# Patient Record
Sex: MALE | Race: ASIAN | NOT HISPANIC OR LATINO | URBAN - METROPOLITAN AREA
[De-identification: names, ages, dates, MRNs, and addresses within clinical notes are randomized per-mention and may not be internally consistent; named-entity substitution may affect disease eponyms.]

---

## 2022-07-06 ENCOUNTER — INPATIENT (INPATIENT)
Facility: HOSPITAL | Age: 82
LOS: 15 days | Discharge: PSYCHIATRIC OR SUBSTANCE USE FACILITY | End: 2022-07-22
Attending: INTERNAL MEDICINE | Admitting: INTERNAL MEDICINE

## 2022-07-06 VITALS
HEART RATE: 71 BPM | RESPIRATION RATE: 18 BRPM | DIASTOLIC BLOOD PRESSURE: 59 MMHG | OXYGEN SATURATION: 100 % | TEMPERATURE: 98 F | SYSTOLIC BLOOD PRESSURE: 113 MMHG

## 2022-07-06 DIAGNOSIS — E86.0 DEHYDRATION: ICD-10-CM

## 2022-07-06 LAB
ALBUMIN SERPL ELPH-MCNC: 4.4 G/DL — SIGNIFICANT CHANGE UP (ref 3.3–5)
ALP SERPL-CCNC: 42 U/L — SIGNIFICANT CHANGE UP (ref 40–120)
ALT FLD-CCNC: 13 U/L — SIGNIFICANT CHANGE UP (ref 4–41)
AMPHET UR-MCNC: NEGATIVE — SIGNIFICANT CHANGE UP
ANION GAP SERPL CALC-SCNC: 18 MMOL/L — HIGH (ref 7–14)
ANION GAP SERPL CALC-SCNC: 22 MMOL/L — HIGH (ref 7–14)
APAP SERPL-MCNC: <10 UG/ML — LOW (ref 15–25)
APPEARANCE UR: CLEAR — SIGNIFICANT CHANGE UP
AST SERPL-CCNC: 21 U/L — SIGNIFICANT CHANGE UP (ref 4–40)
BACTERIA # UR AUTO: NEGATIVE — SIGNIFICANT CHANGE UP
BARBITURATES UR SCN-MCNC: NEGATIVE — SIGNIFICANT CHANGE UP
BASOPHILS # BLD AUTO: 0.06 K/UL — SIGNIFICANT CHANGE UP (ref 0–0.2)
BASOPHILS NFR BLD AUTO: 0.9 % — SIGNIFICANT CHANGE UP (ref 0–2)
BENZODIAZ UR-MCNC: NEGATIVE — SIGNIFICANT CHANGE UP
BILIRUB SERPL-MCNC: 0.9 MG/DL — SIGNIFICANT CHANGE UP (ref 0.2–1.2)
BILIRUB UR-MCNC: ABNORMAL
BUN SERPL-MCNC: 26 MG/DL — HIGH (ref 7–23)
BUN SERPL-MCNC: 27 MG/DL — HIGH (ref 7–23)
CALCIUM SERPL-MCNC: 9.4 MG/DL — SIGNIFICANT CHANGE UP (ref 8.4–10.5)
CALCIUM SERPL-MCNC: 9.6 MG/DL — SIGNIFICANT CHANGE UP (ref 8.4–10.5)
CHLORIDE SERPL-SCNC: 100 MMOL/L — SIGNIFICANT CHANGE UP (ref 98–107)
CHLORIDE SERPL-SCNC: 97 MMOL/L — LOW (ref 98–107)
CO2 SERPL-SCNC: 18 MMOL/L — LOW (ref 22–31)
CO2 SERPL-SCNC: 20 MMOL/L — LOW (ref 22–31)
COCAINE METAB.OTHER UR-MCNC: NEGATIVE — SIGNIFICANT CHANGE UP
COLOR SPEC: YELLOW — SIGNIFICANT CHANGE UP
CREAT SERPL-MCNC: 1.04 MG/DL — SIGNIFICANT CHANGE UP (ref 0.5–1.3)
CREAT SERPL-MCNC: 1.12 MG/DL — SIGNIFICANT CHANGE UP (ref 0.5–1.3)
CREATININE URINE RESULT, DAU: 250 MG/DL — SIGNIFICANT CHANGE UP
DIFF PNL FLD: NEGATIVE — SIGNIFICANT CHANGE UP
EGFR: 66 ML/MIN/1.73M2 — SIGNIFICANT CHANGE UP
EGFR: 72 ML/MIN/1.73M2 — SIGNIFICANT CHANGE UP
EOSINOPHIL # BLD AUTO: 0.07 K/UL — SIGNIFICANT CHANGE UP (ref 0–0.5)
EOSINOPHIL NFR BLD AUTO: 1 % — SIGNIFICANT CHANGE UP (ref 0–6)
EPI CELLS # UR: 1 /HPF — SIGNIFICANT CHANGE UP (ref 0–5)
ETHANOL SERPL-MCNC: <10 MG/DL — SIGNIFICANT CHANGE UP
FLUAV AG NPH QL: SIGNIFICANT CHANGE UP
FLUBV AG NPH QL: SIGNIFICANT CHANGE UP
GLUCOSE BLDC GLUCOMTR-MCNC: 114 MG/DL — HIGH (ref 70–99)
GLUCOSE BLDC GLUCOMTR-MCNC: 119 MG/DL — HIGH (ref 70–99)
GLUCOSE BLDC GLUCOMTR-MCNC: 143 MG/DL — HIGH (ref 70–99)
GLUCOSE BLDC GLUCOMTR-MCNC: 146 MG/DL — HIGH (ref 70–99)
GLUCOSE BLDC GLUCOMTR-MCNC: 160 MG/DL — HIGH (ref 70–99)
GLUCOSE BLDC GLUCOMTR-MCNC: 73 MG/DL — SIGNIFICANT CHANGE UP (ref 70–99)
GLUCOSE BLDC GLUCOMTR-MCNC: 94 MG/DL — SIGNIFICANT CHANGE UP (ref 70–99)
GLUCOSE SERPL-MCNC: 82 MG/DL — SIGNIFICANT CHANGE UP (ref 70–99)
GLUCOSE SERPL-MCNC: 97 MG/DL — SIGNIFICANT CHANGE UP (ref 70–99)
GLUCOSE UR QL: NEGATIVE — SIGNIFICANT CHANGE UP
HCT VFR BLD CALC: 44.8 % — SIGNIFICANT CHANGE UP (ref 39–50)
HGB BLD-MCNC: 14.7 G/DL — SIGNIFICANT CHANGE UP (ref 13–17)
HYALINE CASTS # UR AUTO: 0 /LPF — SIGNIFICANT CHANGE UP (ref 0–7)
IANC: 5.36 K/UL — SIGNIFICANT CHANGE UP (ref 1.8–7.4)
IMM GRANULOCYTES NFR BLD AUTO: 0.3 % — SIGNIFICANT CHANGE UP (ref 0–1.5)
KETONES UR-MCNC: ABNORMAL
LEUKOCYTE ESTERASE UR-ACNC: NEGATIVE — SIGNIFICANT CHANGE UP
LYMPHOCYTES # BLD AUTO: 0.69 K/UL — LOW (ref 1–3.3)
LYMPHOCYTES # BLD AUTO: 9.9 % — LOW (ref 13–44)
MCHC RBC-ENTMCNC: 32.6 PG — SIGNIFICANT CHANGE UP (ref 27–34)
MCHC RBC-ENTMCNC: 32.8 GM/DL — SIGNIFICANT CHANGE UP (ref 32–36)
MCV RBC AUTO: 99.3 FL — SIGNIFICANT CHANGE UP (ref 80–100)
METHADONE UR-MCNC: NEGATIVE — SIGNIFICANT CHANGE UP
MONOCYTES # BLD AUTO: 0.75 K/UL — SIGNIFICANT CHANGE UP (ref 0–0.9)
MONOCYTES NFR BLD AUTO: 10.8 % — SIGNIFICANT CHANGE UP (ref 2–14)
NEUTROPHILS # BLD AUTO: 5.36 K/UL — SIGNIFICANT CHANGE UP (ref 1.8–7.4)
NEUTROPHILS NFR BLD AUTO: 77.1 % — HIGH (ref 43–77)
NITRITE UR-MCNC: NEGATIVE — SIGNIFICANT CHANGE UP
NRBC # BLD: 0 /100 WBCS — SIGNIFICANT CHANGE UP
NRBC # FLD: 0 K/UL — SIGNIFICANT CHANGE UP
OPIATES UR-MCNC: NEGATIVE — SIGNIFICANT CHANGE UP
OXYCODONE UR-MCNC: NEGATIVE — SIGNIFICANT CHANGE UP
PCP SPEC-MCNC: SIGNIFICANT CHANGE UP
PCP UR-MCNC: NEGATIVE — SIGNIFICANT CHANGE UP
PH UR: 5.5 — SIGNIFICANT CHANGE UP (ref 5–8)
PLATELET # BLD AUTO: 230 K/UL — SIGNIFICANT CHANGE UP (ref 150–400)
POTASSIUM SERPL-MCNC: 4.1 MMOL/L — SIGNIFICANT CHANGE UP (ref 3.5–5.3)
POTASSIUM SERPL-MCNC: 4.8 MMOL/L — SIGNIFICANT CHANGE UP (ref 3.5–5.3)
POTASSIUM SERPL-SCNC: 4.1 MMOL/L — SIGNIFICANT CHANGE UP (ref 3.5–5.3)
POTASSIUM SERPL-SCNC: 4.8 MMOL/L — SIGNIFICANT CHANGE UP (ref 3.5–5.3)
PROT SERPL-MCNC: 7.5 G/DL — SIGNIFICANT CHANGE UP (ref 6–8.3)
PROT UR-MCNC: ABNORMAL
RBC # BLD: 4.51 M/UL — SIGNIFICANT CHANGE UP (ref 4.2–5.8)
RBC # FLD: 12.9 % — SIGNIFICANT CHANGE UP (ref 10.3–14.5)
RBC CASTS # UR COMP ASSIST: 0 /HPF — SIGNIFICANT CHANGE UP (ref 0–4)
RSV RNA NPH QL NAA+NON-PROBE: SIGNIFICANT CHANGE UP
SALICYLATES SERPL-MCNC: <0.3 MG/DL — LOW (ref 15–30)
SARS-COV-2 RNA SPEC QL NAA+PROBE: SIGNIFICANT CHANGE UP
SODIUM SERPL-SCNC: 137 MMOL/L — SIGNIFICANT CHANGE UP (ref 135–145)
SODIUM SERPL-SCNC: 138 MMOL/L — SIGNIFICANT CHANGE UP (ref 135–145)
SP GR SPEC: 1.03 — SIGNIFICANT CHANGE UP (ref 1–1.05)
THC UR QL: NEGATIVE — SIGNIFICANT CHANGE UP
TOXICOLOGY SCREEN, DRUGS OF ABUSE, SERUM RESULT: SIGNIFICANT CHANGE UP
TSH SERPL-MCNC: 1.3 UIU/ML — SIGNIFICANT CHANGE UP (ref 0.27–4.2)
UROBILINOGEN FLD QL: ABNORMAL
WBC # BLD: 6.95 K/UL — SIGNIFICANT CHANGE UP (ref 3.8–10.5)
WBC # FLD AUTO: 6.95 K/UL — SIGNIFICANT CHANGE UP (ref 3.8–10.5)
WBC UR QL: 0 /HPF — SIGNIFICANT CHANGE UP (ref 0–5)

## 2022-07-06 PROCEDURE — 99285 EMERGENCY DEPT VISIT HI MDM: CPT | Mod: 25

## 2022-07-06 PROCEDURE — 70450 CT HEAD/BRAIN W/O DYE: CPT | Mod: 26,MA

## 2022-07-06 PROCEDURE — 93010 ELECTROCARDIOGRAM REPORT: CPT

## 2022-07-06 RX ORDER — AMLODIPINE BESYLATE 2.5 MG/1
10 TABLET ORAL DAILY
Refills: 0 | Status: DISCONTINUED | OUTPATIENT
Start: 2022-07-06 | End: 2022-07-16

## 2022-07-06 RX ORDER — BUDESONIDE AND FORMOTEROL FUMARATE DIHYDRATE 160; 4.5 UG/1; UG/1
2 AEROSOL RESPIRATORY (INHALATION)
Refills: 0 | Status: DISCONTINUED | OUTPATIENT
Start: 2022-07-06 | End: 2022-07-22

## 2022-07-06 RX ORDER — DEXTROSE 50 % IN WATER 50 %
25 SYRINGE (ML) INTRAVENOUS ONCE
Refills: 0 | Status: DISCONTINUED | OUTPATIENT
Start: 2022-07-06 | End: 2022-07-22

## 2022-07-06 RX ORDER — DEXTROSE 50 % IN WATER 50 %
15 SYRINGE (ML) INTRAVENOUS ONCE
Refills: 0 | Status: DISCONTINUED | OUTPATIENT
Start: 2022-07-06 | End: 2022-07-22

## 2022-07-06 RX ORDER — SODIUM CHLORIDE 9 MG/ML
1000 INJECTION, SOLUTION INTRAVENOUS ONCE
Refills: 0 | Status: COMPLETED | OUTPATIENT
Start: 2022-07-06 | End: 2022-07-06

## 2022-07-06 RX ORDER — SODIUM CHLORIDE 9 MG/ML
1000 INJECTION, SOLUTION INTRAVENOUS
Refills: 0 | Status: DISCONTINUED | OUTPATIENT
Start: 2022-07-06 | End: 2022-07-22

## 2022-07-06 RX ORDER — DEXTROSE 50 % IN WATER 50 %
12.5 SYRINGE (ML) INTRAVENOUS ONCE
Refills: 0 | Status: DISCONTINUED | OUTPATIENT
Start: 2022-07-06 | End: 2022-07-22

## 2022-07-06 RX ORDER — SODIUM CHLORIDE 9 MG/ML
1000 INJECTION, SOLUTION INTRAVENOUS
Refills: 0 | Status: DISCONTINUED | OUTPATIENT
Start: 2022-07-06 | End: 2022-07-20

## 2022-07-06 RX ORDER — DEXTROSE 50 % IN WATER 50 %
25 SYRINGE (ML) INTRAVENOUS ONCE
Refills: 0 | Status: DISCONTINUED | OUTPATIENT
Start: 2022-07-06 | End: 2022-07-06

## 2022-07-06 RX ORDER — ENOXAPARIN SODIUM 100 MG/ML
40 INJECTION SUBCUTANEOUS EVERY 24 HOURS
Refills: 0 | Status: DISCONTINUED | OUTPATIENT
Start: 2022-07-06 | End: 2022-07-22

## 2022-07-06 RX ORDER — GLUCAGON INJECTION, SOLUTION 0.5 MG/.1ML
1 INJECTION, SOLUTION SUBCUTANEOUS ONCE
Refills: 0 | Status: DISCONTINUED | OUTPATIENT
Start: 2022-07-06 | End: 2022-07-22

## 2022-07-06 RX ORDER — INSULIN LISPRO 100/ML
VIAL (ML) SUBCUTANEOUS
Refills: 0 | Status: DISCONTINUED | OUTPATIENT
Start: 2022-07-06 | End: 2022-07-22

## 2022-07-06 RX ADMIN — SODIUM CHLORIDE 1000 MILLILITER(S): 9 INJECTION, SOLUTION INTRAVENOUS at 05:40

## 2022-07-06 RX ADMIN — SODIUM CHLORIDE 1000 MILLILITER(S): 9 INJECTION, SOLUTION INTRAVENOUS at 02:07

## 2022-07-06 RX ADMIN — SODIUM CHLORIDE 75 MILLILITER(S): 9 INJECTION, SOLUTION INTRAVENOUS at 06:43

## 2022-07-06 RX ADMIN — Medication 0.5 MILLIGRAM(S): at 22:05

## 2022-07-06 RX ADMIN — BUDESONIDE AND FORMOTEROL FUMARATE DIHYDRATE 2 PUFF(S): 160; 4.5 AEROSOL RESPIRATORY (INHALATION) at 22:00

## 2022-07-06 RX ADMIN — SODIUM CHLORIDE 75 MILLILITER(S): 9 INJECTION, SOLUTION INTRAVENOUS at 22:30

## 2022-07-06 RX ADMIN — SODIUM CHLORIDE 1000 MILLILITER(S): 9 INJECTION, SOLUTION INTRAVENOUS at 06:40

## 2022-07-06 NOTE — ED PROVIDER NOTE - OBJECTIVE STATEMENT
82M PMH CHF, COPD, DM, dementia, schizophrenia, HTN sent in from [facility] for dehydration and SI. Per notes in chart that were sent from facility pt has not been eating and states he wants to kill himself. Pt currently states he has felt like this for the past 10 days. Not able to provide any other hx

## 2022-07-06 NOTE — BH CONSULTATION LIAISON ASSESSMENT NOTE - CURRENT MEDICATION
MEDICATIONS  (STANDING):  amLODIPine   Tablet 10 milliGRAM(s) Oral daily  budesonide  80 MICROgram(s)/formoterol 4.5 MICROgram(s) Inhaler 2 Puff(s) Inhalation two times a day  dextrose 5% + lactated ringers. 1000 milliLiter(s) (75 mL/Hr) IV Continuous <Continuous>  dextrose 5%. 1000 milliLiter(s) (100 mL/Hr) IV Continuous <Continuous>  dextrose 5%. 1000 milliLiter(s) (50 mL/Hr) IV Continuous <Continuous>  dextrose 50% Injectable 25 Gram(s) IV Push once  dextrose 50% Injectable 12.5 Gram(s) IV Push once  dextrose 50% Injectable 25 Gram(s) IV Push once  enoxaparin Injectable 40 milliGRAM(s) SubCutaneous every 24 hours  glucagon  Injectable 1 milliGRAM(s) IntraMuscular once  insulin lispro (ADMELOG) corrective regimen sliding scale   SubCutaneous three times a day before meals  LORazepam     Tablet 0.5 milliGRAM(s) Oral at bedtime  PARoxetine 20 milliGRAM(s) Oral daily    MEDICATIONS  (PRN):  dextrose Oral Gel 15 Gram(s) Oral once PRN Blood Glucose LESS THAN 70 milliGRAM(s)/deciliter

## 2022-07-06 NOTE — BH CONSULTATION LIAISON ASSESSMENT NOTE - RISK ASSESSMENT
risk: recent SI, recent SA?, hx schizophrenia  Protective: in treatment, at baseline compliant with medication, on GARCIA risk: recent SI, recent SA?, hx schizophrenia  Protective: in treatment, at baseline compliant with medication, on GARCIA, has a psychiatrist

## 2022-07-06 NOTE — BH CONSULTATION LIAISON ASSESSMENT NOTE - ATTENDING COMMENTS
Chart reviewed, pt. seen/evaluated on 7/7, I agree with above assessment/plan, pt. AAOX3, knows the current president, smiling/laughing at times, endorsing SI with plans to jump in the ocean, when asked about the stressors/mood, pt. started to stutter and unable to elaborate further, denies HI, denies AVH. Denies feeling unsafe in the hospital. Patient is aware that he takes Paxil and Ativan, unable to tell his psychiatrist name, reports he is  and has one son, echopraxia noted on exam, no other catatonic sxs. Plan as above, encourage PO intake, calorie count,  will follow

## 2022-07-06 NOTE — ED ADULT NURSE NOTE - OBJECTIVE STATEMENT
Pt. is an 82 y.o male who presents form [facility] for SI. Pt. A&Ox4 and non-amb. Pt. not providing information, only stating "I want to die, I want to commit suicide". Does not state if he has a plan. PCA at bedside within arms reach. Scene maintained for safety. FS low, juice provided per MD order.

## 2022-07-06 NOTE — BH CONSULTATION LIAISON ASSESSMENT NOTE - NSBHCHARTREVIEWLAB_PSY_A_CORE FT
14.7   6.95  )-----------( 230      ( 06 Jul 2022 02:29 )             44.8   07-06    138  |  100  |  26<H>  ----------------------------<  97  4.8   |  20<L>  |  1.04    Ca    9.4      06 Jul 2022 04:00    TPro  7.5  /  Alb  4.4  /  TBili  0.9  /  DBili  x   /  AST  21  /  ALT  13  /  AlkPhos  42  07-06

## 2022-07-06 NOTE — H&P ADULT - ASSESSMENT
82M PMH CHF, COPD, DM, dementia, schizophrenia, HTN sent in from [facility] for dehydration and SI. Per notes in chart that were sent from facility pt has not been eating and states he wants to kill himself. Pt currently states he has felt like this for the past 10 days. Not able to provide any other hx      82M PMH CHF, COPD, DM, dementia, schizophrenia, HTN sent in from [facility] for dehydration and SI. Per notes in chart that were sent from facility pt has not been eating and states he wants to kill himself. Pt currently states he has felt like this for the past 10 days. Not able to provide any other hx    1 Suicidal ideation  - psych consult pending  - will defer 1 to 1 decision on psych  - will monitor    2 Schizophrenia  - cw home meds    3 COPD  - cw nebs    4 DM  - monitor FS  - ISS    Lovenox for dvt prophylaxis

## 2022-07-06 NOTE — BH CONSULTATION LIAISON ASSESSMENT NOTE - SUMMARY
Patient is an 82M PMH CHF, COPD, DM, dementia, schizophrenia, HTN sent in from [facility] for dehydration and SI. Patient is a poor historian. Patient does report a PPHx of schizophrenia. Spoke with psychiatrist from [facility] Dr. Moeller [phone] who reports patient has a hx of schizophrenia - has requested to be on invega sustenna as he has done well on this medication in the past. Was given this medication about a year ago and has been doing well. Patient does have a "close catatonic episode" about a year ago, and this past monday, he was called by staff saying the patient was note eating, not speaking, not moving. Unsure if the ativan 0.5mg qhs is new as MD is not at a computer at this time.   Psychiatry was called for SI as patient reported not eating or drinking in attempt to kill self.    PLAN  - continue patient home medications - patient reporting being off medications for 2-3 days  -- Paxil 20mg daily, Ativan 0.5mg qhs  -- patient on Invega Sustenna 156mg - given on the 3rd of the month? as per paperwork  - PRN for agitation: Ativan 0.5mg q6hrs as patient with recent hx r/o catatonia   - can continue CO due to recent SI/ impulsivity   - CL will follow   Patient is an 82M PMH CHF, COPD, DM, dementia, schizophrenia, HTN sent in from [facility] for dehydration and SI. Patient is a poor historian. Patient does report a PPHx of schizophrenia. Spoke with psychiatrist from [facility] Dr. Moeller [phone] who reports patient has a hx of schizophrenia - has requested to be on invega sustenna as he has done well on this medication in the past. Was given this medication about a year ago and has been doing well. Patient does have a "close catatonic episode" about a year ago, and this past monday, he was called by staff saying the patient was note eating, not speaking, not moving. Unsure if the ativan 0.5mg qhs is new as MD is not at a computer at this time.   Psychiatry was called for SI as patient reported not eating or drinking in attempt to kill self.    PLAN  - continue patient home medications - patient reporting being off medications for 2-3 days  -- Paxil 20mg daily  ---Please discontinue Ativan 0.5mg qhs  ---start Ativan 0.5mg PO BID (7AM and 11AM) for possible ?catatonia   -- patient on Invega Sustenna 156mg - given on the 3rd of the month? as per paperwork (needs to clarify this as it unclear when was the last dose given)  - PRN for agitation: Ativan 0.5mg q6hrs as patient with recent hx r/o catatonia   - can continue CO due to recent SI/ impulsivity   - CL will follow   Patient is an 82M PMH CHF, COPD, DM, dementia, schizophrenia, HTN sent in from [facility] for dehydration and SI. Patient is a poor historian. Patient does report a PPHx of schizophrenia. Spoke with psychiatrist from [facility] Dr. Moeller [phone] who reports patient has a hx of schizophrenia - has requested to be on invega sustenna as he has done well on this medication in the past. Was given this medication about a year ago and has been doing well. Patient does have a "close catatonic episode" about a year ago, and this past monday, he was called by staff saying the patient was note eating, not speaking, not moving. Unsure if the ativan 0.5mg qhs is new as MD is not at a computer at this time.   Psychiatry was called for SI as patient reported not eating or drinking in attempt to kill self.    PLAN  - continue patient home medications - patient reporting being off medications for 2-3 days  -- Paxil 20mg daily  ---Give Ativan 0.5mg qhs x1 on 7/7  ---start Ativan 0.5mg PO BID from 7/8 (7AM and 11AM) for possible ?catatonia   -- patient on Invega Sustenna 156mg - given on the 3rd of the month? as per paperwork (needs to clarify this as it unclear when was the last dose given)  - PRN for agitation: Ativan 0.5mg q6hrs as patient with recent hx r/o catatonia   - can continue CO due to recent SI/ impulsivity   - CL will follow

## 2022-07-06 NOTE — H&P ADULT - HISTORY OF PRESENT ILLNESS
82M PMH CHF, COPD, DM, dementia, schizophrenia, HTN sent in from [facility] for dehydration and SI. Per notes in chart that were sent from facility pt has not been eating and states he wants to kill himself. Pt currently states he has felt like this for the past 10 days. Not able to provide any other hx

## 2022-07-06 NOTE — PATIENT PROFILE ADULT - FALL HARM RISK - HARM RISK INTERVENTIONS
Assistance with ambulation/Assistance OOB with selected safe patient handling equipment/Communicate Risk of Fall with Harm to all staff/Discuss with provider need for PT consult/Monitor gait and stability/Reinforce activity limits and safety measures with patient and family/Tailored Fall Risk Interventions/Visual Cue: Yellow wristband and red socks/Bed in lowest position, wheels locked, appropriate side rails in place/Call bell, personal items and telephone in reach/Instruct patient to call for assistance before getting out of bed or chair/Non-slip footwear when patient is out of bed/[place] to call system/Physically safe environment - no spills, clutter or unnecessary equipment/Purposeful Proactive Rounding/Room/bathroom lighting operational, light cord in reach

## 2022-07-06 NOTE — ED PROVIDER NOTE - NSICDXPASTMEDICALHX_GEN_ALL_CORE_FT
PAST MEDICAL HISTORY:  CHF (congestive heart failure)     COPD, severity to be determined     DM (diabetes mellitus)     HTN (hypertension)     Schizophrenia

## 2022-07-06 NOTE — ED ADULT NURSE NOTE - CHIEF COMPLAINT QUOTE
Arrives with EMS from [facility] for "failure to thrive, and he would like to kill himself." Patient appears cachectic in triage and is repeating, "I don't want to live." FS noted to be 67. PMHx schizophrenia, DM2. Copy of MOLST arrives with patient.

## 2022-07-06 NOTE — ED PROVIDER NOTE - PROGRESS NOTE DETAILS
Omar ENCINAS (PGY-3)  called phone no for nursing supervisor at facility. no answer. left call back # for collateral

## 2022-07-06 NOTE — ED PROVIDER NOTE - CLINICAL SUMMARY MEDICAL DECISION MAKING FREE TEXT BOX
82M PMH CHF, COPD, DM, dementia, schizophrenia, HTN sent in from [facility] for SI. VSS. Dry MM, lungs ctab, abd NT  Will order psych labs, tsh, ivf, ct head, reassess symptoms

## 2022-07-06 NOTE — BH CONSULTATION LIAISON ASSESSMENT NOTE - HPI (INCLUDE ILLNESS QUALITY, SEVERITY, DURATION, TIMING, CONTEXT, MODIFYING FACTORS, ASSOCIATED SIGNS AND SYMPTOMS)
Patient is an 82M PMH CHF, COPD, DM, dementia, schizophrenia, HTN sent in from [facility] for dehydration and SI. Patient is a poor historian. Patient does report a PPHx of schizophrenia. Spoke with psychiatrist from [facility] Dr. Moeller [phone] who reports patient has a hx of schizophrenia - has requested to be on invega sustenna as he has done well on this medication in the past. Was given this medication about a year ago and has been doing well. Patient does have a "close catatonic episode" about a year ago, and this past monday, he was called by staff saying the patient was note eating, not speaking, not moving. Unsure if the ativan 0.5mg qhs is new as MD is not at a computer at this time.   Psychiatry was called for SI as patient reported not eating or drinking in attempt to kill self.    Patient was seen and assessed at bedside. Patient with good eye contact, smiling, laughing with provider. Patient is alert, aware he is in the hospital, states it is July 7th. Patient reports being here as he is experiencing "a lot of pressure from the nursing home."  Patient begins to stutter with inability to further elaborate on the stressors at his NH. Patient denies feeling as if he is in danger. Denies paranoia. no AH or VH reported. Patient does report having SI. He states 3 days ago he filled his sink and tried to drown himself. He also reports he stopped eating to kill himself, but on today's exam he requests food and as per RN patient did eat today. Patient unable to say what has changed.      Patient not scoring on bush rosmery at this time. WIll monitor.

## 2022-07-06 NOTE — ED ADULT NURSE NOTE - NSIMPLEMENTINTERV_GEN_ALL_ED
Implemented All Fall with Harm Risk Interventions:  [place] to call system. Call bell, personal items and telephone within reach. Instruct patient to call for assistance. Room bathroom lighting operational. Non-slip footwear when patient is off stretcher. Physically safe environment: no spills, clutter or unnecessary equipment. Stretcher in lowest position, wheels locked, appropriate side rails in place. Provide visual cue, wrist band, yellow gown, etc. Monitor gait and stability. Monitor for mental status changes and reorient to person, place, and time. Review medications for side effects contributing to fall risk. Reinforce activity limits and safety measures with patient and family. Provide visual clues: red socks.

## 2022-07-06 NOTE — ED ADULT TRIAGE NOTE - CHIEF COMPLAINT QUOTE
Arrives with EMS from [facility] for "failure to thrive, and he would like to kill himself." Patient appears cachectic in triage and is repeating, "I don't want to live." FS noted to be 67. PMHx schizophrenia, DM2. Copy of MOLST arrives with patient.

## 2022-07-06 NOTE — ED PROVIDER NOTE - ATTENDING CONTRIBUTION TO CARE
81 y/o M with h/o CHF, COPD, DM, dementia, schizophrenia, HTN from NH for FTT.  Per NH records, he has not been eating for 3-4 days (refusing food and water) and making suicidal statements.  Pt states that he wants to die.  He does not have a specific plan, but states he has felt this was for 10 days.  Denies HI/AVH.  No fever, pain, vomiting, diarrhea.  Thin, elderly male, poorly nourished, lying in stretcher, awake and alert, nontoxic.  AF/VSS.  Dry mucosa.  Lungs cta bl.  Cards nl S1/S2, RRR, no MRG.  Abd soft ntnd.  No focal neuro deficits.  (+)Pt endorsing suicidal ideations, no plan; no obvious psychomotor retardation; does appear internally preoccupied, answering questions appropriately and follows commands.  Plan for ekg, labs incl tox, ua, ct head - FTT 2/2 dementia vs underlying psychiatric illness, pt does not display any signs of catatonia.  Unclear if decreased PO intake is 2/2 suicidal intent or if there is other underlying etiology (infection, cns lesion, metabolic disturbance).  Will start IV hydrtion for clinical dehydration, correct electrolytes prn, admit med for stabilization and clearance prior to psych (keep on CO given expressed suicidality).

## 2022-07-06 NOTE — H&P ADULT - NSHPPHYSICALEXAM_GEN_ALL_CORE
General:  NAD  PERRLA  Neurology: nonfocal  Respiratory: CTA B/L  CV: RRR, S1S2, no murmurs, rubs or gallops  Abdominal: Soft, NT, ND +BS, Last BM  Extremities: No edema, + peripheral pulses  Skin Normal

## 2022-07-06 NOTE — BH CONSULTATION LIAISON ASSESSMENT NOTE - NSBHCHARTREVIEWVS_PSY_A_CORE FT
Vital Signs Last 24 Hrs  T(C): 36.9 (06 Jul 2022 08:52), Max: 37.2 (06 Jul 2022 05:21)  T(F): 98.4 (06 Jul 2022 08:52), Max: 99 (06 Jul 2022 05:21)  HR: 55 (06 Jul 2022 08:52) (51 - 71)  BP: 142/71 (06 Jul 2022 08:52) (113/59 - 148/69)  BP(mean): --  RR: 18 (06 Jul 2022 08:52) (18 - 20)  SpO2: 100% (06 Jul 2022 08:52) (98% - 100%)

## 2022-07-06 NOTE — ED PROVIDER NOTE - PHYSICAL EXAMINATION
Physical Exam:  Gen: cachectic, awake alert   HEENT:  normal conjunctiva, oral mucosa dry  Lung: CTAB, no respiratory distress, no wheezes/rhonchi/rales B/L, speaking in full sentences  CV: RRR  Abd: soft, NT, ND, no guarding, no rigidity, no rebound tenderness  MSK: no visible deformities  Skin: Warm, well perfused, no rash, no leg swelling  ~King Nelson MD (PGY-3)

## 2022-07-06 NOTE — CONSULT NOTE ADULT - ASSESSMENT
82M with CHF, COPD, DM, dementia, schizophrenia, HTN sent in from [facility] for dehydration and SI.  CTH unremarkable   Utox neg     Impression: AMS possible 2/2 toxic metabolic encephalopathy vs related to underlying psychiatric condition   - psych f/u for SI. 1:1?  - b12, RPR, TSH   - on paroxetine 20 daily and lorazepam 0.5 qhs   - no need for further brain imaging at this time    - check FS, glucose control <180  - GI/DVT ppx  - Counseling on diet, exercise, and medication adherence was done  - Counseling on smoking cessation and alcohol consumption offered when appropriate.  - Pain assessed and judicious use of narcotics when appropriate was discussed.    - Stroke education given when appropriate.  - Importance of fall prevention discussed.   - Differential diagnosis and plan of care discussed with patient and/or family and primary team  - Thank you for allowing me to participate in the care of this patient. Call with questions.   David Francisco MD  Vascular Neurology  Office: [phone]

## 2022-07-06 NOTE — H&P ADULT - NSHPLABSRESULTS_GEN_ALL_CORE
Lab Results:  CBC  CBC Full  -  ( 2022 02:29 )  WBC Count : 6.95 K/uL  RBC Count : 4.51 M/uL  Hemoglobin : 14.7 g/dL  Hematocrit : 44.8 %  Platelet Count - Automated : 230 K/uL  Mean Cell Volume : 99.3 fL  Mean Cell Hemoglobin : 32.6 pg  Mean Cell Hemoglobin Concentration : 32.8 gm/dL  Auto Neutrophil # : 5.36 K/uL  Auto Lymphocyte # : 0.69 K/uL  Auto Monocyte # : 0.75 K/uL  Auto Eosinophil # : 0.07 K/uL  Auto Basophil # : 0.06 K/uL  Auto Neutrophil % : 77.1 %  Auto Lymphocyte % : 9.9 %  Auto Monocyte % : 10.8 %  Auto Eosinophil % : 1.0 %  Auto Basophil % : 0.9 %    .		Differential:	[] Automated		[] Manual  Chemistry                        14.7   6.95  )-----------( 230      ( 2022 02:29 )             44.8     07-06    138  |  100  |  26<H>  ----------------------------<  97  4.8   |  20<L>  |  1.04    Ca    9.4      2022 04:00    TPro  7.5  /  Alb  4.4  /  TBili  0.9  /  DBili  x   /  AST  21  /  ALT  13  /  AlkPhos  42  07-06    LIVER FUNCTIONS - ( 2022 02:29 )  Alb: 4.4 g/dL / Pro: 7.5 g/dL / ALK PHOS: 42 U/L / ALT: 13 U/L / AST: 21 U/L / GGT: x             Urinalysis Basic - ( 2022 04:57 )    Color: Yellow / Appearance: Clear / S.029 / pH: x  Gluc: x / Ketone: Large  / Bili: Small / Urobili: 3 mg/dL   Blood: x / Protein: 30 mg/dL / Nitrite: Negative   Leuk Esterase: Negative / RBC: 0 /HPF / WBC 0 /HPF   Sq Epi: x / Non Sq Epi: 1 /HPF / Bacteria: Negative            MICROBIOLOGY/CULTURES:      RADIOLOGY RESULTS: reviewed

## 2022-07-07 LAB
A1C WITH ESTIMATED AVERAGE GLUCOSE RESULT: 5.8 % — HIGH (ref 4–5.6)
ESTIMATED AVERAGE GLUCOSE: 120 — SIGNIFICANT CHANGE UP
GLUCOSE BLDC GLUCOMTR-MCNC: 135 MG/DL — HIGH (ref 70–99)
GLUCOSE BLDC GLUCOMTR-MCNC: 135 MG/DL — HIGH (ref 70–99)
GLUCOSE BLDC GLUCOMTR-MCNC: 144 MG/DL — HIGH (ref 70–99)

## 2022-07-07 PROCEDURE — 99223 1ST HOSP IP/OBS HIGH 75: CPT

## 2022-07-07 RX ADMIN — Medication 0.5 MILLIGRAM(S): at 22:49

## 2022-07-07 RX ADMIN — BUDESONIDE AND FORMOTEROL FUMARATE DIHYDRATE 2 PUFF(S): 160; 4.5 AEROSOL RESPIRATORY (INHALATION) at 21:50

## 2022-07-07 RX ADMIN — ENOXAPARIN SODIUM 40 MILLIGRAM(S): 100 INJECTION SUBCUTANEOUS at 05:22

## 2022-07-07 RX ADMIN — AMLODIPINE BESYLATE 10 MILLIGRAM(S): 2.5 TABLET ORAL at 05:23

## 2022-07-07 RX ADMIN — BUDESONIDE AND FORMOTEROL FUMARATE DIHYDRATE 2 PUFF(S): 160; 4.5 AEROSOL RESPIRATORY (INHALATION) at 08:29

## 2022-07-07 RX ADMIN — Medication 20 MILLIGRAM(S): at 11:30

## 2022-07-07 NOTE — CONSULT NOTE ADULT - SUBJECTIVE AND OBJECTIVE BOX
Neurology Consult    Reason for Consult: Patient is a 82y old  Male who presents with a chief complaint of SI (2022 09:10) AMS       HPI:   82M with CHF, COPD, DM, dementia, schizophrenia, HTN sent in from [facility] for dehydration and SI. Per notes in chart that were sent from facility pt has not been eating and states he wants to kill himself. Pt currently states he has felt like this for the past 10 days. Not able to provide any other hx (2022 09:10)       PAST MEDICAL & SURGICAL HISTORY:  HTN (hypertension)      COPD, severity to be determined      DM (diabetes mellitus)      Schizophrenia      CHF (congestive heart failure)      No significant past surgical history          Allergies: Allergies    Allergy Status Unknown    Intolerances        Social History: Denies toxic habits including tobacco, ETOH or illicit drugs.    Family History: FAMILY HISTORY:  No pertinent family history in first degree relatives    . No family history of strokes    Medications: MEDICATIONS  (STANDING):  amLODIPine   Tablet 10 milliGRAM(s) Oral daily  budesonide  80 MICROgram(s)/formoterol 4.5 MICROgram(s) Inhaler 2 Puff(s) Inhalation two times a day  dextrose 5% + lactated ringers. 1000 milliLiter(s) (75 mL/Hr) IV Continuous <Continuous>  dextrose 5%. 1000 milliLiter(s) (100 mL/Hr) IV Continuous <Continuous>  dextrose 5%. 1000 milliLiter(s) (50 mL/Hr) IV Continuous <Continuous>  dextrose 50% Injectable 25 Gram(s) IV Push once  dextrose 50% Injectable 12.5 Gram(s) IV Push once  dextrose 50% Injectable 25 Gram(s) IV Push once  enoxaparin Injectable 40 milliGRAM(s) SubCutaneous every 24 hours  glucagon  Injectable 1 milliGRAM(s) IntraMuscular once  insulin lispro (ADMELOG) corrective regimen sliding scale   SubCutaneous three times a day before meals  LORazepam     Tablet 0.5 milliGRAM(s) Oral at bedtime  PARoxetine 20 milliGRAM(s) Oral daily    MEDICATIONS  (PRN):  dextrose Oral Gel 15 Gram(s) Oral once PRN Blood Glucose LESS THAN 70 milliGRAM(s)/deciliter      Review of Systems:  CONSTITUTIONAL:  No weight loss, fever, chills, weakness or fatigue.  HEENT:  Eyes:  No visual loss, blurred vision, double vision or yellow sclera. Ears, Nose, Throat:  No hearing loss, sneezing, congestion, runny nose or sore throat.  SKIN:  No rash or itching.  CARDIOVASCULAR:  No chest pain, chest pressure or chest discomfort. No palpitations or edema.  RESPIRATORY:  No shortness of breath, cough or sputum.  GASTROINTESTINAL:  No anorexia, nausea, vomiting or diarrhea. No abdominal pain or blood.  GENITOURINARY:  No burning on urination or incontinence   NEUROLOGICAL:  No headache, dizziness, syncope, paralysis, ataxia, numbness or tingling in the extremities. No change in bowel or bladder control. no limb weakness. no vision changes.   MUSCULOSKELETAL:  No muscle, back pain, joint pain or stiffness.  HEMATOLOGIC:  No anemia, bleeding or bruising.  LYMPHATICS:  No enlarged nodes. No history of splenectomy.  PSYCHIATRIC:  No history of depression or anxiety.  ENDOCRINOLOGIC:  No reports of sweating, cold or heat intolerance. No polyuria or polydipsia.      Vitals:  Vital Signs Last 24 Hrs  T(C): 36.7 (2022 18:30), Max: 37.2 (2022 05:21)  T(F): 98 (2022 18:30), Max: 99 (2022 05:21)  HR: 60 (2022 18:30) (51 - 71)  BP: 137/92 (2022 18:30) (113/59 - 148/69)  BP(mean): --  RR: 18 (2022 18:30) (18 - 20)  SpO2: 100% (2022 18:30) (98% - 100%)    General Exam:   General Appearance: Appropriately dressed and in no acute distress       Head: Normocephalic, atraumatic and no dysmorphic features  Ear, Nose, and Throat: Moist mucous membranes  CVS: S1S2+  Resp: No SOB, no wheeze or rhonchi  GI: soft NT/ND  Extremities: No edema or cyanosis  Skin: No bruises or rashes     Neurological Exam:  Mental Status: Awake, alert and oriented x 2-3.  Able to follow simple and complex verbal commands. Able to name and repeat. fluent speech. No obvious aphasia or dysarthria noted.   Cranial Nerves: PERRL, EOMI, VFFC, sensation V1-V3 intact,  no obvious facial asymmetry, equal elevation of palate, scm/trap 5/5, tongue is midline on protrusion. no obvious papilledema on fundoscopic exam. hearing is grossly intact.   Motor: generlzied weakness but at least 4+/5   Sensation: Intact to light touch and pinprick throughout. no right/left confusion. no extinction to tactile on DSS.   Reflexes: 1+ throughout at biceps, brachioradialis, triceps, patellars and ankles bilaterally and equal. No clonus. R toe and L toe were both downgoing.  Coordination: No dysmetria on FNF    Gait: deferred in DED     Data/Labs/Imaging which I personally reviewed.     Labs:     CBC Full  -  ( 2022 02:29 )  WBC Count : 6.95 K/uL  RBC Count : 4.51 M/uL  Hemoglobin : 14.7 g/dL  Hematocrit : 44.8 %  Platelet Count - Automated : 230 K/uL  Mean Cell Volume : 99.3 fL  Mean Cell Hemoglobin : 32.6 pg  Mean Cell Hemoglobin Concentration : 32.8 gm/dL  Auto Neutrophil # : 5.36 K/uL  Auto Lymphocyte # : 0.69 K/uL  Auto Monocyte # : 0.75 K/uL  Auto Eosinophil # : 0.07 K/uL  Auto Basophil # : 0.06 K/uL  Auto Neutrophil % : 77.1 %  Auto Lymphocyte % : 9.9 %  Auto Monocyte % : 10.8 %  Auto Eosinophil % : 1.0 %  Auto Basophil % : 0.9 %    07-    138  |  100  |  26<H>  ----------------------------<  97  4.8   |  20<L>  |  1.04    Ca    9.4      2022 04:00    TPro  7.5  /  Alb  4.4  /  TBili  0.9  /  DBili  x   /  AST  21  /  ALT  13  /  AlkPhos  42  07-06    LIVER FUNCTIONS - ( 2022 02:29 )  Alb: 4.4 g/dL / Pro: 7.5 g/dL / ALK PHOS: 42 U/L / ALT: 13 U/L / AST: 21 U/L / GGT: x             Urinalysis Basic - ( 2022 04:57 )    Color: Yellow / Appearance: Clear / S.029 / pH: x  Gluc: x / Ketone: Large  / Bili: Small / Urobili: 3 mg/dL   Blood: x / Protein: 30 mg/dL / Nitrite: Negative   Leuk Esterase: Negative / RBC: 0 /HPF / WBC 0 /HPF   Sq Epi: x / Non Sq Epi: 1 /HPF / Bacteria: Negative        < from: CT Head No Cont (22 @ 03:46) >    ACC: [id number] EXAM:  CT BRAIN                          PROCEDURE DATE:  2022          INTERPRETATION:  CLINICAL INDICATION:  Altered mental status    TECHNIQUE : Axial CT scanning of the brain was obtained from the skull   base to the vertex without the administration of intravenous contrast.   Coronal and sagittal reformatted images were subsequently obtained.    COMPARISON: No prior imaging available for comparison.    FINDINGS:  No acute intracranial hemorrhage or suspicious extra-axial fluid   collection. No focal edema or acute mass effect. No hydrocephalus.   Basilar cisterns remain clear. Senescent changes are compatible with the   patient's age.    The scalp and imaged midfacial soft tissues are unremarkable. Calvarium   is intact. Minimal paranasal sinus mucosal thickening. Mastoid air cells   and middle ear cavities are clear.    IMPRESSION:  Unremarkable, unenhanced CT head for age.    --- End of Report ---          PETRA ABEBE MD; Resident Interventional Radiology  Thisdocument has been electronically signed.  SAMANTHA MITCHELL MD; Attending Radiologist  This document has been electronically signed. 2022  4:40AM    < end of copied text >    
Date of service: 07/07/22    Requesting Physician : Dr. Browne     Reason for Consultation: Abnormal ECG     HISTORY OF PRESENT ILLNESS: HPI:   82M PMH CHF, COPD, DM, dementia, schizophrenia, HTN sent in from [facility] for dehydration and SI. Per notes in chart that were sent from facility, the pt has not been eating and stated he wants to kill himself. He was admitted and underwent ECG demonstrating non-specific T wave changes.  Cardiology consulted to further evaluate.  The patient denies chest pain or anginal symptoms.  Pt. is a poor historian but offers no complaints upon evaluation.        PAST MEDICAL & SURGICAL HISTORY:  HTN (hypertension)      COPD, severity to be determined      DM (diabetes mellitus)      Schizophrenia      CHF (congestive heart failure)      No significant past surgical history              MEDICATIONS:  MEDICATIONS  (STANDING):  amLODIPine   Tablet 10 milliGRAM(s) Oral daily  budesonide  80 MICROgram(s)/formoterol 4.5 MICROgram(s) Inhaler 2 Puff(s) Inhalation two times a day  dextrose 5% + lactated ringers. 1000 milliLiter(s) (75 mL/Hr) IV Continuous <Continuous>  dextrose 5%. 1000 milliLiter(s) (100 mL/Hr) IV Continuous <Continuous>  dextrose 5%. 1000 milliLiter(s) (50 mL/Hr) IV Continuous <Continuous>  dextrose 50% Injectable 25 Gram(s) IV Push once  dextrose 50% Injectable 12.5 Gram(s) IV Push once  dextrose 50% Injectable 25 Gram(s) IV Push once  enoxaparin Injectable 40 milliGRAM(s) SubCutaneous every 24 hours  glucagon  Injectable 1 milliGRAM(s) IntraMuscular once  insulin lispro (ADMELOG) corrective regimen sliding scale   SubCutaneous three times a day before meals  LORazepam     Tablet 0.5 milliGRAM(s) Oral at bedtime  PARoxetine 20 milliGRAM(s) Oral daily      Allergies    Allergy Status Unknown    Intolerances        FAMILY HISTORY:  No pertinent family history in first degree relatives      Non-contributary for premature coronary disease or sudden cardiac death    SOCIAL HISTORY:    [x ] Non-smoker  [ ] Smoker  [ ] Alcohol      REVIEW OF SYSTEMS:  [ ]chest pain  [  ]shortness of breath  [  ]palpitations  [  ]syncope  [ ]near syncope [ ]upper extremity weakness   [ ] lower extremity weakness  [  ]diplopia  [  ]altered mental status   [  ]fevers  [ ]chills [ ]nausea  [ ]vomitting  [  ]dysphagia    [ ]abdominal pain  [ ]melena  [ ]BRBPR    [  ]epistaxis  [  ]rash    [ ]lower extremity edema        [x ] All others negative	  [ ] Unable to obtain    PHYSICAL EXAM:  T(C): 36.6 (07-07-22 @ 12:53), Max: 37 (07-07-22 @ 05:00)  HR: 64 (07-07-22 @ 12:53) (60 - 76)  BP: 135/73 (07-07-22 @ 12:53) (135/73 - 154/78)  RR: 18 (07-07-22 @ 12:53) (16 - 18)  SpO2: 100% (07-07-22 @ 12:53) (97% - 100%)  Wt(kg): --  I&O's Summary    06 Jul 2022 07:01  -  07 Jul 2022 07:00  --------------------------------------------------------  IN: 0 mL / OUT: 675 mL / NET: -675 mL          HEENT:   Normal oral mucosa, PERRL, EOMI	  Lymphatic: No lymphadenopathy , no edema  Cardiovascular: Normal S1 S2, No JVD, No murmurs , Peripheral pulses palpable 2+ bilaterally  Respiratory: Lungs clear to auscultation, normal effort 	  Gastrointestinal:  Soft, Non-tender, + BS	  Skin: No rashes, No ecchymoses, No cyanosis, warm to touch        TELEMETRY: 	    ECG: SR, non-specific T wave changes, QTc 437 	  RADIOLOGY:  OTHER:     DIAGNOSTIC TESTING:  [ ] Echocardiogram:   [ ]  Catheterization:  [ ] Stress Test:    	  	  LABS:	 	    CARDIAC MARKERS:                              14.7   6.95  )-----------( 230      ( 06 Jul 2022 02:29 )             44.8     07-06    138  |  100  |  26<H>  ----------------------------<  97  4.8   |  20<L>  |  1.04    Ca    9.4      06 Jul 2022 04:00    TPro  7.5  /  Alb  4.4  /  TBili  0.9  /  DBili  x   /  AST  21  /  ALT  13  /  AlkPhos  42  07-06    proBNP:   Lipid Profile:   HgA1c:   TSH:     ASSESSMENT/PLAN: 82M PMH CHF, COPD, DM, dementia, schizophrenia, HTN sent in from [facility] for dehydration and SI.     -ecg with non-specific t wave changes  -given no anginal symptoms, no further workup indicated at this time   -pt. with no clinical heart failure   -QTc normal - if QTc prolonging medications are to be used in the future, would monitor QTc with serial ECG  -follow up psych and neurology     Deirdre Jalloh MD

## 2022-07-08 LAB
ANION GAP SERPL CALC-SCNC: 10 MMOL/L — SIGNIFICANT CHANGE UP (ref 7–14)
BASOPHILS # BLD AUTO: 0.05 K/UL — SIGNIFICANT CHANGE UP (ref 0–0.2)
BASOPHILS NFR BLD AUTO: 1.1 % — SIGNIFICANT CHANGE UP (ref 0–2)
BUN SERPL-MCNC: 6 MG/DL — LOW (ref 7–23)
CALCIUM SERPL-MCNC: 9.1 MG/DL — SIGNIFICANT CHANGE UP (ref 8.4–10.5)
CHLORIDE SERPL-SCNC: 99 MMOL/L — SIGNIFICANT CHANGE UP (ref 98–107)
CO2 SERPL-SCNC: 28 MMOL/L — SIGNIFICANT CHANGE UP (ref 22–31)
CREAT SERPL-MCNC: 0.79 MG/DL — SIGNIFICANT CHANGE UP (ref 0.5–1.3)
EGFR: 89 ML/MIN/1.73M2 — SIGNIFICANT CHANGE UP
EOSINOPHIL # BLD AUTO: 0.17 K/UL — SIGNIFICANT CHANGE UP (ref 0–0.5)
EOSINOPHIL NFR BLD AUTO: 3.6 % — SIGNIFICANT CHANGE UP (ref 0–6)
GLUCOSE BLDC GLUCOMTR-MCNC: 110 MG/DL — HIGH (ref 70–99)
GLUCOSE BLDC GLUCOMTR-MCNC: 121 MG/DL — HIGH (ref 70–99)
GLUCOSE BLDC GLUCOMTR-MCNC: 131 MG/DL — HIGH (ref 70–99)
GLUCOSE BLDC GLUCOMTR-MCNC: 139 MG/DL — HIGH (ref 70–99)
GLUCOSE SERPL-MCNC: 134 MG/DL — HIGH (ref 70–99)
HCT VFR BLD CALC: 41.7 % — SIGNIFICANT CHANGE UP (ref 39–50)
HGB BLD-MCNC: 14.6 G/DL — SIGNIFICANT CHANGE UP (ref 13–17)
IANC: 3.11 K/UL — SIGNIFICANT CHANGE UP (ref 1.8–7.4)
IMM GRANULOCYTES NFR BLD AUTO: 0 % — SIGNIFICANT CHANGE UP (ref 0–1.5)
LYMPHOCYTES # BLD AUTO: 0.78 K/UL — LOW (ref 1–3.3)
LYMPHOCYTES # BLD AUTO: 16.4 % — SIGNIFICANT CHANGE UP (ref 13–44)
MAGNESIUM SERPL-MCNC: 1.9 MG/DL — SIGNIFICANT CHANGE UP (ref 1.6–2.6)
MCHC RBC-ENTMCNC: 33.1 PG — SIGNIFICANT CHANGE UP (ref 27–34)
MCHC RBC-ENTMCNC: 35 GM/DL — SIGNIFICANT CHANGE UP (ref 32–36)
MCV RBC AUTO: 94.6 FL — SIGNIFICANT CHANGE UP (ref 80–100)
MONOCYTES # BLD AUTO: 0.65 K/UL — SIGNIFICANT CHANGE UP (ref 0–0.9)
MONOCYTES NFR BLD AUTO: 13.7 % — SIGNIFICANT CHANGE UP (ref 2–14)
NEUTROPHILS # BLD AUTO: 3.11 K/UL — SIGNIFICANT CHANGE UP (ref 1.8–7.4)
NEUTROPHILS NFR BLD AUTO: 65.2 % — SIGNIFICANT CHANGE UP (ref 43–77)
NRBC # BLD: 0 /100 WBCS — SIGNIFICANT CHANGE UP
NRBC # FLD: 0 K/UL — SIGNIFICANT CHANGE UP
PHOSPHATE SERPL-MCNC: 3.3 MG/DL — SIGNIFICANT CHANGE UP (ref 2.5–4.5)
PLATELET # BLD AUTO: 205 K/UL — SIGNIFICANT CHANGE UP (ref 150–400)
POTASSIUM SERPL-MCNC: 3.6 MMOL/L — SIGNIFICANT CHANGE UP (ref 3.5–5.3)
POTASSIUM SERPL-SCNC: 3.6 MMOL/L — SIGNIFICANT CHANGE UP (ref 3.5–5.3)
RBC # BLD: 4.41 M/UL — SIGNIFICANT CHANGE UP (ref 4.2–5.8)
RBC # FLD: 12.9 % — SIGNIFICANT CHANGE UP (ref 10.3–14.5)
SODIUM SERPL-SCNC: 137 MMOL/L — SIGNIFICANT CHANGE UP (ref 135–145)
T PALLIDUM AB TITR SER: NEGATIVE — SIGNIFICANT CHANGE UP
TSH SERPL-MCNC: 2.44 UIU/ML — SIGNIFICANT CHANGE UP (ref 0.27–4.2)
VIT B12 SERPL-MCNC: 744 PG/ML — SIGNIFICANT CHANGE UP (ref 200–900)
WBC # BLD: 4.76 K/UL — SIGNIFICANT CHANGE UP (ref 3.8–10.5)
WBC # FLD AUTO: 4.76 K/UL — SIGNIFICANT CHANGE UP (ref 3.8–10.5)

## 2022-07-08 PROCEDURE — 99233 SBSQ HOSP IP/OBS HIGH 50: CPT

## 2022-07-08 RX ADMIN — Medication 0.5 MILLIGRAM(S): at 06:04

## 2022-07-08 RX ADMIN — Medication 20 MILLIGRAM(S): at 11:50

## 2022-07-08 RX ADMIN — ENOXAPARIN SODIUM 40 MILLIGRAM(S): 100 INJECTION SUBCUTANEOUS at 06:02

## 2022-07-08 RX ADMIN — BUDESONIDE AND FORMOTEROL FUMARATE DIHYDRATE 2 PUFF(S): 160; 4.5 AEROSOL RESPIRATORY (INHALATION) at 21:01

## 2022-07-08 RX ADMIN — AMLODIPINE BESYLATE 10 MILLIGRAM(S): 2.5 TABLET ORAL at 06:03

## 2022-07-08 NOTE — BH CONSULTATION LIAISON PROGRESS NOTE - NSBHASSESSMENTFT_PSY_ALL_CORE
Patient is an 82M PMH CHF, COPD, DM, dementia, schizophrenia, HTN sent in from [facility] for dehydration and SI. Patient is a poor historian. Patient does report a PPHx of schizophrenia. Spoke with [facility] from [facility] Dr. Moeller [phone] who reports patient has a hx of schizophrenia - has requested to be on invega sustenna as he has done well on this medication in the past. Was given this medication about a year ago and has been doing well. Patient does have a "close catatonic episode" about a year ago, and this past monday, he was called by staff saying the patient was note eating, not speaking, not moving. Unsure if the ativan 0.5mg qhs is new as MD is not at a computer at this time. Psychiatry was called for SI as patient reported not eating or drinking in attempt to kill self.    7/8: AAOX3, cooperative, continues to have SI with plans, unable to report any specific stressors, +psychomotor retardation, likely thought blocked, however  no significant catatonic sxs noted on exam.       PLAN  - continue patient home medications:   -- Paxil 20mg daily  ---Continue Ativan 0.5mg PO BID (7AM and 11AM) for possible ?catatonia (started today), will monitor/titrate accordingly   - patient on Invega Sustenna IM 156mg - given on the 3rd of the month? as per paperwork (needs to clarify this as it unclear when was the last dose given)  - PRN for agitation: Ativan 0.5mg q6hrs as patient with recent hx r/o catatonia   -  continue CO due to recent SI with plans/ impulsivity   - Needs to confirm when last GARCIA given : psychiatrist from [facility] Dr. Moeller [phone]  -Encourage PO intake, calorie count  -Dispo: Pt. will need an inpatient psychiatric admission once he consistently starts eating and medically optimized. 2PC in chart.   - CL will  Patient is an 82M PMH CHF, COPD, DM, dementia, schizophrenia, HTN sent in from [facility] for dehydration and SI. Patient is a poor historian. Patient does report a PPHx of schizophrenia. Spoke with psychiatrist from [facility] Dr. Moeller [phone] who reports patient has a hx of schizophrenia - has requested to be on invega sustenna as he has done well on this medication in the past. Was given this medication about a year ago and has been doing well. Patient does have a "close catatonic episode" about a year ago, and this past monday, he was called by staff saying the patient was note eating, not speaking, not moving. Unsure if the ativan 0.5mg qhs is new as MD is not at a computer at this time. Psychiatry was called for SI as patient reported not eating or drinking in attempt to kill self.    7/8: AAOX3, cooperative, continues to have SI with plans, unable to report any specific stressors, +psychomotor retardation, likely thought blocked, however  no significant catatonic sxs noted on exam.       PLAN  - continue patient home medications:   -- Paxil 20mg daily  ---Continue Ativan 0.5mg PO BID (7AM and 11AM) for possible ?catatonia (started today), will monitor/titrate accordingly   - patient on Invega Sustenna IM 156mg - given on the 3rd of the month? as per paperwork (needs to clarify this as it unclear when was the last dose given)  - PRN for agitation: Ativan 0.5mg q6hrs as patient with recent hx r/o catatonia   -  continue CO due to recent SI with plans/ impulsivity   - Needs to confirm when last GARCIA given : psychiatrist from [facility] Dr. Moeller [phone]  -Encourage PO intake, calorie count  -Dispo: Pt. will need an inpatient psychiatric admission once he consistently starts eating and medically optimized. 2PC in progress.   - CL will

## 2022-07-08 NOTE — BH CONSULTATION LIAISON PROGRESS NOTE - NSBHFUPINTERVALHXFT_PSY_A_CORE
Chart reviewed, did not get PRNs, compliant with psychiatric medications, pt. seen/evaluated, AAOX3, pt. with good eye contact, reports he is feeling depressed due to multiple issues, when asked to elaborate, pt. was unable to explain. Patient continues to intermittently stutter however seems improved from yesterday. Patient reports ongoing SI with plans " I want to jump in an ocean, because I don't to live anymore". Patient denies paranoia, no AH or VH reported. Denies HI. Patient did not eat his breakfast but stated " I will eat my lunch today". Patient reports that his adult son lives in [place] but he did not speak with him for sometime.      Chart reviewed, did not get PRNs, compliant with psychiatric medications, pt. seen/evaluated, AAOX3, pt. with good eye contact, reports he is feeling depressed due to multiple issues, when asked to elaborate, pt. was unable to explain. Patient continues to intermittently stutter however seems improved from yesterday. Patient reports ongoing SI with plans " I want to jump in an ocean, because I don't want  to live anymore". Patient denies paranoia, no AH or VH reported. Denies HI. Patient did not eat his breakfast but stated " I will eat my lunch today". Patient reports that his adult son lives in [place] but he did not speak with him for sometime.

## 2022-07-09 LAB
ANION GAP SERPL CALC-SCNC: 9 MMOL/L — SIGNIFICANT CHANGE UP (ref 7–14)
BASOPHILS # BLD AUTO: 0.04 K/UL — SIGNIFICANT CHANGE UP (ref 0–0.2)
BASOPHILS NFR BLD AUTO: 0.9 % — SIGNIFICANT CHANGE UP (ref 0–2)
BUN SERPL-MCNC: 7 MG/DL — SIGNIFICANT CHANGE UP (ref 7–23)
CALCIUM SERPL-MCNC: 8.9 MG/DL — SIGNIFICANT CHANGE UP (ref 8.4–10.5)
CHLORIDE SERPL-SCNC: 102 MMOL/L — SIGNIFICANT CHANGE UP (ref 98–107)
CO2 SERPL-SCNC: 27 MMOL/L — SIGNIFICANT CHANGE UP (ref 22–31)
CREAT SERPL-MCNC: 0.86 MG/DL — SIGNIFICANT CHANGE UP (ref 0.5–1.3)
EGFR: 87 ML/MIN/1.73M2 — SIGNIFICANT CHANGE UP
EOSINOPHIL # BLD AUTO: 0.16 K/UL — SIGNIFICANT CHANGE UP (ref 0–0.5)
EOSINOPHIL NFR BLD AUTO: 3.7 % — SIGNIFICANT CHANGE UP (ref 0–6)
GLUCOSE BLDC GLUCOMTR-MCNC: 122 MG/DL — HIGH (ref 70–99)
GLUCOSE BLDC GLUCOMTR-MCNC: 124 MG/DL — HIGH (ref 70–99)
GLUCOSE BLDC GLUCOMTR-MCNC: 130 MG/DL — HIGH (ref 70–99)
GLUCOSE BLDC GLUCOMTR-MCNC: 141 MG/DL — HIGH (ref 70–99)
GLUCOSE SERPL-MCNC: 117 MG/DL — HIGH (ref 70–99)
HCT VFR BLD CALC: 40 % — SIGNIFICANT CHANGE UP (ref 39–50)
HGB BLD-MCNC: 13.8 G/DL — SIGNIFICANT CHANGE UP (ref 13–17)
IANC: 2.81 K/UL — SIGNIFICANT CHANGE UP (ref 1.8–7.4)
IMM GRANULOCYTES NFR BLD AUTO: 0.2 % — SIGNIFICANT CHANGE UP (ref 0–1.5)
LYMPHOCYTES # BLD AUTO: 0.7 K/UL — LOW (ref 1–3.3)
LYMPHOCYTES # BLD AUTO: 16.3 % — SIGNIFICANT CHANGE UP (ref 13–44)
MAGNESIUM SERPL-MCNC: 1.9 MG/DL — SIGNIFICANT CHANGE UP (ref 1.6–2.6)
MCHC RBC-ENTMCNC: 32.7 PG — SIGNIFICANT CHANGE UP (ref 27–34)
MCHC RBC-ENTMCNC: 34.5 GM/DL — SIGNIFICANT CHANGE UP (ref 32–36)
MCV RBC AUTO: 94.8 FL — SIGNIFICANT CHANGE UP (ref 80–100)
MONOCYTES # BLD AUTO: 0.58 K/UL — SIGNIFICANT CHANGE UP (ref 0–0.9)
MONOCYTES NFR BLD AUTO: 13.5 % — SIGNIFICANT CHANGE UP (ref 2–14)
NEUTROPHILS # BLD AUTO: 2.81 K/UL — SIGNIFICANT CHANGE UP (ref 1.8–7.4)
NEUTROPHILS NFR BLD AUTO: 65.4 % — SIGNIFICANT CHANGE UP (ref 43–77)
NRBC # BLD: 0 /100 WBCS — SIGNIFICANT CHANGE UP
NRBC # FLD: 0 K/UL — SIGNIFICANT CHANGE UP
PHOSPHATE SERPL-MCNC: 3.4 MG/DL — SIGNIFICANT CHANGE UP (ref 2.5–4.5)
PLATELET # BLD AUTO: 205 K/UL — SIGNIFICANT CHANGE UP (ref 150–400)
POTASSIUM SERPL-MCNC: 3.5 MMOL/L — SIGNIFICANT CHANGE UP (ref 3.5–5.3)
POTASSIUM SERPL-SCNC: 3.5 MMOL/L — SIGNIFICANT CHANGE UP (ref 3.5–5.3)
RBC # BLD: 4.22 M/UL — SIGNIFICANT CHANGE UP (ref 4.2–5.8)
RBC # FLD: 13.1 % — SIGNIFICANT CHANGE UP (ref 10.3–14.5)
SODIUM SERPL-SCNC: 138 MMOL/L — SIGNIFICANT CHANGE UP (ref 135–145)
WBC # BLD: 4.3 K/UL — SIGNIFICANT CHANGE UP (ref 3.8–10.5)
WBC # FLD AUTO: 4.3 K/UL — SIGNIFICANT CHANGE UP (ref 3.8–10.5)

## 2022-07-09 RX ADMIN — SODIUM CHLORIDE 75 MILLILITER(S): 9 INJECTION, SOLUTION INTRAVENOUS at 11:51

## 2022-07-09 RX ADMIN — Medication 20 MILLIGRAM(S): at 11:49

## 2022-07-09 RX ADMIN — AMLODIPINE BESYLATE 10 MILLIGRAM(S): 2.5 TABLET ORAL at 05:32

## 2022-07-09 RX ADMIN — ENOXAPARIN SODIUM 40 MILLIGRAM(S): 100 INJECTION SUBCUTANEOUS at 05:32

## 2022-07-09 RX ADMIN — BUDESONIDE AND FORMOTEROL FUMARATE DIHYDRATE 2 PUFF(S): 160; 4.5 AEROSOL RESPIRATORY (INHALATION) at 20:56

## 2022-07-09 RX ADMIN — Medication 0.5 MILLIGRAM(S): at 11:48

## 2022-07-09 RX ADMIN — Medication 0.5 MILLIGRAM(S): at 07:20

## 2022-07-09 NOTE — BH CONSULTATION LIAISON PROGRESS NOTE - NSBHFUPINTERVALHXFT_PSY_A_CORE
Chart reviewed, did not get PRNs, compliant with psychiatric medications, pt. seen/evaluated, AAOX3, pt. with good eye contact. Reports feeling depressed, sleeping poorly. Patient reports ongoing SI with plans to drown or stab himself, have a heart attack. Patient denies paranoia, no AH or VH reported. Denies HI. Patient did not eat his breakfast but stated " I will eat my lunch today" - Per nursing staff, pt does not eat breakfast, and eats half of his lunch and dinner trays.

## 2022-07-09 NOTE — BH CONSULTATION LIAISON PROGRESS NOTE - NSBHASSESSMENTFT_PSY_ALL_CORE
Patient is an 82M PMH CHF, COPD, DM, dementia, schizophrenia, HTN sent in from [facility] for dehydration and SI. Patient is a poor historian. Patient does report a PPHx of schizophrenia. Spoke with psychiatrist from [facility] Dr. Moeller [phone] who reports patient has a hx of schizophrenia - has requested to be on invega sustenna as he has done well on this medication in the past. Was given this medication about a year ago and has been doing well. Patient does have a "close catatonic episode" about a year ago, and this past monday, he was called by staff saying the patient was note eating, not speaking, not moving. Unsure if the ativan 0.5mg qhs is new as MD is not at a computer at this time. Psychiatry was called for SI as patient reported not eating or drinking in attempt to kill self.    7/8: AAOX3, cooperative, continues to have SI with plans, unable to report any specific stressors, +psychomotor retardation, likely thought blocked, however  no significant catatonic sxs noted on exam.   7/9: A&Ox3, cooperative, ongoing SI with plans. Not suspicious for catatonia.    PLAN  - continue patient home medications:   -- Paxil 20mg daily  ---Continue Ativan 0.5mg PO BID (7AM and 11AM) for possible ?catatonia (started today), will monitor/titrate accordingly   - patient on Invega Sustenna IM 156mg - given on the 3rd of the month? as per paperwork (needs to clarify this as it unclear when was the last dose given)  - PRN for agitation: Ativan 0.5mg q6hrs as patient with recent hx r/o catatonia   -  continue CO due to recent SI with plans/ impulsivity   - Needs to confirm when last GARCIA given : psychiatrist from [facility] Dr. Moeller [phone]  -Encourage PO intake, calorie count  -Dispo: Pt. will need an inpatient psychiatric admission once he consistently starts eating and medically optimized. 2PC in progress.   - CL will

## 2022-07-10 LAB
ANION GAP SERPL CALC-SCNC: 11 MMOL/L — SIGNIFICANT CHANGE UP (ref 7–14)
BASOPHILS # BLD AUTO: 0.04 K/UL — SIGNIFICANT CHANGE UP (ref 0–0.2)
BASOPHILS NFR BLD AUTO: 1.2 % — SIGNIFICANT CHANGE UP (ref 0–2)
BUN SERPL-MCNC: 9 MG/DL — SIGNIFICANT CHANGE UP (ref 7–23)
CALCIUM SERPL-MCNC: 9.1 MG/DL — SIGNIFICANT CHANGE UP (ref 8.4–10.5)
CHLORIDE SERPL-SCNC: 102 MMOL/L — SIGNIFICANT CHANGE UP (ref 98–107)
CO2 SERPL-SCNC: 26 MMOL/L — SIGNIFICANT CHANGE UP (ref 22–31)
CREAT SERPL-MCNC: 0.76 MG/DL — SIGNIFICANT CHANGE UP (ref 0.5–1.3)
EGFR: 90 ML/MIN/1.73M2 — SIGNIFICANT CHANGE UP
EOSINOPHIL # BLD AUTO: 0.2 K/UL — SIGNIFICANT CHANGE UP (ref 0–0.5)
EOSINOPHIL NFR BLD AUTO: 6 % — SIGNIFICANT CHANGE UP (ref 0–6)
GLUCOSE BLDC GLUCOMTR-MCNC: 107 MG/DL — HIGH (ref 70–99)
GLUCOSE BLDC GLUCOMTR-MCNC: 122 MG/DL — HIGH (ref 70–99)
GLUCOSE BLDC GLUCOMTR-MCNC: 124 MG/DL — HIGH (ref 70–99)
GLUCOSE BLDC GLUCOMTR-MCNC: 131 MG/DL — HIGH (ref 70–99)
GLUCOSE SERPL-MCNC: 117 MG/DL — HIGH (ref 70–99)
HCT VFR BLD CALC: 41.5 % — SIGNIFICANT CHANGE UP (ref 39–50)
HGB BLD-MCNC: 13.4 G/DL — SIGNIFICANT CHANGE UP (ref 13–17)
IANC: 1.92 K/UL — SIGNIFICANT CHANGE UP (ref 1.8–7.4)
IMM GRANULOCYTES NFR BLD AUTO: 0 % — SIGNIFICANT CHANGE UP (ref 0–1.5)
LYMPHOCYTES # BLD AUTO: 0.72 K/UL — LOW (ref 1–3.3)
LYMPHOCYTES # BLD AUTO: 21.6 % — SIGNIFICANT CHANGE UP (ref 13–44)
MAGNESIUM SERPL-MCNC: 1.9 MG/DL — SIGNIFICANT CHANGE UP (ref 1.6–2.6)
MCHC RBC-ENTMCNC: 31.6 PG — SIGNIFICANT CHANGE UP (ref 27–34)
MCHC RBC-ENTMCNC: 32.3 GM/DL — SIGNIFICANT CHANGE UP (ref 32–36)
MCV RBC AUTO: 97.9 FL — SIGNIFICANT CHANGE UP (ref 80–100)
MONOCYTES # BLD AUTO: 0.45 K/UL — SIGNIFICANT CHANGE UP (ref 0–0.9)
MONOCYTES NFR BLD AUTO: 13.5 % — SIGNIFICANT CHANGE UP (ref 2–14)
NEUTROPHILS # BLD AUTO: 1.92 K/UL — SIGNIFICANT CHANGE UP (ref 1.8–7.4)
NEUTROPHILS NFR BLD AUTO: 57.7 % — SIGNIFICANT CHANGE UP (ref 43–77)
NRBC # BLD: 0 /100 WBCS — SIGNIFICANT CHANGE UP
NRBC # FLD: 0 K/UL — SIGNIFICANT CHANGE UP
PHOSPHATE SERPL-MCNC: 3.3 MG/DL — SIGNIFICANT CHANGE UP (ref 2.5–4.5)
PLATELET # BLD AUTO: 210 K/UL — SIGNIFICANT CHANGE UP (ref 150–400)
POTASSIUM SERPL-MCNC: 3.6 MMOL/L — SIGNIFICANT CHANGE UP (ref 3.5–5.3)
POTASSIUM SERPL-SCNC: 3.6 MMOL/L — SIGNIFICANT CHANGE UP (ref 3.5–5.3)
RBC # BLD: 4.24 M/UL — SIGNIFICANT CHANGE UP (ref 4.2–5.8)
RBC # FLD: 12.9 % — SIGNIFICANT CHANGE UP (ref 10.3–14.5)
SODIUM SERPL-SCNC: 139 MMOL/L — SIGNIFICANT CHANGE UP (ref 135–145)
WBC # BLD: 3.33 K/UL — LOW (ref 3.8–10.5)
WBC # FLD AUTO: 3.33 K/UL — LOW (ref 3.8–10.5)

## 2022-07-10 RX ADMIN — ENOXAPARIN SODIUM 40 MILLIGRAM(S): 100 INJECTION SUBCUTANEOUS at 05:38

## 2022-07-10 RX ADMIN — Medication 0.5 MILLIGRAM(S): at 06:02

## 2022-07-10 RX ADMIN — Medication 20 MILLIGRAM(S): at 11:00

## 2022-07-10 RX ADMIN — AMLODIPINE BESYLATE 10 MILLIGRAM(S): 2.5 TABLET ORAL at 05:38

## 2022-07-10 RX ADMIN — Medication 0.5 MILLIGRAM(S): at 10:56

## 2022-07-10 NOTE — BH CONSULTATION LIAISON PROGRESS NOTE - NSBHFUPINTERVALHXFT_PSY_A_CORE
Chart reviewed, did not get PRNs, compliant with psychiatric medications.     Patient sleeping on approach for interview. Per staff at bedside, patient has been alert and talkative this morning. He ate 25% of his breakfast, has been eating complete lunch and dinner meals.

## 2022-07-10 NOTE — BH CONSULTATION LIAISON PROGRESS NOTE - NSBHASSESSMENTFT_PSY_ALL_CORE
Patient is an 82M PMH CHF, COPD, DM, dementia, schizophrenia, HTN sent in from [facility] for dehydration and SI. Patient is a poor historian. Patient does report a PPHx of schizophrenia. Spoke with psychiatrist from [facility] Dr. Moeller [phone] who reports patient has a hx of schizophrenia - has requested to be on invega sustenna as he has done well on this medication in the past. Was given this medication about a year ago and has been doing well. Patient does have a "close catatonic episode" about a year ago, and this past monday, he was called by staff saying the patient was note eating, not speaking, not moving. Unsure if the ativan 0.5mg qhs is new as MD is not at a computer at this time. Psychiatry was called for SI as patient reported not eating or drinking in attempt to kill self.    7/8: AAOX3, cooperative, continues to have SI with plans, unable to report any specific stressors, +psychomotor retardation, likely thought blocked, however  no significant catatonic sxs noted on exam.   7/9: A&Ox3, cooperative, ongoing SI with plans. Not suspicious for catatonia.    PLAN  - continue patient home medications:   -- Paxil 20mg daily  ---Continue Ativan 0.5mg PO BID (7AM and 11AM) for possible ?catatonia (started today), will monitor/titrate accordingly   - patient on Invega Sustenna IM 156mg - given on the 3rd of the month? as per paperwork (needs to clarify this as it unclear when was the last dose given)  - PRN for agitation: Ativan 0.5mg q6hrs as patient with recent hx r/o catatonia   -  continue CO due to recent SI with plans/ impulsivity   - Needs to confirm when last GARCIA given : psychiatrist from [facility] Dr. Moeller [phone]  -Encourage PO intake, calorie count  -Dispo: Pt. will need an inpatient psychiatric admission once he consistently starts eating and medically optimized. 2PC in progress.   - CL will  Patient is an 82M PMH CHF, COPD, DM, dementia, schizophrenia, HTN sent in from [facility] for dehydration and SI. Patient is a poor historian. Patient does report a PPHx of schizophrenia. Spoke with psychiatrist from [facility] Dr. Moeller [phone] who reports patient has a hx of schizophrenia - has requested to be on invega sustenna as he has done well on this medication in the past. Was given this medication about a year ago and has been doing well. Patient does have a "close catatonic episode" about a year ago, and this past monday, he was called by staff saying the patient was note eating, not speaking, not moving. Unsure if the ativan 0.5mg qhs is new as MD is not at a computer at this time. Psychiatry was called for SI as patient reported not eating or drinking in attempt to kill self.    7/8: AAOX3, cooperative, continues to have SI with plans, unable to report any specific stressors, +psychomotor retardation, likely thought blocked, however  no significant catatonic sxs noted on exam.   7/9: A&Ox3, cooperative, ongoing SI with plans. Not suspicious for catatonia.  7/10: Per staff, patient has been alert, cooperative, talkative.     PLAN  - continue patient home medications:   -- Paxil 20mg daily  ---Continue Ativan 0.5mg PO BID (7AM and 11AM) for possible ?catatonia, will monitor/titrate accordingly   - patient on Invega Sustenna IM 156mg - given on the 3rd of the month? as per paperwork (needs to clarify this as it unclear when was the last dose given)  - PRN for agitation: Ativan 0.5mg q6hrs as patient with recent hx r/o catatonia   -  continue CO 1:1 due to recent SI with plans/ impulsivity   - Needs to confirm when last GARCIA given : psychiatrist from [facility] Dr. Moeller [phone]  -Encourage PO intake, calorie count  -Dispo: Pt. will need an inpatient psychiatric admission once he consistently starts eating and medically optimized. 2PC in progress.   - CL will follow

## 2022-07-11 LAB
ANION GAP SERPL CALC-SCNC: 12 MMOL/L — SIGNIFICANT CHANGE UP (ref 7–14)
BASOPHILS # BLD AUTO: 0.05 K/UL — SIGNIFICANT CHANGE UP (ref 0–0.2)
BASOPHILS NFR BLD AUTO: 1.3 % — SIGNIFICANT CHANGE UP (ref 0–2)
BUN SERPL-MCNC: 6 MG/DL — LOW (ref 7–23)
CALCIUM SERPL-MCNC: 9.4 MG/DL — SIGNIFICANT CHANGE UP (ref 8.4–10.5)
CHLORIDE SERPL-SCNC: 100 MMOL/L — SIGNIFICANT CHANGE UP (ref 98–107)
CO2 SERPL-SCNC: 27 MMOL/L — SIGNIFICANT CHANGE UP (ref 22–31)
CREAT SERPL-MCNC: 0.75 MG/DL — SIGNIFICANT CHANGE UP (ref 0.5–1.3)
EGFR: 91 ML/MIN/1.73M2 — SIGNIFICANT CHANGE UP
EOSINOPHIL # BLD AUTO: 0.2 K/UL — SIGNIFICANT CHANGE UP (ref 0–0.5)
EOSINOPHIL NFR BLD AUTO: 5.2 % — SIGNIFICANT CHANGE UP (ref 0–6)
GLUCOSE BLDC GLUCOMTR-MCNC: 112 MG/DL — HIGH (ref 70–99)
GLUCOSE BLDC GLUCOMTR-MCNC: 120 MG/DL — HIGH (ref 70–99)
GLUCOSE BLDC GLUCOMTR-MCNC: 95 MG/DL — SIGNIFICANT CHANGE UP (ref 70–99)
GLUCOSE BLDC GLUCOMTR-MCNC: 97 MG/DL — SIGNIFICANT CHANGE UP (ref 70–99)
GLUCOSE SERPL-MCNC: 109 MG/DL — HIGH (ref 70–99)
HCT VFR BLD CALC: 41.5 % — SIGNIFICANT CHANGE UP (ref 39–50)
HGB BLD-MCNC: 13.9 G/DL — SIGNIFICANT CHANGE UP (ref 13–17)
IANC: 2.23 K/UL — SIGNIFICANT CHANGE UP (ref 1.8–7.4)
IMM GRANULOCYTES NFR BLD AUTO: 0.3 % — SIGNIFICANT CHANGE UP (ref 0–1.5)
LYMPHOCYTES # BLD AUTO: 0.88 K/UL — LOW (ref 1–3.3)
LYMPHOCYTES # BLD AUTO: 22.9 % — SIGNIFICANT CHANGE UP (ref 13–44)
MAGNESIUM SERPL-MCNC: 2 MG/DL — SIGNIFICANT CHANGE UP (ref 1.6–2.6)
MCHC RBC-ENTMCNC: 31.7 PG — SIGNIFICANT CHANGE UP (ref 27–34)
MCHC RBC-ENTMCNC: 33.5 GM/DL — SIGNIFICANT CHANGE UP (ref 32–36)
MCV RBC AUTO: 94.7 FL — SIGNIFICANT CHANGE UP (ref 80–100)
MONOCYTES # BLD AUTO: 0.47 K/UL — SIGNIFICANT CHANGE UP (ref 0–0.9)
MONOCYTES NFR BLD AUTO: 12.2 % — SIGNIFICANT CHANGE UP (ref 2–14)
NEUTROPHILS # BLD AUTO: 2.23 K/UL — SIGNIFICANT CHANGE UP (ref 1.8–7.4)
NEUTROPHILS NFR BLD AUTO: 58.1 % — SIGNIFICANT CHANGE UP (ref 43–77)
NRBC # BLD: 0 /100 WBCS — SIGNIFICANT CHANGE UP
NRBC # FLD: 0 K/UL — SIGNIFICANT CHANGE UP
PHOSPHATE SERPL-MCNC: 3.3 MG/DL — SIGNIFICANT CHANGE UP (ref 2.5–4.5)
PLATELET # BLD AUTO: 218 K/UL — SIGNIFICANT CHANGE UP (ref 150–400)
POTASSIUM SERPL-MCNC: 3.6 MMOL/L — SIGNIFICANT CHANGE UP (ref 3.5–5.3)
POTASSIUM SERPL-SCNC: 3.6 MMOL/L — SIGNIFICANT CHANGE UP (ref 3.5–5.3)
RBC # BLD: 4.38 M/UL — SIGNIFICANT CHANGE UP (ref 4.2–5.8)
RBC # FLD: 12.9 % — SIGNIFICANT CHANGE UP (ref 10.3–14.5)
SARS-COV-2 RNA SPEC QL NAA+PROBE: SIGNIFICANT CHANGE UP
SODIUM SERPL-SCNC: 139 MMOL/L — SIGNIFICANT CHANGE UP (ref 135–145)
WBC # BLD: 3.84 K/UL — SIGNIFICANT CHANGE UP (ref 3.8–10.5)
WBC # FLD AUTO: 3.84 K/UL — SIGNIFICANT CHANGE UP (ref 3.8–10.5)

## 2022-07-11 PROCEDURE — 99233 SBSQ HOSP IP/OBS HIGH 50: CPT

## 2022-07-11 RX ADMIN — Medication 0.5 MILLIGRAM(S): at 07:03

## 2022-07-11 RX ADMIN — Medication 0.5 MILLIGRAM(S): at 15:11

## 2022-07-11 RX ADMIN — SODIUM CHLORIDE 75 MILLILITER(S): 9 INJECTION, SOLUTION INTRAVENOUS at 15:08

## 2022-07-11 RX ADMIN — ENOXAPARIN SODIUM 40 MILLIGRAM(S): 100 INJECTION SUBCUTANEOUS at 05:42

## 2022-07-11 RX ADMIN — AMLODIPINE BESYLATE 10 MILLIGRAM(S): 2.5 TABLET ORAL at 05:43

## 2022-07-11 RX ADMIN — Medication 0.5 MILLIGRAM(S): at 11:22

## 2022-07-11 RX ADMIN — SODIUM CHLORIDE 75 MILLILITER(S): 9 INJECTION, SOLUTION INTRAVENOUS at 03:13

## 2022-07-11 RX ADMIN — Medication 10 MILLIGRAM(S): at 11:25

## 2022-07-11 NOTE — BH CONSULTATION LIAISON PROGRESS NOTE - NSBHFUPINTERVALHXFT_PSY_A_CORE
Chart reviewed, did not get PRNs, compliant with psychiatric medications. Patient seen/evaluated, reports he ate eggs and pancakes in breakfast, as per staff  pt. ate 50% of his breakfast. Patient reported he sometimes feels depressed, superficially denies SI today.

## 2022-07-11 NOTE — BH CONSULTATION LIAISON PROGRESS NOTE - NSBHASSESSMENTFT_PSY_ALL_CORE
Patient is an 82M PMH CHF, COPD, DM, dementia, schizophrenia, HTN sent in from [facility] for dehydration and SI. Patient is a poor historian. Patient does report a PPHx of schizophrenia. Spoke with [facility] from [facility] Dr. Moeller [phone] who reports patient has a hx of schizophrenia - has requested to be on invega sustenna as he has done well on this medication in the past. Was given this medication about a year ago and has been doing well. Patient does have a "close catatonic episode" about a year ago, and this past monday, he was called by staff saying the patient was note eating, not speaking, not moving. Unsure if the ativan 0.5mg qhs is new as MD is not at a computer at this time. Psychiatry was called for SI as patient reported not eating or drinking in attempt to kill self.    7/8: AAOX3, cooperative, continues to have SI with plans, unable to report any specific stressors, +psychomotor retardation, likely thought blocked, however  no significant catatonic sxs noted on exam.   7/9: A&Ox3, cooperative, ongoing SI with plans. Not suspicious for catatonia.  7/10: Per staff, patient has been alert, cooperative, talkative.   7/11: oriented, remains depressed/withdrawn, less engaging today, possibly thought blocked, no significant catatonic sxs noted on exam, today he denies SI, PO intake somewhat improved.     PLAN  - continue patient home medications:   -- INCREASE Paxil 30mg daily to target depression  --INCREASE Ativan 0.5mg PO TID (7AM, 11AM and 4PM) for possible ?catatonia, will monitor/titrate accordingly (HOLD if sedated, bradycardic, hypotensive or in respiratory distress)  - patient on Invega Sustenna IM 156mg - given on the 3rd of the month? as per paperwork (needs to clarify this as it unclear when was the last dose given)  - PRN for agitation: Ativan 0.5mg q6hrs as patient with recent hx r/o catatonia   -  continue CO 1:1 due to SI with plans/ impulsivity   - Needs to confirm when last GARCIA given : psychiatrist from [facility] Dr. Meoller [phone]  -Encourage PO intake, calorie count  -PT/OT and OOB as tolerated.  -Dispo: Pt. will need an inpatient psychiatric admission once he consistently starts eating and medically optimized. 2PC placed in the chart.   - CL will follow

## 2022-07-11 NOTE — PROGRESS NOTE ADULT - NS ATTEND AMEND GEN_ALL_CORE FT
Patient seen and examined.  Agree with above.   No further inpatient cardiac workup needed at this time.     Deirdre Jalloh MD

## 2022-07-12 LAB
GLUCOSE BLDC GLUCOMTR-MCNC: 101 MG/DL — HIGH (ref 70–99)
GLUCOSE BLDC GLUCOMTR-MCNC: 105 MG/DL — HIGH (ref 70–99)
GLUCOSE BLDC GLUCOMTR-MCNC: 105 MG/DL — HIGH (ref 70–99)
GLUCOSE BLDC GLUCOMTR-MCNC: 94 MG/DL — SIGNIFICANT CHANGE UP (ref 70–99)

## 2022-07-12 PROCEDURE — 99232 SBSQ HOSP IP/OBS MODERATE 35: CPT

## 2022-07-12 RX ADMIN — BUDESONIDE AND FORMOTEROL FUMARATE DIHYDRATE 2 PUFF(S): 160; 4.5 AEROSOL RESPIRATORY (INHALATION) at 21:13

## 2022-07-12 RX ADMIN — BUDESONIDE AND FORMOTEROL FUMARATE DIHYDRATE 2 PUFF(S): 160; 4.5 AEROSOL RESPIRATORY (INHALATION) at 08:51

## 2022-07-12 RX ADMIN — AMLODIPINE BESYLATE 10 MILLIGRAM(S): 2.5 TABLET ORAL at 05:13

## 2022-07-12 RX ADMIN — Medication 30 MILLIGRAM(S): at 11:21

## 2022-07-12 RX ADMIN — Medication 0.5 MILLIGRAM(S): at 15:42

## 2022-07-12 RX ADMIN — Medication 0.5 MILLIGRAM(S): at 07:30

## 2022-07-12 RX ADMIN — ENOXAPARIN SODIUM 40 MILLIGRAM(S): 100 INJECTION SUBCUTANEOUS at 05:07

## 2022-07-12 RX ADMIN — Medication 0.5 MILLIGRAM(S): at 11:21

## 2022-07-12 RX ADMIN — SODIUM CHLORIDE 75 MILLILITER(S): 9 INJECTION, SOLUTION INTRAVENOUS at 06:45

## 2022-07-12 NOTE — SWALLOW BEDSIDE ASSESSMENT ADULT - SWALLOW EVAL: DIAGNOSIS
Was A Bandage Applied: Yes 1) Functional oral stage of swallow soft/bite sized, puree, mildly thick fluids, and thin fluids marked by adequate containment, bolus manipulation/mastication, and coordination. Anterior to posterior oral transit/transfer noted. 2) Functional pharyngeal stage of swallow for soft and bite sized, puree, and mildly thick fluids marked by initiation of pharyngeal swallow trigger and hyolaryngeal excursion noted upon digital palpation. No overt signs/symptoms of penetration/aspiration noted. 3) Moderate to Severe pharyngeal dysphagia for thin liquids marked by a suspected delayed pharyngeal swallow trigger. Hyolaryngeal elevation noted upon digital palpation. Patient with immediate cough following oral intake suggestive of airway penetration/aspiration.

## 2022-07-12 NOTE — SWALLOW BEDSIDE ASSESSMENT ADULT - ADDITIONAL RECOMMENDATIONS
1) Monitor for PO tolerance and intake 2) This service to follow up as schedule permits for dysphagia remediation while patient is admitted to [facility]. 3) Reconsult this service as needed. 4) Patient would benefit from continued swallow therapy upon discharge.

## 2022-07-12 NOTE — SWALLOW BEDSIDE ASSESSMENT ADULT - COMMENTS
Hospitalist note 7/11 "82M PMH CHF, COPD, DM, dementia, schizophrenia, HTN sent in from [facility] for dehydration and SI. Per notes in chart that were sent from facility pt has not been eating and states he wants to kill himself. Pt currently states he has felt like this for the past 10 days. Not able to provide any other hx".       WBC is WFL. CT Head 7/6 "Unremarkable, unenhanced CT head for age."    Patient seen at bedside, awake/alert and oriented, during clinical swallow evaluation this PM. Patient able to follow directions and answer questions. Patient denied feeding/swallowing difficulties. Patient denied pain. Patient was cooperative and accepted all PO trials.

## 2022-07-12 NOTE — BH CONSULTATION LIAISON PROGRESS NOTE - NSBHASSESSMENTFT_PSY_ALL_CORE
Patient is an 82M PMH CHF, COPD, DM, dementia, schizophrenia, HTN sent in from [facility] for dehydration and SI. Patient is a poor historian. Patient does report a PPHx of schizophrenia. Spoke with [facility] from [facility] Dr. Moeller [phone] who reports patient has a hx of schizophrenia - has requested to be on invega sustenna as he has done well on this medication in the past. Was given this medication about a year ago and has been doing well. Patient does have a "close catatonic episode" about a year ago, and this past monday, he was called by staff saying the patient was note eating, not speaking, not moving. Unsure if the ativan 0.5mg qhs is new as MD is not at a computer at this time. Psychiatry was called for SI as patient reported not eating or drinking in attempt to kill self.    7/8: AAOX3, cooperative, continues to have SI with plans, unable to report any specific stressors, +psychomotor retardation, likely thought blocked, however  no significant catatonic sxs noted on exam.   7/9: A&Ox3, cooperative, ongoing SI with plans. Not suspicious for catatonia.  7/10: Per staff, patient has been alert, cooperative, talkative.   7/11: oriented, remains depressed/withdrawn, less engaging today, possibly thought blocked, no significant catatonic sxs noted on exam, today he denies SI, PO intake somewhat improved.   7/12: Presentation largely unchanged, however more verbal/talkative today, reports SI with plan. No significant catatonic sxs noted on exam, paxil and ativan increased on 7/11.     PLAN  --Cont. Paxil 30mg daily to target depression  --Continue Ativan 0.5mg PO TID (7AM, 11AM and 4PM) for possible ?catatonia, will monitor/titrate accordingly (HOLD if sedated, bradycardic, hypotensive or in respiratory distress)  - patient on Invega Sustenna IM 156mg - given on the 3rd of the month? as per paperwork (needs to clarify this as it unclear when was the last dose given)  - PRN for agitation: Ativan 0.5mg q6hrs as patient with recent hx r/o catatonia   -  continue CO 1:1 due to SI with plans/ impulsivity   - Needs to confirm when last GARCIA given : psychiatrist from [facility] Dr. Moeller [phone]  -Encourage PO intake, calorie count  -PT/OT and OOB as tolerated.  -Dispo: Pt. will need an inpatient psychiatric admission once he consistently starts eating and medically optimized. 2PC placed in the chart.   - CL will follow

## 2022-07-12 NOTE — BH CONSULTATION LIAISON PROGRESS NOTE - OTHER
Staring somewhat but makes eye contact intermittently  variable concrete, possibly thought blocked seems sad/withdrawn

## 2022-07-12 NOTE — SWALLOW BEDSIDE ASSESSMENT ADULT - ASR SWALLOW RECOMMEND DIAG
Objective testing NOT warranted given patient with overt s/sx of penetration/aspiration for thin liquids only

## 2022-07-12 NOTE — BH CONSULTATION LIAISON PROGRESS NOTE - NSBHFUPINTERVALHXFT_PSY_A_CORE
Chart reviewed, did not get PRNs, compliant with standing psychiatric medications. Patient seen/evaluated, AAOX3, knows the current president, reports he drank apple juice in breakfast, patient reported he is feeling depressed because he is under lots of stress. When asked to elaborate, pt. unable to do so. Patient initially denies SI, later in the interview stated " I have suicidal thoughts, I want to drown myself in ocean". Denies HI, denies AVH.

## 2022-07-13 LAB
GLUCOSE BLDC GLUCOMTR-MCNC: 102 MG/DL — HIGH (ref 70–99)
GLUCOSE BLDC GLUCOMTR-MCNC: 102 MG/DL — HIGH (ref 70–99)
GLUCOSE BLDC GLUCOMTR-MCNC: 114 MG/DL — HIGH (ref 70–99)

## 2022-07-13 PROCEDURE — 99232 SBSQ HOSP IP/OBS MODERATE 35: CPT

## 2022-07-13 RX ADMIN — Medication 30 MILLIGRAM(S): at 11:08

## 2022-07-13 RX ADMIN — Medication 0.5 MILLIGRAM(S): at 06:39

## 2022-07-13 RX ADMIN — Medication 0.5 MILLIGRAM(S): at 16:53

## 2022-07-13 RX ADMIN — BUDESONIDE AND FORMOTEROL FUMARATE DIHYDRATE 2 PUFF(S): 160; 4.5 AEROSOL RESPIRATORY (INHALATION) at 11:08

## 2022-07-13 RX ADMIN — Medication 0.5 MILLIGRAM(S): at 11:08

## 2022-07-13 RX ADMIN — AMLODIPINE BESYLATE 10 MILLIGRAM(S): 2.5 TABLET ORAL at 05:12

## 2022-07-13 RX ADMIN — ENOXAPARIN SODIUM 40 MILLIGRAM(S): 100 INJECTION SUBCUTANEOUS at 05:12

## 2022-07-13 NOTE — DIETITIAN INITIAL EVALUATION ADULT - ADD RECOMMEND
1. Monitor weights, labs, BM's, skin integrity, PO intake/tolerance. 2. Encourage po intake as tolerated, assist with meals and menu selections, provide alternatives PRN. 3. May consider daily Multivitamin and appetite stimulant when medically appropriate.

## 2022-07-13 NOTE — BH CONSULTATION LIAISON PROGRESS NOTE - NSBHASSESSMENTFT_PSY_ALL_CORE
Patient is an 82M PMH CHF, COPD, DM, dementia, schizophrenia, HTN sent in from [facility] for dehydration and SI. Patient is a poor historian. Patient does report a PPHx of schizophrenia. Spoke with psychiatrist from [facility] Dr. Moeller [phone] who reports patient has a hx of schizophrenia - has requested to be on invega sustenna as he has done well on this medication in the past. Was given this medication about a year ago and has been doing well. Patient does have a "close catatonic episode" about a year ago, and this past monday, he was called by staff saying the patient was note eating, not speaking, not moving. Unsure if the ativan 0.5mg qhs is new as MD is not at a computer at this time. Psychiatry was called for SI as patient reported not eating or drinking in attempt to kill self.    7/8: AAOX3, cooperative, continues to have SI with plans, unable to report any specific stressors, +psychomotor retardation, likely thought blocked, however  no significant catatonic sxs noted on exam.   7/9: A&Ox3, cooperative, ongoing SI with plans. Not suspicious for catatonia.  7/10: Per staff, patient has been alert, cooperative, talkative.   7/11: oriented, remains depressed/withdrawn, less engaging today, possibly thought blocked, no significant catatonic sxs noted on exam, today he denies SI, PO intake somewhat improved.   7/12: Presentation largely unchanged, however more verbal/talkative today, reports SI with plan. No significant catatonic sxs noted on exam, paxil and ativan increased on 7/11.   7/13: AAOX3, remains depressed/withdrawn, denies SI today, po intake improving, continues to need an inpatient psychiatric admission for further stabilization/safety    PLAN  --Cont. Paxil 30mg daily to target depression  --Continue Ativan 0.5mg PO TID (7AM, 11AM and 4PM) for possible ?catatonia, will monitor/titrate accordingly (HOLD if sedated, bradycardic, hypotensive or in respiratory distress)  - patient on Invega Sustenna IM 156mg - given on the 3rd of the month? as per paperwork (needs to clarify this as it unclear when was the last dose given)  - PRN for agitation: Ativan 0.5mg q6hrs as patient with recent hx r/o catatonia   -  continue CO 1:1 due to SI with plans/ impulsivity   - Needs to confirm when last GARCIA given : psychiatrist from [facility] Dr. Moeller [phone]  -Encourage PO intake, calorie count  -PT/OT and OOB as tolerated.  -Dispo: Pt. will need an inpatient psychiatric admission once he consistently starts eating and medically optimized. 2PC placed in the chart.   -Case d/w [facility] in person  - CL will follow

## 2022-07-13 NOTE — BH CONSULTATION LIAISON PROGRESS NOTE - NSBHFUPINTERVALHXFT_PSY_A_CORE
Chart reviewed, did not get PRNs, remains compliant with standing psychiatric medications, seen/evaluated, oriented, seems withdrawn with poor eye contact,   remains depressed, denies SI today, stating "I had suicidal thoughts yesterday".  Denies HI, denies AVH. Reports that he had his breakfast.    As per sitter: Patient ate his breakfast.

## 2022-07-13 NOTE — DIETITIAN INITIAL EVALUATION ADULT - ORAL NUTRITION SUPPLEMENTS
Add Glucerna Therapeutic Nutrition Shake 240mls 2x daily (440kcals, 20g protein) thickened to appropriate consistency

## 2022-07-13 NOTE — DIETITIAN INITIAL EVALUATION ADULT - CALCULATED FROM (G/KG)
9/14/21: Care Coordinator will close EW and DTR as member has been in TCU > 30days.   MMIS entered  [id number] sent.   DTR sent to [facility].     Plan is for member to move to [place] - will reopen EW at that time.     Merari Reyna RN, N  [facility]       73.8

## 2022-07-13 NOTE — DIETITIAN INITIAL EVALUATION ADULT - PERTINENT MEDS FT
MEDICATIONS  (STANDING):  amLODIPine   Tablet 10 milliGRAM(s) Oral daily  budesonide  80 MICROgram(s)/formoterol 4.5 MICROgram(s) Inhaler 2 Puff(s) Inhalation two times a day  dextrose 5% + lactated ringers. 1000 milliLiter(s) (75 mL/Hr) IV Continuous <Continuous>  dextrose 5%. 1000 milliLiter(s) (100 mL/Hr) IV Continuous <Continuous>  dextrose 5%. 1000 milliLiter(s) (50 mL/Hr) IV Continuous <Continuous>  dextrose 50% Injectable 25 Gram(s) IV Push once  dextrose 50% Injectable 12.5 Gram(s) IV Push once  dextrose 50% Injectable 25 Gram(s) IV Push once  enoxaparin Injectable 40 milliGRAM(s) SubCutaneous every 24 hours  glucagon  Injectable 1 milliGRAM(s) IntraMuscular once  insulin lispro (ADMELOG) corrective regimen sliding scale   SubCutaneous three times a day before meals  LORazepam     Tablet 0.5 milliGRAM(s) Oral <User Schedule>  LORazepam     Tablet 0.5 milliGRAM(s) Oral <User Schedule>  PARoxetine 30 milliGRAM(s) Oral daily    MEDICATIONS  (PRN):  dextrose Oral Gel 15 Gram(s) Oral once PRN Blood Glucose LESS THAN 70 milliGRAM(s)/deciliter

## 2022-07-13 NOTE — DIETITIAN INITIAL EVALUATION ADULT - NS FNS DIET ORDER
Diet, Consistent Carbohydrate Renal/No Snacks:   Soft and Bite Sized (SOFTBTSZ)  Mildly Thick Liquids (MILDTHICKLIQS) (07-12-22 @ 15:35)

## 2022-07-13 NOTE — BH CONSULTATION LIAISON PROGRESS NOTE - OTHER
seems sad/withdrawn Staring somewhat but makes eye contact intermittently  variable concrete, possibly thought blocked

## 2022-07-13 NOTE — DIETITIAN INITIAL EVALUATION ADULT - PERTINENT LABORATORY DATA
07-11 Na139 mmol/L Glu 109 mg/dL<H> K+ 3.6 mmol/L Cr  0.75 mg/dL BUN 6 mg/dL<L> 07-11 Phos 3.3 mg/dL    POCT Blood Glucose.: 102 mg/dL (07-13-22 @ 11:38)  A1C with Estimated Average Glucose Result: 5.8 % (07-07-22 @ 05:30)    CAPILLARY BLOOD GLUCOSE  POCT Blood Glucose.: 102 mg/dL (13 Jul 2022 11:38)  POCT Blood Glucose.: 102 mg/dL (13 Jul 2022 07:24)  POCT Blood Glucose.: 101 mg/dL (12 Jul 2022 22:24)  POCT Blood Glucose.: 105 mg/dL (12 Jul 2022 16:56)

## 2022-07-13 NOTE — DIETITIAN INITIAL EVALUATION ADULT - OTHER INFO
Per chart, 82M PMH CHF, COPD, DM, dementia, schizophrenia, HTN sent in from [facility] for dehydration and SI.     Limited diet hx/information obtained from patient due to garbled speech, dementia, and patient did not express interest in communicating with this RDN during encounter today. Collateral information obtained from medical chart and PCA/RN at bedside. Noted s/p SLP swallow eval on 7/12 which recommended Soft and bite sized with mildly thick liquids. On Safety tray due to SI. Per PCA/RN, patient with fair PO ~50% at meals, tolerated current diet order, no chewing/swallowing difficulties at meals observed. No reports of GI distress (nausea/vomiting/diarrhea/constipation) at this time. NKFA per patient. Patient endorsed decreased appetite, RDN encouraged PO intake as tolerated, patient is amenable to have Glucerna shake to optimize po intake.

## 2022-07-14 ENCOUNTER — TRANSCRIPTION ENCOUNTER (OUTPATIENT)
Age: 82
End: 2022-07-14

## 2022-07-14 LAB
GLUCOSE BLDC GLUCOMTR-MCNC: 103 MG/DL — HIGH (ref 70–99)
GLUCOSE BLDC GLUCOMTR-MCNC: 107 MG/DL — HIGH (ref 70–99)
GLUCOSE BLDC GLUCOMTR-MCNC: 109 MG/DL — HIGH (ref 70–99)
GLUCOSE BLDC GLUCOMTR-MCNC: 88 MG/DL — SIGNIFICANT CHANGE UP (ref 70–99)
SARS-COV-2 RNA SPEC QL NAA+PROBE: SIGNIFICANT CHANGE UP

## 2022-07-14 PROCEDURE — 99232 SBSQ HOSP IP/OBS MODERATE 35: CPT

## 2022-07-14 RX ORDER — PALIPERIDONE 1.5 MG/1
1 TABLET, EXTENDED RELEASE ORAL
Qty: 0 | Refills: 0 | DISCHARGE

## 2022-07-14 RX ADMIN — Medication 0.5 MILLIGRAM(S): at 18:00

## 2022-07-14 RX ADMIN — Medication 30 MILLIGRAM(S): at 18:00

## 2022-07-14 RX ADMIN — BUDESONIDE AND FORMOTEROL FUMARATE DIHYDRATE 2 PUFF(S): 160; 4.5 AEROSOL RESPIRATORY (INHALATION) at 11:02

## 2022-07-14 RX ADMIN — Medication 0.5 MILLIGRAM(S): at 11:41

## 2022-07-14 RX ADMIN — SODIUM CHLORIDE 75 MILLILITER(S): 9 INJECTION, SOLUTION INTRAVENOUS at 23:09

## 2022-07-14 RX ADMIN — ENOXAPARIN SODIUM 40 MILLIGRAM(S): 100 INJECTION SUBCUTANEOUS at 05:24

## 2022-07-14 RX ADMIN — AMLODIPINE BESYLATE 10 MILLIGRAM(S): 2.5 TABLET ORAL at 05:24

## 2022-07-14 NOTE — PHYSICAL THERAPY INITIAL EVALUATION ADULT - ADDITIONAL COMMENTS
Pt minimally verbal, unable to gather social history. As per care coordination 7/8/22, pt admitted from skilled nursing facility where he was walking.

## 2022-07-14 NOTE — BH CONSULTATION LIAISON PROGRESS NOTE - OTHER
Staring somewhat but makes eye contact intermittently  concrete, goal directed, difficult to engage in interview, possibly ?thought blocked variable seems sad/withdrawn

## 2022-07-14 NOTE — PHYSICAL THERAPY INITIAL EVALUATION ADULT - PHYSICAL ASSIST/NONPHYSICAL ASSIST: GAIT, REHAB EVAL
1.  Rapid strep negative, throat culture pending. 2.  Tylenol or ibuprofen as directed for pain/fever. 3.  Recommend oral antihistamine such as childrens Benadryl, Claritin, Allegra, Zyrtec, Xyzal (generic is okay).   Benadryl is dosed multiple times da otherwise, call your child's healthcare provider if:  · Your child has a fever (see Fever and children, below)  · Ear pain that gets worse  · Discharge, blood, or foul odor from ear  · Unusual decreased activity, fussiness, drowsiness, or confusion  · Head [id number] [employer] [address]. [address] [facility], [address] [place] [address]. All rights reserved. This information is not intended as a substitute for professional medical care. Always follow your healthcare professional's instructions. verbal cues/nonverbal cues (demo/gestures)/1 person assist

## 2022-07-14 NOTE — DISCHARGE NOTE PROVIDER - NSDCMRMEDTOKEN_GEN_ALL_CORE_FT
amLODIPine 10 mg oral tablet: 1 tab(s) orally once a day  LORazepam 0.5 mg oral tablet: 1 tab(s) orally 2 times a day 7am, 11 am  LORazepam 0.5 mg oral tablet: 1 tab(s) orally once a day @ 4pm  metFORMIN 500 mg oral tablet: 1 tab(s) orally 2 times a day  PARoxetine 20 mg oral tablet: 1 tab(s) orally once a day  Symbicort 80 mcg-4.5 mcg/inh inhalation aerosol: 2 puff(s) inhaled 2 times a day   amLODIPine 10 mg oral tablet: 1 tab(s) orally once a day  LORazepam 0.5 mg oral tablet: 1 tab(s) orally once a day [id number]  metFORMIN 500 mg oral tablet: 1 tab(s) orally 2 times a day  paliperidone 156 mg/mL intramuscular suspension, extended release:  intramuscular once a month next 8/3  PARoxetine 20 mg oral tablet: 1 tab(s) orally once a day  Symbicort 80 mcg-4.5 mcg/inh inhalation aerosol: 2 puff(s) inhaled 2 times a day

## 2022-07-14 NOTE — PHYSICAL THERAPY INITIAL EVALUATION ADULT - PATIENT PROFILE REVIEW, REHAB EVAL
Activity - out of bed with assistance. Spoke with VILMA Villeda prior to session, pt cleared for PT evaluation./yes

## 2022-07-14 NOTE — DISCHARGE NOTE PROVIDER - NSDCCPCAREPLAN_GEN_ALL_CORE_FT
PRINCIPAL DISCHARGE DIAGNOSIS  Diagnosis: Suicidal ideation  Assessment and Plan of Treatment: You have Suicidal ideation  You are AAOX3, remains depressed/withdrawn, denies SI today, po intake improving.  You need an inpatient psychiatric admission for further stabilization/safety.  Continue medications Paxil, Ativan, Invega.  Continue 1:1         PRINCIPAL DISCHARGE DIAGNOSIS  Diagnosis: Suicidal ideation  Assessment and Plan of Treatment: You have Suicidal ideation  You are AAOX3, remains depressed/withdrawn, denies SI today, po intake improving.  You need an inpatient psychiatric admission for further stabilization/safety.  Continue medications Paxil, Ativan, Invega.  Continue 1:1.  Please continue your medications as prescribed and allow help with daily acitivities of living. Maintain a safe environment and make movements in a careful manner to prevent falls. Ensure that you are eating and drinking adequately and maintaining a healthy sleep cycle. If you are in need of assistance with medication adjustment you can follow-up with your outpatient provider or refer to the [facility] Geriatric Psychiatry clinic by calling [phone].         PRINCIPAL DISCHARGE DIAGNOSIS  Diagnosis: Suicidal ideation  Assessment and Plan of Treatment: You have Suicidal ideation  You are AAOX3, remains depressed/withdrawn, denies SI today, po intake improving.  You need an inpatient psychiatric admission for further stabilization/safety.  Continue medications Paxil, Ativan, Invega.   Please continue your medications as prescribed and allow help with daily acitivities of living. Maintain a safe environment and make movements in a careful manner to prevent falls. Ensure that you are eating and drinking adequately and maintaining a healthy sleep cycle. If you are in need of assistance with medication adjustment you can follow-up with your outpatient provider or refer to the [facility] Geriatric Psychiatry clinic by calling [phone].         PRINCIPAL DISCHARGE DIAGNOSIS  Diagnosis: Suicidal ideation  Assessment and Plan of Treatment: You have Suicidal ideation  You are AAOX3, remains depressed/withdrawn, denies SI today, po intake improving.  You need an inpatient psychiatric admission for further stabilization/safety.  Continue medications Paxil, Ativan, Invega.   Please continue your medications as prescribed and allow help with daily acitivities of living. Maintain a safe environment and make movements in a careful manner to prevent falls. Ensure that you are eating and drinking adequately and maintaining a healthy sleep cycle. If you are in need of assistance with medication adjustment you can follow-up with your outpatient provider or refer to the [facility] Geriatric Psychiatry clinic by calling [phone].  Invega Sustenna IM 156mg given on the 3rd of the month last on 7/3 - NEXT DUE 8/3/22

## 2022-07-14 NOTE — DISCHARGE NOTE PROVIDER - HOSPITAL COURSE
82M PMH CHF, COPD, DM, dementia, schizophrenia, HTN sent in from [facility] for dehydration and SI. Per notes in chart that were sent from facility pt has not been eating and states he wants to kill himself. Pt currently states he has felt like this for the past 10 days. Not able to provide any other hx    1 Suicidal ideation  - psych consult appreciated   - will monitor    2 Schizophrenia  - cw home meds          82M PMH CHF, COPD, DM, dementia, schizophrenia, HTN sent in from [facility] for dehydration and SI. Per notes in chart that were sent from facility pt has not been eating and states he wants to kill himself. Pt currently states he has felt like this for the past 10 days. Not able to provide any other hx    1 Suicidal ideation  - psych consult appreciated   - will monitor    2 Schizophrenia  - cw home meds    On ___ this case was reviewed with  ____, the patient is medically stable and optimized for discharge. All medications were reviewed and prescriptions were sent to mutually agreed upon pharmacy.          82M PMH CHF, COPD, DM, dementia, schizophrenia, HTN sent in from [facility] for dehydration and SI. Per notes in chart that were sent from facility pt has not been eating and states he wants to kill himself. Pt admitted for SI. Psy consulted and pt placed on constant obs. Pt started on IVF. Psy regimen tailored. Pt with oral intake and weaned off IVF.    7/22 QTc 409    Case discussed with Dr Browne on 7/22 pt medically optimized for discharge to [facility].      82M PMH CHF, COPD, DM, dementia, schizophrenia, HTN sent in from [facility] for dehydration and SI. Per notes in chart that were sent from facility pt has not been eating and states he wants to kill himself. Pt admitted for SI. Psy consulted and pt placed on constant obs. Pt started on IVF. Psy regimen tailored. Pt with oral intake and weaned off IVF.    PT recommende rehab    7/22 QTc 409    Case discussed with Dr Browne on 7/22 pt medically optimized for discharge to [facility].      82M PMH CHF, COPD, DM, dementia, schizophrenia, HTN sent in from [facility] for dehydration and SI. Per notes in chart that were sent from facility pt has not been eating and states he wants to kill himself. Pt admitted for SI. Psy consulted and pt placed on constant obs. Pt started on IVF. Psy regimen tailored. Pt with oral intake and weaned off IVF.    PT recommended rehab    7/22 QTc 409    Case discussed with Dr Browne on 7/22 pt medically optimized for discharge to [facility].

## 2022-07-14 NOTE — PHYSICAL THERAPY INITIAL EVALUATION ADULT - PERTINENT HX OF CURRENT PROBLEM, REHAB EVAL
82 year old male sent in from [facility] for dehydration and SI. Per notes in chart that were sent from facility pt has not been eating and states he wants to kill himself. Pt states he has felt like this for the past 10 days.

## 2022-07-14 NOTE — BH CONSULTATION LIAISON PROGRESS NOTE - NSBHASSESSMENTFT_PSY_ALL_CORE
Patient is an 82M PMH CHF, COPD, DM, dementia, schizophrenia, HTN sent in from [facility] for dehydration and SI. Patient is a poor historian. Patient does report a PPHx of schizophrenia. Sp[place] with [facility] from [place] Dr. Moeller [phone] who reports patient has a hx of schizophrenia - has requested to be on invega sustenna as he has done well on this medication in the past. Was given this medication about a year ago and has been doing well. Patient does have a "close catatonic episode" about a year ago, and this past monday, he was called by staff saying the patient was note eating, not speaking, not moving. Unsure if the ativan 0.5mg qhs is new as MD is not at a computer at this time. Psychiatry was called for SI as patient reported not eating or drinking in attempt to kill self.    7/8: AAOX3, cooperative, continues to have SI with plans, unable to report any specific stressors, +psychomotor retardation, likely thought blocked, however  no significant catatonic sxs noted on exam.   7/9: A&Ox3, cooperative, ongoing SI with plans. Not suspicious for catatonia.  7/10: Per staff, patient has been alert, cooperative, talkative.   7/11: oriented, remains depressed/withdrawn, less engaging today, possibly thought blocked, no significant catatonic sxs noted on exam, today he denies SI, PO intake somewhat improved.   7/12: Presentation largely unchanged, however more verbal/talkative today, reports SI with plan. No significant catatonic sxs noted on exam, paxil and ativan increased on 7/11.   7/13: AAOX3, remains depressed/withdrawn, denies SI today, po intake improving, continues to need an inpatient psychiatric admission for further stabilization/safety  7/14: oriented, remains depressed/withdrawn, continues to report intermittent SI with plans. Po intake has been improving. Patient will need an inpatient psychiatric admission for safety/stability.       PLAN  --Cont. Paxil 30mg daily to target depression  --Continue Ativan 0.5mg PO TID (7AM, 11AM and 4PM) for possible ?catatonia, will monitor/titrate accordingly (HOLD if sedated, bradycardic, hypotensive or in respiratory distress)  - patient on Invega Sustenna IM 156mg - given on the 3rd of the month? as per paperwork (needs to clarify this as it unclear when was the last dose given)  - PRN for agitation: Ativan 0.5mg q6hrs as patient with recent hx r/o catatonia   -  continue CO 1:1 due to SI with plans/ impulsivity   - Needs to confirm/clarify when last GARCIA given : psychiatrist from [facility] Dr. Moeller [phone]  -Encourage PO intake, calorie count  -PT/OT and OOB as tolerated.  -Dispo: Pt. will need an inpatient psychiatric admission once he consistently starts eating and medically optimized. 2PC placed in the chart.   -Case d/w [facility] in person  - CL will follow Patient is an 82M PMH CHF, COPD, DM, dementia, schizophrenia, HTN sent in from [facility] for dehydration and SI. Patient is a poor historian. Patient does report a PPHx of schizophrenia. Spoke with psychiatrist from [facility] Dr. Moeller [phone] who reports patient has a hx of schizophrenia - has requested to be on invega sustenna as he has done well on this medication in the past. Was given this medication about a year ago and has been doing well. Patient does have a "close catatonic episode" about a year ago, and this past monday, he was called by staff saying the patient was note eating, not speaking, not moving. Unsure if the ativan 0.5mg qhs is new as MD is not at a computer at this time. Psychiatry was called for SI as patient reported not eating or drinking in attempt to kill self.    7/8: AAOX3, cooperative, continues to have SI with plans, unable to report any specific stressors, +psychomotor retardation, likely thought blocked, however  no significant catatonic sxs noted on exam.   7/9: A&Ox3, cooperative, ongoing SI with plans. Not suspicious for catatonia.  7/10: Per staff, patient has been alert, cooperative, talkative.   7/11: oriented, remains depressed/withdrawn, less engaging today, possibly thought blocked, no significant catatonic sxs noted on exam, today he denies SI, PO intake somewhat improved.   7/12: Presentation largely unchanged, however more verbal/talkative today, reports SI with plan. No significant catatonic sxs noted on exam, paxil and ativan increased on 7/11.   7/13: AAOX3, remains depressed/withdrawn, denies SI today, po intake improving, continues to need an inpatient psychiatric admission for further stabilization/safety  7/14: oriented, remains depressed/withdrawn, continues to report intermittent SI with plans. Po intake has been improving. Patient will need an inpatient psychiatric admission for safety/stability.       PLAN  --Cont. Paxil 30mg daily to target depression  --Continue Ativan 0.5mg PO TID (7AM, 11AM and 4PM) for possible ?catatonia, will monitor/titrate accordingly (HOLD if sedated, bradycardic, hypotensive or in respiratory distress)  - patient on Invega Sustenna IM 156mg - given on the 3rd of the month? as per paperwork (needs to clarify this as it unclear when was the last dose given)  - PRN for agitation: Ativan 0.5mg q6hrs as patient with recent hx r/o catatonia   -  continue CO 1:1 due to SI with plans/ impulsivity   - Needs to confirm/clarify when last GARCIA given : psychiatrist from [place] Dr. Moeller [phone] (attempted to call today 7/14, unable to reach, mailbox was full so unable to leave a message)   -Encourage PO intake, calorie count  -PT/OT and OOB as tolerated.  -Dispo: Pt. will need an inpatient psychiatric admission once he consistently starts eating and medically optimized. 2PC placed in the chart.   -Case d/w [facility] in person  - CL will follow

## 2022-07-14 NOTE — BH CONSULTATION LIAISON PROGRESS NOTE - NSBHFUPINTERVALHXFT_PSY_A_CORE
Chart reviewed, did not get PRNs, morning dose of Ativan held due to lethargy,  pt. seen, AAOX3, overall continues to feel depressed, but reports mood is better today and was able to eat his breakfast. When asked about SI, pt. guarded, he initially denies, later endorsing intermittent SI with plans. Denies HI, denies AVH.     As per sitter and patient's nurse: Patient ate his breakfast.

## 2022-07-15 LAB
GLUCOSE BLDC GLUCOMTR-MCNC: 103 MG/DL — HIGH (ref 70–99)
GLUCOSE BLDC GLUCOMTR-MCNC: 126 MG/DL — HIGH (ref 70–99)
GLUCOSE BLDC GLUCOMTR-MCNC: 98 MG/DL — SIGNIFICANT CHANGE UP (ref 70–99)
GLUCOSE BLDC GLUCOMTR-MCNC: 98 MG/DL — SIGNIFICANT CHANGE UP (ref 70–99)

## 2022-07-15 PROCEDURE — 99231 SBSQ HOSP IP/OBS SF/LOW 25: CPT | Mod: FS

## 2022-07-15 RX ADMIN — SODIUM CHLORIDE 75 MILLILITER(S): 9 INJECTION, SOLUTION INTRAVENOUS at 11:19

## 2022-07-15 RX ADMIN — SODIUM CHLORIDE 75 MILLILITER(S): 9 INJECTION, SOLUTION INTRAVENOUS at 22:31

## 2022-07-15 RX ADMIN — Medication 0.5 MILLIGRAM(S): at 07:03

## 2022-07-15 RX ADMIN — Medication 0.5 MILLIGRAM(S): at 11:17

## 2022-07-15 RX ADMIN — Medication 0.5 MILLIGRAM(S): at 16:59

## 2022-07-15 RX ADMIN — BUDESONIDE AND FORMOTEROL FUMARATE DIHYDRATE 2 PUFF(S): 160; 4.5 AEROSOL RESPIRATORY (INHALATION) at 22:31

## 2022-07-15 RX ADMIN — ENOXAPARIN SODIUM 40 MILLIGRAM(S): 100 INJECTION SUBCUTANEOUS at 05:49

## 2022-07-15 RX ADMIN — Medication 30 MILLIGRAM(S): at 11:17

## 2022-07-15 RX ADMIN — AMLODIPINE BESYLATE 10 MILLIGRAM(S): 2.5 TABLET ORAL at 05:49

## 2022-07-15 NOTE — BH CONSULTATION LIAISON PROGRESS NOTE - NSBHASSESSMENTFT_PSY_ALL_CORE
Patient is an 82M PMH CHF, COPD, DM, dementia, schizophrenia, HTN sent in from [facility] for dehydration and SI. Patient is a poor historian. Patient does report a PPHx of schizophrenia. Spoke with psychiatrist from [place] Dr. Moeller [phone] who reports patient has a hx of schizophrenia - has requested to be on invega sustenna as he has done well on this medication in the past. Was given this medication about a year ago and has been doing well. Patient does have a "close catatonic episode" about a year ago, and this past monday, he was called by staff saying the patient was note eating, not speaking, not moving. Unsure if the ativan 0.5mg qhs is new as MD is not at a computer at this time. Psychiatry was called for SI as patient reported not eating or drinking in attempt to kill self.    7/8: AAOX3, cooperative, continues to have SI with plans, unable to report any specific stressors, +psychomotor retardation, likely thought blocked, however  no significant catatonic sxs noted on exam.   7/9: A&Ox3, cooperative, ongoing SI with plans. Not suspicious for catatonia.  7/10: Per staff, patient has been alert, cooperative, talkative.   7/11: oriented, remains depressed/withdrawn, less engaging today, possibly thought blocked, no significant catatonic sxs noted on exam, today he denies SI, PO intake somewhat improved.   7/12: Presentation largely unchanged, however more verbal/talkative today, reports SI with plan. No significant catatonic sxs noted on exam, paxil and ativan increased on 7/11.   7/13: AAOX3, remains depressed/withdrawn, denies SI today, po intake improving, continues to need an inpatient psychiatric admission for further stabilization/safety  7/14: oriented, remains depressed/withdrawn, continues to report intermittent SI with plans. Po intake has been improving. Patient will need an inpatient psychiatric admission for safety/stability.   7/15: denies SI on this exam however remains withdrawn and possibly thought blocked. WIll follow       PLAN  --Cont. Paxil 30mg daily to target depression  --Continue Ativan 0.5mg PO TID (7AM, 11AM and 4PM) for possible ?catatonia, will monitor/titrate accordingly (HOLD if sedated, bradycardic, hypotensive or in respiratory distress)  - patient on Invega Sustenna IM 156mg - given on the 3rd of the month? as per paperwork (needs to clarify this as it unclear when was the last dose given)  - PRN for agitation: Ativan 0.5mg q6hrs as patient with recent hx r/o catatonia   -  continue CO 1:1 due to SI with plans/ impulsivity   - Needs to confirm/clarify when last GARCIA given : psychiatrist from [place] Dr. Moeller [phone] mailbox was full so unable to leave a message - will attempt to call residence.   -Encourage PO intake, calorie count  -PT/OT and OOB as tolerated.  -Dispo: Pt. will need an inpatient psychiatric admission once he consistently starts eating and medically optimized. 2PC placed in the chart.   - CL will follow Patient is an 82M PMH CHF, COPD, DM, dementia, schizophrenia, HTN sent in from [facility] for dehydration and SI. Patient is a poor historian. Patient does report a PPHx of schizophrenia. Spoke with psychiatrist from [place] Dr. Moeller [phone] who reports patient has a hx of schizophrenia - has requested to be on invega sustenna as he has done well on this medication in the past. Was given this medication about a year ago and has been doing well. Patient does have a "close catatonic episode" about a year ago, and this past monday, he was called by staff saying the patient was note eating, not speaking, not moving. Unsure if the ativan 0.5mg qhs is new as MD is not at a computer at this time. Psychiatry was called for SI as patient reported not eating or drinking in attempt to kill self.    7/8: AAOX3, cooperative, continues to have SI with plans, unable to report any specific stressors, +psychomotor retardation, likely thought blocked, however  no significant catatonic sxs noted on exam.   7/9: A&Ox3, cooperative, ongoing SI with plans. Not suspicious for catatonia.  7/10: Per staff, patient has been alert, cooperative, talkative.   7/11: oriented, remains depressed/withdrawn, less engaging today, possibly thought blocked, no significant catatonic sxs noted on exam, today he denies SI, PO intake somewhat improved.   7/12: Presentation largely unchanged, however more verbal/talkative today, reports SI with plan. No significant catatonic sxs noted on exam, paxil and ativan increased on 7/11.   7/13: AAOX3, remains depressed/withdrawn, denies SI today, po intake improving, continues to need an inpatient psychiatric admission for further stabilization/safety  7/14: oriented, remains depressed/withdrawn, continues to report intermittent SI with plans. Po intake has been improving. Patient will need an inpatient psychiatric admission for safety/stability.   7/15: denies SI on this exam however remains withdrawn and possibly thought blocked. WIll follow       PLAN  --Cont. Paxil 30mg daily to target depression  --Continue Ativan 0.5mg PO TID (7AM, 11AM and 4PM) for possible ?catatonia, will monitor/titrate accordingly (HOLD if sedated, bradycardic, hypotensive or in respiratory distress)  - patient on Invega Sustenna IM 156mg - given on the 3rd of the month? as per paperwork (needs to clarify this as it unclear when was the last dose given)  - PRN for agitation: Ativan 0.5mg q6hrs as patient with recent hx r/o catatonia   -  continue CO 1:1 due to SI with plans/ impulsivity   - Needs to confirm/clarify when last GARCIA given : psychiatrist from [place] Dr. Moeller [phone] mailbox was full so unable to leave a message - attempted to call [facility] residence - Staff on unit does not answer - left  for return call .   -Encourage PO intake, calorie count  -PT/OT and OOB as tolerated.  -Dispo: Pt. will need an inpatient psychiatric admission once he consistently starts eating and medically optimized. 2PC placed in the chart.   - CL will follow

## 2022-07-15 NOTE — BH CONSULTATION LIAISON PROGRESS NOTE - NSBHFUPINTERVALHXFT_PSY_A_CORE
Chart reviewed, did not get PRNs. Accepting all medications. Patient ate breakfast ( patient reported as well as confirmed by PCA). Patient reporting good mood, stating no SI at this time. Reports enjoying reading. Patient continues to be thought blocked, speech stuttered. NO obvious s/s of catatonia noted on this exam.

## 2022-07-15 NOTE — BH CONSULTATION LIAISON PROGRESS NOTE - OTHER
withdrawn variable concrete, goal directed, difficult to engage in interview, possibly ?thought blocked

## 2022-07-16 LAB
GLUCOSE BLDC GLUCOMTR-MCNC: 104 MG/DL — HIGH (ref 70–99)
GLUCOSE BLDC GLUCOMTR-MCNC: 105 MG/DL — HIGH (ref 70–99)
GLUCOSE BLDC GLUCOMTR-MCNC: 116 MG/DL — HIGH (ref 70–99)
GLUCOSE BLDC GLUCOMTR-MCNC: 134 MG/DL — HIGH (ref 70–99)

## 2022-07-16 RX ORDER — AMLODIPINE BESYLATE 2.5 MG/1
10 TABLET ORAL DAILY
Refills: 0 | Status: DISCONTINUED | OUTPATIENT
Start: 2022-07-16 | End: 2022-07-22

## 2022-07-16 RX ADMIN — AMLODIPINE BESYLATE 10 MILLIGRAM(S): 2.5 TABLET ORAL at 05:59

## 2022-07-16 RX ADMIN — Medication 30 MILLIGRAM(S): at 18:00

## 2022-07-16 RX ADMIN — ENOXAPARIN SODIUM 40 MILLIGRAM(S): 100 INJECTION SUBCUTANEOUS at 05:59

## 2022-07-16 RX ADMIN — Medication 0.5 MILLIGRAM(S): at 17:58

## 2022-07-16 RX ADMIN — BUDESONIDE AND FORMOTEROL FUMARATE DIHYDRATE 2 PUFF(S): 160; 4.5 AEROSOL RESPIRATORY (INHALATION) at 10:12

## 2022-07-16 RX ADMIN — BUDESONIDE AND FORMOTEROL FUMARATE DIHYDRATE 2 PUFF(S): 160; 4.5 AEROSOL RESPIRATORY (INHALATION) at 21:03

## 2022-07-16 NOTE — BH CONSULTATION LIAISON PROGRESS NOTE - NSBHASSESSMENTFT_PSY_ALL_CORE
Patient is an 82M PMH CHF, COPD, DM, dementia, schizophrenia, HTN sent in from [facility] for dehydration and SI. Patient is a poor historian. Patient does report a PPHx of schizophrenia. Spoke with [facility] from [place] Dr. Moeller [phone] who reports patient has a hx of schizophrenia - has requested to be on invega sustenna as he has done well on this medication in the past. Was given this medication about a year ago and has been doing well. Patient does have a "close catatonic episode" about a year ago, and this past monday, he was called by staff saying the patient was note eating, not speaking, not moving. Unsure if the ativan 0.5mg qhs is new as MD is not at a computer at this time. Psychiatry was called for SI as patient reported not eating or drinking in attempt to kill self.    7/8: AAOX3, cooperative, continues to have SI with plans, unable to report any specific stressors, +psychomotor retardation, likely thought blocked, however  no significant catatonic sxs noted on exam.   7/9: A&Ox3, cooperative, ongoing SI with plans. Not suspicious for catatonia.  7/10: Per staff, patient has been alert, cooperative, talkative.   7/11: oriented, remains depressed/withdrawn, less engaging today, possibly thought blocked, no significant catatonic sxs noted on exam, today he denies SI, PO intake somewhat improved.   7/12: Presentation largely unchanged, however more verbal/talkative today, reports SI with plan. No significant catatonic sxs noted on exam, paxil and ativan increased on 7/11.   7/13: AAOX3, remains depressed/withdrawn, denies SI today, po intake improving, continues to need an inpatient psychiatric admission for further stabilization/safety  7/14: oriented, remains depressed/withdrawn, continues to report intermittent SI with plans. Po intake has been improving. Patient will need an inpatient psychiatric admission for safety/stability.   7/15: denies SI on this exam however remains withdrawn and possibly thought blocked. WIll follow   7/16: continues to present as withdrawn; endorses passive SI without plan/intent    PLAN  --Cont. Paxil 30mg daily to target depression  --Continue Ativan 0.5mg PO TID (7AM, 11AM and 4PM) for possible ?catatonia, will monitor/titrate accordingly (HOLD if sedated, bradycardic, hypotensive or in respiratory distress)  - patient on Invega Sustenna IM 156mg - given on the 3rd of the month? as per paperwork (needs to clarify this as it unclear when was the last dose given)  - PRN for agitation: Ativan 0.5mg q6hrs as patient with recent hx r/o catatonia   -  continue CO 1:1 due to SI with plans/ impulsivity   - Needs to confirm/clarify when last GARCIA given : psychiatrist from [facility] Dr. Moeller [phone] mailbox was full so unable to leave a message - attempted to call [place] [facility] residence - Staff on unit does not answer - left VM for return call .   -Encourage PO intake, calorie count  -PT/OT and OOB as tolerated.  -Dispo: Pt. will need an inpatient psychiatric admission once he consistently starts eating and medically optimized. 2PC placed in the chart.   - CL will follow

## 2022-07-16 NOTE — PROVIDER CONTACT NOTE (OTHER) - ASSESSMENT
Electrolytes and BUN within normal limit. PO intake encouraged.
bp 179/92. other VSS. asymptomatic
no PM insulin sliding scale and no BP parameters for AM amlodipine

## 2022-07-16 NOTE — BH CONSULTATION LIAISON PROGRESS NOTE - NSBHFUPINTERVALHXFT_PSY_A_CORE
Chart reviewed, did not get PRNs. Accepting all medications. Pt reports feeling "so-so" today. Reports sometimes thinking about not wanting to be alive, though denies plan/intent. Says appetite is fair and he has been eating and drinking. +mild cogwheeling on exam

## 2022-07-16 NOTE — PROVIDER CONTACT NOTE (OTHER) - SITUATION
Pt noted to be coughing with dinner. Patient lungs assessed and sound clear.
bp 179/92. other VSS. asymptomatic
no PM insulin sliding scale and no BP parameters for AM amlodipine

## 2022-07-17 LAB
GLUCOSE BLDC GLUCOMTR-MCNC: 101 MG/DL — HIGH (ref 70–99)
GLUCOSE BLDC GLUCOMTR-MCNC: 104 MG/DL — HIGH (ref 70–99)
GLUCOSE BLDC GLUCOMTR-MCNC: 105 MG/DL — HIGH (ref 70–99)
GLUCOSE BLDC GLUCOMTR-MCNC: 113 MG/DL — HIGH (ref 70–99)
SARS-COV-2 RNA SPEC QL NAA+PROBE: SIGNIFICANT CHANGE UP

## 2022-07-17 RX ADMIN — BUDESONIDE AND FORMOTEROL FUMARATE DIHYDRATE 2 PUFF(S): 160; 4.5 AEROSOL RESPIRATORY (INHALATION) at 09:09

## 2022-07-17 RX ADMIN — Medication 0.5 MILLIGRAM(S): at 16:55

## 2022-07-17 RX ADMIN — AMLODIPINE BESYLATE 10 MILLIGRAM(S): 2.5 TABLET ORAL at 05:36

## 2022-07-17 RX ADMIN — BUDESONIDE AND FORMOTEROL FUMARATE DIHYDRATE 2 PUFF(S): 160; 4.5 AEROSOL RESPIRATORY (INHALATION) at 21:03

## 2022-07-17 RX ADMIN — ENOXAPARIN SODIUM 40 MILLIGRAM(S): 100 INJECTION SUBCUTANEOUS at 05:36

## 2022-07-17 RX ADMIN — Medication 30 MILLIGRAM(S): at 16:55

## 2022-07-17 NOTE — BH CONSULTATION LIAISON PROGRESS NOTE - ADDITIONAL DETAILS / COMMENTS
No echolalia or echopraxia. No automatic obedience. No grimacing. No catalepsy. No rigidity. No gegenhalten, no posturing
No echolalia or echopraxia. No automatic obedience. No grimacing. No catalepsy. No rigidity. No gegenhalten, no posturing, able to follow simple commands 
No echolalia or echopraxia. No automatic obedience. No grimacing. No catalepsy. No rigidity.
Continues with No echolalia or echopraxia. No automatic obedience. No grimacing. No catalepsy. No rigidity. No gegenhalten, no posturing, able to follow simple commands 
No echolalia or echopraxia. No automatic obedience. No grimacing. No catalepsy. No rigidity. No gegenhalten, no posturing

## 2022-07-17 NOTE — BH CONSULTATION LIAISON PROGRESS NOTE - ATTENDING COMMENTS
Chart reviewed. Discussed case with resident. Agree with above assessment/recs. Will continue to follow. 
Chart reviewed. Case discussed with resident. Agree with above recs/assessment. Will continue to follow.

## 2022-07-17 NOTE — BH CONSULTATION LIAISON PROGRESS NOTE - NSBHMSELANG_PSY_A_CORE
No abnormalities noted
No abnormalities noted
Unable to assess

## 2022-07-17 NOTE — BH CONSULTATION LIAISON PROGRESS NOTE - NSBHFUPINTERVALHXFT_PSY_A_CORE
Chart reviewed, no acute events, no PRN required overnight. Accepting all medications. Pt reports feeling "okay" today, denies current complaints, denies SI/HI. Per staff just finished eating breakfast, has been calm and cooperative.

## 2022-07-17 NOTE — BH CONSULTATION LIAISON PROGRESS NOTE - NSBHASSESSMENTFT_PSY_ALL_CORE
Patient is an 82M PMH CHF, COPD, DM, dementia, schizophrenia, HTN sent in from [facility] for dehydration and SI. Patient is a poor historian. Patient does report a PPHx of schizophrenia. Spoke with [facility] from [facility] Dr. Moeller [phone] who reports patient has a hx of schizophrenia - has requested to be on invega sustenna as he has done well on this medication in the past. Was given this medication about a year ago and has been doing well. Patient does have a "close catatonic episode" about a year ago, and this past monday, he was called by staff saying the patient was note eating, not speaking, not moving. Unsure if the ativan 0.5mg qhs is new as MD is not at a computer at this time. Psychiatry was called for SI as patient reported not eating or drinking in attempt to kill self.    7/8: AAOX3, cooperative, continues to have SI with plans, unable to report any specific stressors, +psychomotor retardation, likely thought blocked, however  no significant catatonic sxs noted on exam.   7/9: A&Ox3, cooperative, ongoing SI with plans. Not suspicious for catatonia.  7/10: Per staff, patient has been alert, cooperative, talkative.   7/11: oriented, remains depressed/withdrawn, less engaging today, possibly thought blocked, no significant catatonic sxs noted on exam, today he denies SI, PO intake somewhat improved.   7/12: Presentation largely unchanged, however more verbal/talkative today, reports SI with plan. No significant catatonic sxs noted on exam, paxil and ativan increased on 7/11.   7/13: AAOX3, remains depressed/withdrawn, denies SI today, po intake improving, continues to need an inpatient psychiatric admission for further stabilization/safety  7/14: oriented, remains depressed/withdrawn, continues to report intermittent SI with plans. Po intake has been improving. Patient will need an inpatient psychiatric admission for safety/stability.   7/15: denies SI on this exam however remains withdrawn and possibly thought blocked. WIll follow   7/16: continues to present as withdrawn; endorses passive SI without plan/intent  7/17: denies SI, per staff has been calm and cooperative with adequate PO intake.     PLAN  --Cont. Paxil 30mg daily to target depression  --Continue Ativan 0.5mg PO TID (7AM, 11AM and 4PM) for possible ?catatonia, will monitor/titrate accordingly (HOLD if sedated, bradycardic, hypotensive or in respiratory distress)  - patient on Invega Sustenna IM 156mg - given on the 3rd of the month? as per paperwork (needs to clarify this as it unclear when was the last dose given)  - PRN for agitation: Ativan 0.5mg q6hrs as patient with recent hx r/o catatonia   -  continue CO 1:1 due to SI with plans/ impulsivity   - Needs to confirm/clarify when last GARCIA given : psychiatrist from [facility] Dr. Moeller [phone] mailbox was full so unable to leave a message - attempted to call [place] residence - Staff on unit does not answer - left  for return call .   -Encourage PO intake, calorie count  -PT/OT and OOB as tolerated.  -Dispo: Pt. will need an inpatient psychiatric admission once he consistently starts eating and medically optimized. 2PC placed in the chart.   - CL will follow

## 2022-07-18 LAB
GLUCOSE BLDC GLUCOMTR-MCNC: 105 MG/DL — HIGH (ref 70–99)
GLUCOSE BLDC GLUCOMTR-MCNC: 106 MG/DL — HIGH (ref 70–99)
GLUCOSE BLDC GLUCOMTR-MCNC: 113 MG/DL — HIGH (ref 70–99)
GLUCOSE BLDC GLUCOMTR-MCNC: 116 MG/DL — HIGH (ref 70–99)

## 2022-07-18 PROCEDURE — 99231 SBSQ HOSP IP/OBS SF/LOW 25: CPT

## 2022-07-18 RX ADMIN — ENOXAPARIN SODIUM 40 MILLIGRAM(S): 100 INJECTION SUBCUTANEOUS at 06:13

## 2022-07-18 RX ADMIN — Medication 0.5 MILLIGRAM(S): at 17:14

## 2022-07-18 RX ADMIN — AMLODIPINE BESYLATE 10 MILLIGRAM(S): 2.5 TABLET ORAL at 06:13

## 2022-07-18 RX ADMIN — BUDESONIDE AND FORMOTEROL FUMARATE DIHYDRATE 2 PUFF(S): 160; 4.5 AEROSOL RESPIRATORY (INHALATION) at 08:56

## 2022-07-18 RX ADMIN — Medication 30 MILLIGRAM(S): at 17:14

## 2022-07-18 NOTE — BH CONSULTATION LIAISON PROGRESS NOTE - NSBHASSESSMENTFT_PSY_ALL_CORE
Patient is an 82M PMH CHF, COPD, DM, dementia, schizophrenia, HTN sent in from [facility] for dehydration and SI. Patient is a poor historian. Patient does report a PPHx of schizophrenia. Sp[place] [facility] from [place] Dr. Moeller [phone] who reports patient has a hx of schizophrenia - has requested to be on invega sustenna as he has done well on this medication in the past. Was given this medication about a year ago and has been doing well. Patient does have a "close catatonic episode" about a year ago, and this past monday, he was called by staff saying the patient was note eating, not speaking, not moving. Unsure if the ativan 0.5mg qhs is new as MD is not at a computer at this time. Psychiatry was called for SI as patient reported not eating or drinking in attempt to kill self.    7/8: AAOX3, cooperative, continues to have SI with plans, unable to report any specific stressors, +psychomotor retardation, likely thought blocked, however  no significant catatonic sxs noted on exam.   7/9: A&Ox3, cooperative, ongoing SI with plans. Not suspicious for catatonia.  7/10: Per staff, patient has been alert, cooperative, talkative.   7/11: oriented, remains depressed/withdrawn, less engaging today, possibly thought blocked, no significant catatonic sxs noted on exam, today he denies SI, PO intake somewhat improved.   7/12: Presentation largely unchanged, however more verbal/talkative today, reports SI with plan. No significant catatonic sxs noted on exam, paxil and ativan increased on 7/11.   7/13: AAOX3, remains depressed/withdrawn, denies SI today, po intake improving, continues to need an inpatient psychiatric admission for further stabilization/safety  7/14: oriented, remains depressed/withdrawn, continues to report intermittent SI with plans. Po intake has been improving. Patient will need an inpatient psychiatric admission for safety/stability.   7/15: denies SI on this exam however remains withdrawn and possibly thought blocked. WIll follow   7/16: continues to present as withdrawn; endorses passive SI without plan/intent  7/17: denies SI, per staff has been calm and cooperative with adequate PO intake.   7/18: No changes, no PRNs, mood content, calm/cooperative, flat/constricted; Attempted to call [phone] going to voicemail upon transfer to nursing station.    PLAN  --Cont. Paxil 30mg daily to target depression  --Continue Ativan 0.5mg PO TID (7AM, 11AM and 4PM) for possible ?catatonia, will monitor/titrate accordingly (HOLD if sedated, bradycardic, hypotensive or in respiratory distress)  - patient on Invega Sustenna IM 156mg - given on the 3rd of the month? as per paperwork (needs to clarify this as it unclear when was the last dose given)  - PRN for agitation: Ativan 0.5mg q6hrs as patient with recent hx r/o catatonia   -  continue CO 1:1 due to SI with plans/ impulsivity   - Needs to confirm/clarify when last GARCIA given attempted to call [facility] residence - Staff on unit does not answer - left  for return call .   - Encourage PO intake, calorie count, PT/OT and OOB as tolerated.  - Dispo: Pt. will need an inpatient psychiatric admission once he consistently starts eating and medically optimized. 2PC placed in the chart.   - CL will follow

## 2022-07-19 LAB
ANION GAP SERPL CALC-SCNC: 11 MMOL/L — SIGNIFICANT CHANGE UP (ref 7–14)
BUN SERPL-MCNC: 9 MG/DL — SIGNIFICANT CHANGE UP (ref 7–23)
CALCIUM SERPL-MCNC: 9.2 MG/DL — SIGNIFICANT CHANGE UP (ref 8.4–10.5)
CHLORIDE SERPL-SCNC: 98 MMOL/L — SIGNIFICANT CHANGE UP (ref 98–107)
CO2 SERPL-SCNC: 26 MMOL/L — SIGNIFICANT CHANGE UP (ref 22–31)
CREAT SERPL-MCNC: 0.62 MG/DL — SIGNIFICANT CHANGE UP (ref 0.5–1.3)
EGFR: 96 ML/MIN/1.73M2 — SIGNIFICANT CHANGE UP
GLUCOSE BLDC GLUCOMTR-MCNC: 107 MG/DL — HIGH (ref 70–99)
GLUCOSE BLDC GLUCOMTR-MCNC: 109 MG/DL — HIGH (ref 70–99)
GLUCOSE BLDC GLUCOMTR-MCNC: 110 MG/DL — HIGH (ref 70–99)
GLUCOSE BLDC GLUCOMTR-MCNC: 127 MG/DL — HIGH (ref 70–99)
GLUCOSE SERPL-MCNC: 122 MG/DL — HIGH (ref 70–99)
HCT VFR BLD CALC: 42.7 % — SIGNIFICANT CHANGE UP (ref 39–50)
HGB BLD-MCNC: 14.5 G/DL — SIGNIFICANT CHANGE UP (ref 13–17)
MAGNESIUM SERPL-MCNC: 2.1 MG/DL — SIGNIFICANT CHANGE UP (ref 1.6–2.6)
MCHC RBC-ENTMCNC: 32.2 PG — SIGNIFICANT CHANGE UP (ref 27–34)
MCHC RBC-ENTMCNC: 34 GM/DL — SIGNIFICANT CHANGE UP (ref 32–36)
MCV RBC AUTO: 94.9 FL — SIGNIFICANT CHANGE UP (ref 80–100)
NRBC # BLD: 0 /100 WBCS — SIGNIFICANT CHANGE UP
NRBC # FLD: 0 K/UL — SIGNIFICANT CHANGE UP
PHOSPHATE SERPL-MCNC: 2.9 MG/DL — SIGNIFICANT CHANGE UP (ref 2.5–4.5)
PLATELET # BLD AUTO: 264 K/UL — SIGNIFICANT CHANGE UP (ref 150–400)
POTASSIUM SERPL-MCNC: 3.8 MMOL/L — SIGNIFICANT CHANGE UP (ref 3.5–5.3)
POTASSIUM SERPL-SCNC: 3.8 MMOL/L — SIGNIFICANT CHANGE UP (ref 3.5–5.3)
RBC # BLD: 4.5 M/UL — SIGNIFICANT CHANGE UP (ref 4.2–5.8)
RBC # FLD: 12.5 % — SIGNIFICANT CHANGE UP (ref 10.3–14.5)
SARS-COV-2 RNA SPEC QL NAA+PROBE: SIGNIFICANT CHANGE UP
SODIUM SERPL-SCNC: 135 MMOL/L — SIGNIFICANT CHANGE UP (ref 135–145)
WBC # BLD: 5.55 K/UL — SIGNIFICANT CHANGE UP (ref 3.8–10.5)
WBC # FLD AUTO: 5.55 K/UL — SIGNIFICANT CHANGE UP (ref 3.8–10.5)

## 2022-07-19 PROCEDURE — 99232 SBSQ HOSP IP/OBS MODERATE 35: CPT | Mod: FS

## 2022-07-19 RX ADMIN — AMLODIPINE BESYLATE 10 MILLIGRAM(S): 2.5 TABLET ORAL at 05:42

## 2022-07-19 RX ADMIN — SODIUM CHLORIDE 75 MILLILITER(S): 9 INJECTION, SOLUTION INTRAVENOUS at 23:05

## 2022-07-19 RX ADMIN — Medication 30 MILLIGRAM(S): at 12:27

## 2022-07-19 RX ADMIN — Medication 0.5 MILLIGRAM(S): at 17:35

## 2022-07-19 RX ADMIN — ENOXAPARIN SODIUM 40 MILLIGRAM(S): 100 INJECTION SUBCUTANEOUS at 05:42

## 2022-07-19 NOTE — BH CONSULTATION LIAISON PROGRESS NOTE - NSBHATTESTCOMMENTATTENDFT_PSY_A_CORE
patient depressed, no SI today but patient unreliable, invega dose confirmed. Will need to renew 2PC

## 2022-07-19 NOTE — BH CONSULTATION LIAISON PROGRESS NOTE - NSBHFUPINTERVALHXFT_PSY_A_CORE
Chart reviewed, no acute events, no PRN required. Patient is seen, alert, calm, cooperative. He describes his mood as "sad" but shrugs shoulders as to why he is sad. Patient then denies SI and again shrugs shoulders as to why he is not suicidal at this time. Unable to tell provider about previous suicidal thoughts, what changed. Limited interview today. patient does state he is eating. Sleep is inconsistent. No overt s/s of catatonia on exam.

## 2022-07-19 NOTE — BH CONSULTATION LIAISON PROGRESS NOTE - NSBHATTESTAPPAMEND_PSY_A_CORE
I have personally seen and examined this patient. I fully participated in the care of this patient. I have made amendments to the documentation where appropriate and otherwise agree with the history, physical exam, and plan as documented by the HARRY

## 2022-07-19 NOTE — BH CONSULTATION LIAISON PROGRESS NOTE - NSBHASSESSMENTFT_PSY_ALL_CORE
Patient is an 82M PMH CHF, COPD, DM, dementia, schizophrenia, HTN sent in from [facility] for dehydration and SI. Patient is a poor historian. Patient does report a PPHx of schizophrenia. Spoke with psychiatrist from [place] Dr. Moeller [phone] who reports patient has a hx of schizophrenia - has requested to be on invega sustenna as he has done well on this medication in the past. Was given this medication about a year ago and has been doing well. Patient does have a "close catatonic episode" about a year ago, and this past monday, he was called by staff saying the patient was note eating, not speaking, not moving. Unsure if the ativan 0.5mg qhs is new as MD is not at a computer at this time. Psychiatry was called for SI as patient reported not eating or drinking in attempt to kill self.    7/8: AAOX3, cooperative, continues to have SI with plans, unable to report any specific stressors, +psychomotor retardation, likely thought blocked, however  no significant catatonic sxs noted on exam.   7/9: A&Ox3, cooperative, ongoing SI with plans. Not suspicious for catatonia.  7/10: Per staff, patient has been alert, cooperative, talkative.   7/11: oriented, remains depressed/withdrawn, less engaging today, possibly thought blocked, no significant catatonic sxs noted on exam, today he denies SI, PO intake somewhat improved.   7/12: Presentation largely unchanged, however more verbal/talkative today, reports SI with plan. No significant catatonic sxs noted on exam, paxil and ativan increased on 7/11.   7/13: AAOX3, remains depressed/withdrawn, denies SI today, po intake improving, continues to need an inpatient psychiatric admission for further stabilization/safety  7/14: oriented, remains depressed/withdrawn, continues to report intermittent SI with plans. Po intake has been improving. Patient will need an inpatient psychiatric admission for safety/stability.   7/15: denies SI on this exam however remains withdrawn and possibly thought blocked. WIll follow   7/16: continues to present as withdrawn; endorses passive SI without plan/intent  7/17: denies SI, per staff has been calm and cooperative with adequate PO intake.   7/18: No changes, no PRNs, mood content, calm/cooperative, flat/constricted; Attempted to call [phone] going to voicemail upon transfer to nursing station.  7/19: no SI today, however states mood is sad. Poor insight. Flat, withdrawan.     PLAN  --Cont. Paxil 30mg daily to target depression  --Continue Ativan 0.5mg PO TID (7AM, 11AM and 4PM) for possible ?catatonia, will monitor/titrate accordingly (HOLD if sedated, bradycardic, hypotensive or in respiratory distress)  - patient on Invega Sustenna IM 156mg - given on the 3rd of the month? as per paperwork (needs to clarify this as it unclear when was the last dose given)  - PRN for agitation: Ativan 0.5mg q6hrs as patient with recent hx r/o catatonia   -  continue CO 1:1 due to SI with plans/ impulsivity   - Needs to confirm/clarify when last GARCIA given attempted to call [facility] residence - Staff on unit does not answer - left  for return call .   - Encourage PO intake, calorie count, PT/OT and OOB as tolerated.  - Dispo: Pt. will need an inpatient psychiatric admission once he consistently starts eating and medically optimized. will update legals.   - CL will follow Patient is an 82M PMH CHF, COPD, DM, dementia, schizophrenia, HTN sent in from [facility] for dehydration and SI. Patient is a poor historian. Patient does report a PPHx of schizophrenia. Spoke with [facility] from [facility] Dr. Moeller [phone] who reports patient has a hx of schizophrenia - has requested to be on invega sustenna as he has done well on this medication in the past. Was given this medication about a year ago and has been doing well. Patient does have a "close catatonic episode" about a year ago, and this past monday, he was called by staff saying the patient was note eating, not speaking, not moving. Unsure if the ativan 0.5mg qhs is new as MD is not at a computer at this time. Psychiatry was called for SI as patient reported not eating or drinking in attempt to kill self.    7/8: AAOX3, cooperative, continues to have SI with plans, unable to report any specific stressors, +psychomotor retardation, likely thought blocked, however  no significant catatonic sxs noted on exam.   7/9: A&Ox3, cooperative, ongoing SI with plans. Not suspicious for catatonia.  7/10: Per staff, patient has been alert, cooperative, talkative.   7/11: oriented, remains depressed/withdrawn, less engaging today, possibly thought blocked, no significant catatonic sxs noted on exam, today he denies SI, PO intake somewhat improved.   7/12: Presentation largely unchanged, however more verbal/talkative today, reports SI with plan. No significant catatonic sxs noted on exam, paxil and ativan increased on 7/11.   7/13: AAOX3, remains depressed/withdrawn, denies SI today, po intake improving, continues to need an inpatient psychiatric admission for further stabilization/safety  7/14: oriented, remains depressed/withdrawn, continues to report intermittent SI with plans. Po intake has been improving. Patient will need an inpatient psychiatric admission for safety/stability.   7/15: denies SI on this exam however remains withdrawn and possibly thought blocked. WIll follow   7/16: continues to present as withdrawn; endorses passive SI without plan/intent  7/17: denies SI, per staff has been calm and cooperative with adequate PO intake.   7/18: No changes, no PRNs, mood content, calm/cooperative, flat/constricted; Attempted to call [phone] going to voicemail upon transfer to nursing station.  7/19: no SI today, however states mood is sad. Poor insight. Flat, withdrawn. INVEGA INJECTION GIVEN 7/3 confirmed with RN on 3rd floor, [facility].     PLAN  --Cont. Paxil 30mg daily to target depression  --Continue Ativan 0.5mg PO TID (7AM, 11AM and 4PM) for possible ?catatonia, will monitor/titrate accordingly (HOLD if sedated, bradycardic, hypotensive or in respiratory distress)  - patient on Invega Sustenna IM 156mg - given on the 3rd of the month (7/3- confirmed )   - PRN for agitation: Ativan 0.5mg q6hrs as patient with recent hx r/o catatonia   -  continue CO 1:1 due to SI with plans/ impulsivity   - Encourage PO intake, calorie count, PT/OT and OOB as tolerated.  - Dispo: Pt. will need an inpatient psychiatric admission once he consistently starts eating and medically optimized. will update legals.   - CL will follow

## 2022-07-20 PROBLEM — J44.9 CHRONIC OBSTRUCTIVE PULMONARY DISEASE, UNSPECIFIED: Chronic | Status: ACTIVE | Noted: 2022-07-06

## 2022-07-20 PROBLEM — I50.9 HEART FAILURE, UNSPECIFIED: Chronic | Status: ACTIVE | Noted: 2022-07-06

## 2022-07-20 PROBLEM — I10 ESSENTIAL (PRIMARY) HYPERTENSION: Chronic | Status: ACTIVE | Noted: 2022-07-06

## 2022-07-20 LAB
ANION GAP SERPL CALC-SCNC: 11 MMOL/L — SIGNIFICANT CHANGE UP (ref 7–14)
BUN SERPL-MCNC: 10 MG/DL — SIGNIFICANT CHANGE UP (ref 7–23)
CALCIUM SERPL-MCNC: 9 MG/DL — SIGNIFICANT CHANGE UP (ref 8.4–10.5)
CHLORIDE SERPL-SCNC: 98 MMOL/L — SIGNIFICANT CHANGE UP (ref 98–107)
CO2 SERPL-SCNC: 27 MMOL/L — SIGNIFICANT CHANGE UP (ref 22–31)
CREAT SERPL-MCNC: 0.73 MG/DL — SIGNIFICANT CHANGE UP (ref 0.5–1.3)
EGFR: 91 ML/MIN/1.73M2 — SIGNIFICANT CHANGE UP
GLUCOSE BLDC GLUCOMTR-MCNC: 114 MG/DL — HIGH (ref 70–99)
GLUCOSE BLDC GLUCOMTR-MCNC: 115 MG/DL — HIGH (ref 70–99)
GLUCOSE BLDC GLUCOMTR-MCNC: 118 MG/DL — HIGH (ref 70–99)
GLUCOSE SERPL-MCNC: 107 MG/DL — HIGH (ref 70–99)
HCT VFR BLD CALC: 38.7 % — LOW (ref 39–50)
HGB BLD-MCNC: 13.4 G/DL — SIGNIFICANT CHANGE UP (ref 13–17)
MAGNESIUM SERPL-MCNC: 1.9 MG/DL — SIGNIFICANT CHANGE UP (ref 1.6–2.6)
MCHC RBC-ENTMCNC: 32.6 PG — SIGNIFICANT CHANGE UP (ref 27–34)
MCHC RBC-ENTMCNC: 34.6 GM/DL — SIGNIFICANT CHANGE UP (ref 32–36)
MCV RBC AUTO: 94.2 FL — SIGNIFICANT CHANGE UP (ref 80–100)
NRBC # BLD: 0 /100 WBCS — SIGNIFICANT CHANGE UP
NRBC # FLD: 0 K/UL — SIGNIFICANT CHANGE UP
PHOSPHATE SERPL-MCNC: 3.1 MG/DL — SIGNIFICANT CHANGE UP (ref 2.5–4.5)
PLATELET # BLD AUTO: 240 K/UL — SIGNIFICANT CHANGE UP (ref 150–400)
POTASSIUM SERPL-MCNC: 3.7 MMOL/L — SIGNIFICANT CHANGE UP (ref 3.5–5.3)
POTASSIUM SERPL-SCNC: 3.7 MMOL/L — SIGNIFICANT CHANGE UP (ref 3.5–5.3)
RBC # BLD: 4.11 M/UL — LOW (ref 4.2–5.8)
RBC # FLD: 12.7 % — SIGNIFICANT CHANGE UP (ref 10.3–14.5)
SODIUM SERPL-SCNC: 136 MMOL/L — SIGNIFICANT CHANGE UP (ref 135–145)
WBC # BLD: 5.24 K/UL — SIGNIFICANT CHANGE UP (ref 3.8–10.5)
WBC # FLD AUTO: 5.24 K/UL — SIGNIFICANT CHANGE UP (ref 3.8–10.5)

## 2022-07-20 PROCEDURE — 99231 SBSQ HOSP IP/OBS SF/LOW 25: CPT | Mod: FS

## 2022-07-20 RX ADMIN — AMLODIPINE BESYLATE 10 MILLIGRAM(S): 2.5 TABLET ORAL at 05:23

## 2022-07-20 RX ADMIN — ENOXAPARIN SODIUM 40 MILLIGRAM(S): 100 INJECTION SUBCUTANEOUS at 05:23

## 2022-07-20 RX ADMIN — Medication 0.5 MILLIGRAM(S): at 17:09

## 2022-07-20 RX ADMIN — Medication 30 MILLIGRAM(S): at 12:27

## 2022-07-20 NOTE — BH CONSULTATION LIAISON PROGRESS NOTE - NSBHFUPINTERVALHXFT_PSY_A_CORE
Chart reviewed, no acute events, no PRN required. Patient is seen, alert, calm, cooperative. patient reports todays mood as "good now" but again remains uncertain as to why his mood his good today vs sad yesterday. patient denies SI and HI today. Seen eating lunch. PCA reporting no behavioral issues. Limited interview.

## 2022-07-20 NOTE — BH CONSULTATION LIAISON PROGRESS NOTE - NSBHASSESSMENTFT_PSY_ALL_CORE
Patient is an 82M PMH CHF, COPD, DM, dementia, schizophrenia, HTN sent in from [facility] for dehydration and SI. Patient is a poor historian. Patient does report a PPHx of schizophrenia. Spoke [place] [facility] from [place] Dr. Moeller [phone] who reports patient has a hx of schizophrenia - has requested to be on invega sustenna as he has done well on this medication in the past. Was given this medication about a year ago and has been doing well. Patient does have a "close catatonic episode" about a year ago, and this past monday, he was called by staff saying the patient was note eating, not speaking, not moving. Unsure if the ativan 0.5mg qhs is new as MD is not at a computer at this time. Psychiatry was called for SI as patient reported not eating or drinking in attempt to kill self.    7/8: AAOX3, cooperative, continues to have SI with plans, unable to report any specific stressors, +psychomotor retardation, likely thought blocked, however  no significant catatonic sxs noted on exam.   7/9: A&Ox3, cooperative, ongoing SI with plans. Not suspicious for catatonia.  7/10: Per staff, patient has been alert, cooperative, talkative.   7/11: oriented, remains depressed/withdrawn, less engaging today, possibly thought blocked, no significant catatonic sxs noted on exam, today he denies SI, PO intake somewhat improved.   7/12: Presentation largely unchanged, however more verbal/talkative today, reports SI with plan. No significant catatonic sxs noted on exam, paxil and ativan increased on 7/11.   7/13: AAOX3, remains depressed/withdrawn, denies SI today, po intake improving, continues to need an inpatient psychiatric admission for further stabilization/safety  7/14: oriented, remains depressed/withdrawn, continues to report intermittent SI with plans. Po intake has been improving. Patient will need an inpatient psychiatric admission for safety/stability.   7/15: denies SI on this exam however remains withdrawn and possibly thought blocked. WIll follow   7/16: continues to present as withdrawn; endorses passive SI without plan/intent  7/17: denies SI, per staff has been calm and cooperative with adequate PO intake.   7/18: No changes, no PRNs, mood content, calm/cooperative, flat/constricted; Attempted to call [phone] going to voicemail upon transfer to nursing station.  7/19: no SI today, however states mood is sad. Poor insight. Flat, withdrawn. INVEGA INJECTION GIVEN 7/3 confirmed with RN on 3rd floor, [facility].   7/20: remains constricted with limited insight. no SI.     PLAN  --Cont. Paxil 30mg daily to target depression  --Continue Ativan 0.5mg PO TID (7AM, 11AM and 4PM) for possible ?catatonia, will monitor/titrate accordingly (HOLD if sedated, bradycardic, hypotensive or in respiratory distress)  - patient on Invega Sustenna IM 156mg - given on the 3rd of the month (7/3- confirmed )   - PRN for agitation: Ativan 0.5mg q6hrs as patient with recent hx r/o catatonia   -  continue CO 1:1 due to impulsivity and recent SI with plan - has been denying SI, L will follow  - Encourage PO intake, calorie count, PT/OT and OOB as tolerated.  - Dispo: Pt. will need an inpatient psychiatric admission once he consistently starts eating and medically optimized. will update legals.   - CL will follow

## 2022-07-21 LAB
ANION GAP SERPL CALC-SCNC: 9 MMOL/L — SIGNIFICANT CHANGE UP (ref 7–14)
BUN SERPL-MCNC: 10 MG/DL — SIGNIFICANT CHANGE UP (ref 7–23)
CALCIUM SERPL-MCNC: 9.2 MG/DL — SIGNIFICANT CHANGE UP (ref 8.4–10.5)
CHLORIDE SERPL-SCNC: 98 MMOL/L — SIGNIFICANT CHANGE UP (ref 98–107)
CO2 SERPL-SCNC: 27 MMOL/L — SIGNIFICANT CHANGE UP (ref 22–31)
CREAT SERPL-MCNC: 0.73 MG/DL — SIGNIFICANT CHANGE UP (ref 0.5–1.3)
EGFR: 91 ML/MIN/1.73M2 — SIGNIFICANT CHANGE UP
GLUCOSE BLDC GLUCOMTR-MCNC: 100 MG/DL — HIGH (ref 70–99)
GLUCOSE BLDC GLUCOMTR-MCNC: 104 MG/DL — HIGH (ref 70–99)
GLUCOSE BLDC GLUCOMTR-MCNC: 105 MG/DL — HIGH (ref 70–99)
GLUCOSE BLDC GLUCOMTR-MCNC: 145 MG/DL — HIGH (ref 70–99)
GLUCOSE BLDC GLUCOMTR-MCNC: 98 MG/DL — SIGNIFICANT CHANGE UP (ref 70–99)
GLUCOSE SERPL-MCNC: 103 MG/DL — HIGH (ref 70–99)
MAGNESIUM SERPL-MCNC: 2 MG/DL — SIGNIFICANT CHANGE UP (ref 1.6–2.6)
PHOSPHATE SERPL-MCNC: 3.4 MG/DL — SIGNIFICANT CHANGE UP (ref 2.5–4.5)
POTASSIUM SERPL-MCNC: 3.7 MMOL/L — SIGNIFICANT CHANGE UP (ref 3.5–5.3)
POTASSIUM SERPL-SCNC: 3.7 MMOL/L — SIGNIFICANT CHANGE UP (ref 3.5–5.3)
SODIUM SERPL-SCNC: 134 MMOL/L — LOW (ref 135–145)

## 2022-07-21 PROCEDURE — 99231 SBSQ HOSP IP/OBS SF/LOW 25: CPT

## 2022-07-21 RX ADMIN — Medication 30 MILLIGRAM(S): at 12:22

## 2022-07-21 RX ADMIN — AMLODIPINE BESYLATE 10 MILLIGRAM(S): 2.5 TABLET ORAL at 05:18

## 2022-07-21 RX ADMIN — ENOXAPARIN SODIUM 40 MILLIGRAM(S): 100 INJECTION SUBCUTANEOUS at 05:18

## 2022-07-21 RX ADMIN — Medication 0.5 MILLIGRAM(S): at 16:45

## 2022-07-21 NOTE — BH CONSULTATION LIAISON PROGRESS NOTE - NSBHFUPINTERVALHXFT_PSY_A_CORE
Chart reviewed, no acute events, no PRN required. Patient is seen, alert, calm, cooperative. Patient denies SI and HI today. Seen eating lunch. PCA reporting no behavioral issues. Limited interview.

## 2022-07-21 NOTE — BH CONSULTATION LIAISON PROGRESS NOTE - NSBHASSESSMENTFT_PSY_ALL_CORE
Patient is an 82M PMH CHF, COPD, DM, dementia, schizophrenia, HTN sent in from [facility] for dehydration and SI. Patient is a poor historian. Patient does report a PPHx of schizophrenia. Sp[place] [facility] from [place] Dr. Moeller [phone] who reports patient has a hx of schizophrenia - has requested to be on invega sustenna as he has done well on this medication in the past. Was given this medication about a year ago and has been doing well. Patient does have a "close catatonic episode" about a year ago, and this past monday, he was called by staff saying the patient was note eating, not speaking, not moving. Unsure if the ativan 0.5mg qhs is new as MD is not at a computer at this time. Psychiatry was called for SI as patient reported not eating or drinking in attempt to kill self.    7/8: AAOX3, cooperative, continues to have SI with plans, unable to report any specific stressors, +psychomotor retardation, likely thought blocked, however  no significant catatonic sxs noted on exam.   7/9: A&Ox3, cooperative, ongoing SI with plans. Not suspicious for catatonia.  7/10: Per staff, patient has been alert, cooperative, talkative.   7/11: oriented, remains depressed/withdrawn, less engaging today, possibly thought blocked, no significant catatonic sxs noted on exam, today he denies SI, PO intake somewhat improved.   7/12: Presentation largely unchanged, however more verbal/talkative today, reports SI with plan. No significant catatonic sxs noted on exam, paxil and ativan increased on 7/11.   7/13: AAOX3, remains depressed/withdrawn, denies SI today, po intake improving, continues to need an inpatient psychiatric admission for further stabilization/safety  7/14: oriented, remains depressed/withdrawn, continues to report intermittent SI with plans. Po intake has been improving. Patient will need an inpatient psychiatric admission for safety/stability.   7/15: denies SI on this exam however remains withdrawn and possibly thought blocked. WIll follow   7/16: continues to present as withdrawn; endorses passive SI without plan/intent  7/17: denies SI, per staff has been calm and cooperative with adequate PO intake.   7/18: No changes, no PRNs, mood content, calm/cooperative, flat/constricted; Attempted to call [phone] going to voicemail upon transfer to nursing station.  7/19: no SI today, however states mood is sad. Poor insight. Flat, withdrawn. INVEGA INJECTION GIVEN 7/3 confirmed with RN on 3rd floor, [facility].   7/20: remains constricted with limited insight. no SI.   7/21: Remains constricted with limited insight no suicidality or psychosis noted; Eating meals.    PLAN  - Cont. Paxil 30mg daily to target depression  - Continue Ativan 0.5mg PO TID (7AM, 11AM and 4PM) for possible catatonia, will monitor/titrate accordingly (HOLD if sedated, bradycardic, hypotensive or in respiratory distress)  - Patient on Invega Sustenna IM 156mg given on the 3rd of the month last on 7/3 (Confirmed).   - PRN for agitation: Ativan 0.5mg q6hrs as patient with recent hx r/o catatonia   - Continue CO 1:1 due to impulsivity and recent SI with plan - has been denying SI, L will follow  - Encourage PO intake, calorie count, PT/OT and OOB as tolerated.  - Dispo: Pt. will need an inpatient psychiatric admission once he consistently starts eating and medically optimized. will update legals.   - CL will follow

## 2022-07-22 ENCOUNTER — TRANSCRIPTION ENCOUNTER (OUTPATIENT)
Age: 82
End: 2022-07-22

## 2022-07-22 ENCOUNTER — INPATIENT (INPATIENT)
Facility: HOSPITAL | Age: 82
LOS: 27 days | Discharge: HOME | End: 2022-08-19
Attending: PSYCHIATRY & NEUROLOGY | Admitting: PSYCHIATRY & NEUROLOGY

## 2022-07-22 VITALS
HEART RATE: 66 BPM | DIASTOLIC BLOOD PRESSURE: 73 MMHG | TEMPERATURE: 98 F | RESPIRATION RATE: 17 BRPM | SYSTOLIC BLOOD PRESSURE: 129 MMHG

## 2022-07-22 VITALS
SYSTOLIC BLOOD PRESSURE: 135 MMHG | TEMPERATURE: 98 F | HEART RATE: 64 BPM | RESPIRATION RATE: 18 BRPM | DIASTOLIC BLOOD PRESSURE: 83 MMHG | OXYGEN SATURATION: 97 %

## 2022-07-22 DIAGNOSIS — E11.9 TYPE 2 DIABETES MELLITUS WITHOUT COMPLICATIONS: ICD-10-CM

## 2022-07-22 DIAGNOSIS — F33.9 MAJOR DEPRESSIVE DISORDER, RECURRENT, UNSPECIFIED: ICD-10-CM

## 2022-07-22 LAB
ANION GAP SERPL CALC-SCNC: 10 MMOL/L — SIGNIFICANT CHANGE UP (ref 7–14)
BUN SERPL-MCNC: 17 MG/DL — SIGNIFICANT CHANGE UP (ref 7–23)
CALCIUM SERPL-MCNC: 9.1 MG/DL — SIGNIFICANT CHANGE UP (ref 8.4–10.5)
CHLORIDE SERPL-SCNC: 96 MMOL/L — LOW (ref 98–107)
CO2 SERPL-SCNC: 26 MMOL/L — SIGNIFICANT CHANGE UP (ref 22–31)
CREAT SERPL-MCNC: 0.76 MG/DL — SIGNIFICANT CHANGE UP (ref 0.5–1.3)
EGFR: 90 ML/MIN/1.73M2 — SIGNIFICANT CHANGE UP
GLUCOSE BLDC GLUCOMTR-MCNC: 105 MG/DL — HIGH (ref 70–99)
GLUCOSE BLDC GLUCOMTR-MCNC: 109 MG/DL — HIGH (ref 70–99)
GLUCOSE BLDC GLUCOMTR-MCNC: 98 MG/DL — SIGNIFICANT CHANGE UP (ref 70–99)
GLUCOSE SERPL-MCNC: 83 MG/DL — SIGNIFICANT CHANGE UP (ref 70–99)
HCT VFR BLD CALC: 40.3 % — SIGNIFICANT CHANGE UP (ref 39–50)
HGB BLD-MCNC: 13.6 G/DL — SIGNIFICANT CHANGE UP (ref 13–17)
MCHC RBC-ENTMCNC: 31.8 PG — SIGNIFICANT CHANGE UP (ref 27–34)
MCHC RBC-ENTMCNC: 33.7 GM/DL — SIGNIFICANT CHANGE UP (ref 32–36)
MCV RBC AUTO: 94.2 FL — SIGNIFICANT CHANGE UP (ref 80–100)
NRBC # BLD: 0 /100 WBCS — SIGNIFICANT CHANGE UP
NRBC # FLD: 0 K/UL — SIGNIFICANT CHANGE UP
PLATELET # BLD AUTO: 269 K/UL — SIGNIFICANT CHANGE UP (ref 150–400)
POTASSIUM SERPL-MCNC: 3.8 MMOL/L — SIGNIFICANT CHANGE UP (ref 3.5–5.3)
POTASSIUM SERPL-SCNC: 3.8 MMOL/L — SIGNIFICANT CHANGE UP (ref 3.5–5.3)
RBC # BLD: 4.28 M/UL — SIGNIFICANT CHANGE UP (ref 4.2–5.8)
RBC # FLD: 12.7 % — SIGNIFICANT CHANGE UP (ref 10.3–14.5)
SARS-COV-2 RNA SPEC QL NAA+PROBE: SIGNIFICANT CHANGE UP
SODIUM SERPL-SCNC: 132 MMOL/L — LOW (ref 135–145)
WBC # BLD: 5.27 K/UL — SIGNIFICANT CHANGE UP (ref 3.8–10.5)
WBC # FLD AUTO: 5.27 K/UL — SIGNIFICANT CHANGE UP (ref 3.8–10.5)

## 2022-07-22 PROCEDURE — 99231 SBSQ HOSP IP/OBS SF/LOW 25: CPT

## 2022-07-22 PROCEDURE — 99221 1ST HOSP IP/OBS SF/LOW 40: CPT

## 2022-07-22 PROCEDURE — 93010 ELECTROCARDIOGRAM REPORT: CPT

## 2022-07-22 RX ORDER — PALIPERIDONE 1.5 MG/1
0 TABLET, EXTENDED RELEASE ORAL
Qty: 0 | Refills: 0 | DISCHARGE
Start: 2022-07-22

## 2022-07-22 RX ORDER — AMLODIPINE BESYLATE 2.5 MG/1
10 TABLET ORAL DAILY
Refills: 0 | Status: DISCONTINUED | OUTPATIENT
Start: 2022-07-22 | End: 2022-08-05

## 2022-07-22 RX ORDER — BUDESONIDE AND FORMOTEROL FUMARATE DIHYDRATE 160; 4.5 UG/1; UG/1
2 AEROSOL RESPIRATORY (INHALATION)
Refills: 0 | Status: DISCONTINUED | OUTPATIENT
Start: 2022-07-22 | End: 2022-08-19

## 2022-07-22 RX ORDER — SODIUM CHLORIDE 9 MG/ML
1000 INJECTION, SOLUTION INTRAVENOUS
Refills: 0 | Status: DISCONTINUED | OUTPATIENT
Start: 2022-07-22 | End: 2022-08-19

## 2022-07-22 RX ORDER — DEXTROSE 50 % IN WATER 50 %
25 SYRINGE (ML) INTRAVENOUS ONCE
Refills: 0 | Status: DISCONTINUED | OUTPATIENT
Start: 2022-07-22 | End: 2022-08-19

## 2022-07-22 RX ORDER — DEXTROSE 50 % IN WATER 50 %
12.5 SYRINGE (ML) INTRAVENOUS ONCE
Refills: 0 | Status: DISCONTINUED | OUTPATIENT
Start: 2022-07-22 | End: 2022-08-19

## 2022-07-22 RX ORDER — DEXTROSE 50 % IN WATER 50 %
15 SYRINGE (ML) INTRAVENOUS ONCE
Refills: 0 | Status: DISCONTINUED | OUTPATIENT
Start: 2022-07-22 | End: 2022-08-19

## 2022-07-22 RX ORDER — GLUCAGON INJECTION, SOLUTION 0.5 MG/.1ML
1 INJECTION, SOLUTION SUBCUTANEOUS ONCE
Refills: 0 | Status: DISCONTINUED | OUTPATIENT
Start: 2022-07-22 | End: 2022-08-19

## 2022-07-22 RX ORDER — METFORMIN HYDROCHLORIDE 850 MG/1
500 TABLET ORAL
Refills: 0 | Status: DISCONTINUED | OUTPATIENT
Start: 2022-07-22 | End: 2022-08-19

## 2022-07-22 RX ORDER — OLANZAPINE 15 MG/1
2.5 TABLET, FILM COATED ORAL ONCE
Refills: 0 | Status: DISCONTINUED | OUTPATIENT
Start: 2022-07-22 | End: 2022-08-19

## 2022-07-22 RX ORDER — INSULIN LISPRO 100/ML
VIAL (ML) SUBCUTANEOUS
Refills: 0 | Status: DISCONTINUED | OUTPATIENT
Start: 2022-07-22 | End: 2022-07-27

## 2022-07-22 RX ADMIN — Medication 0.5 MILLIGRAM(S): at 16:56

## 2022-07-22 RX ADMIN — Medication 30 MILLIGRAM(S): at 12:47

## 2022-07-22 RX ADMIN — ENOXAPARIN SODIUM 40 MILLIGRAM(S): 100 INJECTION SUBCUTANEOUS at 05:21

## 2022-07-22 RX ADMIN — AMLODIPINE BESYLATE 10 MILLIGRAM(S): 2.5 TABLET ORAL at 09:36

## 2022-07-22 NOTE — BH CONSULTATION LIAISON PROGRESS NOTE - CURRENT MEDICATION
MEDICATIONS  (STANDING):  amLODIPine   Tablet 10 milliGRAM(s) Oral daily  budesonide  80 MICROgram(s)/formoterol 4.5 MICROgram(s) Inhaler 2 Puff(s) Inhalation two times a day  dextrose 5% + lactated ringers. 1000 milliLiter(s) (75 mL/Hr) IV Continuous <Continuous>  dextrose 5%. 1000 milliLiter(s) (100 mL/Hr) IV Continuous <Continuous>  dextrose 5%. 1000 milliLiter(s) (50 mL/Hr) IV Continuous <Continuous>  dextrose 50% Injectable 25 Gram(s) IV Push once  dextrose 50% Injectable 12.5 Gram(s) IV Push once  dextrose 50% Injectable 25 Gram(s) IV Push once  enoxaparin Injectable 40 milliGRAM(s) SubCutaneous every 24 hours  glucagon  Injectable 1 milliGRAM(s) IntraMuscular once  insulin lispro (ADMELOG) corrective regimen sliding scale   SubCutaneous three times a day before meals  LORazepam     Tablet 0.5 milliGRAM(s) Oral <User Schedule>  LORazepam     Tablet 0.5 milliGRAM(s) Oral <User Schedule>  PARoxetine 30 milliGRAM(s) Oral daily    MEDICATIONS  (PRN):  dextrose Oral Gel 15 Gram(s) Oral once PRN Blood Glucose LESS THAN 70 milliGRAM(s)/deciliter  
MEDICATIONS  (STANDING):  amLODIPine   Tablet 10 milliGRAM(s) Oral daily  budesonide  80 MICROgram(s)/formoterol 4.5 MICROgram(s) Inhaler 2 Puff(s) Inhalation two times a day  dextrose 5% + lactated ringers. 1000 milliLiter(s) (75 mL/Hr) IV Continuous <Continuous>  dextrose 5%. 1000 milliLiter(s) (100 mL/Hr) IV Continuous <Continuous>  dextrose 5%. 1000 milliLiter(s) (50 mL/Hr) IV Continuous <Continuous>  dextrose 50% Injectable 25 Gram(s) IV Push once  dextrose 50% Injectable 12.5 Gram(s) IV Push once  dextrose 50% Injectable 25 Gram(s) IV Push once  enoxaparin Injectable 40 milliGRAM(s) SubCutaneous every 24 hours  glucagon  Injectable 1 milliGRAM(s) IntraMuscular once  insulin lispro (ADMELOG) corrective regimen sliding scale   SubCutaneous three times a day before meals  LORazepam     Tablet 0.5 milliGRAM(s) Oral <User Schedule>  PARoxetine 20 milliGRAM(s) Oral daily    MEDICATIONS  (PRN):  dextrose Oral Gel 15 Gram(s) Oral once PRN Blood Glucose LESS THAN 70 milliGRAM(s)/deciliter  
MEDICATIONS  (STANDING):  amLODIPine   Tablet 10 milliGRAM(s) Oral daily  budesonide  80 MICROgram(s)/formoterol 4.5 MICROgram(s) Inhaler 2 Puff(s) Inhalation two times a day  dextrose 5% + lactated ringers. 1000 milliLiter(s) (75 mL/Hr) IV Continuous <Continuous>  dextrose 5%. 1000 milliLiter(s) (100 mL/Hr) IV Continuous <Continuous>  dextrose 5%. 1000 milliLiter(s) (50 mL/Hr) IV Continuous <Continuous>  dextrose 50% Injectable 25 Gram(s) IV Push once  dextrose 50% Injectable 12.5 Gram(s) IV Push once  dextrose 50% Injectable 25 Gram(s) IV Push once  enoxaparin Injectable 40 milliGRAM(s) SubCutaneous every 24 hours  glucagon  Injectable 1 milliGRAM(s) IntraMuscular once  insulin lispro (ADMELOG) corrective regimen sliding scale   SubCutaneous three times a day before meals  LORazepam     Tablet 0.5 milliGRAM(s) Oral <User Schedule>  LORazepam     Tablet 0.5 milliGRAM(s) Oral <User Schedule>  PARoxetine 30 milliGRAM(s) Oral daily    MEDICATIONS  (PRN):  dextrose Oral Gel 15 Gram(s) Oral once PRN Blood Glucose LESS THAN 70 milliGRAM(s)/deciliter  
MEDICATIONS  (STANDING):  amLODIPine   Tablet 10 milliGRAM(s) Oral daily  budesonide  80 MICROgram(s)/formoterol 4.5 MICROgram(s) Inhaler 2 Puff(s) Inhalation two times a day  dextrose 5% + lactated ringers. 1000 milliLiter(s) (75 mL/Hr) IV Continuous <Continuous>  dextrose 5%. 1000 milliLiter(s) (100 mL/Hr) IV Continuous <Continuous>  dextrose 5%. 1000 milliLiter(s) (50 mL/Hr) IV Continuous <Continuous>  dextrose 50% Injectable 25 Gram(s) IV Push once  dextrose 50% Injectable 12.5 Gram(s) IV Push once  dextrose 50% Injectable 25 Gram(s) IV Push once  enoxaparin Injectable 40 milliGRAM(s) SubCutaneous every 24 hours  glucagon  Injectable 1 milliGRAM(s) IntraMuscular once  insulin lispro (ADMELOG) corrective regimen sliding scale   SubCutaneous three times a day before meals  LORazepam     Tablet 0.5 milliGRAM(s) Oral <User Schedule>  PARoxetine 30 milliGRAM(s) Oral daily    MEDICATIONS  (PRN):  dextrose Oral Gel 15 Gram(s) Oral once PRN Blood Glucose LESS THAN 70 milliGRAM(s)/deciliter  
MEDICATIONS  (STANDING):  amLODIPine   Tablet 10 milliGRAM(s) Oral daily  budesonide  80 MICROgram(s)/formoterol 4.5 MICROgram(s) Inhaler 2 Puff(s) Inhalation two times a day  dextrose 5% + lactated ringers. 1000 milliLiter(s) (75 mL/Hr) IV Continuous <Continuous>  dextrose 5%. 1000 milliLiter(s) (50 mL/Hr) IV Continuous <Continuous>  dextrose 5%. 1000 milliLiter(s) (100 mL/Hr) IV Continuous <Continuous>  dextrose 50% Injectable 25 Gram(s) IV Push once  dextrose 50% Injectable 12.5 Gram(s) IV Push once  dextrose 50% Injectable 25 Gram(s) IV Push once  enoxaparin Injectable 40 milliGRAM(s) SubCutaneous every 24 hours  glucagon  Injectable 1 milliGRAM(s) IntraMuscular once  insulin lispro (ADMELOG) corrective regimen sliding scale   SubCutaneous three times a day before meals  LORazepam     Tablet 0.5 milliGRAM(s) Oral <User Schedule>  PARoxetine 30 milliGRAM(s) Oral daily    MEDICATIONS  (PRN):  dextrose Oral Gel 15 Gram(s) Oral once PRN Blood Glucose LESS THAN 70 milliGRAM(s)/deciliter  
MEDICATIONS  (STANDING):  amLODIPine   Tablet 10 milliGRAM(s) Oral daily  budesonide  80 MICROgram(s)/formoterol 4.5 MICROgram(s) Inhaler 2 Puff(s) Inhalation two times a day  dextrose 5% + lactated ringers. 1000 milliLiter(s) (75 mL/Hr) IV Continuous <Continuous>  dextrose 5%. 1000 milliLiter(s) (100 mL/Hr) IV Continuous <Continuous>  dextrose 5%. 1000 milliLiter(s) (50 mL/Hr) IV Continuous <Continuous>  dextrose 50% Injectable 25 Gram(s) IV Push once  dextrose 50% Injectable 12.5 Gram(s) IV Push once  dextrose 50% Injectable 25 Gram(s) IV Push once  enoxaparin Injectable 40 milliGRAM(s) SubCutaneous every 24 hours  glucagon  Injectable 1 milliGRAM(s) IntraMuscular once  insulin lispro (ADMELOG) corrective regimen sliding scale   SubCutaneous three times a day before meals  LORazepam     Tablet 0.5 milliGRAM(s) Oral <User Schedule>  PARoxetine 30 milliGRAM(s) Oral daily    MEDICATIONS  (PRN):  dextrose Oral Gel 15 Gram(s) Oral once PRN Blood Glucose LESS THAN 70 milliGRAM(s)/deciliter  
MEDICATIONS  (STANDING):  amLODIPine   Tablet 10 milliGRAM(s) Oral daily  budesonide  80 MICROgram(s)/formoterol 4.5 MICROgram(s) Inhaler 2 Puff(s) Inhalation two times a day  dextrose 5%. 1000 milliLiter(s) (100 mL/Hr) IV Continuous <Continuous>  dextrose 5%. 1000 milliLiter(s) (50 mL/Hr) IV Continuous <Continuous>  dextrose 50% Injectable 25 Gram(s) IV Push once  dextrose 50% Injectable 12.5 Gram(s) IV Push once  dextrose 50% Injectable 25 Gram(s) IV Push once  enoxaparin Injectable 40 milliGRAM(s) SubCutaneous every 24 hours  glucagon  Injectable 1 milliGRAM(s) IntraMuscular once  insulin lispro (ADMELOG) corrective regimen sliding scale   SubCutaneous three times a day before meals  LORazepam     Tablet 0.5 milliGRAM(s) Oral <User Schedule>  PARoxetine 30 milliGRAM(s) Oral daily    MEDICATIONS  (PRN):  dextrose Oral Gel 15 Gram(s) Oral once PRN Blood Glucose LESS THAN 70 milliGRAM(s)/deciliter  
MEDICATIONS  (STANDING):  amLODIPine   Tablet 10 milliGRAM(s) Oral daily  budesonide  80 MICROgram(s)/formoterol 4.5 MICROgram(s) Inhaler 2 Puff(s) Inhalation two times a day  dextrose 5% + lactated ringers. 1000 milliLiter(s) (75 mL/Hr) IV Continuous <Continuous>  dextrose 5%. 1000 milliLiter(s) (100 mL/Hr) IV Continuous <Continuous>  dextrose 5%. 1000 milliLiter(s) (50 mL/Hr) IV Continuous <Continuous>  dextrose 50% Injectable 25 Gram(s) IV Push once  dextrose 50% Injectable 12.5 Gram(s) IV Push once  dextrose 50% Injectable 25 Gram(s) IV Push once  enoxaparin Injectable 40 milliGRAM(s) SubCutaneous every 24 hours  glucagon  Injectable 1 milliGRAM(s) IntraMuscular once  insulin lispro (ADMELOG) corrective regimen sliding scale   SubCutaneous three times a day before meals  LORazepam     Tablet 0.5 milliGRAM(s) Oral <User Schedule>  PARoxetine 20 milliGRAM(s) Oral daily    MEDICATIONS  (PRN):  dextrose Oral Gel 15 Gram(s) Oral once PRN Blood Glucose LESS THAN 70 milliGRAM(s)/deciliter  
MEDICATIONS  (STANDING):  amLODIPine   Tablet 10 milliGRAM(s) Oral daily  budesonide  80 MICROgram(s)/formoterol 4.5 MICROgram(s) Inhaler 2 Puff(s) Inhalation two times a day  dextrose 5% + lactated ringers. 1000 milliLiter(s) (75 mL/Hr) IV Continuous <Continuous>  dextrose 5%. 1000 milliLiter(s) (100 mL/Hr) IV Continuous <Continuous>  dextrose 5%. 1000 milliLiter(s) (50 mL/Hr) IV Continuous <Continuous>  dextrose 50% Injectable 25 Gram(s) IV Push once  dextrose 50% Injectable 12.5 Gram(s) IV Push once  dextrose 50% Injectable 25 Gram(s) IV Push once  enoxaparin Injectable 40 milliGRAM(s) SubCutaneous every 24 hours  glucagon  Injectable 1 milliGRAM(s) IntraMuscular once  insulin lispro (ADMELOG) corrective regimen sliding scale   SubCutaneous three times a day before meals  LORazepam     Tablet 0.5 milliGRAM(s) Oral <User Schedule>  PARoxetine 30 milliGRAM(s) Oral daily    MEDICATIONS  (PRN):  dextrose Oral Gel 15 Gram(s) Oral once PRN Blood Glucose LESS THAN 70 milliGRAM(s)/deciliter  
MEDICATIONS  (STANDING):  amLODIPine   Tablet 10 milliGRAM(s) Oral daily  budesonide  80 MICROgram(s)/formoterol 4.5 MICROgram(s) Inhaler 2 Puff(s) Inhalation two times a day  dextrose 5% + lactated ringers. 1000 milliLiter(s) (75 mL/Hr) IV Continuous <Continuous>  dextrose 5%. 1000 milliLiter(s) (100 mL/Hr) IV Continuous <Continuous>  dextrose 5%. 1000 milliLiter(s) (50 mL/Hr) IV Continuous <Continuous>  dextrose 50% Injectable 25 Gram(s) IV Push once  dextrose 50% Injectable 12.5 Gram(s) IV Push once  dextrose 50% Injectable 25 Gram(s) IV Push once  enoxaparin Injectable 40 milliGRAM(s) SubCutaneous every 24 hours  glucagon  Injectable 1 milliGRAM(s) IntraMuscular once  insulin lispro (ADMELOG) corrective regimen sliding scale   SubCutaneous three times a day before meals  LORazepam     Tablet 0.5 milliGRAM(s) Oral <User Schedule>  LORazepam     Tablet 0.5 milliGRAM(s) Oral <User Schedule>  PARoxetine 30 milliGRAM(s) Oral daily    MEDICATIONS  (PRN):  dextrose Oral Gel 15 Gram(s) Oral once PRN Blood Glucose LESS THAN 70 milliGRAM(s)/deciliter  
MEDICATIONS  (STANDING):  amLODIPine   Tablet 10 milliGRAM(s) Oral daily  budesonide  80 MICROgram(s)/formoterol 4.5 MICROgram(s) Inhaler 2 Puff(s) Inhalation two times a day  dextrose 5% + lactated ringers. 1000 milliLiter(s) (75 mL/Hr) IV Continuous <Continuous>  dextrose 5%. 1000 milliLiter(s) (100 mL/Hr) IV Continuous <Continuous>  dextrose 5%. 1000 milliLiter(s) (50 mL/Hr) IV Continuous <Continuous>  dextrose 50% Injectable 25 Gram(s) IV Push once  dextrose 50% Injectable 12.5 Gram(s) IV Push once  dextrose 50% Injectable 25 Gram(s) IV Push once  enoxaparin Injectable 40 milliGRAM(s) SubCutaneous every 24 hours  glucagon  Injectable 1 milliGRAM(s) IntraMuscular once  insulin lispro (ADMELOG) corrective regimen sliding scale   SubCutaneous three times a day before meals  LORazepam     Tablet 0.5 milliGRAM(s) Oral <User Schedule>  PARoxetine 20 milliGRAM(s) Oral daily    MEDICATIONS  (PRN):  dextrose Oral Gel 15 Gram(s) Oral once PRN Blood Glucose LESS THAN 70 milliGRAM(s)/deciliter  
MEDICATIONS  (STANDING):  amLODIPine   Tablet 10 milliGRAM(s) Oral daily  budesonide  80 MICROgram(s)/formoterol 4.5 MICROgram(s) Inhaler 2 Puff(s) Inhalation two times a day  dextrose 5% + lactated ringers. 1000 milliLiter(s) (75 mL/Hr) IV Continuous <Continuous>  dextrose 5%. 1000 milliLiter(s) (100 mL/Hr) IV Continuous <Continuous>  dextrose 5%. 1000 milliLiter(s) (50 mL/Hr) IV Continuous <Continuous>  dextrose 50% Injectable 25 Gram(s) IV Push once  dextrose 50% Injectable 12.5 Gram(s) IV Push once  dextrose 50% Injectable 25 Gram(s) IV Push once  enoxaparin Injectable 40 milliGRAM(s) SubCutaneous every 24 hours  glucagon  Injectable 1 milliGRAM(s) IntraMuscular once  insulin lispro (ADMELOG) corrective regimen sliding scale   SubCutaneous three times a day before meals  LORazepam     Tablet 0.5 milliGRAM(s) Oral <User Schedule>  PARoxetine 30 milliGRAM(s) Oral daily    MEDICATIONS  (PRN):  dextrose Oral Gel 15 Gram(s) Oral once PRN Blood Glucose LESS THAN 70 milliGRAM(s)/deciliter  
MEDICATIONS  (STANDING):  amLODIPine   Tablet 10 milliGRAM(s) Oral daily  budesonide  80 MICROgram(s)/formoterol 4.5 MICROgram(s) Inhaler 2 Puff(s) Inhalation two times a day  dextrose 5% + lactated ringers. 1000 milliLiter(s) (75 mL/Hr) IV Continuous <Continuous>  dextrose 5%. 1000 milliLiter(s) (100 mL/Hr) IV Continuous <Continuous>  dextrose 5%. 1000 milliLiter(s) (50 mL/Hr) IV Continuous <Continuous>  dextrose 50% Injectable 25 Gram(s) IV Push once  dextrose 50% Injectable 12.5 Gram(s) IV Push once  dextrose 50% Injectable 25 Gram(s) IV Push once  enoxaparin Injectable 40 milliGRAM(s) SubCutaneous every 24 hours  glucagon  Injectable 1 milliGRAM(s) IntraMuscular once  insulin lispro (ADMELOG) corrective regimen sliding scale   SubCutaneous three times a day before meals  LORazepam     Tablet 0.5 milliGRAM(s) Oral <User Schedule>  PARoxetine 30 milliGRAM(s) Oral daily    MEDICATIONS  (PRN):  dextrose Oral Gel 15 Gram(s) Oral once PRN Blood Glucose LESS THAN 70 milliGRAM(s)/deciliter  
MEDICATIONS  (STANDING):  amLODIPine   Tablet 10 milliGRAM(s) Oral daily  budesonide  80 MICROgram(s)/formoterol 4.5 MICROgram(s) Inhaler 2 Puff(s) Inhalation two times a day  dextrose 5%. 1000 milliLiter(s) (100 mL/Hr) IV Continuous <Continuous>  dextrose 5%. 1000 milliLiter(s) (50 mL/Hr) IV Continuous <Continuous>  dextrose 50% Injectable 25 Gram(s) IV Push once  dextrose 50% Injectable 12.5 Gram(s) IV Push once  dextrose 50% Injectable 25 Gram(s) IV Push once  enoxaparin Injectable 40 milliGRAM(s) SubCutaneous every 24 hours  glucagon  Injectable 1 milliGRAM(s) IntraMuscular once  insulin lispro (ADMELOG) corrective regimen sliding scale   SubCutaneous three times a day before meals  LORazepam     Tablet 0.5 milliGRAM(s) Oral <User Schedule>  PARoxetine 30 milliGRAM(s) Oral daily    MEDICATIONS  (PRN):  dextrose Oral Gel 15 Gram(s) Oral once PRN Blood Glucose LESS THAN 70 milliGRAM(s)/deciliter  
MEDICATIONS  (STANDING):  amLODIPine   Tablet 10 milliGRAM(s) Oral daily  budesonide  80 MICROgram(s)/formoterol 4.5 MICROgram(s) Inhaler 2 Puff(s) Inhalation two times a day  dextrose 5% + lactated ringers. 1000 milliLiter(s) (75 mL/Hr) IV Continuous <Continuous>  dextrose 5%. 1000 milliLiter(s) (100 mL/Hr) IV Continuous <Continuous>  dextrose 5%. 1000 milliLiter(s) (50 mL/Hr) IV Continuous <Continuous>  dextrose 50% Injectable 25 Gram(s) IV Push once  dextrose 50% Injectable 12.5 Gram(s) IV Push once  dextrose 50% Injectable 25 Gram(s) IV Push once  enoxaparin Injectable 40 milliGRAM(s) SubCutaneous every 24 hours  glucagon  Injectable 1 milliGRAM(s) IntraMuscular once  insulin lispro (ADMELOG) corrective regimen sliding scale   SubCutaneous three times a day before meals  LORazepam     Tablet 0.5 milliGRAM(s) Oral <User Schedule>  PARoxetine 20 milliGRAM(s) Oral daily    MEDICATIONS  (PRN):  dextrose Oral Gel 15 Gram(s) Oral once PRN Blood Glucose LESS THAN 70 milliGRAM(s)/deciliter

## 2022-07-22 NOTE — BH INPATIENT PSYCHIATRY ASSESSMENT NOTE - NSBHMETABOLIC_PSY_ALL_CORE_FT
BMI:   HbA1c: A1C with Estimated Average Glucose Result: 5.8 % (07-07-22 @ 05:30)    Glucose: POCT Blood Glucose.: 105 mg/dL (07-22-22 @ 17:00)    BP: --  Lipid Panel:

## 2022-07-22 NOTE — DISCHARGE NOTE NURSING/CASE MANAGEMENT/SOCIAL WORK - NSDCPEFALRISK_GEN_ALL_CORE
For information on Fall & Injury Prevention, visit: [email or web address] OR  [email or web address] OR  https://www.cdc.gov/steadi/patient.html

## 2022-07-22 NOTE — BH INPATIENT PSYCHIATRY ASSESSMENT NOTE - NSBHCHARTREVIEWVS_PSY_A_CORE FT
Vital Signs Last 24 Hrs  T(C): 36.4 (07-22-22 @ 12:07), Max: 36.8 (07-21-22 @ 23:30)  T(F): 97.5 (07-22-22 @ 12:07), Max: 98.2 (07-21-22 @ 23:30)  HR: 64 (07-22-22 @ 12:07) (56 - 64)  BP: 135/83 (07-22-22 @ 12:07) (121/67 - 137/76)  BP(mean): --  RR: 18 (07-22-22 @ 12:07) (17 - 18)  SpO2: 97% (07-22-22 @ 12:07) (95% - 98%)

## 2022-07-22 NOTE — BH CONSULTATION LIAISON PROGRESS NOTE - NSBHFUPREASONCONS_PSY_A_CORE
suicidality/depression/med management
depression/med management
suicidality/depression/med management

## 2022-07-22 NOTE — DISCHARGE NOTE NURSING/CASE MANAGEMENT/SOCIAL WORK - PATIENT PORTAL LINK FT
You can access the FollowMyHealth Patient Portal offered by [facility] by registering at the following website: [email or web address]. By joining [facility]’s FollowMyHealth portal, you will also be able to view your health information using other applications (apps) compatible with our system.

## 2022-07-22 NOTE — BH CONSULTATION LIAISON PROGRESS NOTE - NSBHFUPINTERVALHXFT_PSY_A_CORE
Chart reviewed, no acute events, no PRN required. PCA reporting no behavioral issues. Limited interview, however, PCA reports patient was expressing some of his social life aspects being more conversational in the morning. ACP noticed that patient has been receiving Ativan 0.5mg PO in the evening and has not received daytime doses since July 15th as order has fallen off. Patient has been behaviorally well controlled and no active signs of catatonia at this time. Denies SI/HI/AH/VH.

## 2022-07-22 NOTE — PROGRESS NOTE ADULT - ASSESSMENT
82M PMH CHF, COPD, DM, dementia, schizophrenia, HTN sent in from [facility] for dehydration and SI. Per notes in chart that were sent from facility pt has not been eating and states he wants to kill himself. Pt currently states he has felt like this for the past 10 days. Not able to provide any other hx    1 Suicidal ideation  - psych consult appreciated   - will monitor    2 Schizophrenia  - cw home meds    3 COPD  - cw nebs    4 DM  - monitor FS  - ISS    Lovenox for dvt prophylaxis 
 82M PMH CHF, COPD, DM, dementia, schizophrenia, HTN sent in from [facility] for dehydration and SI. Per notes in chart that were sent from facility pt has not been eating and states he wants to kill himself. Pt currently states he has felt like this for the past 10 days. Not able to provide any other hx    1 Suicidal ideation  - psych consult appreciated   - will monitor    2 Schizophrenia  - cw home meds    3 COPD  - cw nebs    4 DM  - monitor FS  - ISS    Lovenox for dvt prophylaxis     dc planning to [facility]     
 82M PMH CHF, COPD, DM, dementia, schizophrenia, HTN sent in from [facility] for dehydration and SI. Per notes in chart that were sent from facility pt has not been eating and states he wants to kill himself. Pt currently states he has felt like this for the past 10 days. Not able to provide any other hx    1 Suicidal ideation  - psych consult appreciated   - will monitor    2 Schizophrenia  - cw home meds    3 COPD  - cw nebs    4 DM  - monitor FS  - ISS    Lovenox for dvt prophylaxis   
 82M PMH CHF, COPD, DM, dementia, schizophrenia, HTN sent in from [facility] for dehydration and SI. Per notes in chart that were sent from facility pt has not been eating and states he wants to kill himself. Pt currently states he has felt like this for the past 10 days. Not able to provide any other hx    1 Suicidal ideation  - psych consult appreciated   - will monitor    2 Schizophrenia  - cw home meds    3 COPD  - cw nebs    4 DM  - monitor FS  - ISS    Lovenox for dvt prophylaxis     dc planning to [facility]   
 82M PMH CHF, COPD, DM, dementia, schizophrenia, HTN sent in from [facility] for dehydration and SI. Per notes in chart that were sent from facility pt has not been eating and states he wants to kill himself. Pt currently states he has felt like this for the past 10 days. Not able to provide any other hx    1 Suicidal ideation  - psych consult appreciated   - will monitor    2 Schizophrenia  - cw home meds    3 COPD  - cw nebs    4 DM  - monitor FS  - ISS    Lovenox for dvt prophylaxis 
 82M PMH CHF, COPD, DM, dementia, schizophrenia, HTN sent in from [facility] for dehydration and SI. Per notes in chart that were sent from facility pt has not been eating and states he wants to kill himself. Pt currently states he has felt like this for the past 10 days. Not able to provide any other hx    1 Suicidal ideation  - psych consult appreciated   - will monitor    2 Schizophrenia  - cw home meds    3 COPD  - cw nebs    4 DM  - monitor FS  - ISS    Lovenox for dvt prophylaxis 
 82M PMH CHF, COPD, DM, dementia, schizophrenia, HTN sent in from [facility] for dehydration and SI. Per notes in chart that were sent from facility pt has not been eating and states he wants to kill himself. Pt currently states he has felt like this for the past 10 days. Not able to provide any other hx    1 Suicidal ideation  - psych consult appreciated   - will monitor    2 Schizophrenia  - cw home meds    3 COPD  - cw nebs    4 DM  - monitor FS  - ISS    Lovenox for dvt prophylaxis 
 82M with CHF, COPD, DM, dementia, schizophrenia, HTN sent in from [facility] for dehydration and SI.  CTH unremarkable   Utox neg   B12 adn TSH WNL   RPR neg   Impression: AMS possible 2/2 toxic metabolic encephalopathy vs related to underlying psychiatric condition   - psych f/u for SI.    - on paroxetine 20 daily and lorazepam 0.5 qhs   - no need for further brain imaging at this time    - check FS, glucose control <180  - GI/DVT ppx  - Thank you for allowing me to participate in the care of this patient. Call with questions.   - await dispo to [facility]RE[facility] bowser planning   David Francisco MD  Vascular Neurology  Office: [phone] 
 82M with CHF, COPD, DM, dementia, schizophrenia, HTN sent in from [facility] for dehydration and SI.  CTH unremarkable   Utox neg   B12 adn TSH WNL   RPR neg   Impression: AMS possible 2/2 toxic metabolic encephalopathy vs related to underlying psychiatric condition   - psych f/u for SI.    - on paroxetine 20 daily and lorazepam 0.5 qhs   - no need for further brain imaging at this time    - check FS, glucose control <180  - GI/DVT ppx  - Thank you for allowing me to participate in the care of this patient. Call with questions.   - await dispo to [facility] bowser planning   David Francisco MD  Vascular Neurology  Office: [phone] 
 82M with CHF, COPD, DM, dementia, schizophrenia, HTN sent in from [facility] for dehydration and SI.  CTH unremarkable   Utox neg   B12 adn TSH WNL   RPR neg   Impression: AMS possible 2/2 toxic metabolic encephalopathy vs related to underlying psychiatric condition   - psych f/u for SI.    - on paroxetine 20 daily and lorazepam 0.5 qhs   - no need for further brain imaging at this time    - check FS, glucose control <180  - GI/DVT ppx  - Thank you for allowing me to participate in the care of this patient. Call with questions.   - await dispo to [facility]RE[facility] bowser planning   David Francisco MD  Vascular Neurology  Office: [phone] 
 82M PMH CHF, COPD, DM, dementia, schizophrenia, HTN sent in from [facility] for dehydration and SI. Per notes in chart that were sent from facility pt has not been eating and states he wants to kill himself. Pt currently states he has felt like this for the past 10 days. Not able to provide any other hx    1 Suicidal ideation  - psych consult appreciated   - will monitor    2 Schizophrenia  - cw home meds    3 COPD  - cw nebs    4 DM  - monitor FS  - ISS    Lovenox for dvt prophylaxis 
 82M PMH CHF, COPD, DM, dementia, schizophrenia, HTN sent in from [facility] for dehydration and SI. Per notes in chart that were sent from facility pt has not been eating and states he wants to kill himself. Pt currently states he has felt like this for the past 10 days. Not able to provide any other hx    1 Suicidal ideation  - psych consult appreciated   - will monitor    2 Schizophrenia  - cw home meds    3 COPD  - cw nebs    4 DM  - monitor FS  - ISS    Lovenox for dvt prophylaxis 
 82M PMH CHF, COPD, DM, dementia, schizophrenia, HTN sent in from [facility] for dehydration and SI. Per notes in chart that were sent from facility pt has not been eating and states he wants to kill himself. Pt currently states he has felt like this for the past 10 days. Not able to provide any other hx    1 Suicidal ideation  - psych consult appreciated   - will monitor    2 Schizophrenia  - cw home meds    3 COPD  - cw nebs    4 DM  - monitor FS  - ISS    Lovenox for dvt prophylaxis 
 82M PMH CHF, COPD, DM, dementia, schizophrenia, HTN sent in from [facility] for dehydration and SI. Per notes in chart that were sent from facility pt has not been eating and states he wants to kill himself. Pt currently states he has felt like this for the past 10 days. Not able to provide any other hx    1 Suicidal ideation  - psych consult appreciated   - will monitor    2 Schizophrenia  - cw home meds    3 COPD  - cw nebs    4 DM  - monitor FS  - ISS    Lovenox for dvt prophylaxis   
 82M with CHF, COPD, DM, dementia, schizophrenia, HTN sent in from [facility] for dehydration and SI.  CTH unremarkable   Utox neg   B12 adn TSH WNL   RPR neg   Impression: AMS possible 2/2 toxic metabolic encephalopathy vs related to underlying psychiatric condition   - psych f/u for SI. 1:1   - on paroxetine 20 daily and lorazepam 0.5 qhs   - no need for further brain imaging at this time    - check FS, glucose control <180  - GI/DVT ppx  - Thank you for allowing me to participate in the care of this patient. Call with questions.   David Francisco MD  Vascular Neurology  Office: [phone] 
 82M with CHF, COPD, DM, dementia, schizophrenia, HTN sent in from [facility] for dehydration and SI.  CTH unremarkable   Utox neg   B12 adn TSH WNL   RPR neg   Impression: AMS possible 2/2 toxic metabolic encephalopathy vs related to underlying psychiatric condition   - psych f/u for SI. 1:1   - on paroxetine 20 daily and lorazepam 0.5 qhs   - no need for further brain imaging at this time    - check FS, glucose control <180  - GI/DVT ppx  - Thank you for allowing me to participate in the care of this patient. Call with questions.   - await dispo to ANDREA Francisco MD  Vascular Neurology  Office: [phone] 
 82M with CHF, COPD, DM, dementia, schizophrenia, HTN sent in from [facility] for dehydration and SI.  CTH unremarkable   Utox neg   B12 adn TSH WNL   RPR neg   Impression: AMS possible 2/2 toxic metabolic encephalopathy vs related to underlying psychiatric condition   - psych f/u for SI.    - on paroxetine 20 daily and lorazepam 0.5 qhs   - no need for further brain imaging at this time    - check FS, glucose control <180  - GI/DVT ppx  - Thank you for allowing me to participate in the care of this patient. Call with questions.   - await dispo to [facility]RE[facility] bowser planning   David Francisco MD  Vascular Neurology  Office: [phone] 
 82M PMH CHF, COPD, DM, dementia, schizophrenia, HTN sent in from [facility] for dehydration and SI. Per notes in chart that were sent from facility pt has not been eating and states he wants to kill himself. Pt currently states he has felt like this for the past 10 days. Not able to provide any other hx    1 Suicidal ideation  - psych consult appreciated   - will monitor    2 Schizophrenia  - cw home meds    3 COPD  - cw nebs    4 DM  - monitor FS  - ISS    Lovenox for dvt prophylaxis   
 82M PMH CHF, COPD, DM, dementia, schizophrenia, HTN sent in from [facility] for dehydration and SI. Per notes in chart that were sent from facility pt has not been eating and states he wants to kill himself. Pt currently states he has felt like this for the past 10 days. Not able to provide any other hx    1 Suicidal ideation  - psych consult appreciated   - will monitor    2 Schizophrenia  - cw home meds    3 COPD  - cw nebs    4 DM  - monitor FS  - ISS    Lovenox for dvt prophylaxis   
 82M with CHF, COPD, DM, dementia, schizophrenia, HTN sent in from [facility] for dehydration and SI.  CTH unremarkable   Utox neg   B12 adn TSH WNL   RPR neg   Impression: AMS possible 2/2 toxic metabolic encephalopathy vs related to underlying psychiatric condition   - psych f/u for SI.    - on paroxetine 20 daily and lorazepam 0.5 qhs   - no need for further brain imaging at this time    - check FS, glucose control <180  - GI/DVT ppx  - Thank you for allowing me to participate in the care of this patient. Call with questions.   - await dispo to [facility]RE[facility] bowser planning   David Francisco MD  Vascular Neurology  Office: [phone] 
 82M with CHF, COPD, DM, dementia, schizophrenia, HTN sent in from [facility] for dehydration and SI.  CTH unremarkable   Utox neg   B12 adn TSH WNL   RPR neg   Impression: AMS possible 2/2 toxic metabolic encephalopathy vs related to underlying psychiatric condition   - psych f/u for SI.  1:1   - on paroxetine 20 daily and lorazepam 0.5 qhs   - no need for further brain imaging at this time    - check FS, glucose control <180  - GI/DVT ppx  - Thank you for allowing me to participate in the care of this patient. Call with questions.   - await dispo to [facility] niels planning   David Francisco MD  Vascular Neurology  Office: [phone] 
 82M with CHF, COPD, DM, dementia, schizophrenia, HTN sent in from [facility] for dehydration and SI.  CTH unremarkable   Utox neg   B12 adn TSH WNL   RPR neg   Impression: AMS possible 2/2 toxic metabolic encephalopathy vs related to underlying psychiatric condition   - psych f/u for SI. 1:1   - on paroxetine 20 daily and lorazepam 0.5 qhs   - no need for further brain imaging at this time    - check FS, glucose control <180  - GI/DVT ppx  - Thank you for allowing me to participate in the care of this patient. Call with questions.   David Francisco MD  Vascular Neurology  Office: [phone] 
 82M PMH CHF, COPD, DM, dementia, schizophrenia, HTN sent in from [facility] for dehydration and SI. Per notes in chart that were sent from facility pt has not been eating and states he wants to kill himself. Pt currently states he has felt like this for the past 10 days. Not able to provide any other hx    1 Suicidal ideation  - psych consult appreciated   - will monitor    2 Schizophrenia  - cw home meds    3 COPD  - cw nebs    4 DM  - monitor FS  - ISS    Lovenox for dvt prophylaxis   
Attending addendum:   Agree with above  QTc normal - if QTc prolonging medications are to be used in the future, would monitor QTc with serial ECG  Otherwise, no further inpatient cardiac workup anticipated at this time     Deirdre Jalloh MD 
 82M PMH CHF, COPD, DM, dementia, schizophrenia, HTN sent in from [facility] for dehydration and SI. Per notes in chart that were sent from facility pt has not been eating and states he wants to kill himself. Pt currently states he has felt like this for the past 10 days. Not able to provide any other hx    1 Suicidal ideation  - psych consult appreciated   - will monitor    2 Schizophrenia  - cw home meds    3 COPD  - cw nebs    4 DM  - monitor FS  - ISS    Lovenox for dvt prophylaxis   
 82M with CHF, COPD, DM, dementia, schizophrenia, HTN sent in from [facility] for dehydration and SI.  CTH unremarkable   Utox neg   B12 adn TSH WNL   RPR neg   Impression: AMS possible 2/2 toxic metabolic encephalopathy vs related to underlying psychiatric condition   - psych f/u for SI.    - on paroxetine 20 daily and lorazepam 0.5 qhs   - no need for further brain imaging at this time    - check FS, glucose control <180  - GI/DVT ppx  - Thank you for allowing me to participate in the care of this patient. Call with questions.   - await dispo to [facility]RE[facility] bowser planning   David Francisco MD  Vascular Neurology  Office: [phone] 
 82M PMH CHF, COPD, DM, dementia, schizophrenia, HTN sent in from [facility] for dehydration and SI. Per notes in chart that were sent from facility pt has not been eating and states he wants to kill himself. Pt currently states he has felt like this for the past 10 days. Not able to provide any other hx    1 Suicidal ideation  - psych consult appreciated   - will monitor    2 Schizophrenia  - cw home meds    3 COPD  - cw nebs    4 DM  - monitor FS  - ISS    Lovenox for dvt prophylaxis

## 2022-07-22 NOTE — BH CONSULTATION LIAISON PROGRESS NOTE - NSBHCONSULTMEDPRNREASON_PSY_A_CORE
agitation...

## 2022-07-22 NOTE — BH CONSULTATION LIAISON PROGRESS NOTE - NSBHMSESPABN_PSY_A_CORE
Soft volume/Slowed rate/Decreased productivity/Increased latency/Other
Soft volume/Slowed rate/Decreased productivity/Increased latency
Impaired articulation
Soft volume/Slowed rate/Decreased productivity/Increased latency
Impaired articulation
Impaired articulation
Soft volume/Slowed rate/Decreased productivity/Increased latency
Impaired articulation
Soft volume/Slowed rate/Decreased productivity/Increased latency
Soft volume/Slowed rate/Decreased productivity/Increased latency
Impaired articulation
Soft volume/Slowed rate/Decreased productivity/Increased latency
Soft volume/Slowed rate/Decreased productivity/Increased latency

## 2022-07-22 NOTE — BH CONSULTATION LIAISON PROGRESS NOTE - NSBHATTESTTYPEVISIT_PSY_A_CORE
HARRY without on-site Attending supervision
Attending Only
HARRY without on-site Attending supervision
Attending Only
HARRY without on-site Attending supervision
Resident/Fellow with telephonic supervision
Resident/Fellow with telephonic supervision
Attending evaluating patient with HARRY ([id number] code)

## 2022-07-22 NOTE — BH CONSULTATION LIAISON PROGRESS NOTE - NSBHMSEMOOD_PSY_A_CORE
Normal
Normal
Depressed
Depressed
Normal
Depressed
Depressed
Normal
Depressed
Unable to assess
Normal

## 2022-07-22 NOTE — BH CONSULTATION LIAISON PROGRESS NOTE - NSBHMSEGAIT_PSY_A_CORE
Unable to assess

## 2022-07-22 NOTE — PROGRESS NOTE ADULT - REASON FOR ADMISSION
SI

## 2022-07-22 NOTE — BH CONSULTATION LIAISON PROGRESS NOTE - NSBHPSYCHOLCOGORIENT_PSY_A_CORE
Oriented to time, place, person, situation
Not fully oriented...
Oriented to time, place, person, situation

## 2022-07-22 NOTE — BH CONSULTATION LIAISON PROGRESS NOTE - NSBHINDICATION_PSY_ALL_CORE
See above
see above  patient with intermittent SI - denies today 7/15 - will monitor 
see above
see above  patient with intermittent SI - denies today 7/17 - will monitor 
see above  patient with intermittent SI - denies today 7/15 - will monitor 
See above

## 2022-07-22 NOTE — CHART NOTE - NSCHARTNOTEFT_GEN_A_CORE
Source: Patient [ ]    Family [ ]     other [x ] PCA, chart review    Patient seen for f/u for severe malnutrition. 82 year old male with a PMH of CHF, DM, dementia, schizophrenia, HTN sent in from NH for dehydration and SI per chart.    Patient with fair PO intake, noted with 51-75% intake per RN flow sheet. No GI distress or intolerances to diet consistency reported. No edema or pressure injuries noted per RN flow sheet.    Diet : Diet, Consistent Carbohydrate Renal/No Snacks:   Soft and Bite Sized (SOFTBTSZ)  Mildly Thick Liquids (MILDTHICKLIQS) (07-12-22 @ 15:35)    Current Weight: no new weight to assess  61.5 kg (7/6)    Pertinent Medications: MEDICATIONS  (STANDING):  amLODIPine   Tablet 10 milliGRAM(s) Oral daily  budesonide  80 MICROgram(s)/formoterol 4.5 MICROgram(s) Inhaler 2 Puff(s) Inhalation two times a day  dextrose 5%. 1000 milliLiter(s) (100 mL/Hr) IV Continuous <Continuous>  dextrose 5%. 1000 milliLiter(s) (50 mL/Hr) IV Continuous <Continuous>  dextrose 50% Injectable 25 Gram(s) IV Push once  dextrose 50% Injectable 12.5 Gram(s) IV Push once  dextrose 50% Injectable 25 Gram(s) IV Push once  enoxaparin Injectable 40 milliGRAM(s) SubCutaneous every 24 hours  glucagon  Injectable 1 milliGRAM(s) IntraMuscular once  insulin lispro (ADMELOG) corrective regimen sliding scale   SubCutaneous three times a day before meals  LORazepam     Tablet 0.5 milliGRAM(s) Oral <User Schedule>  PARoxetine 30 milliGRAM(s) Oral daily    MEDICATIONS  (PRN):  dextrose Oral Gel 15 Gram(s) Oral once PRN Blood Glucose LESS THAN 70 milliGRAM(s)/deciliter    Pertinent Labs:  07-21 Na134 mmol/L<L> Glu 103 mg/dL<H> K+ 3.7 mmol/L Cr  0.73 mg/dL BUN 10 mg/dL 07-21 Phos 3.4 mg/dL  CAPILLARY BLOOD GLUCOSE  POCT Blood Glucose.: 104 mg/dL (21 Jul 2022 11:44)  POCT Blood Glucose.: 100 mg/dL (21 Jul 2022 07:42)  POCT Blood Glucose.: 98 mg/dL (21 Jul 2022 02:44)  POCT Blood Glucose.: 118 mg/dL (20 Jul 2022 16:48)    Estimated Needs:   [x ] no change since previous assessment    Previous Nutrition Diagnosis: Severe malnutrition    Nutrition Diagnosis is [x ] ongoing  [ ] resolved [ ] not applicable     Recommend  - adjust diet to consistent carbohydrate (no snacks) and d/c renal  - consider addition of Glucerna TID (660 kcal, 30 g pro)  - obtain weekly weight and document PO intake to monitor trend    Monitoring and Evaluation:   [x ] PO intake [x ] Tolerance to diet prescription [x ] weights [x ] follow up per protocol
pt is medically cleared for dc
82M PMH CHF, COPD, DM, dementia, schizophrenia, HTN sent in from [facility] for dehydration and SI. Per notes in chart that were sent from facility pt has not been eating and states he wants to kill himself. Pt currently states he has felt like this for the past 10 days. Not able to provide any other hx    Hospital course:    Suicidal ideation  -ativan added on 7/8 r/o catatonia  -CT head neg  - per neuro- no further brain imaging warranted      Schizophrenia   - Paxil 30mg daily  ---Continue Ativan 0.5mg PO TID (7AM, 11AM and 4 PM) for possible ?catatonia, will monitor/titrate accordingly   - patient on Invega Sustenna IM 156mg - given on the 3rd of the month?   - PRN for agitation: Ativan 0.5mg q6hrs as patient with recent hx r/o catatonia   -  continue CO 1:1 due to recent SI with plans/ impulsivity       COPD  - cw nebs     DM  - monitor FS  - ISS

## 2022-07-22 NOTE — PROGRESS NOTE ADULT - PROVIDER SPECIALTY LIST ADULT
Cardiology
Cardiology
Family Medicine
Family Medicine
Hospitalist
Neurology
Cardiology
Neurology
Neurology
Cardiology
Hospitalist
Neurology
Hospitalist
Neurology
Neurology
Hospitalist
Hospitalist
Neurology
Neurology

## 2022-07-22 NOTE — BH CONSULTATION LIAISON PROGRESS NOTE - NSBHADMITIPOBS_PSY_A_CORE
Constant observation

## 2022-07-22 NOTE — BH CONSULTATION LIAISON PROGRESS NOTE - NSBHMSEAFFRANGE_PSY_A_CORE
Constricted/Other
Constricted
Constricted/Other
Constricted/Other
Constricted

## 2022-07-22 NOTE — BH INPATIENT PSYCHIATRY ASSESSMENT NOTE - CURRENT MEDICATION
MEDICATIONS  (STANDING):  amLODIPine   Tablet 10 milliGRAM(s) Oral daily  budesonide  80 MICROgram(s)/formoterol 4.5 MICROgram(s) Inhaler 2 Puff(s) Inhalation two times a day  dextrose 5%. 1000 milliLiter(s) (50 mL/Hr) IV Continuous <Continuous>  dextrose 5%. 1000 milliLiter(s) (100 mL/Hr) IV Continuous <Continuous>  dextrose 50% Injectable 25 Gram(s) IV Push once  dextrose 50% Injectable 12.5 Gram(s) IV Push once  dextrose 50% Injectable 25 Gram(s) IV Push once  glucagon  Injectable 1 milliGRAM(s) IntraMuscular once  insulin lispro (ADMELOG) corrective regimen sliding scale   SubCutaneous Before meals and at bedtime  LORazepam     Tablet 0.5 milliGRAM(s) Oral <User Schedule>  metFORMIN 500 milliGRAM(s) Oral two times a day  PARoxetine 20 milliGRAM(s) Oral daily    MEDICATIONS  (PRN):  dextrose Oral Gel 15 Gram(s) Oral once PRN Blood Glucose LESS THAN 70 milliGRAM(s)/deciliter  LORazepam     Tablet 0.5 milliGRAM(s) Oral every 4 hours PRN agitation due to catatonia  OLANZapine Injectable 2.5 milliGRAM(s) IntraMuscular once PRN agitation

## 2022-07-22 NOTE — BH INPATIENT PSYCHIATRY ASSESSMENT NOTE - HPI (INCLUDE ILLNESS QUALITY, SEVERITY, DURATION, TIMING, CONTEXT, MODIFYING FACTORS, ASSOCIATED SIGNS AND SYMPTOMS)
Patient is an 82M PMH CHF, COPD, DM, dementia, schizophrenia, HTN sent in from [facility] for dehydration and SI. Admitted at [facility] and seen by CL service for catatonia with SI.  Now admitted at [facility].    Initial interview limited.  Patient found sitting in jenni chair in day room.  Interview limited by stammering and laconic speech.  Patient states he has intermittent SI.  Will not specify any thoughts of how he would want to die.  Denies current SI at time of interview.  Feels safe in the hospital.  Hears voices but cannot say what they tell him.  Patient denies current depressed mood.  Was oriented to name, location, situation, prior residence before the hospital, month, year.  Does not know date.  patient has been receiving Ativan 0.5mg PO in the evening and has not received daytime doses since July 15th as order has fallen off. Patient has been behaviorally well controlled and no active signs of catatonia at this time.     [facility] hospital course per CL:   7/8: AAOX3, cooperative, continues to have SI with plans, unable to report any specific stressors, +psychomotor retardation, likely thought blocked, however  no significant catatonic sxs noted on exam.   7/9: A&Ox3, cooperative, ongoing SI with plans. Not suspicious for catatonia.  7/10: Per staff, patient has been alert, cooperative, talkative.   7/11: oriented, remains depressed/withdrawn, less engaging today, possibly thought blocked, no significant catatonic sxs noted on exam, today he denies SI, PO intake somewhat improved.   7/12: Presentation largely unchanged, however more verbal/talkative today, reports SI with plan. No significant catatonic sxs noted on exam, paxil and ativan increased on 7/11.   7/13: AAOX3, remains depressed/withdrawn, denies SI today, po intake improving, continues to need an inpatient psychiatric admission for further stabilization/safety  7/14: oriented, remains depressed/withdrawn, continues to report intermittent SI with plans. Po intake has been improving. Patient will need an inpatient psychiatric admission for safety/stability.   7/15: denies SI on this exam however remains withdrawn and possibly thought blocked. WIll follow   7/16: continues to present as withdrawn; endorses passive SI without plan/intent  7/17: denies SI, per staff has been calm and cooperative with adequate PO intake.   7/18: No changes, no PRNs, mood content, calm/cooperative, flat/constricted; Attempted to call [phone] going to voicemail upon transfer to nursing station.  7/19: no SI today, however states mood is sad. Poor insight. Flat, withdrawn. INVEGA INJECTION GIVEN 7/3 confirmed with RN on 3rd floor, [facility].   7/20: remains constricted with limited insight. no SI.   7/21: Remains constricted with limited insight no suicidality or psychosis noted; Eating meals.  7/22: No events overnight. Patient content and constricted. Primary team reported that patient has been receiving Ativan 0.5mg PO in the evening and has not received daytime doses since July 15th as order has fallen off. Patient has been behaviorally well controlled and no active signs of catatonia at this time. Denies SI/HI/AH/VH.      Per initial CL note: "Patient is a poor historian. Patient does report a PPHx of schizophrenia. Spoke with psychiatrist from [facility] Dr. Moeller [phone] who reports patient has a hx of schizophrenia - has requested to be on invega sustenna as he has done well on this medication in the past. Was given this medication about a year ago and has been doing well. Patient does have a "close catatonic episode" about a year ago, and this past monday, he was called by staff saying the patient was note eating, not speaking, not moving. Unsure if the ativan 0.5mg qhs is new as MD is not at a computer at this time.   Psychiatry was called for SI as patient reported not eating or drinking in attempt to kill self.  Patient was seen and assessed at bedside. Patient with good eye contact, smiling, laughing with provider. Patient is alert, aware he is in the hospital, states it is July 7th. Patient reports being here as he is experiencing "a lot of pressure from the nursing home."  Patient begins to stutter with inability to further elaborate on the stressors at his NH. Patient denies feeling as if he is in danger. Denies paranoia. no AH or VH reported. Patient does report having SI. He states 3 days ago he filled his sink and tried to drown himself. He also reports he stopped eating to kill himself, but on today's exam he requests food and as per RN patient did eat today. Patient unable to say what has changed.    Patient not scoring on bush rosmery at this time. Will monitor. "

## 2022-07-22 NOTE — BH CONSULTATION LIAISON PROGRESS NOTE - [id number]_PSY_A_CORE
09-Jul-2022 11:02
10-Jul-2022 10:48
13-Jul-2022 11:02
21-Jul-2022 13:46
12-Jul-2022 10:25
22-Jul-2022 12:01
15-Jul-2022 12:38
11-Jul-2022 10:52
17-Jul-2022 12:07
16-Jul-2022 11:05
18-Jul-2022 14:02
08-Jul-2022 10:20
19-Jul-2022 14:19
14-Jul-2022 10:12
20-Jul-2022 12:29

## 2022-07-22 NOTE — PROGRESS NOTE ADULT - SUBJECTIVE AND OBJECTIVE BOX
Date of service: 07/08/22    chief complaint: suicidal ideation    extended hpi:  82M PMH CHF, COPD, DM, dementia, schizophrenia, HTN sent in from [facility] for dehydration and SI. Per notes in chart that were sent from facility, the pt has not been eating and stated he wants to kill himself.    S: no chest pain or sob; ros otherwise negative.     Review of Systems:   Constitutional: [ ] fevers, [ ] chills.   Skin: [ ] dry skin. [ ] rashes.  Psychiatric: [ ] depression, [ ] anxiety.   Gastrointestinal: [ ] BRBPR, [ ] melena.   Neurological: [ ] confusion. [ ] seizures. [ ] shuffling gait.   Ears,Nose,Mouth and Throat: [ ] ear pain [ ] sore throat.   Eyes: [ ] diplopia.   Respiratory: [ ] hemoptysis. [ ] shortness of breath  Cardiovascular: See HPI above  Hematologic/Lymphatic: [ ] anemia. [ ] painful nodes. [ ] prolonged bleeding.   Genitourinary: [ ] hematuria. [ ] flank pain.   Endocrine: [ ] significant change in weight. [ ] intolerance to heat and cold.     Review of systems [x ] otherwise negative, [ ] otherwise unable to obtain    FH: no family history of sudden cardiac death in first degree relatives    SH: [ ] tobacco, [ ] alcohol, [ ] drugs    amLODIPine   Tablet 10 milliGRAM(s) Oral daily  budesonide  80 MICROgram(s)/formoterol 4.5 MICROgram(s) Inhaler 2 Puff(s) Inhalation two times a day  dextrose 5% + lactated ringers. 1000 milliLiter(s) IV Continuous <Continuous>  dextrose 5%. 1000 milliLiter(s) IV Continuous <Continuous>  dextrose 5%. 1000 milliLiter(s) IV Continuous <Continuous>  dextrose 50% Injectable 25 Gram(s) IV Push once  dextrose 50% Injectable 12.5 Gram(s) IV Push once  dextrose 50% Injectable 25 Gram(s) IV Push once  dextrose Oral Gel 15 Gram(s) Oral once PRN  enoxaparin Injectable 40 milliGRAM(s) SubCutaneous every 24 hours  glucagon  Injectable 1 milliGRAM(s) IntraMuscular once  insulin lispro (ADMELOG) corrective regimen sliding scale   SubCutaneous three times a day before meals  LORazepam     Tablet 0.5 milliGRAM(s) Oral <User Schedule>  PARoxetine 20 milliGRAM(s) Oral daily                            14.6   4.76  )-----------( 205      ( 08 Jul 2022 06:00 )             41.7       07-08    137  |  99  |  6<L>  ----------------------------<  134<H>  3.6   |  28  |  0.79    Ca    9.1      08 Jul 2022 06:00  Phos  3.3     07-08  Mg     1.90     07-08              T(C): 36.4 (07-08-22 @ 10:14), Max: 36.4 (07-08-22 @ 10:14)  HR: 61 (07-08-22 @ 10:14) (56 - 63)  BP: 125/76 (07-08-22 @ 10:14) (125/76 - 157/79)  RR: 18 (07-08-22 @ 10:14) (16 - 18)  SpO2: 98% (07-08-22 @ 10:14) (98% - 100%)  Wt(kg): --    I&O's Summary      General: Well nourished in no acute distress.    Head: Normocephalic and atraumatic.   Neck: No JVD. No bruits. Supple. Does not appear to be enlarged.   Cardiovascular: + S1,S2 ; RRR Soft systolic murmur at the left lower sternal border. No rubs noted.    Lungs: CTA b/l. No rhonchi, rales or wheezes.   Abdomen: + BS, soft. Non tender. Non distended. No rebound. No guarding.   Extremities: No clubbing/cyanosis/edema.   Skin: Warm and moist. The patient's skin has normal elasticity and good skin turgor.     A/P:  82M PMH CHF, COPD, DM, dementia, schizophrenia, HTN sent in from [facility] for dehydration and SI.     -ecg with non-specific t wave changes  -given no anginal symptoms, no further workup indicated at this time   -pt. with no clinical heart failure   -QTc normal - if QTc prolonging medications are to be used in the future, would monitor QTc with serial ECG  -follow up psych and neurology     Deirdre Jalloh MD 
Date of service: 07/10/22    chief complaint: suicidal ideation    extended hpi:  82M PMH CHF, COPD, DM, dementia, schizophrenia, HTN sent in from [facility] for dehydration and SI. Per notes in chart that were sent from facility, the pt has not been eating and stated he wants to kill himself.    pt seen and examined, no distress     Review of Systems:   Constitutional: [ ] fevers, [ ] chills.   Skin: [ ] dry skin. [ ] rashes.  Psychiatric: [ ] depression, [ ] anxiety.   Gastrointestinal: [ ] BRBPR, [ ] melena.   Neurological: [ ] confusion. [ ] seizures. [ ] shuffling gait.   Ears,Nose,Mouth and Throat: [ ] ear pain [ ] sore throat.   Eyes: [ ] diplopia.   Respiratory: [ ] hemoptysis. [ ] shortness of breath  Cardiovascular: See HPI above  Hematologic/Lymphatic: [ ] anemia. [ ] painful nodes. [ ] prolonged bleeding.   Genitourinary: [ ] hematuria. [ ] flank pain.   Endocrine: [ ] significant change in weight. [ ] intolerance to heat and cold.     Review of systems [x ] otherwise negative, [ ] otherwise unable to obtain    FH: no family history of sudden cardiac death in first degree relatives    SH: [ ] tobacco, [ ] alcohol, [ ] drugs              amLODIPine   Tablet 10 milliGRAM(s) Oral daily  budesonide  80 MICROgram(s)/formoterol 4.5 MICROgram(s) Inhaler 2 Puff(s) Inhalation two times a day  dextrose 5% + lactated ringers. 1000 milliLiter(s) IV Continuous <Continuous>  dextrose 5%. 1000 milliLiter(s) IV Continuous <Continuous>  dextrose 5%. 1000 milliLiter(s) IV Continuous <Continuous>  dextrose 50% Injectable 25 Gram(s) IV Push once  dextrose 50% Injectable 12.5 Gram(s) IV Push once  dextrose 50% Injectable 25 Gram(s) IV Push once  dextrose Oral Gel 15 Gram(s) Oral once PRN  enoxaparin Injectable 40 milliGRAM(s) SubCutaneous every 24 hours  glucagon  Injectable 1 milliGRAM(s) IntraMuscular once  insulin lispro (ADMELOG) corrective regimen sliding scale   SubCutaneous three times a day before meals  LORazepam     Tablet 0.5 milliGRAM(s) Oral <User Schedule>  PARoxetine 20 milliGRAM(s) Oral daily                            13.4   3.33  )-----------( 210      ( 10 Jul 2022 06:00 )             41.5       Hemoglobin: 13.4 g/dL (07-10 @ 06:00)  Hemoglobin: 13.8 g/dL (07-09 @ 05:33)  Hemoglobin: 14.6 g/dL (07-08 @ 06:00)  Hemoglobin: 14.7 g/dL (07-06 @ 02:29)      07-10    139  |  102  |  9   ----------------------------<  117<H>  3.6   |  26  |  0.76    Ca    9.1      10 Jul 2022 06:00  Phos  3.3     07-10  Mg     1.90     07-10      Creatinine Trend: 0.76<--, 0.86<--, 0.79<--, 1.04<--, 1.12<--    COAGS:           T(C): 36.6 (07-10-22 @ 09:31), Max: 36.6 (07-10-22 @ 09:31)  HR: 63 (07-10-22 @ 09:31) (61 - 99)  BP: 153/69 (07-10-22 @ 09:31) (136/71 - 160/76)  RR: 18 (07-10-22 @ 09:31) (18 - 18)  SpO2: 98% (07-10-22 @ 09:31) (64% - 99%)  Wt(kg): --    I&O's Summary  General: Well nourished in no acute distress.    Head: Normocephalic and atraumatic.   Neck: No JVD. No bruits. Supple. Does not appear to be enlarged.   Cardiovascular: + S1,S2 ; RRR Soft systolic murmur at the left lower sternal border. No rubs noted.    Lungs: CTA b/l. No rhonchi, rales or wheezes.   Abdomen: + BS, soft. Non tender. Non distended. No rebound. No guarding.   Extremities: No clubbing/cyanosis/edema.   Skin: Warm and moist. The patient's skin has normal elasticity and good skin turgor.     A/P:  82M PMH CHF, COPD, DM, dementia, schizophrenia, HTN sent in from [facility] for dehydration and SI.     -EKG with non-specific t wave changes  - BP stable on norvasc.   -given no anginal symptoms, no further workup indicated at this time   -pt. with no clinical heart failure   -QTc normal - if QTc prolonging medications are to be used in the future, would monitor QTc with serial ECG     
Date of service: 07/11/22    chief complaint: suicidal ideation    extended hpi:  82M PMH CHF, COPD, DM, dementia, schizophrenia, HTN sent in from [facility] for dehydration and SI. Per notes in chart that were sent from facility, the pt has not been eating and stated he wants to kill himself.    S: no chest pain or sob; ros otherwise negative.     Review of Systems:   Constitutional: [ ] fevers, [ ] chills.   Skin: [ ] dry skin. [ ] rashes.  Psychiatric: [ ] depression, [ ] anxiety.   Gastrointestinal: [ ] BRBPR, [ ] melena.   Neurological: [ ] confusion. [ ] seizures. [ ] shuffling gait.   Ears,Nose,Mouth and Throat: [ ] ear pain [ ] sore throat.   Eyes: [ ] diplopia.   Respiratory: [ ] hemoptysis. [ ] shortness of breath  Cardiovascular: See HPI above  Hematologic/Lymphatic: [ ] anemia. [ ] painful nodes. [ ] prolonged bleeding.   Genitourinary: [ ] hematuria. [ ] flank pain.   Endocrine: [ ] significant change in weight. [ ] intolerance to heat and cold.     Review of systems [x ] otherwise negative, [ ] otherwise unable to obtain    FH: no family history of sudden cardiac death in first degree relatives    SH: [ ] tobacco, [ ] alcohol, [ ] drugs    amLODIPine   Tablet 10 milliGRAM(s) Oral daily  budesonide  80 MICROgram(s)/formoterol 4.5 MICROgram(s) Inhaler 2 Puff(s) Inhalation two times a day  dextrose 5% + lactated ringers. 1000 milliLiter(s) IV Continuous <Continuous>  dextrose 5%. 1000 milliLiter(s) IV Continuous <Continuous>  dextrose 5%. 1000 milliLiter(s) IV Continuous <Continuous>  dextrose 50% Injectable 25 Gram(s) IV Push once  dextrose 50% Injectable 12.5 Gram(s) IV Push once  dextrose 50% Injectable 25 Gram(s) IV Push once  dextrose Oral Gel 15 Gram(s) Oral once PRN  enoxaparin Injectable 40 milliGRAM(s) SubCutaneous every 24 hours  glucagon  Injectable 1 milliGRAM(s) IntraMuscular once  insulin lispro (ADMELOG) corrective regimen sliding scale   SubCutaneous three times a day before meals  LORazepam     Tablet 0.5 milliGRAM(s) Oral <User Schedule>  LORazepam     Tablet 0.5 milliGRAM(s) Oral <User Schedule>                            13.9   3.84  )-----------( 218      ( 11 Jul 2022 05:56 )             41.5       139  |  100  |  6<L>  ----------------------------<  109<H>  3.6   |  27  |  0.75    Ca    9.4      11 Jul 2022 05:56  Phos  3.3     07-11  Mg     2.00     07-11      T(C): 36.4 (07-11-22 @ 11:41), Max: 36.4 (07-10-22 @ 21:09)  HR: 58 (07-11-22 @ 11:41) (55 - 63)  BP: 131/74 (07-11-22 @ 11:41) (131/74 - 158/79)  RR: 16 (07-11-22 @ 11:41) (16 - 16)  SpO2: 98% (07-11-22 @ 11:41) (95% - 99%)  Wt(kg): --    General: Well nourished in no acute distress.    Head: Normocephalic and atraumatic.   Neck: No JVD. No bruits. Supple. Does not appear to be enlarged.   Cardiovascular: + S1,S2 ; RRR Soft systolic murmur at the left lower sternal border. No rubs noted.    Lungs: CTA b/l. No rhonchi, rales or wheezes.   Abdomen: + BS, soft. Non tender. Non distended. No rebound. No guarding.   Extremities: No clubbing/cyanosis/edema.   Skin: Warm and moist. The patient's skin has normal elasticity and good skin turgor.     A/P:  82M PMH CHF, COPD, DM, dementia, schizophrenia, HTN sent in from [facility] for dehydration and SI.     -ecg with non-specific t wave changes  -given no anginal symptoms, no further workup indicated at this time   -pt. with no clinical heart failure   -QTc normal - if QTc prolonging medications are to be used in the future, would monitor QTc with serial ECG  -follow up psych and neurology     
Date of service: 07/12/22    chief complaint: suicidal ideation    extended hpi:  82M PMH CHF, COPD, DM, dementia, schizophrenia, HTN sent in from [facility] for dehydration and SI. Per notes in chart that were sent from facility, the pt has not been eating and stated he wants to kill himself.    S: no chest pain or sob; ros otherwise negative.     Review of Systems:   Constitutional: [ ] fevers, [ ] chills.   Skin: [ ] dry skin. [ ] rashes.  Psychiatric: [ ] depression, [ ] anxiety.   Gastrointestinal: [ ] BRBPR, [ ] melena.   Neurological: [ ] confusion. [ ] seizures. [ ] shuffling gait.   Ears,Nose,Mouth and Throat: [ ] ear pain [ ] sore throat.   Eyes: [ ] diplopia.   Respiratory: [ ] hemoptysis. [ ] shortness of breath  Cardiovascular: See HPI above  Hematologic/Lymphatic: [ ] anemia. [ ] painful nodes. [ ] prolonged bleeding.   Genitourinary: [ ] hematuria. [ ] flank pain.   Endocrine: [ ] significant change in weight. [ ] intolerance to heat and cold.     Review of systems [x ] otherwise negative, [ ] otherwise unable to obtain    FH: no family history of sudden cardiac death in first degree relatives    SH: [ ] tobacco, [ ] alcohol, [ ] drugs    amLODIPine   Tablet 10 milliGRAM(s) Oral daily  budesonide  80 MICROgram(s)/formoterol 4.5 MICROgram(s) Inhaler 2 Puff(s) Inhalation two times a day  dextrose 5% + lactated ringers. 1000 milliLiter(s) IV Continuous <Continuous>  dextrose 5%. 1000 milliLiter(s) IV Continuous <Continuous>  dextrose 5%. 1000 milliLiter(s) IV Continuous <Continuous>  dextrose 50% Injectable 25 Gram(s) IV Push once  dextrose 50% Injectable 12.5 Gram(s) IV Push once  dextrose 50% Injectable 25 Gram(s) IV Push once  dextrose Oral Gel 15 Gram(s) Oral once PRN  enoxaparin Injectable 40 milliGRAM(s) SubCutaneous every 24 hours  glucagon  Injectable 1 milliGRAM(s) IntraMuscular once  insulin lispro (ADMELOG) corrective regimen sliding scale   SubCutaneous three times a day before meals  LORazepam     Tablet 0.5 milliGRAM(s) Oral <User Schedule>  LORazepam     Tablet 0.5 milliGRAM(s) Oral <User Schedule>  PARoxetine 30 milliGRAM(s) Oral daily                            13.9   3.84  )-----------( 218      ( 11 Jul 2022 05:56 )             41.5       07-11    139  |  100  |  6<L>  ----------------------------<  109<H>  3.6   |  27  |  0.75    Ca    9.4      11 Jul 2022 05:56  Phos  3.3     07-11  Mg     2.00     07-11              T(C): 36.5 (07-12-22 @ 05:04), Max: 36.9 (07-11-22 @ 18:23)  HR: 70 (07-12-22 @ 07:20) (57 - 70)  BP: 138/74 (07-12-22 @ 07:20) (102/72 - 140/76)  RR: 16 (07-12-22 @ 05:04) (15 - 17)  SpO2: 100% (07-12-22 @ 05:04) (98% - 100%)  Wt(kg): --    I&O's Summary    11 Jul 2022 07:01  -  12 Jul 2022 07:00  --------------------------------------------------------  IN: 0 mL / OUT: 801 mL / NET: -801 mL        General: Well nourished in no acute distress.    Head: Normocephalic and atraumatic.   Neck: No JVD. No bruits. Supple. Does not appear to be enlarged.   Cardiovascular: + S1,S2 ; RRR Soft systolic murmur at the left lower sternal border. No rubs noted.    Lungs: CTA b/l. No rhonchi, rales or wheezes.   Abdomen: + BS, soft. Non tender. Non distended. No rebound. No guarding.   Extremities: No clubbing/cyanosis/edema.   Skin: Warm and moist. The patient's skin has normal elasticity and good skin turgor.     A/P:  82M PMH CHF, COPD, DM, dementia, schizophrenia, HTN sent in from [facility] for dehydration and SI.     -ecg with non-specific t wave changes  -given no anginal symptoms, no further workup indicated at this time   -pt. with no clinical heart failure   -QTc normal - if QTc prolonging medications are to be used in the future, would monitor QTc with serial ECG  -follow up psych and neurology   -no further inpatient cardiac workup expected at this time     Deirdre Jalloh MD     
Date of service: 07/13/22    chief complaint: suicidal ideation    extended hpi:  82M PMH CHF, COPD, DM, dementia, schizophrenia, HTN sent in from [facility] for dehydration and SI. Per notes in chart that were sent from facility, the pt has not been eating and stated he wants to kill himself.    S: no chest pain or sob, more alert today; ros otherwise negative.     Review of Systems:   Constitutional: [ ] fevers, [ ] chills.   Skin: [ ] dry skin. [ ] rashes.  Psychiatric: [ ] depression, [ ] anxiety.   Gastrointestinal: [ ] BRBPR, [ ] melena.   Neurological: [ ] confusion. [ ] seizures. [ ] shuffling gait.   Ears,Nose,Mouth and Throat: [ ] ear pain [ ] sore throat.   Eyes: [ ] diplopia.   Respiratory: [ ] hemoptysis. [ ] shortness of breath  Cardiovascular: See HPI above  Hematologic/Lymphatic: [ ] anemia. [ ] painful nodes. [ ] prolonged bleeding.   Genitourinary: [ ] hematuria. [ ] flank pain.   Endocrine: [ ] significant change in weight. [ ] intolerance to heat and cold.     Review of systems [x ] otherwise negative, [ ] otherwise unable to obtain    FH: no family history of sudden cardiac death in first degree relatives    SH: [ ] tobacco, [ ] alcohol, [ ] drugs    amLODIPine   Tablet 10 milliGRAM(s) Oral daily  budesonide  80 MICROgram(s)/formoterol 4.5 MICROgram(s) Inhaler 2 Puff(s) Inhalation two times a day  dextrose 5% + lactated ringers. 1000 milliLiter(s) IV Continuous <Continuous>  dextrose 5%. 1000 milliLiter(s) IV Continuous <Continuous>  dextrose 5%. 1000 milliLiter(s) IV Continuous <Continuous>  dextrose 50% Injectable 25 Gram(s) IV Push once  dextrose 50% Injectable 12.5 Gram(s) IV Push once  dextrose 50% Injectable 25 Gram(s) IV Push once  dextrose Oral Gel 15 Gram(s) Oral once PRN  enoxaparin Injectable 40 milliGRAM(s) SubCutaneous every 24 hours  glucagon  Injectable 1 milliGRAM(s) IntraMuscular once  insulin lispro (ADMELOG) corrective regimen sliding scale   SubCutaneous three times a day before meals  LORazepam     Tablet 0.5 milliGRAM(s) Oral <User Schedule>  LORazepam     Tablet 0.5 milliGRAM(s) Oral <User Schedule>  PARoxetine 30 milliGRAM(s) Oral daily      T(C): 37.3 (07-13-22 @ 11:54), Max: 37.6 (07-13-22 @ 07:00)  HR: 70 (07-13-22 @ 11:54) (55 - 70)  BP: 130/76 (07-13-22 @ 11:54) (130/76 - 167/73)  RR: 17 (07-13-22 @ 11:54) (17 - 18)  SpO2: 99% (07-13-22 @ 11:54) (96% - 99%)  Wt(kg): --    I&O's Summary    12 Jul 2022 07:01  -  13 Jul 2022 07:00  --------------------------------------------------------  IN: 0 mL / OUT: 2 mL / NET: -2 mL    General: Well nourished in no acute distress.    Head: Normocephalic and atraumatic.   Neck: No JVD. No bruits. Supple. Does not appear to be enlarged.   Cardiovascular: + S1,S2 ; RRR Soft systolic murmur at the left lower sternal border. No rubs noted.    Lungs: CTA b/l. No rhonchi, rales or wheezes.   Abdomen: + BS, soft. Non tender. Non distended. No rebound. No guarding.   Extremities: No clubbing/cyanosis/edema.   Skin: Warm and moist. The patient's skin has normal elasticity and good skin turgor.     A/P:  82M PMH CHF, COPD, DM, dementia, schizophrenia, HTN sent in from [facility] for dehydration and SI.     -ecg with non-specific t wave changes  -given no anginal symptoms, no further workup indicated at this time   -pt. with no clinical heart failure   -QTc normal - if QTc prolonging medications are to be used in the future, would monitor QTc with serial ECG  -follow up psych and neurology   -no further inpatient cardiac workup expected at this time         
Date of service: 07/16/22    chief complaint: suicidal ideation    extended hpi:  82M PMH CHF, COPD, DM, dementia, schizophrenia, HTN sent in from [facility] for dehydration and SI. Per notes in chart that were sent from facility, the pt has not been eating and stated he wants to kill himself.    S: no chest pain or sob; ros otherwise negative.     Review of Systems:   Constitutional: [ ] fevers, [ ] chills.   Skin: [ ] dry skin. [ ] rashes.  Psychiatric: [ ] depression, [ ] anxiety.   Gastrointestinal: [ ] BRBPR, [ ] melena.   Neurological: [ ] confusion. [ ] seizures. [ ] shuffling gait.   Ears,Nose,Mouth and Throat: [ ] ear pain [ ] sore throat.   Eyes: [ ] diplopia.   Respiratory: [ ] hemoptysis. [ ] shortness of breath  Cardiovascular: See HPI above  Hematologic/Lymphatic: [ ] anemia. [ ] painful nodes. [ ] prolonged bleeding.   Genitourinary: [ ] hematuria. [ ] flank pain.   Endocrine: [ ] significant change in weight. [ ] intolerance to heat and cold.     Review of systems [x ] otherwise negative, [ ] otherwise unable to obtain    FH: no family history of sudden cardiac death in first degree relatives    SH: [ ] tobacco, [ ] alcohol, [ ] drugs    amLODIPine   Tablet 10 milliGRAM(s) Oral daily  budesonide  80 MICROgram(s)/formoterol 4.5 MICROgram(s) Inhaler 2 Puff(s) Inhalation two times a day  dextrose 5% + lactated ringers. 1000 milliLiter(s) IV Continuous <Continuous>  dextrose 5%. 1000 milliLiter(s) IV Continuous <Continuous>  dextrose 5%. 1000 milliLiter(s) IV Continuous <Continuous>  dextrose 50% Injectable 25 Gram(s) IV Push once  dextrose 50% Injectable 12.5 Gram(s) IV Push once  dextrose 50% Injectable 25 Gram(s) IV Push once  dextrose Oral Gel 15 Gram(s) Oral once PRN  enoxaparin Injectable 40 milliGRAM(s) SubCutaneous every 24 hours  glucagon  Injectable 1 milliGRAM(s) IntraMuscular once  insulin lispro (ADMELOG) corrective regimen sliding scale   SubCutaneous three times a day before meals  LORazepam     Tablet 0.5 milliGRAM(s) Oral <User Schedule>  PARoxetine 30 milliGRAM(s) Oral daily      T(C): 36.6 (07-16-22 @ 05:40), Max: 36.8 (07-15-22 @ 10:55)  HR: 59 (07-16-22 @ 06:49) (58 - 63)  BP: 167/70 (07-16-22 @ 06:49) (110/68 - 179/92)  RR: 18 (07-16-22 @ 06:49) (18 - 18)  SpO2: 95% (07-16-22 @ 06:49) (94% - 98%)      General: Well nourished in no acute distress.    Head: Normocephalic and atraumatic.   Neck: No JVD. No bruits. Supple. Does not appear to be enlarged.   Cardiovascular: + S1,S2 ; RRR Soft systolic murmur at the left lower sternal border. No rubs noted.    Lungs: CTA b/l. No rhonchi, rales or wheezes.   Abdomen: + BS, soft. Non tender. Non distended. No rebound. No guarding.   Extremities: No clubbing/cyanosis/edema.   Skin: Warm and moist. The patient's skin has normal elasticity and good skin turgor.     A/P:  82M PMH CHF, COPD, DM, dementia, schizophrenia, HTN sent in from [facility] for dehydration and SI.     -ecg with non-specific t wave changes  -given no anginal symptoms, no further workup indicated at this time   -pt. with no clinical heart failure   -QTc normal - if QTc prolonging medications are to be used in the future, would monitor QTc with serial ECG  -follow up psych and neurology   -no further inpatient cardiac workup expected at this time       
Date of service: 07/17/22    chief complaint: suicidal ideation    extended hpi:  82M PMH CHF, COPD, DM, dementia, schizophrenia, HTN sent in from [facility] for dehydration and SI. Per notes in chart that were sent from facility, the pt has not been eating and stated he wants to kill himself.    S: no chest pain or sob; ros otherwise negative.     Review of Systems:   Constitutional: [ ] fevers, [ ] chills.   Skin: [ ] dry skin. [ ] rashes.  Psychiatric: [ ] depression, [ ] anxiety.   Gastrointestinal: [ ] BRBPR, [ ] melena.   Neurological: [ ] confusion. [ ] seizures. [ ] shuffling gait.   Ears,Nose,Mouth and Throat: [ ] ear pain [ ] sore throat.   Eyes: [ ] diplopia.   Respiratory: [ ] hemoptysis. [ ] shortness of breath  Cardiovascular: See HPI above  Hematologic/Lymphatic: [ ] anemia. [ ] painful nodes. [ ] prolonged bleeding.   Genitourinary: [ ] hematuria. [ ] flank pain.   Endocrine: [ ] significant change in weight. [ ] intolerance to heat and cold.     Review of systems [x ] otherwise negative, [ ] otherwise unable to obtain    FH: no family history of sudden cardiac death in first degree relatives    SH: [ ] tobacco, [ ] alcohol, [ ] drugs    amLODIPine   Tablet 10 milliGRAM(s) Oral daily  budesonide  80 MICROgram(s)/formoterol 4.5 MICROgram(s) Inhaler 2 Puff(s) Inhalation two times a day  dextrose 5% + lactated ringers. 1000 milliLiter(s) IV Continuous <Continuous>  dextrose 5%. 1000 milliLiter(s) IV Continuous <Continuous>  dextrose 5%. 1000 milliLiter(s) IV Continuous <Continuous>  dextrose 50% Injectable 25 Gram(s) IV Push once  dextrose 50% Injectable 12.5 Gram(s) IV Push once  dextrose 50% Injectable 25 Gram(s) IV Push once  dextrose Oral Gel 15 Gram(s) Oral once PRN  enoxaparin Injectable 40 milliGRAM(s) SubCutaneous every 24 hours  glucagon  Injectable 1 milliGRAM(s) IntraMuscular once  insulin lispro (ADMELOG) corrective regimen sliding scale   SubCutaneous three times a day before meals  LORazepam     Tablet 0.5 milliGRAM(s) Oral <User Schedule>  PARoxetine 30 milliGRAM(s) Oral daily      T(C): 36.6 (07-17-22 @ 13:12), Max: 36.7 (07-16-22 @ 17:00)  HR: 59 (07-17-22 @ 13:12) (58 - 62)  BP: 115/60 (07-17-22 @ 13:12) (108/67 - 136/61)  RR: 18 (07-17-22 @ 13:12) (18 - 18)  SpO2: 95% (07-17-22 @ 13:12) (94% - 96%)  Wt(kg): --    I&O's Summary    General: Well nourished in no acute distress.    Head: Normocephalic and atraumatic.   Neck: No JVD. No bruits. Supple. Does not appear to be enlarged.   Cardiovascular: + S1,S2 ; RRR Soft systolic murmur at the left lower sternal border. No rubs noted.    Lungs: CTA b/l. No rhonchi, rales or wheezes.   Abdomen: + BS, soft. Non tender. Non distended. No rebound. No guarding.   Extremities: No clubbing/cyanosis/edema.   Skin: Warm and moist. The patient's skin has normal elasticity and good skin turgor.     A/P:  82M PMH CHF, COPD, DM, dementia, schizophrenia, HTN sent in from [facility] for dehydration and SI.     -ecg with non-specific t wave changes  -given no anginal symptoms, no further workup indicated at this time   -pt. with no clinical heart failure   -QTc normal - if QTc prolonging medications are to be used in the future, would monitor QTc with serial ECG  -follow up psych and neurology   -no further inpatient cardiac workup expected at this time     Jean Stanley M.D.  Cardiac Electrophysiology  [phone]  
Date of service: 07/21/22    chief complaint: suicidal ideation    extended hpi:  82M PMH CHF, COPD, DM, dementia, schizophrenia, HTN sent in from [facility] for dehydration and SI. Per notes in chart that were sent from facility, the pt has not been eating and stated he wants to kill himself.    S: no chest pain or sob; ros otherwise negative.     Review of Systems:   Constitutional: [ ] fevers, [ ] chills.   Skin: [ ] dry skin. [ ] rashes.  Psychiatric: [ ] depression, [ ] anxiety.   Gastrointestinal: [ ] BRBPR, [ ] melena.   Neurological: [ ] confusion. [ ] seizures. [ ] shuffling gait.   Ears,Nose,Mouth and Throat: [ ] ear pain [ ] sore throat.   Eyes: [ ] diplopia.   Respiratory: [ ] hemoptysis. [ ] shortness of breath  Cardiovascular: See HPI above  Hematologic/Lymphatic: [ ] anemia. [ ] painful nodes. [ ] prolonged bleeding.   Genitourinary: [ ] hematuria. [ ] flank pain.   Endocrine: [ ] significant change in weight. [ ] intolerance to heat and cold.     Review of systems [x ] otherwise negative, [ ] otherwise unable to obtain    FH: no family history of sudden cardiac death in first degree relatives    SH: [ ] tobacco, [ ] alcohol, [ ] drugs    amLODIPine   Tablet 10 milliGRAM(s) Oral daily  budesonide  80 MICROgram(s)/formoterol 4.5 MICROgram(s) Inhaler 2 Puff(s) Inhalation two times a day  dextrose 5%. 1000 milliLiter(s) IV Continuous <Continuous>  dextrose 5%. 1000 milliLiter(s) IV Continuous <Continuous>  dextrose 50% Injectable 25 Gram(s) IV Push once  dextrose 50% Injectable 12.5 Gram(s) IV Push once  dextrose 50% Injectable 25 Gram(s) IV Push once  dextrose Oral Gel 15 Gram(s) Oral once PRN  enoxaparin Injectable 40 milliGRAM(s) SubCutaneous every 24 hours  glucagon  Injectable 1 milliGRAM(s) IntraMuscular once  insulin lispro (ADMELOG) corrective regimen sliding scale   SubCutaneous three times a day before meals  LORazepam     Tablet 0.5 milliGRAM(s) Oral <User Schedule>  PARoxetine 30 milliGRAM(s) Oral daily                            13.4   5.24  )-----------( 240      ( 20 Jul 2022 07:00 )             38.7       07-21    134<L>  |  98  |  10  ----------------------------<  103<H>  3.7   |  27  |  0.73    Ca    9.2      21 Jul 2022 06:35  Phos  3.4     07-21  Mg     2.00     07-21              T(C): 36.5 (07-21-22 @ 12:38), Max: 36.5 (07-21-22 @ 05:13)  HR: 61 (07-21-22 @ 12:38) (61 - 84)  BP: 134/70 (07-21-22 @ 12:38) (134/70 - 160/74)  RR: 18 (07-21-22 @ 12:38) (17 - 18)  SpO2: 98% (07-21-22 @ 12:38) (94% - 98%)  Wt(kg): --    I&O's Summary    20 Jul 2022 07:01  -  21 Jul 2022 07:00  --------------------------------------------------------  IN: 0 mL / OUT: 1300 mL / NET: -1300 mL        General: Well nourished in no acute distress.    Head: Normocephalic and atraumatic.   Neck: No JVD. No bruits. Supple. Does not appear to be enlarged.   Cardiovascular: + S1,S2 ; RRR Soft systolic murmur at the left lower sternal border. No rubs noted.    Lungs: CTA b/l. No rhonchi, rales or wheezes.   Abdomen: + BS, soft. Non tender. Non distended. No rebound. No guarding.   Extremities: No clubbing/cyanosis/edema.   Skin: Warm and moist. The patient's skin has normal elasticity and good skin turgor.     A/P:  82M PMH CHF, COPD, DM, dementia, schizophrenia, HTN sent in from [facility] for dehydration and SI.     -ecg with non-specific t wave changes  -given no anginal symptoms, no further workup indicated at this time   -pt. with no clinical heart failure or anginal symptoms   -QTc normal - if QTc prolonging medications are to be used in the future, would monitor QTc with serial ECG  -follow up psych and neurology   -no further inpatient cardiac workup expected at this time   -primary care per medicine    Deirdre Jalloh MD     
Neurology Progress Note    S: Patient seen and examined. No new events overnight. resting in bed     Medication:    MEDICATIONS  (STANDING):  amLODIPine   Tablet 10 milliGRAM(s) Oral daily  budesonide  80 MICROgram(s)/formoterol 4.5 MICROgram(s) Inhaler 2 Puff(s) Inhalation two times a day  dextrose 5% + lactated ringers. 1000 milliLiter(s) (75 mL/Hr) IV Continuous <Continuous>  dextrose 5%. 1000 milliLiter(s) (100 mL/Hr) IV Continuous <Continuous>  dextrose 5%. 1000 milliLiter(s) (50 mL/Hr) IV Continuous <Continuous>  dextrose 50% Injectable 25 Gram(s) IV Push once  dextrose 50% Injectable 12.5 Gram(s) IV Push once  dextrose 50% Injectable 25 Gram(s) IV Push once  enoxaparin Injectable 40 milliGRAM(s) SubCutaneous every 24 hours  glucagon  Injectable 1 milliGRAM(s) IntraMuscular once  insulin lispro (ADMELOG) corrective regimen sliding scale   SubCutaneous three times a day before meals  LORazepam     Tablet 0.5 milliGRAM(s) Oral <User Schedule>  PARoxetine 30 milliGRAM(s) Oral daily    MEDICATIONS  (PRN):  dextrose Oral Gel 15 Gram(s) Oral once PRN Blood Glucose LESS THAN 70 milliGRAM(s)/deciliter        Vitals:        Vital Signs Last 24 Hrs  T(C): 36.8 (07-18-22 @ 11:00), Max: 36.8 (07-18-22 @ 11:00)  T(F): 98.3 (07-18-22 @ 11:00), Max: 98.3 (07-18-22 @ 11:00)  HR: 67 (07-18-22 @ 11:00) (50 - 67)  BP: 134/66 (07-18-22 @ 11:00) (115/60 - 155/85)  BP(mean): --  RR: 18 (07-18-22 @ 11:00) (17 - 19)  SpO2: 97% (07-18-22 @ 11:00) (95% - 97%)            General Exam:   General Appearance: Appropriately dressed and in no acute distress       Head: Normocephalic, atraumatic and no dysmorphic features  Ear, Nose, and Throat: Moist mucous membranes  CVS: S1S2+  Resp: No SOB, no wheeze or rhonchi  GI: soft NT/ND  Extremities: No edema or cyanosis  Skin: No bruises or rashes     Neurological Exam:  Mental Status: Awake, alert and oriented x 2-3.  Able to follow simple and complex verbal commands. Able to name and repeat. fluent speech. No obvious aphasia or dysarthria noted.   Cranial Nerves: PERRL, EOMI, VFFC, sensation V1-V3 intact,  no obvious facial asymmetry, equal elevation of palate, scm/trap 5/5, tongue is midline on protrusion. no obvious papilledema on fundoscopic exam. hearing is grossly intact.   Motor: generlzied weakness but at least 4+/5   Sensation: Intact to light touch and pinprick throughout. no right/left confusion. no extinction to tactile on DSS.   Reflexes: 1+ throughout at biceps, brachioradialis, triceps, patellars and ankles bilaterally and equal. No clonus. R toe and L toe were both downgoing.  Coordination: No dysmetria on FNF    Gait: deferred     I personally reviewed the below data/images/labs:  no new labs       < from: CT Head No Cont (07.06.22 @ 03:46) >    ACC: [id number] EXAM:  CT BRAIN                          PROCEDURE DATE:  07/06/2022          INTERPRETATION:  CLINICAL INDICATION:  Altered mental status    TECHNIQUE : Axial CT scanning of the brain was obtained from the skull   base to the vertex without the administration of intravenous contrast.   Coronal and sagittal reformatted images were subsequently obtained.    COMPARISON: No prior imaging available for comparison.    FINDINGS:  No acute intracranial hemorrhage or suspicious extra-axial fluid   collection. No focal edema or acute mass effect. No hydrocephalus.   Basilar cisterns remain clear. Senescent changes are compatible with the   patient's age.    The scalp and imaged midfacial soft tissues are unremarkable. Calvarium   is intact. Minimal paranasal sinus mucosal thickening. Mastoid air cells   and middle ear cavities are clear.    IMPRESSION:  Unremarkable, unenhanced CT head for age.    --- End of Report ---          PETRA ABEBE MD; Resident Interventional Radiology  Thisdocument has been electronically signed.  SAMANTHA MITCHELL MD; Attending Radiologist  This document has been electronically signed. Jul 6 2022  4:40AM    < end of copied text >         
Neurology Progress Note    S: Patient seen and examined. No new events overnight. resting in bed     Medication:  MEDICATIONS  (STANDING):  amLODIPine   Tablet 10 milliGRAM(s) Oral daily  budesonide  80 MICROgram(s)/formoterol 4.5 MICROgram(s) Inhaler 2 Puff(s) Inhalation two times a day  dextrose 5% + lactated ringers. 1000 milliLiter(s) (75 mL/Hr) IV Continuous <Continuous>  dextrose 5%. 1000 milliLiter(s) (100 mL/Hr) IV Continuous <Continuous>  dextrose 5%. 1000 milliLiter(s) (50 mL/Hr) IV Continuous <Continuous>  dextrose 50% Injectable 25 Gram(s) IV Push once  dextrose 50% Injectable 12.5 Gram(s) IV Push once  dextrose 50% Injectable 25 Gram(s) IV Push once  enoxaparin Injectable 40 milliGRAM(s) SubCutaneous every 24 hours  glucagon  Injectable 1 milliGRAM(s) IntraMuscular once  insulin lispro (ADMELOG) corrective regimen sliding scale   SubCutaneous three times a day before meals  LORazepam     Tablet 0.5 milliGRAM(s) Oral <User Schedule>  PARoxetine 30 milliGRAM(s) Oral daily    MEDICATIONS  (PRN):  dextrose Oral Gel 15 Gram(s) Oral once PRN Blood Glucose LESS THAN 70 milliGRAM(s)/deciliter      Vitals:        Vital Signs Last 24 Hrs  T(C): 36.4 (07-17-22 @ 06:14), Max: 36.7 (07-16-22 @ 13:44)  T(F): 97.5 (07-17-22 @ 06:14), Max: 98.1 (07-16-22 @ 13:44)  HR: 58 (07-17-22 @ 06:14) (56 - 62)  BP: 134/70 (07-17-22 @ 06:14) (108/67 - 136/61)  BP(mean): --  RR: 18 (07-17-22 @ 06:14) (18 - 18)  SpO2: 95% (07-17-22 @ 06:14) (94% - 96%)              General Exam:   General Appearance: Appropriately dressed and in no acute distress       Head: Normocephalic, atraumatic and no dysmorphic features  Ear, Nose, and Throat: Moist mucous membranes  CVS: S1S2+  Resp: No SOB, no wheeze or rhonchi  GI: soft NT/ND  Extremities: No edema or cyanosis  Skin: No bruises or rashes     Neurological Exam:  Mental Status: Awake, alert and oriented x 2-3.  Able to follow simple and complex verbal commands. Able to name and repeat. fluent speech. No obvious aphasia or dysarthria noted.   Cranial Nerves: PERRL, EOMI, VFFC, sensation V1-V3 intact,  no obvious facial asymmetry, equal elevation of palate, scm/trap 5/5, tongue is midline on protrusion. no obvious papilledema on fundoscopic exam. hearing is grossly intact.   Motor: generlzied weakness but at least 4+/5   Sensation: Intact to light touch and pinprick throughout. no right/left confusion. no extinction to tactile on DSS.   Reflexes: 1+ throughout at biceps, brachioradialis, triceps, patellars and ankles bilaterally and equal. No clonus. R toe and L toe were both downgoing.  Coordination: No dysmetria on FNF    Gait: deferred     I personally reviewed the below data/images/labs:  no new labs       < from: CT Head No Cont (07.06.22 @ 03:46) >    ACC: [id number] EXAM:  CT BRAIN                          PROCEDURE DATE:  07/06/2022          INTERPRETATION:  CLINICAL INDICATION:  Altered mental status    TECHNIQUE : Axial CT scanning of the brain was obtained from the skull   base to the vertex without the administration of intravenous contrast.   Coronal and sagittal reformatted images were subsequently obtained.    COMPARISON: No prior imaging available for comparison.    FINDINGS:  No acute intracranial hemorrhage or suspicious extra-axial fluid   collection. No focal edema or acute mass effect. No hydrocephalus.   Basilar cisterns remain clear. Senescent changes are compatible with the   patient's age.    The scalp and imaged midfacial soft tissues are unremarkable. Calvarium   is intact. Minimal paranasal sinus mucosal thickening. Mastoid air cells   and middle ear cavities are clear.    IMPRESSION:  Unremarkable, unenhanced CT head for age.    --- End of Report ---          PETRA ABEBE MD; Resident Interventional Radiology  Thisdocument has been electronically signed.  SAMANTHA MITCHELL MD; Attending Radiologist  This document has been electronically signed. Jul 6 2022  4:40AM    < end of copied text >         
Neurology Progress Note    S: Patient seen and examined. No new events overnight. resting in bed     Medication:  MEDICATIONS  (STANDING):  amLODIPine   Tablet 10 milliGRAM(s) Oral daily  budesonide  80 MICROgram(s)/formoterol 4.5 MICROgram(s) Inhaler 2 Puff(s) Inhalation two times a day  dextrose 5%. 1000 milliLiter(s) (100 mL/Hr) IV Continuous <Continuous>  dextrose 5%. 1000 milliLiter(s) (50 mL/Hr) IV Continuous <Continuous>  dextrose 50% Injectable 25 Gram(s) IV Push once  dextrose 50% Injectable 12.5 Gram(s) IV Push once  dextrose 50% Injectable 25 Gram(s) IV Push once  enoxaparin Injectable 40 milliGRAM(s) SubCutaneous every 24 hours  glucagon  Injectable 1 milliGRAM(s) IntraMuscular once  insulin lispro (ADMELOG) corrective regimen sliding scale   SubCutaneous three times a day before meals  LORazepam     Tablet 0.5 milliGRAM(s) Oral <User Schedule>  PARoxetine 30 milliGRAM(s) Oral daily    MEDICATIONS  (PRN):  dextrose Oral Gel 15 Gram(s) Oral once PRN Blood Glucose LESS THAN 70 milliGRAM(s)/deciliter      Vitals:        Vital Signs Last 24 Hrs  T(C): 36.3 (07-22-22 @ 05:19), Max: 36.8 (07-21-22 @ 23:30)  T(F): 97.4 (07-22-22 @ 05:19), Max: 98.2 (07-21-22 @ 23:30)  HR: 58 (07-22-22 @ 05:19) (56 - 61)  BP: 137/76 (07-22-22 @ 05:19) (121/67 - 137/76)  BP(mean): --  RR: 17 (07-22-22 @ 05:19) (17 - 18)  SpO2: 95% (07-22-22 @ 05:19) (95% - 98%)                  General Exam:   General Appearance: Appropriately dressed and in no acute distress       Head: Normocephalic, atraumatic and no dysmorphic features  Ear, Nose, and Throat: Moist mucous membranes  CVS: S1S2+  Resp: No SOB, no wheeze or rhonchi  GI: soft NT/ND  Extremities: No edema or cyanosis  Skin: No bruises or rashes     Neurological Exam:  Mental Status: Awake, alert and oriented x 2-3.  Able to follow simple and complex verbal commands. Able to name and repeat. fluent speech. No obvious aphasia or dysarthria noted.   Cranial Nerves: PERRL, EOMI, VFFC, sensation V1-V3 intact,  no obvious facial asymmetry, equal elevation of palate, scm/trap 5/5, tongue is midline on protrusion. no obvious papilledema on fundoscopic exam. hearing is grossly intact.   Motor: generlzied weakness but at least 4+/5   Sensation: Intact to light touch and pinprick throughout. no right/left confusion. no extinction to tactile on DSS.   Reflexes: 1+ throughout at biceps, brachioradialis, triceps, patellars and ankles bilaterally and equal. No clonus. R toe and L toe were both downgoing.  Coordination: No dysmetria on FNF    Gait: deferred     I personally reviewed the below data/images/labs:     CBC Full  -  ( 22 Jul 2022 05:30 )  WBC Count : 5.27 K/uL  RBC Count : 4.28 M/uL  Hemoglobin : 13.6 g/dL  Hematocrit : 40.3 %  Platelet Count - Automated : 269 K/uL  Mean Cell Volume : 94.2 fL  Mean Cell Hemoglobin : 31.8 pg  Mean Cell Hemoglobin Concentration : 33.7 gm/dL  Auto Neutrophil # : x  Auto Lymphocyte # : x  Auto Monocyte # : x  Auto Eosinophil # : x  Auto Basophil # : x  Auto Neutrophil % : x  Auto Lymphocyte % : x  Auto Monocyte % : x  Auto Eosinophil % : x  Auto Basophil % : x    07-22    132<L>  |  96<L>  |  17  ----------------------------<  83  3.8   |  26  |  0.76    Ca    9.1      22 Jul 2022 05:30  Phos  3.4     07-21  Mg     2.00     07-21        < from: CT Head No Cont (07.06.22 @ 03:46) >    ACC: [id number] EXAM:  CT BRAIN                          PROCEDURE DATE:  07/06/2022          INTERPRETATION:  CLINICAL INDICATION:  Altered mental status    TECHNIQUE : Axial CT scanning of the brain was obtained from the skull   base to the vertex without the administration of intravenous contrast.   Coronal and sagittal reformatted images were subsequently obtained.    COMPARISON: No prior imaging available for comparison.    FINDINGS:  No acute intracranial hemorrhage or suspicious extra-axial fluid   collection. No focal edema or acute mass effect. No hydrocephalus.   Basilar cisterns remain clear. Senescent changes are compatible with the   patient's age.    The scalp and imaged midfacial soft tissues are unremarkable. Calvarium   is intact. Minimal paranasal sinus mucosal thickening. Mastoid air cells   and middle ear cavities are clear.    IMPRESSION:  Unremarkable, unenhanced CT head for age.    --- End of Report ---          PETRA ABEBE MD; Resident Interventional Radiology  Thisdocument has been electronically signed.  SAMANTHA MITCHELL MD; Attending Radiologist  This document has been electronically signed. Jul 6 2022  4:40AM    < end of copied text >         
Patient is a 82y old  Male who presents with a chief complaint of SI (10 Jul 2022 19:08)    Date of servie : 07-11-22 @ 13:23  INTERVAL HPI/OVERNIGHT EVENTS:  T(C): 36.4 (07-11-22 @ 11:41), Max: 36.6 (07-10-22 @ 14:29)  HR: 58 (07-11-22 @ 11:41) (55 - 63)  BP: 131/74 (07-11-22 @ 11:41) (131/74 - 158/79)  RR: 16 (07-11-22 @ 11:41) (16 - 18)  SpO2: 98% (07-11-22 @ 11:41) (95% - 99%)  Wt(kg): --  I&O's Summary    10 Jul 2022 07:01  -  11 Jul 2022 07:00  --------------------------------------------------------  IN: 0 mL / OUT: 800 mL / NET: -800 mL    11 Jul 2022 07:01  -  11 Jul 2022 13:23  --------------------------------------------------------  IN: 0 mL / OUT: 400 mL / NET: -400 mL        LABS:                        13.9   3.84  )-----------( 218      ( 11 Jul 2022 05:56 )             41.5     07-11    139  |  100  |  6<L>  ----------------------------<  109<H>  3.6   |  27  |  0.75    Ca    9.4      11 Jul 2022 05:56  Phos  3.3     07-11  Mg     2.00     07-11          CAPILLARY BLOOD GLUCOSE      POCT Blood Glucose.: 112 mg/dL (11 Jul 2022 11:05)  POCT Blood Glucose.: 120 mg/dL (11 Jul 2022 07:34)  POCT Blood Glucose.: 124 mg/dL (10 Jul 2022 22:14)  POCT Blood Glucose.: 107 mg/dL (10 Jul 2022 16:38)            MEDICATIONS  (STANDING):  amLODIPine   Tablet 10 milliGRAM(s) Oral daily  budesonide  80 MICROgram(s)/formoterol 4.5 MICROgram(s) Inhaler 2 Puff(s) Inhalation two times a day  dextrose 5% + lactated ringers. 1000 milliLiter(s) (75 mL/Hr) IV Continuous <Continuous>  dextrose 5%. 1000 milliLiter(s) (100 mL/Hr) IV Continuous <Continuous>  dextrose 5%. 1000 milliLiter(s) (50 mL/Hr) IV Continuous <Continuous>  dextrose 50% Injectable 25 Gram(s) IV Push once  dextrose 50% Injectable 12.5 Gram(s) IV Push once  dextrose 50% Injectable 25 Gram(s) IV Push once  enoxaparin Injectable 40 milliGRAM(s) SubCutaneous every 24 hours  glucagon  Injectable 1 milliGRAM(s) IntraMuscular once  insulin lispro (ADMELOG) corrective regimen sliding scale   SubCutaneous three times a day before meals  LORazepam     Tablet 0.5 milliGRAM(s) Oral <User Schedule>  LORazepam     Tablet 0.5 milliGRAM(s) Oral <User Schedule>    MEDICATIONS  (PRN):  dextrose Oral Gel 15 Gram(s) Oral once PRN Blood Glucose LESS THAN 70 milliGRAM(s)/deciliter          PHYSICAL EXAM:  GENERAL: NAD, well-groomed, well-developed  HEAD:  Atraumatic, Normocephalic  CHEST/LUNG: Clear to percussion bilaterally; No rales, rhonchi, wheezing, or rubs  HEART: Regular rate and rhythm; No murmurs, rubs, or gallops  ABDOMEN: Soft, Nontender, Nondistended; Bowel sounds present  EXTREMITIES:  2+ Peripheral Pulses, No clubbing, cyanosis, or edema  LYMPH: No lymphadenopathy noted  SKIN: No rashes or lesions    Care Discussed with Consultants/Other Providers [ ] YES  [ ] NO
Patient is a 82y old  Male who presents with a chief complaint of SI (15 Jul 2022 13:52)    Date of servie : 07-15-22 @ 17:09  INTERVAL HPI/OVERNIGHT EVENTS:  T(C): 36.8 (07-15-22 @ 10:55), Max: 37.1 (07-15-22 @ 05:50)  HR: 58 (07-15-22 @ 10:55) (58 - 65)  BP: 110/68 (07-15-22 @ 10:55) (110/68 - 162/86)  RR: 18 (07-15-22 @ 10:55) (18 - 18)  SpO2: 96% (07-15-22 @ 10:55) (95% - 97%)  Wt(kg): --  I&O's Summary    14 Jul 2022 07:01  -  15 Jul 2022 07:00  --------------------------------------------------------  IN: 0 mL / OUT: 700 mL / NET: -700 mL    15 Jul 2022 07:01  -  15 Jul 2022 17:09  --------------------------------------------------------  IN: 0 mL / OUT: 500 mL / NET: -500 mL        LABS:              CAPILLARY BLOOD GLUCOSE      POCT Blood Glucose.: 98 mg/dL (15 Jul 2022 16:55)  POCT Blood Glucose.: 126 mg/dL (15 Jul 2022 11:34)  POCT Blood Glucose.: 103 mg/dL (15 Jul 2022 07:22)  POCT Blood Glucose.: 88 mg/dL (14 Jul 2022 23:11)            MEDICATIONS  (STANDING):  amLODIPine   Tablet 10 milliGRAM(s) Oral daily  budesonide  80 MICROgram(s)/formoterol 4.5 MICROgram(s) Inhaler 2 Puff(s) Inhalation two times a day  dextrose 5% + lactated ringers. 1000 milliLiter(s) (75 mL/Hr) IV Continuous <Continuous>  dextrose 5%. 1000 milliLiter(s) (50 mL/Hr) IV Continuous <Continuous>  dextrose 5%. 1000 milliLiter(s) (100 mL/Hr) IV Continuous <Continuous>  dextrose 50% Injectable 25 Gram(s) IV Push once  dextrose 50% Injectable 12.5 Gram(s) IV Push once  dextrose 50% Injectable 25 Gram(s) IV Push once  enoxaparin Injectable 40 milliGRAM(s) SubCutaneous every 24 hours  glucagon  Injectable 1 milliGRAM(s) IntraMuscular once  insulin lispro (ADMELOG) corrective regimen sliding scale   SubCutaneous three times a day before meals  LORazepam     Tablet 0.5 milliGRAM(s) Oral <User Schedule>  PARoxetine 30 milliGRAM(s) Oral daily    MEDICATIONS  (PRN):  dextrose Oral Gel 15 Gram(s) Oral once PRN Blood Glucose LESS THAN 70 milliGRAM(s)/deciliter          PHYSICAL EXAM:  GENERAL: NAD, well-groomed, well-developed  HEAD:  Atraumatic, Normocephalic  CHEST/LUNG: Clear to percussion bilaterally; No rales, rhonchi, wheezing, or rubs  HEART: Regular rate and rhythm; No murmurs, rubs, or gallops  ABDOMEN: Soft, Nontender, Nondistended; Bowel sounds present  EXTREMITIES:  2+ Peripheral Pulses, No clubbing, cyanosis, or edema  LYMPH: No lymphadenopathy noted  SKIN: No rashes or lesions    Care Discussed with Consultants/Other Providers [ ] YES  [ ] NO
Patient is a 82y old  Male who presents with a chief complaint of SI (17 Jul 2022 15:23)    Date of servie : 07-17-22 @ 19:14  INTERVAL HPI/OVERNIGHT EVENTS:  T(C): 36.3 (07-17-22 @ 18:11), Max: 36.7 (07-16-22 @ 20:46)  HR: 51 (07-17-22 @ 18:11) (51 - 60)  BP: 141/72 (07-17-22 @ 18:11) (108/67 - 141/72)  RR: 19 (07-17-22 @ 18:11) (18 - 19)  SpO2: 97% (07-17-22 @ 18:11) (95% - 97%)  Wt(kg): --  I&O's Summary      LABS:              CAPILLARY BLOOD GLUCOSE      POCT Blood Glucose.: 105 mg/dL (17 Jul 2022 16:35)  POCT Blood Glucose.: 113 mg/dL (17 Jul 2022 11:47)  POCT Blood Glucose.: 104 mg/dL (17 Jul 2022 07:21)  POCT Blood Glucose.: 104 mg/dL (16 Jul 2022 20:50)            MEDICATIONS  (STANDING):  amLODIPine   Tablet 10 milliGRAM(s) Oral daily  budesonide  80 MICROgram(s)/formoterol 4.5 MICROgram(s) Inhaler 2 Puff(s) Inhalation two times a day  dextrose 5% + lactated ringers. 1000 milliLiter(s) (75 mL/Hr) IV Continuous <Continuous>  dextrose 5%. 1000 milliLiter(s) (100 mL/Hr) IV Continuous <Continuous>  dextrose 5%. 1000 milliLiter(s) (50 mL/Hr) IV Continuous <Continuous>  dextrose 50% Injectable 25 Gram(s) IV Push once  dextrose 50% Injectable 12.5 Gram(s) IV Push once  dextrose 50% Injectable 25 Gram(s) IV Push once  enoxaparin Injectable 40 milliGRAM(s) SubCutaneous every 24 hours  glucagon  Injectable 1 milliGRAM(s) IntraMuscular once  insulin lispro (ADMELOG) corrective regimen sliding scale   SubCutaneous three times a day before meals  LORazepam     Tablet 0.5 milliGRAM(s) Oral <User Schedule>  PARoxetine 30 milliGRAM(s) Oral daily    MEDICATIONS  (PRN):  dextrose Oral Gel 15 Gram(s) Oral once PRN Blood Glucose LESS THAN 70 milliGRAM(s)/deciliter          PHYSICAL EXAM:  GENERAL: NAD, well-groomed, well-developed  HEAD:  Atraumatic, Normocephalic  CHEST/LUNG: Clear to percussion bilaterally; No rales, rhonchi, wheezing, or rubs  HEART: Regular rate and rhythm; No murmurs, rubs, or gallops  ABDOMEN: Soft, Nontender, Nondistended; Bowel sounds present  EXTREMITIES:  2+ Peripheral Pulses, No clubbing, cyanosis, or edema  LYMPH: No lymphadenopathy noted  SKIN: No rashes or lesions    Care Discussed with Consultants/Other Providers [ ] YES  [ ] NO
Patient is a 82y old  Male who presents with a chief complaint of SI (18 Jul 2022 11:23)    Date of servie : 07-18-22 @ 13:44  INTERVAL HPI/OVERNIGHT EVENTS:  T(C): 36.8 (07-18-22 @ 11:00), Max: 36.8 (07-18-22 @ 11:00)  HR: 67 (07-18-22 @ 11:00) (50 - 67)  BP: 134/66 (07-18-22 @ 11:00) (129/82 - 155/85)  RR: 18 (07-18-22 @ 11:00) (17 - 19)  SpO2: 97% (07-18-22 @ 11:00) (95% - 97%)  Wt(kg): --  I&O's Summary    18 Jul 2022 07:01  -  18 Jul 2022 13:44  --------------------------------------------------------  IN: 0 mL / OUT: 400 mL / NET: -400 mL        LABS:              CAPILLARY BLOOD GLUCOSE      POCT Blood Glucose.: 116 mg/dL (18 Jul 2022 11:10)  POCT Blood Glucose.: 105 mg/dL (18 Jul 2022 06:18)  POCT Blood Glucose.: 101 mg/dL (17 Jul 2022 20:40)  POCT Blood Glucose.: 105 mg/dL (17 Jul 2022 16:35)            MEDICATIONS  (STANDING):  amLODIPine   Tablet 10 milliGRAM(s) Oral daily  budesonide  80 MICROgram(s)/formoterol 4.5 MICROgram(s) Inhaler 2 Puff(s) Inhalation two times a day  dextrose 5% + lactated ringers. 1000 milliLiter(s) (75 mL/Hr) IV Continuous <Continuous>  dextrose 5%. 1000 milliLiter(s) (100 mL/Hr) IV Continuous <Continuous>  dextrose 5%. 1000 milliLiter(s) (50 mL/Hr) IV Continuous <Continuous>  dextrose 50% Injectable 25 Gram(s) IV Push once  dextrose 50% Injectable 12.5 Gram(s) IV Push once  dextrose 50% Injectable 25 Gram(s) IV Push once  enoxaparin Injectable 40 milliGRAM(s) SubCutaneous every 24 hours  glucagon  Injectable 1 milliGRAM(s) IntraMuscular once  insulin lispro (ADMELOG) corrective regimen sliding scale   SubCutaneous three times a day before meals  LORazepam     Tablet 0.5 milliGRAM(s) Oral <User Schedule>  PARoxetine 30 milliGRAM(s) Oral daily    MEDICATIONS  (PRN):  dextrose Oral Gel 15 Gram(s) Oral once PRN Blood Glucose LESS THAN 70 milliGRAM(s)/deciliter          PHYSICAL EXAM:  GENERAL: NAD, well-groomed, well-developed  HEAD:  Atraumatic, Normocephalic  CHEST/LUNG: Clear to percussion bilaterally; No rales, rhonchi, wheezing, or rubs  HEART: Regular rate and rhythm; No murmurs, rubs, or gallops  ABDOMEN: Soft, Nontender, Nondistended; Bowel sounds present  EXTREMITIES:  2+ Peripheral Pulses, No clubbing, cyanosis, or edema  LYMPH: No lymphadenopathy noted  SKIN: No rashes or lesions    Care Discussed with Consultants/Other Providers [ ] YES  [ ] NO
Date of service: 07/09/22    chief complaint: suicidal ideation    extended hpi:  82M PMH CHF, COPD, DM, dementia, schizophrenia, HTN sent in from [facility] for dehydration and SI. Per notes in chart that were sent from facility, the pt has not been eating and stated he wants to kill himself.    pt seen and examined, no complaints, ROS - . .     Review of Systems:   Constitutional: [ ] fevers, [ ] chills.   Skin: [ ] dry skin. [ ] rashes.  Psychiatric: [ ] depression, [ ] anxiety.   Gastrointestinal: [ ] BRBPR, [ ] melena.   Neurological: [ ] confusion. [ ] seizures. [ ] shuffling gait.   Ears,Nose,Mouth and Throat: [ ] ear pain [ ] sore throat.   Eyes: [ ] diplopia.   Respiratory: [ ] hemoptysis. [ ] shortness of breath  Cardiovascular: See HPI above  Hematologic/Lymphatic: [ ] anemia. [ ] painful nodes. [ ] prolonged bleeding.   Genitourinary: [ ] hematuria. [ ] flank pain.   Endocrine: [ ] significant change in weight. [ ] intolerance to heat and cold.     Review of systems [x ] otherwise negative, [ ] otherwise unable to obtain    FH: no family history of sudden cardiac death in first degree relatives    SH: [ ] tobacco, [ ] alcohol, [ ] drugs         amLODIPine   Tablet 10 milliGRAM(s) Oral daily  budesonide  80 MICROgram(s)/formoterol 4.5 MICROgram(s) Inhaler 2 Puff(s) Inhalation two times a day  dextrose 5% + lactated ringers. 1000 milliLiter(s) IV Continuous <Continuous>  dextrose 5%. 1000 milliLiter(s) IV Continuous <Continuous>  dextrose 5%. 1000 milliLiter(s) IV Continuous <Continuous>  dextrose 50% Injectable 25 Gram(s) IV Push once  dextrose 50% Injectable 12.5 Gram(s) IV Push once  dextrose 50% Injectable 25 Gram(s) IV Push once  dextrose Oral Gel 15 Gram(s) Oral once PRN  enoxaparin Injectable 40 milliGRAM(s) SubCutaneous every 24 hours  glucagon  Injectable 1 milliGRAM(s) IntraMuscular once  insulin lispro (ADMELOG) corrective regimen sliding scale   SubCutaneous three times a day before meals  LORazepam     Tablet 0.5 milliGRAM(s) Oral <User Schedule>  PARoxetine 20 milliGRAM(s) Oral daily                            13.8   4.30  )-----------( 205      ( 09 Jul 2022 05:33 )             40.0       Hemoglobin: 13.8 g/dL (07-09 @ 05:33)  Hemoglobin: 14.6 g/dL (07-08 @ 06:00)  Hemoglobin: 14.7 g/dL (07-06 @ 02:29)      07-09    138  |  102  |  7   ----------------------------<  117<H>  3.5   |  27  |  0.86    Ca    8.9      09 Jul 2022 05:33  Phos  3.4     07-09  Mg     1.90     07-09      Creatinine Trend: 0.86<--, 0.79<--, 1.04<--, 1.12<--    COAGS:           T(C): 36.8 (07-09-22 @ 05:32), Max: 36.8 (07-09-22 @ 05:32)  HR: 64 (07-09-22 @ 05:32) (61 - 70)  BP: 139/70 (07-09-22 @ 05:32) (125/76 - 139/70)  RR: 17 (07-09-22 @ 05:32) (17 - 18)  SpO2: 96% (07-09-22 @ 05:32) (96% - 98%)  Wt(kg): --    I&O's Summary      General: Well nourished in no acute distress.    Head: Normocephalic and atraumatic.   Neck: No JVD. No bruits. Supple. Does not appear to be enlarged.   Cardiovascular: + S1,S2 ; RRR Soft systolic murmur at the left lower sternal border. No rubs noted.    Lungs: CTA b/l. No rhonchi, rales or wheezes.   Abdomen: + BS, soft. Non tender. Non distended. No rebound. No guarding.   Extremities: No clubbing/cyanosis/edema.   Skin: Warm and moist. The patient's skin has normal elasticity and good skin turgor.     A/P:  82M PMH CHF, COPD, DM, dementia, schizophrenia, HTN sent in from [facility] for dehydration and SI.     -ecg with non-specific t wave changes  - BP stable on norvasc.   -given no anginal symptoms, no further workup indicated at this time   -pt. with no clinical heart failure   -QTc normal - if QTc prolonging medications are to be used in the future, would monitor QTc with serial ECG  - appreciate psych follow up    
Date of service: 07/14/22    chief complaint: suicidal ideation    extended hpi:  82M PMH CHF, COPD, DM, dementia, schizophrenia, HTN sent in from [facility] for dehydration and SI. Per notes in chart that were sent from facility, the pt has not been eating and stated he wants to kill himself.    S: no chest pain or sob, more alert today; ros otherwise negative.   Review of Systems:   Constitutional: [ ] fevers, [ ] chills.   Skin: [ ] dry skin. [ ] rashes.  Psychiatric: [ ] depression, [ ] anxiety.   Gastrointestinal: [ ] BRBPR, [ ] melena.   Neurological: [ ] confusion. [ ] seizures. [ ] shuffling gait.   Ears,Nose,Mouth and Throat: [ ] ear pain [ ] sore throat.   Eyes: [ ] diplopia.   Respiratory: [ ] hemoptysis. [ ] shortness of breath  Cardiovascular: See HPI above  Hematologic/Lymphatic: [ ] anemia. [ ] painful nodes. [ ] prolonged bleeding.   Genitourinary: [ ] hematuria. [ ] flank pain.   Endocrine: [ ] significant change in weight. [ ] intolerance to heat and cold.     Review of systems [x ] otherwise negative, [ ] otherwise unable to obtain    FH: no family history of sudden cardiac death in first degree relatives    SH: [ ] tobacco, [ ] alcohol, [ ] drugs    amLODIPine   Tablet 10 milliGRAM(s) Oral daily  budesonide  80 MICROgram(s)/formoterol 4.5 MICROgram(s) Inhaler 2 Puff(s) Inhalation two times a day  dextrose 5% + lactated ringers. 1000 milliLiter(s) IV Continuous <Continuous>  dextrose 5%. 1000 milliLiter(s) IV Continuous <Continuous>  dextrose 5%. 1000 milliLiter(s) IV Continuous <Continuous>  dextrose 50% Injectable 25 Gram(s) IV Push once  dextrose 50% Injectable 12.5 Gram(s) IV Push once  dextrose 50% Injectable 25 Gram(s) IV Push once  dextrose Oral Gel 15 Gram(s) Oral once PRN  enoxaparin Injectable 40 milliGRAM(s) SubCutaneous every 24 hours  glucagon  Injectable 1 milliGRAM(s) IntraMuscular once  insulin lispro (ADMELOG) corrective regimen sliding scale   SubCutaneous three times a day before meals  LORazepam     Tablet 0.5 milliGRAM(s) Oral <User Schedule>  LORazepam     Tablet 0.5 milliGRAM(s) Oral <User Schedule>  PARoxetine 30 milliGRAM(s) Oral daily      T(C): 36.3 (07-14-22 @ 10:13), Max: 36.6 (07-13-22 @ 18:03)  HR: 60 (07-14-22 @ 10:13) (54 - 66)  BP: 138/74 (07-14-22 @ 10:13) (138/74 - 174/77)  RR: 18 (07-14-22 @ 10:13) (17 - 18)  SpO2: 95% (07-14-22 @ 10:13) (95% - 99%)  Wt(kg): --    I&O's Summary    14 Jul 2022 07:01  -  14 Jul 2022 12:37  --------------------------------------------------------  IN: 0 mL / OUT: 700 mL / NET: -700 mL    General: Well nourished in no acute distress.    Head: Normocephalic and atraumatic.   Neck: No JVD. No bruits. Supple. Does not appear to be enlarged.   Cardiovascular: + S1,S2 ; RRR Soft systolic murmur at the left lower sternal border. No rubs noted.    Lungs: CTA b/l. No rhonchi, rales or wheezes.   Abdomen: + BS, soft. Non tender. Non distended. No rebound. No guarding.   Extremities: No clubbing/cyanosis/edema.   Skin: Warm and moist. The patient's skin has normal elasticity and good skin turgor.     A/P:  82M PMH CHF, COPD, DM, dementia, schizophrenia, HTN sent in from [facility] for dehydration and SI.     -ecg with non-specific t wave changes  -given no anginal symptoms, no further workup indicated at this time   -pt. with no clinical heart failure   -QTc normal - if QTc prolonging medications are to be used in the future, would monitor QTc with serial ECG  -follow up psych and neurology   -no further inpatient cardiac workup expected at this time         
Date of service: 07/20/22    chief complaint: suicidal ideation    extended hpi:  82M PMH CHF, COPD, DM, dementia, schizophrenia, HTN sent in from [facility] for dehydration and SI. Per notes in chart that were sent from facility, the pt has not been eating and stated he wants to kill himself.    S: no chest pain or sob; ros otherwise negative.     Review of Systems:   Constitutional: [ ] fevers, [ ] chills.   Skin: [ ] dry skin. [ ] rashes.  Psychiatric: [ ] depression, [ ] anxiety.   Gastrointestinal: [ ] BRBPR, [ ] melena.   Neurological: [ ] confusion. [ ] seizures. [ ] shuffling gait.   Ears,Nose,Mouth and Throat: [ ] ear pain [ ] sore throat.   Eyes: [ ] diplopia.   Respiratory: [ ] hemoptysis. [ ] shortness of breath  Cardiovascular: See HPI above  Hematologic/Lymphatic: [ ] anemia. [ ] painful nodes. [ ] prolonged bleeding.   Genitourinary: [ ] hematuria. [ ] flank pain.   Endocrine: [ ] significant change in weight. [ ] intolerance to heat and cold.     Review of systems [x ] otherwise negative, [ ] otherwise unable to obtain    FH: no family history of sudden cardiac death in first degree relatives    SH: [ ] tobacco, [ ] alcohol, [ ] drugs    amLODIPine   Tablet 10 milliGRAM(s) Oral daily  budesonide  80 MICROgram(s)/formoterol 4.5 MICROgram(s) Inhaler 2 Puff(s) Inhalation two times a day  dextrose 5% + lactated ringers. 1000 milliLiter(s) IV Continuous <Continuous>  dextrose 5%. 1000 milliLiter(s) IV Continuous <Continuous>  dextrose 5%. 1000 milliLiter(s) IV Continuous <Continuous>  dextrose 50% Injectable 25 Gram(s) IV Push once  dextrose 50% Injectable 12.5 Gram(s) IV Push once  dextrose 50% Injectable 25 Gram(s) IV Push once  dextrose Oral Gel 15 Gram(s) Oral once PRN  enoxaparin Injectable 40 milliGRAM(s) SubCutaneous every 24 hours  glucagon  Injectable 1 milliGRAM(s) IntraMuscular once  insulin lispro (ADMELOG) corrective regimen sliding scale   SubCutaneous three times a day before meals  LORazepam     Tablet 0.5 milliGRAM(s) Oral <User Schedule>  PARoxetine 30 milliGRAM(s) Oral daily                     13.4   5.24  )-----------( 240      ( 20 Jul 2022 07:00 )             38.7     136  |  98  |  10  ----------------------------<  107<H>  3.7   |  27  |  0.73    Ca    9.0      20 Jul 2022 07:00  Phos  3.1     07-20  Mg     1.90     07-20    T(C): 36.6 (07-20-22 @ 13:19), Max: 36.6 (07-20-22 @ 13:19)  HR: 61 (07-20-22 @ 13:19) (55 - 61)  BP: 151/77 (07-20-22 @ 13:19) (122/68 - 151/77)  RR: 18 (07-20-22 @ 13:19) (16 - 18)  SpO2: 99% (07-20-22 @ 13:19) (96% - 100%)    General: Well nourished in no acute distress.    Head: Normocephalic and atraumatic.   Neck: No JVD. No bruits. Supple. Does not appear to be enlarged.   Cardiovascular: + S1,S2 ; RRR Soft systolic murmur at the left lower sternal border. No rubs noted.    Lungs: CTA b/l. No rhonchi, rales or wheezes.   Abdomen: + BS, soft. Non tender. Non distended. No rebound. No guarding.   Extremities: No clubbing/cyanosis/edema.   Skin: Warm and moist. The patient's skin has normal elasticity and good skin turgor.     A/P:  82M PMH CHF, COPD, DM, dementia, schizophrenia, HTN sent in from [facility] for dehydration and SI.     -ecg with non-specific t wave changes  -given no anginal symptoms, no further workup indicated at this time   -pt. with no clinical heart failure or anginal symptoms   -QTc normal - if QTc prolonging medications are to be used in the future, would monitor QTc with serial ECG  -follow up psych and neurology   -no further inpatient cardiac workup expected at this time       
Patient is a 82y old  Male who presents with a chief complaint of SI (08 Jul 2022 12:18)    Date of servie : 07-08-22 @ 14:25  INTERVAL HPI/OVERNIGHT EVENTS:  T(C): 36.4 (07-08-22 @ 10:14), Max: 36.4 (07-08-22 @ 10:14)  HR: 61 (07-08-22 @ 10:14) (56 - 63)  BP: 125/76 (07-08-22 @ 10:14) (125/76 - 157/79)  RR: 18 (07-08-22 @ 10:14) (16 - 18)  SpO2: 98% (07-08-22 @ 10:14) (98% - 100%)  Wt(kg): --  I&O's Summary      LABS:                        14.6   4.76  )-----------( 205      ( 08 Jul 2022 06:00 )             41.7     07-08    137  |  99  |  6<L>  ----------------------------<  134<H>  3.6   |  28  |  0.79    Ca    9.1      08 Jul 2022 06:00  Phos  3.3     07-08  Mg     1.90     07-08          CAPILLARY BLOOD GLUCOSE      POCT Blood Glucose.: 139 mg/dL (08 Jul 2022 11:26)  POCT Blood Glucose.: 131 mg/dL (08 Jul 2022 08:29)  POCT Blood Glucose.: 135 mg/dL (07 Jul 2022 16:51)            MEDICATIONS  (STANDING):  amLODIPine   Tablet 10 milliGRAM(s) Oral daily  budesonide  80 MICROgram(s)/formoterol 4.5 MICROgram(s) Inhaler 2 Puff(s) Inhalation two times a day  dextrose 5% + lactated ringers. 1000 milliLiter(s) (75 mL/Hr) IV Continuous <Continuous>  dextrose 5%. 1000 milliLiter(s) (100 mL/Hr) IV Continuous <Continuous>  dextrose 5%. 1000 milliLiter(s) (50 mL/Hr) IV Continuous <Continuous>  dextrose 50% Injectable 25 Gram(s) IV Push once  dextrose 50% Injectable 12.5 Gram(s) IV Push once  dextrose 50% Injectable 25 Gram(s) IV Push once  enoxaparin Injectable 40 milliGRAM(s) SubCutaneous every 24 hours  glucagon  Injectable 1 milliGRAM(s) IntraMuscular once  insulin lispro (ADMELOG) corrective regimen sliding scale   SubCutaneous three times a day before meals  LORazepam     Tablet 0.5 milliGRAM(s) Oral <User Schedule>  PARoxetine 20 milliGRAM(s) Oral daily    MEDICATIONS  (PRN):  dextrose Oral Gel 15 Gram(s) Oral once PRN Blood Glucose LESS THAN 70 milliGRAM(s)/deciliter          PHYSICAL EXAM:  GENERAL: NAD, well-groomed, well-developed  HEAD:  Atraumatic, Normocephalic  CHEST/LUNG: Clear to percussion bilaterally; No rales, rhonchi, wheezing, or rubs  HEART: Regular rate and rhythm; No murmurs, rubs, or gallops  ABDOMEN: Soft, Nontender, Nondistended; Bowel sounds present  EXTREMITIES:  2+ Peripheral Pulses, No clubbing, cyanosis, or edema  LYMPH: No lymphadenopathy noted  SKIN: No rashes or lesions    Care Discussed with Consultants/Other Providers [ ] YES  [ ] NO
Patient is a 82y old  Male who presents with a chief complaint of SI (16 Jul 2022 10:19)    Date of servie : 07-16-22 @ 17:56  INTERVAL HPI/OVERNIGHT EVENTS:  T(C): 36.7 (07-16-22 @ 17:00), Max: 36.8 (07-15-22 @ 20:20)  HR: 62 (07-16-22 @ 17:00) (56 - 63)  BP: 136/61 (07-16-22 @ 17:00) (118/73 - 179/92)  RR: 18 (07-16-22 @ 17:00) (18 - 18)  SpO2: 94% (07-16-22 @ 17:00) (94% - 98%)  Wt(kg): --  I&O's Summary    15 Jul 2022 07:01  -  16 Jul 2022 07:00  --------------------------------------------------------  IN: 0 mL / OUT: 500 mL / NET: -500 mL        LABS:              CAPILLARY BLOOD GLUCOSE      POCT Blood Glucose.: 116 mg/dL (16 Jul 2022 16:29)  POCT Blood Glucose.: 134 mg/dL (16 Jul 2022 11:14)  POCT Blood Glucose.: 105 mg/dL (16 Jul 2022 07:18)  POCT Blood Glucose.: 98 mg/dL (15 Jul 2022 21:06)            MEDICATIONS  (STANDING):  amLODIPine   Tablet 10 milliGRAM(s) Oral daily  budesonide  80 MICROgram(s)/formoterol 4.5 MICROgram(s) Inhaler 2 Puff(s) Inhalation two times a day  dextrose 5% + lactated ringers. 1000 milliLiter(s) (75 mL/Hr) IV Continuous <Continuous>  dextrose 5%. 1000 milliLiter(s) (50 mL/Hr) IV Continuous <Continuous>  dextrose 5%. 1000 milliLiter(s) (100 mL/Hr) IV Continuous <Continuous>  dextrose 50% Injectable 25 Gram(s) IV Push once  dextrose 50% Injectable 12.5 Gram(s) IV Push once  dextrose 50% Injectable 25 Gram(s) IV Push once  enoxaparin Injectable 40 milliGRAM(s) SubCutaneous every 24 hours  glucagon  Injectable 1 milliGRAM(s) IntraMuscular once  insulin lispro (ADMELOG) corrective regimen sliding scale   SubCutaneous three times a day before meals  LORazepam     Tablet 0.5 milliGRAM(s) Oral <User Schedule>  PARoxetine 30 milliGRAM(s) Oral daily    MEDICATIONS  (PRN):  dextrose Oral Gel 15 Gram(s) Oral once PRN Blood Glucose LESS THAN 70 milliGRAM(s)/deciliter          PHYSICAL EXAM:  GENERAL: NAD, well-groomed, well-developed  HEAD:  Atraumatic, Normocephalic  CHEST/LUNG: Clear to percussion bilaterally; No rales, rhonchi, wheezing, or rubs  HEART: Regular rate and rhythm; No murmurs, rubs, or gallops  ABDOMEN: Soft, Nontender, Nondistended; Bowel sounds present  EXTREMITIES:  2+ Peripheral Pulses, No clubbing, cyanosis, or edema  LYMPH: No lymphadenopathy noted  SKIN: No rashes or lesions    Care Discussed with Consultants/Other Providers [ ] YES  [ ] NO
Date of service: 07/15/22    chief complaint: suicidal ideation    extended hpi:  82M PMH CHF, COPD, DM, dementia, schizophrenia, HTN sent in from [facility] for dehydration and SI. Per notes in chart that were sent from facility, the pt has not been eating and stated he wants to kill himself.    S: no chest pain or sob; ros otherwise negative.       Review of Systems:   Constitutional: [ ] fevers, [ ] chills.   Skin: [ ] dry skin. [ ] rashes.  Psychiatric: [ ] depression, [ ] anxiety.   Gastrointestinal: [ ] BRBPR, [ ] melena.   Neurological: [ ] confusion. [ ] seizures. [ ] shuffling gait.   Ears,Nose,Mouth and Throat: [ ] ear pain [ ] sore throat.   Eyes: [ ] diplopia.   Respiratory: [ ] hemoptysis. [ ] shortness of breath  Cardiovascular: See HPI above  Hematologic/Lymphatic: [ ] anemia. [ ] painful nodes. [ ] prolonged bleeding.   Genitourinary: [ ] hematuria. [ ] flank pain.   Endocrine: [ ] significant change in weight. [ ] intolerance to heat and cold.     Review of systems [x ] otherwise negative, [ ] otherwise unable to obtain    FH: no family history of sudden cardiac death in first degree relatives    SH: [ ] tobacco, [ ] alcohol, [ ] drugs    amLODIPine   Tablet 10 milliGRAM(s) Oral daily  budesonide  80 MICROgram(s)/formoterol 4.5 MICROgram(s) Inhaler 2 Puff(s) Inhalation two times a day  dextrose 5% + lactated ringers. 1000 milliLiter(s) IV Continuous <Continuous>  dextrose 5%. 1000 milliLiter(s) IV Continuous <Continuous>  dextrose 5%. 1000 milliLiter(s) IV Continuous <Continuous>  dextrose 50% Injectable 25 Gram(s) IV Push once  dextrose 50% Injectable 12.5 Gram(s) IV Push once  dextrose 50% Injectable 25 Gram(s) IV Push once  dextrose Oral Gel 15 Gram(s) Oral once PRN  enoxaparin Injectable 40 milliGRAM(s) SubCutaneous every 24 hours  glucagon  Injectable 1 milliGRAM(s) IntraMuscular once  insulin lispro (ADMELOG) corrective regimen sliding scale   SubCutaneous three times a day before meals  LORazepam     Tablet 0.5 milliGRAM(s) Oral <User Schedule>  PARoxetine 30 milliGRAM(s) Oral daily                        T(C): 36.8 (07-15-22 @ 10:55), Max: 37.1 (07-15-22 @ 05:50)  HR: 58 (07-15-22 @ 10:55) (58 - 65)  BP: 110/68 (07-15-22 @ 10:55) (110/68 - 162/86)  RR: 18 (07-15-22 @ 10:55) (18 - 18)  SpO2: 96% (07-15-22 @ 10:55) (95% - 97%)  Wt(kg): --    I&O's Summary    14 Jul 2022 07:01  -  15 Jul 2022 07:00  --------------------------------------------------------  IN: 0 mL / OUT: 700 mL / NET: -700 mL        General: Well nourished in no acute distress.    Head: Normocephalic and atraumatic.   Neck: No JVD. No bruits. Supple. Does not appear to be enlarged.   Cardiovascular: + S1,S2 ; RRR Soft systolic murmur at the left lower sternal border. No rubs noted.    Lungs: CTA b/l. No rhonchi, rales or wheezes.   Abdomen: + BS, soft. Non tender. Non distended. No rebound. No guarding.   Extremities: No clubbing/cyanosis/edema.   Skin: Warm and moist. The patient's skin has normal elasticity and good skin turgor.     A/P:  82M PMH CHF, COPD, DM, dementia, schizophrenia, HTN sent in from [facility] for dehydration and SI.     -ecg with non-specific t wave changes  -given no anginal symptoms, no further workup indicated at this time   -pt. with no clinical heart failure   -QTc normal - if QTc prolonging medications are to be used in the future, would monitor QTc with serial ECG  -follow up psych and neurology   -no further inpatient cardiac workup expected at this time       Deirdre Jalloh MD   
Date of service: 07/18/22    chief complaint: suicidal ideation    extended hpi:  82M PMH CHF, COPD, DM, dementia, schizophrenia, HTN sent in from [facility] for dehydration and SI. Per notes in chart that were sent from facility, the pt has not been eating and stated he wants to kill himself.    S: no chest pain or sob; ros otherwise negative.     Review of Systems:   Constitutional: [ ] fevers, [ ] chills.   Skin: [ ] dry skin. [ ] rashes.  Psychiatric: [ ] depression, [ ] anxiety.   Gastrointestinal: [ ] BRBPR, [ ] melena.   Neurological: [ ] confusion. [ ] seizures. [ ] shuffling gait.   Ears,Nose,Mouth and Throat: [ ] ear pain [ ] sore throat.   Eyes: [ ] diplopia.   Respiratory: [ ] hemoptysis. [ ] shortness of breath  Cardiovascular: See HPI above  Hematologic/Lymphatic: [ ] anemia. [ ] painful nodes. [ ] prolonged bleeding.   Genitourinary: [ ] hematuria. [ ] flank pain.   Endocrine: [ ] significant change in weight. [ ] intolerance to heat and cold.     Review of systems [x ] otherwise negative, [ ] otherwise unable to obtain    FH: no family history of sudden cardiac death in first degree relatives    SH: [ ] tobacco, [ ] alcohol, [ ] drugs    amLODIPine   Tablet 10 milliGRAM(s) Oral daily  budesonide  80 MICROgram(s)/formoterol 4.5 MICROgram(s) Inhaler 2 Puff(s) Inhalation two times a day  dextrose 5% + lactated ringers. 1000 milliLiter(s) IV Continuous <Continuous>  dextrose 5%. 1000 milliLiter(s) IV Continuous <Continuous>  dextrose 5%. 1000 milliLiter(s) IV Continuous <Continuous>  dextrose 50% Injectable 25 Gram(s) IV Push once  dextrose 50% Injectable 12.5 Gram(s) IV Push once  dextrose 50% Injectable 25 Gram(s) IV Push once  dextrose Oral Gel 15 Gram(s) Oral once PRN  enoxaparin Injectable 40 milliGRAM(s) SubCutaneous every 24 hours  glucagon  Injectable 1 milliGRAM(s) IntraMuscular once  insulin lispro (ADMELOG) corrective regimen sliding scale   SubCutaneous three times a day before meals  LORazepam     Tablet 0.5 milliGRAM(s) Oral <User Schedule>  PARoxetine 30 milliGRAM(s) Oral daily                        T(C): 36.8 (07-18-22 @ 11:00), Max: 36.8 (07-18-22 @ 11:00)  HR: 67 (07-18-22 @ 11:00) (50 - 67)  BP: 134/66 (07-18-22 @ 11:00) (129/82 - 155/85)  RR: 18 (07-18-22 @ 11:00) (17 - 19)  SpO2: 97% (07-18-22 @ 11:00) (95% - 97%)  Wt(kg): --    I&O's Summary    18 Jul 2022 07:01  -  18 Jul 2022 15:48  --------------------------------------------------------  IN: 0 mL / OUT: 400 mL / NET: -400 mL        General: Well nourished in no acute distress.    Head: Normocephalic and atraumatic.   Neck: No JVD. No bruits. Supple. Does not appear to be enlarged.   Cardiovascular: + S1,S2 ; RRR Soft systolic murmur at the left lower sternal border. No rubs noted.    Lungs: CTA b/l. No rhonchi, rales or wheezes.   Abdomen: + BS, soft. Non tender. Non distended. No rebound. No guarding.   Extremities: No clubbing/cyanosis/edema.   Skin: Warm and moist. The patient's skin has normal elasticity and good skin turgor.     A/P:  82M PMH CHF, COPD, DM, dementia, schizophrenia, HTN sent in from [facility] for dehydration and SI.     -ecg with non-specific t wave changes  -given no anginal symptoms, no further workup indicated at this time   -pt. with no clinical heart failure or anginal symptoms   -QTc normal - if QTc prolonging medications are to be used in the future, would monitor QTc with serial ECG  -follow up psych and neurology   -no further inpatient cardiac workup expected at this time     Deirdre Jalloh MD   
Date of service: 07/19/22    chief complaint: suicidal ideation    extended hpi:  82M PMH CHF, COPD, DM, dementia, schizophrenia, HTN sent in from [facility] for dehydration and SI. Per notes in chart that were sent from facility, the pt has not been eating and stated he wants to kill himself.    S: no chest pain or sob; ros otherwise negative.     Review of Systems:   Constitutional: [ ] fevers, [ ] chills.   Skin: [ ] dry skin. [ ] rashes.  Psychiatric: [ ] depression, [ ] anxiety.   Gastrointestinal: [ ] BRBPR, [ ] melena.   Neurological: [ ] confusion. [ ] seizures. [ ] shuffling gait.   Ears,Nose,Mouth and Throat: [ ] ear pain [ ] sore throat.   Eyes: [ ] diplopia.   Respiratory: [ ] hemoptysis. [ ] shortness of breath  Cardiovascular: See HPI above  Hematologic/Lymphatic: [ ] anemia. [ ] painful nodes. [ ] prolonged bleeding.   Genitourinary: [ ] hematuria. [ ] flank pain.   Endocrine: [ ] significant change in weight. [ ] intolerance to heat and cold.     Review of systems [x ] otherwise negative, [ ] otherwise unable to obtain    FH: no family history of sudden cardiac death in first degree relatives    SH: [ ] tobacco, [ ] alcohol, [ ] drugs    amLODIPine   Tablet 10 milliGRAM(s) Oral daily  budesonide  80 MICROgram(s)/formoterol 4.5 MICROgram(s) Inhaler 2 Puff(s) Inhalation two times a day  dextrose 5% + lactated ringers. 1000 milliLiter(s) IV Continuous <Continuous>  dextrose 5%. 1000 milliLiter(s) IV Continuous <Continuous>  dextrose 5%. 1000 milliLiter(s) IV Continuous <Continuous>  dextrose 50% Injectable 25 Gram(s) IV Push once  dextrose 50% Injectable 12.5 Gram(s) IV Push once  dextrose 50% Injectable 25 Gram(s) IV Push once  dextrose Oral Gel 15 Gram(s) Oral once PRN  enoxaparin Injectable 40 milliGRAM(s) SubCutaneous every 24 hours  glucagon  Injectable 1 milliGRAM(s) IntraMuscular once  insulin lispro (ADMELOG) corrective regimen sliding scale   SubCutaneous three times a day before meals  LORazepam     Tablet 0.5 milliGRAM(s) Oral <User Schedule>  PARoxetine 30 milliGRAM(s) Oral daily                            14.5   5.55  )-----------( 264      ( 19 Jul 2022 07:56 )             42.7       07-19    135  |  98  |  9   ----------------------------<  122<H>  3.8   |  26  |  0.62    Ca    9.2      19 Jul 2022 07:56  Phos  2.9     07-19  Mg     2.10     07-19      T(C): 36.4 (07-19-22 @ 14:04), Max: 36.7 (07-18-22 @ 21:36)  HR: 60 (07-19-22 @ 14:04) (60 - 70)  BP: 122/68 (07-19-22 @ 14:04) (122/68 - 159/84)  RR: 18 (07-19-22 @ 14:04) (18 - 18)  SpO2: 100% (07-19-22 @ 14:04) (97% - 100%)  Wt(kg): --    General: Well nourished in no acute distress.    Head: Normocephalic and atraumatic.   Neck: No JVD. No bruits. Supple. Does not appear to be enlarged.   Cardiovascular: + S1,S2 ; RRR Soft systolic murmur at the left lower sternal border. No rubs noted.    Lungs: CTA b/l. No rhonchi, rales or wheezes.   Abdomen: + BS, soft. Non tender. Non distended. No rebound. No guarding.   Extremities: No clubbing/cyanosis/edema.   Skin: Warm and moist. The patient's skin has normal elasticity and good skin turgor.     A/P:  82M PMH CHF, COPD, DM, dementia, schizophrenia, HTN sent in from [facility] for dehydration and SI.     -ecg with non-specific t wave changes  -given no anginal symptoms, no further workup indicated at this time   -pt. with no clinical heart failure or anginal symptoms   -QTc normal - if QTc prolonging medications are to be used in the future, would monitor QTc with serial ECG  -follow up psych and neurology   -no further inpatient cardiac workup expected at this time       
Date of service: 07/22/22    chief complaint: suicidal ideation    extended hpi:  82M PMH CHF, COPD, DM, dementia, schizophrenia, HTN sent in from [facility] for dehydration and SI. Per notes in chart that were sent from facility, the pt has not been eating and stated he wants to kill himself.    S: no chest pain or sob; ros otherwise negative.     Review of Systems:   Constitutional: [ ] fevers, [ ] chills.   Skin: [ ] dry skin. [ ] rashes.  Psychiatric: [ ] depression, [ ] anxiety.   Gastrointestinal: [ ] BRBPR, [ ] melena.   Neurological: [ ] confusion. [ ] seizures. [ ] shuffling gait.   Ears,Nose,Mouth and Throat: [ ] ear pain [ ] sore throat.   Eyes: [ ] diplopia.   Respiratory: [ ] hemoptysis. [ ] shortness of breath  Cardiovascular: See HPI above  Hematologic/Lymphatic: [ ] anemia. [ ] painful nodes. [ ] prolonged bleeding.   Genitourinary: [ ] hematuria. [ ] flank pain.   Endocrine: [ ] significant change in weight. [ ] intolerance to heat and cold.     Review of systems [x ] otherwise negative, [ ] otherwise unable to obtain    FH: no family history of sudden cardiac death in first degree relatives    SH: [ ] tobacco, [ ] alcohol, [ ] drugs      amLODIPine   Tablet 10 milliGRAM(s) Oral daily  budesonide  80 MICROgram(s)/formoterol 4.5 MICROgram(s) Inhaler 2 Puff(s) Inhalation two times a day  dextrose 5%. 1000 milliLiter(s) IV Continuous <Continuous>  dextrose 5%. 1000 milliLiter(s) IV Continuous <Continuous>  dextrose 50% Injectable 25 Gram(s) IV Push once  dextrose 50% Injectable 12.5 Gram(s) IV Push once  dextrose 50% Injectable 25 Gram(s) IV Push once  dextrose Oral Gel 15 Gram(s) Oral once PRN  enoxaparin Injectable 40 milliGRAM(s) SubCutaneous every 24 hours  glucagon  Injectable 1 milliGRAM(s) IntraMuscular once  insulin lispro (ADMELOG) corrective regimen sliding scale   SubCutaneous three times a day before meals  LORazepam     Tablet 0.5 milliGRAM(s) Oral <User Schedule>  PARoxetine 30 milliGRAM(s) Oral daily                            13.6   5.27  )-----------( 269      ( 22 Jul 2022 05:30 )             40.3       07-22    132<L>  |  96<L>  |  17  ----------------------------<  83  3.8   |  26  |  0.76    Ca    9.1      22 Jul 2022 05:30  Phos  3.4     07-21  Mg     2.00     07-21              T(C): 36.4 (07-22-22 @ 12:07), Max: 36.8 (07-21-22 @ 23:30)  HR: 64 (07-22-22 @ 12:07) (56 - 64)  BP: 135/83 (07-22-22 @ 12:07) (121/67 - 137/76)  RR: 18 (07-22-22 @ 12:07) (17 - 18)  SpO2: 97% (07-22-22 @ 12:07) (95% - 98%)  Wt(kg): --    I&O's Summary    22 Jul 2022 07:01  -  22 Jul 2022 13:11  --------------------------------------------------------  IN: 0 mL / OUT: 2 mL / NET: -2 mL          General: Well nourished in no acute distress.    Head: Normocephalic and atraumatic.   Neck: No JVD. No bruits. Supple. Does not appear to be enlarged.   Cardiovascular: + S1,S2 ; RRR Soft systolic murmur at the left lower sternal border. No rubs noted.    Lungs: CTA b/l. No rhonchi, rales or wheezes.   Abdomen: + BS, soft. Non tender. Non distended. No rebound. No guarding.   Extremities: No clubbing/cyanosis/edema.   Skin: Warm and moist. The patient's skin has normal elasticity and good skin turgor.     A/P:  82M PMH CHF, COPD, DM, dementia, schizophrenia, HTN sent in from [facility] for dehydration and SI.     -ecg with non-specific t wave changes  -given no anginal symptoms, no further workup indicated at this time   -pt. with no clinical heart failure or anginal symptoms   -QTc normal - if QTc prolonging medications are to be used in the future, would monitor QTc with serial ECG  -follow up psych and neurology   -no further inpatient cardiac workup expected at this time   -primary care per medicine  -dc planning per primary team     Deirdre Jalloh MD     
Patient is a 82y old  Male who presents with a chief complaint of SI (2022 13:13)    Date of servie : 22 @ 14:53  INTERVAL HPI/OVERNIGHT EVENTS:  T(C): 36.6 (22 @ 12:53), Max: 37 (22 @ 05:00)  HR: 64 (22 @ 12:53) (60 - 76)  BP: 135/73 (22 @ 12:53) (135/73 - 154/78)  RR: 18 (22 @ 12:53) (16 - 18)  SpO2: 100% (22 @ 12:53) (97% - 100%)  Wt(kg): --  I&O's Summary    2022 07:01  -  2022 07:00  --------------------------------------------------------  IN: 0 mL / OUT: 675 mL / NET: -675 mL        LABS:                        14.7   6.95  )-----------( 230      ( 2022 02:29 )             44.8     07-06    138  |  100  |  26<H>  ----------------------------<  97  4.8   |  20<L>  |  1.04    Ca    9.4      2022 04:00    TPro  7.5  /  Alb  4.4  /  TBili  0.9  /  DBili  x   /  AST  21  /  ALT  13  /  AlkPhos  42  07-06      Urinalysis Basic - ( 2022 04:57 )    Color: Yellow / Appearance: Clear / S.029 / pH: x  Gluc: x / Ketone: Large  / Bili: Small / Urobili: 3 mg/dL   Blood: x / Protein: 30 mg/dL / Nitrite: Negative   Leuk Esterase: Negative / RBC: 0 /HPF / WBC 0 /HPF   Sq Epi: x / Non Sq Epi: 1 /HPF / Bacteria: Negative      CAPILLARY BLOOD GLUCOSE  119 (2022 17:15)      POCT Blood Glucose.: 144 mg/dL (2022 11:19)  POCT Blood Glucose.: 135 mg/dL (2022 06:05)  POCT Blood Glucose.: 160 mg/dL (2022 22:19)  POCT Blood Glucose.: 146 mg/dL (2022 19:17)  POCT Blood Glucose.: 119 mg/dL (2022 17:11)        Urinalysis Basic - ( 2022 04:57 )    Color: Yellow / Appearance: Clear / S.029 / pH: x  Gluc: x / Ketone: Large  / Bili: Small / Urobili: 3 mg/dL   Blood: x / Protein: 30 mg/dL / Nitrite: Negative   Leuk Esterase: Negative / RBC: 0 /HPF / WBC 0 /HPF   Sq Epi: x / Non Sq Epi: 1 /HPF / Bacteria: Negative        MEDICATIONS  (STANDING):  amLODIPine   Tablet 10 milliGRAM(s) Oral daily  budesonide  80 MICROgram(s)/formoterol 4.5 MICROgram(s) Inhaler 2 Puff(s) Inhalation two times a day  dextrose 5% + lactated ringers. 1000 milliLiter(s) (75 mL/Hr) IV Continuous <Continuous>  dextrose 5%. 1000 milliLiter(s) (100 mL/Hr) IV Continuous <Continuous>  dextrose 5%. 1000 milliLiter(s) (50 mL/Hr) IV Continuous <Continuous>  dextrose 50% Injectable 25 Gram(s) IV Push once  dextrose 50% Injectable 12.5 Gram(s) IV Push once  dextrose 50% Injectable 25 Gram(s) IV Push once  enoxaparin Injectable 40 milliGRAM(s) SubCutaneous every 24 hours  glucagon  Injectable 1 milliGRAM(s) IntraMuscular once  insulin lispro (ADMELOG) corrective regimen sliding scale   SubCutaneous three times a day before meals  LORazepam     Tablet 0.5 milliGRAM(s) Oral once  PARoxetine 20 milliGRAM(s) Oral daily    MEDICATIONS  (PRN):  dextrose Oral Gel 15 Gram(s) Oral once PRN Blood Glucose LESS THAN 70 milliGRAM(s)/deciliter          PHYSICAL EXAM:  GENERAL: NAD, well-groomed, well-developed  HEAD:  Atraumatic, Normocephalic  CHEST/LUNG: Clear to percussion bilaterally; No rales, rhonchi, wheezing, or rubs  HEART: Regular rate and rhythm; No murmurs, rubs, or gallops  ABDOMEN: Soft, Nontender, Nondistended; Bowel sounds present  EXTREMITIES:  2+ Peripheral Pulses, No clubbing, cyanosis, or edema  LYMPH: No lymphadenopathy noted  SKIN: No rashes or lesions    Care Discussed with Consultants/Other Providers [ ] YES  [ ] NO
Neurology Progress Note    S: Patient seen and examined. No new events overnight.  dc planning     Medication:    MEDICATIONS  (STANDING):  amLODIPine   Tablet 10 milliGRAM(s) Oral daily  budesonide  80 MICROgram(s)/formoterol 4.5 MICROgram(s) Inhaler 2 Puff(s) Inhalation two times a day  dextrose 5% + lactated ringers. 1000 milliLiter(s) (75 mL/Hr) IV Continuous <Continuous>  dextrose 5%. 1000 milliLiter(s) (100 mL/Hr) IV Continuous <Continuous>  dextrose 5%. 1000 milliLiter(s) (50 mL/Hr) IV Continuous <Continuous>  dextrose 50% Injectable 25 Gram(s) IV Push once  dextrose 50% Injectable 12.5 Gram(s) IV Push once  dextrose 50% Injectable 25 Gram(s) IV Push once  enoxaparin Injectable 40 milliGRAM(s) SubCutaneous every 24 hours  glucagon  Injectable 1 milliGRAM(s) IntraMuscular once  insulin lispro (ADMELOG) corrective regimen sliding scale   SubCutaneous three times a day before meals  LORazepam     Tablet 0.5 milliGRAM(s) Oral <User Schedule>  LORazepam     Tablet 0.5 milliGRAM(s) Oral <User Schedule>  PARoxetine 30 milliGRAM(s) Oral daily    MEDICATIONS  (PRN):  dextrose Oral Gel 15 Gram(s) Oral once PRN Blood Glucose LESS THAN 70 milliGRAM(s)/deciliter    Vitals:        Vital Signs Last 24 Hrs  T(C): 36.8 (07-15-22 @ 10:55), Max: 37.1 (07-15-22 @ 05:50)  T(F): 98.2 (07-15-22 @ 10:55), Max: 98.7 (07-15-22 @ 05:50)  HR: 58 (07-15-22 @ 10:55) (58 - 65)  BP: 110/68 (07-15-22 @ 10:55) (110/68 - 162/86)  BP(mean): --  RR: 18 (07-15-22 @ 10:55) (18 - 18)  SpO2: 96% (07-15-22 @ 10:55) (95% - 97%)          General Exam:   General Appearance: Appropriately dressed and in no acute distress       Head: Normocephalic, atraumatic and no dysmorphic features  Ear, Nose, and Throat: Moist mucous membranes  CVS: S1S2+  Resp: No SOB, no wheeze or rhonchi  GI: soft NT/ND  Extremities: No edema or cyanosis  Skin: No bruises or rashes     Neurological Exam:  Mental Status: Awake, alert and oriented x 2-3.  Able to follow simple and complex verbal commands. Able to name and repeat. fluent speech. No obvious aphasia or dysarthria noted.   Cranial Nerves: PERRL, EOMI, VFFC, sensation V1-V3 intact,  no obvious facial asymmetry, equal elevation of palate, scm/trap 5/5, tongue is midline on protrusion. no obvious papilledema on fundoscopic exam. hearing is grossly intact.   Motor: generlzied weakness but at least 4+/5   Sensation: Intact to light touch and pinprick throughout. no right/left confusion. no extinction to tactile on DSS.   Reflexes: 1+ throughout at biceps, brachioradialis, triceps, patellars and ankles bilaterally and equal. No clonus. R toe and L toe were both downgoing.  Coordination: No dysmetria on FNF    Gait: deferred     I personally reviewed the below data/images/labs:  no new labs       < from: CT Head No Cont (07.06.22 @ 03:46) >    ACC: [id number] EXAM:  CT BRAIN                          PROCEDURE DATE:  07/06/2022          INTERPRETATION:  CLINICAL INDICATION:  Altered mental status    TECHNIQUE : Axial CT scanning of the brain was obtained from the skull   base to the vertex without the administration of intravenous contrast.   Coronal and sagittal reformatted images were subsequently obtained.    COMPARISON: No prior imaging available for comparison.    FINDINGS:  No acute intracranial hemorrhage or suspicious extra-axial fluid   collection. No focal edema or acute mass effect. No hydrocephalus.   Basilar cisterns remain clear. Senescent changes are compatible with the   patient's age.    The scalp and imaged midfacial soft tissues are unremarkable. Calvarium   is intact. Minimal paranasal sinus mucosal thickening. Mastoid air cells   and middle ear cavities are clear.    IMPRESSION:  Unremarkable, unenhanced CT head for age.    --- End of Report ---          PETRA ABEBE MD; Resident Interventional Radiology  Thisdocument has been electronically signed.  SAMANTHA MITCHELL MD; Attending Radiologist  This document has been electronically signed. Jul 6 2022  4:40AM    < end of copied text >         
Neurology Progress Note    S: Patient seen and examined. No new events overnight.  dc planning     Medication:    MEDICATIONS  (STANDING):  amLODIPine   Tablet 10 milliGRAM(s) Oral daily  budesonide  80 MICROgram(s)/formoterol 4.5 MICROgram(s) Inhaler 2 Puff(s) Inhalation two times a day  dextrose 5% + lactated ringers. 1000 milliLiter(s) (75 mL/Hr) IV Continuous <Continuous>  dextrose 5%. 1000 milliLiter(s) (100 mL/Hr) IV Continuous <Continuous>  dextrose 5%. 1000 milliLiter(s) (50 mL/Hr) IV Continuous <Continuous>  dextrose 50% Injectable 25 Gram(s) IV Push once  dextrose 50% Injectable 12.5 Gram(s) IV Push once  dextrose 50% Injectable 25 Gram(s) IV Push once  enoxaparin Injectable 40 milliGRAM(s) SubCutaneous every 24 hours  glucagon  Injectable 1 milliGRAM(s) IntraMuscular once  insulin lispro (ADMELOG) corrective regimen sliding scale   SubCutaneous three times a day before meals  LORazepam     Tablet 0.5 milliGRAM(s) Oral <User Schedule>  LORazepam     Tablet 0.5 milliGRAM(s) Oral <User Schedule>  PARoxetine 30 milliGRAM(s) Oral daily    MEDICATIONS  (PRN):  dextrose Oral Gel 15 Gram(s) Oral once PRN Blood Glucose LESS THAN 70 milliGRAM(s)/deciliter    Vitals:      Vital Signs Last 24 Hrs  T(C): 36.7 (07-12-22 @ 12:17), Max: 36.9 (07-11-22 @ 18:23)  T(F): 98 (07-12-22 @ 12:17), Max: 98.4 (07-11-22 @ 18:23)  HR: 55 (07-12-22 @ 12:17) (55 - 70)  BP: 126/65 (07-12-22 @ 12:17) (102/72 - 140/76)  BP(mean): --  RR: 18 (07-12-22 @ 12:17) (15 - 18)  SpO2: 100% (07-12-22 @ 12:17) (99% - 100%)            General Exam:   General Appearance: Appropriately dressed and in no acute distress       Head: Normocephalic, atraumatic and no dysmorphic features  Ear, Nose, and Throat: Moist mucous membranes  CVS: S1S2+  Resp: No SOB, no wheeze or rhonchi  GI: soft NT/ND  Extremities: No edema or cyanosis  Skin: No bruises or rashes     Neurological Exam:  Mental Status: Awake, alert and oriented x 2-3.  Able to follow simple and complex verbal commands. Able to name and repeat. fluent speech. No obvious aphasia or dysarthria noted.   Cranial Nerves: PERRL, EOMI, VFFC, sensation V1-V3 intact,  no obvious facial asymmetry, equal elevation of palate, scm/trap 5/5, tongue is midline on protrusion. no obvious papilledema on fundoscopic exam. hearing is grossly intact.   Motor: generlzied weakness but at least 4+/5   Sensation: Intact to light touch and pinprick throughout. no right/left confusion. no extinction to tactile on DSS.   Reflexes: 1+ throughout at biceps, brachioradialis, triceps, patellars and ankles bilaterally and equal. No clonus. R toe and L toe were both downgoing.  Coordination: No dysmetria on FNF    Gait: deferred     I personally reviewed the below data/images/labs:  CBC Full  -  ( 11 Jul 2022 05:56 )  WBC Count : 3.84 K/uL  RBC Count : 4.38 M/uL  Hemoglobin : 13.9 g/dL  Hematocrit : 41.5 %  Platelet Count - Automated : 218 K/uL  Mean Cell Volume : 94.7 fL  Mean Cell Hemoglobin : 31.7 pg  Mean Cell Hemoglobin Concentration : 33.5 gm/dL  Auto Neutrophil # : 2.23 K/uL  Auto Lymphocyte # : 0.88 K/uL  Auto Monocyte # : 0.47 K/uL  Auto Eosinophil # : 0.20 K/uL  Auto Basophil # : 0.05 K/uL  Auto Neutrophil % : 58.1 %  Auto Lymphocyte % : 22.9 %  Auto Monocyte % : 12.2 %  Auto Eosinophil % : 5.2 %  Auto Basophil % : 1.3 %      07-11    139  |  100  |  6<L>  ----------------------------<  109<H>  3.6   |  27  |  0.75    Ca    9.4      11 Jul 2022 05:56  Phos  3.3     07-11  Mg     2.00     07-11        < from: CT Head No Cont (07.06.22 @ 03:46) >    ACC: [id number] EXAM:  CT BRAIN                          PROCEDURE DATE:  07/06/2022          INTERPRETATION:  CLINICAL INDICATION:  Altered mental status    TECHNIQUE : Axial CT scanning of the brain was obtained from the skull   base to the vertex without the administration of intravenous contrast.   Coronal and sagittal reformatted images were subsequently obtained.    COMPARISON: No prior imaging available for comparison.    FINDINGS:  No acute intracranial hemorrhage or suspicious extra-axial fluid   collection. No focal edema or acute mass effect. No hydrocephalus.   Basilar cisterns remain clear. Senescent changes are compatible with the   patient's age.    The scalp and imaged midfacial soft tissues are unremarkable. Calvarium   is intact. Minimal paranasal sinus mucosal thickening. Mastoid air cells   and middle ear cavities are clear.    IMPRESSION:  Unremarkable, unenhanced CT head for age.    --- End of Report ---          PETRA ABEBE MD; Resident Interventional Radiology  Thisdocument has been electronically signed.  SAMANTHA MITCHELL MD; Attending Radiologist  This document has been electronically signed. Jul 6 2022  4:40AM    < end of copied text >         
Neurology Progress Note    S: Patient seen and examined. No new events overnight. 1:1     Medication:    MEDICATIONS  (STANDING):  amLODIPine   Tablet 10 milliGRAM(s) Oral daily  budesonide  80 MICROgram(s)/formoterol 4.5 MICROgram(s) Inhaler 2 Puff(s) Inhalation two times a day  dextrose 5% + lactated ringers. 1000 milliLiter(s) (75 mL/Hr) IV Continuous <Continuous>  dextrose 5%. 1000 milliLiter(s) (100 mL/Hr) IV Continuous <Continuous>  dextrose 5%. 1000 milliLiter(s) (50 mL/Hr) IV Continuous <Continuous>  dextrose 50% Injectable 25 Gram(s) IV Push once  dextrose 50% Injectable 12.5 Gram(s) IV Push once  dextrose 50% Injectable 25 Gram(s) IV Push once  enoxaparin Injectable 40 milliGRAM(s) SubCutaneous every 24 hours  glucagon  Injectable 1 milliGRAM(s) IntraMuscular once  insulin lispro (ADMELOG) corrective regimen sliding scale   SubCutaneous three times a day before meals  LORazepam     Tablet 0.5 milliGRAM(s) Oral <User Schedule>  PARoxetine 20 milliGRAM(s) Oral daily    MEDICATIONS  (PRN):  dextrose Oral Gel 15 Gram(s) Oral once PRN Blood Glucose LESS THAN 70 milliGRAM(s)/deciliter        Vitals:    Vital Signs Last 24 Hrs  T(C): 36.6 (07-10-22 @ 09:31), Max: 36.6 (07-10-22 @ 09:31)  T(F): 97.9 (07-10-22 @ 09:31), Max: 97.9 (07-10-22 @ 09:31)  HR: 63 (07-10-22 @ 09:31) (61 - 99)  BP: 153/69 (07-10-22 @ 09:31) (136/71 - 160/76)  BP(mean): --  RR: 18 (07-10-22 @ 09:31) (18 - 18)  SpO2: 98% (07-10-22 @ 09:31) (64% - 99%)          General Exam:   General Appearance: Appropriately dressed and in no acute distress       Head: Normocephalic, atraumatic and no dysmorphic features  Ear, Nose, and Throat: Moist mucous membranes  CVS: S1S2+  Resp: No SOB, no wheeze or rhonchi  GI: soft NT/ND  Extremities: No edema or cyanosis  Skin: No bruises or rashes     Neurological Exam:  Mental Status: Awake, alert and oriented x 2-3.  Able to follow simple and complex verbal commands. Able to name and repeat. fluent speech. No obvious aphasia or dysarthria noted.   Cranial Nerves: PERRL, EOMI, VFFC, sensation V1-V3 intact,  no obvious facial asymmetry, equal elevation of palate, scm/trap 5/5, tongue is midline on protrusion. no obvious papilledema on fundoscopic exam. hearing is grossly intact.   Motor: generlzied weakness but at least 4+/5   Sensation: Intact to light touch and pinprick throughout. no right/left confusion. no extinction to tactile on DSS.   Reflexes: 1+ throughout at biceps, brachioradialis, triceps, patellars and ankles bilaterally and equal. No clonus. R toe and L toe were both downgoing.  Coordination: No dysmetria on FNF    Gait: deferred     I personally reviewed the below data/images/labs:    CBC Full  -  ( 10 Jul 2022 06:00 )  WBC Count : 3.33 K/uL  RBC Count : 4.24 M/uL  Hemoglobin : 13.4 g/dL  Hematocrit : 41.5 %  Platelet Count - Automated : 210 K/uL  Mean Cell Volume : 97.9 fL  Mean Cell Hemoglobin : 31.6 pg  Mean Cell Hemoglobin Concentration : 32.3 gm/dL  Auto Neutrophil # : 1.92 K/uL  Auto Lymphocyte # : 0.72 K/uL  Auto Monocyte # : 0.45 K/uL  Auto Eosinophil # : 0.20 K/uL  Auto Basophil # : 0.04 K/uL  Auto Neutrophil % : 57.7 %  Auto Lymphocyte % : 21.6 %  Auto Monocyte % : 13.5 %  Auto Eosinophil % : 6.0 %  Auto Basophil % : 1.2 %      07-10    139  |  102  |  9   ----------------------------<  117<H>  3.6   |  26  |  0.76    Ca    9.1      10 Jul 2022 06:00  Phos  3.3     07-10  Mg     1.90     07-10          < from: CT Head No Cont (07.06.22 @ 03:46) >    ACC: [id number] EXAM:  CT BRAIN                          PROCEDURE DATE:  07/06/2022          INTERPRETATION:  CLINICAL INDICATION:  Altered mental status    TECHNIQUE : Axial CT scanning of the brain was obtained from the skull   base to the vertex without the administration of intravenous contrast.   Coronal and sagittal reformatted images were subsequently obtained.    COMPARISON: No prior imaging available for comparison.    FINDINGS:  No acute intracranial hemorrhage or suspicious extra-axial fluid   collection. No focal edema or acute mass effect. No hydrocephalus.   Basilar cisterns remain clear. Senescent changes are compatible with the   patient's age.    The scalp and imaged midfacial soft tissues are unremarkable. Calvarium   is intact. Minimal paranasal sinus mucosal thickening. Mastoid air cells   and middle ear cavities are clear.    IMPRESSION:  Unremarkable, unenhanced CT head for age.    --- End of Report ---          PETRA ABEBE MD; Resident Interventional Radiology  Thisdocument has been electronically signed.  SAMANTHA MITCHELL MD; Attending Radiologist  This document has been electronically signed. Jul 6 2022  4:40AM    < end of copied text >         
Patient is a 82y old  Male who presents with a chief complaint of SI (12 Jul 2022 11:06)    Date of servie : 07-12-22 @ 14:18  INTERVAL HPI/OVERNIGHT EVENTS:  T(C): 36.7 (07-12-22 @ 12:17), Max: 36.9 (07-11-22 @ 18:23)  HR: 55 (07-12-22 @ 12:17) (55 - 70)  BP: 126/65 (07-12-22 @ 12:17) (102/72 - 140/76)  RR: 18 (07-12-22 @ 12:17) (15 - 18)  SpO2: 100% (07-12-22 @ 12:17) (99% - 100%)  Wt(kg): --  I&O's Summary    11 Jul 2022 07:01  -  12 Jul 2022 07:00  --------------------------------------------------------  IN: 0 mL / OUT: 801 mL / NET: -801 mL        LABS:                        13.9   3.84  )-----------( 218      ( 11 Jul 2022 05:56 )             41.5     07-11    139  |  100  |  6<L>  ----------------------------<  109<H>  3.6   |  27  |  0.75    Ca    9.4      11 Jul 2022 05:56  Phos  3.3     07-11  Mg     2.00     07-11          CAPILLARY BLOOD GLUCOSE      POCT Blood Glucose.: 94 mg/dL (12 Jul 2022 11:39)  POCT Blood Glucose.: 105 mg/dL (12 Jul 2022 07:36)  POCT Blood Glucose.: 97 mg/dL (11 Jul 2022 21:19)  POCT Blood Glucose.: 95 mg/dL (11 Jul 2022 16:31)            MEDICATIONS  (STANDING):  amLODIPine   Tablet 10 milliGRAM(s) Oral daily  budesonide  80 MICROgram(s)/formoterol 4.5 MICROgram(s) Inhaler 2 Puff(s) Inhalation two times a day  dextrose 5% + lactated ringers. 1000 milliLiter(s) (75 mL/Hr) IV Continuous <Continuous>  dextrose 5%. 1000 milliLiter(s) (100 mL/Hr) IV Continuous <Continuous>  dextrose 5%. 1000 milliLiter(s) (50 mL/Hr) IV Continuous <Continuous>  dextrose 50% Injectable 25 Gram(s) IV Push once  dextrose 50% Injectable 12.5 Gram(s) IV Push once  dextrose 50% Injectable 25 Gram(s) IV Push once  enoxaparin Injectable 40 milliGRAM(s) SubCutaneous every 24 hours  glucagon  Injectable 1 milliGRAM(s) IntraMuscular once  insulin lispro (ADMELOG) corrective regimen sliding scale   SubCutaneous three times a day before meals  LORazepam     Tablet 0.5 milliGRAM(s) Oral <User Schedule>  LORazepam     Tablet 0.5 milliGRAM(s) Oral <User Schedule>  PARoxetine 30 milliGRAM(s) Oral daily    MEDICATIONS  (PRN):  dextrose Oral Gel 15 Gram(s) Oral once PRN Blood Glucose LESS THAN 70 milliGRAM(s)/deciliter          PHYSICAL EXAM:  GENERAL: NAD, well-groomed, well-developed  HEAD:  Atraumatic, Normocephalic  CHEST/LUNG: Clear to percussion bilaterally; No rales, rhonchi, wheezing, or rubs  HEART: Regular rate and rhythm; No murmurs, rubs, or gallops  ABDOMEN: Soft, Nontender, Nondistended; Bowel sounds present  EXTREMITIES:  2+ Peripheral Pulses, No clubbing, cyanosis, or edema  LYMPH: No lymphadenopathy noted  SKIN: No rashes or lesions    Care Discussed with Consultants/Other Providers [ ] YES  [ ] NO
Patient is a 82y old  Male who presents with a chief complaint of SI (12 Jul 2022 14:18)    Date of servie : 07-13-22 @ 13:29  INTERVAL HPI/OVERNIGHT EVENTS:  T(C): 37.3 (07-13-22 @ 11:54), Max: 37.6 (07-13-22 @ 07:00)  HR: 70 (07-13-22 @ 11:54) (55 - 70)  BP: 130/76 (07-13-22 @ 11:54) (130/76 - 167/73)  RR: 17 (07-13-22 @ 11:54) (17 - 18)  SpO2: 99% (07-13-22 @ 11:54) (96% - 99%)  Wt(kg): --  I&O's Summary    12 Jul 2022 07:01  -  13 Jul 2022 07:00  --------------------------------------------------------  IN: 0 mL / OUT: 2 mL / NET: -2 mL        LABS:              CAPILLARY BLOOD GLUCOSE      POCT Blood Glucose.: 102 mg/dL (13 Jul 2022 11:38)  POCT Blood Glucose.: 102 mg/dL (13 Jul 2022 07:24)  POCT Blood Glucose.: 101 mg/dL (12 Jul 2022 22:24)  POCT Blood Glucose.: 105 mg/dL (12 Jul 2022 16:56)            MEDICATIONS  (STANDING):  amLODIPine   Tablet 10 milliGRAM(s) Oral daily  budesonide  80 MICROgram(s)/formoterol 4.5 MICROgram(s) Inhaler 2 Puff(s) Inhalation two times a day  dextrose 5% + lactated ringers. 1000 milliLiter(s) (75 mL/Hr) IV Continuous <Continuous>  dextrose 5%. 1000 milliLiter(s) (100 mL/Hr) IV Continuous <Continuous>  dextrose 5%. 1000 milliLiter(s) (50 mL/Hr) IV Continuous <Continuous>  dextrose 50% Injectable 25 Gram(s) IV Push once  dextrose 50% Injectable 12.5 Gram(s) IV Push once  dextrose 50% Injectable 25 Gram(s) IV Push once  enoxaparin Injectable 40 milliGRAM(s) SubCutaneous every 24 hours  glucagon  Injectable 1 milliGRAM(s) IntraMuscular once  insulin lispro (ADMELOG) corrective regimen sliding scale   SubCutaneous three times a day before meals  LORazepam     Tablet 0.5 milliGRAM(s) Oral <User Schedule>  LORazepam     Tablet 0.5 milliGRAM(s) Oral <User Schedule>  PARoxetine 30 milliGRAM(s) Oral daily    MEDICATIONS  (PRN):  dextrose Oral Gel 15 Gram(s) Oral once PRN Blood Glucose LESS THAN 70 milliGRAM(s)/deciliter          PHYSICAL EXAM:  GENERAL: NAD, well-groomed, well-developed  HEAD:  Atraumatic, Normocephalic  CHEST/LUNG: Clear to percussion bilaterally; No rales, rhonchi, wheezing, or rubs  HEART: Regular rate and rhythm; No murmurs, rubs, or gallops  ABDOMEN: Soft, Nontender, Nondistended; Bowel sounds present  EXTREMITIES:  2+ Peripheral Pulses, No clubbing, cyanosis, or edema  LYMPH: No lymphadenopathy noted  SKIN: No rashes or lesions    Care Discussed with Consultants/Other Providers [ ] YES  [ ] NO
Patient is a 82y old  Male who presents with a chief complaint of SI (19 Jul 2022 11:44)    Date of servie : 07-19-22 @ 13:35  INTERVAL HPI/OVERNIGHT EVENTS:  T(C): 36.4 (07-19-22 @ 09:21), Max: 36.7 (07-18-22 @ 21:36)  HR: 60 (07-19-22 @ 09:21) (60 - 70)  BP: 124/71 (07-19-22 @ 09:21) (124/71 - 159/84)  RR: 18 (07-19-22 @ 09:21) (18 - 18)  SpO2: 98% (07-19-22 @ 09:21) (97% - 98%)  Wt(kg): --  I&O's Summary    18 Jul 2022 07:01  -  19 Jul 2022 07:00  --------------------------------------------------------  IN: 0 mL / OUT: 750 mL / NET: -750 mL        LABS:                        14.5   5.55  )-----------( 264      ( 19 Jul 2022 07:56 )             42.7     07-19    135  |  98  |  9   ----------------------------<  122<H>  3.8   |  26  |  0.62    Ca    9.2      19 Jul 2022 07:56  Phos  2.9     07-19  Mg     2.10     07-19          CAPILLARY BLOOD GLUCOSE      POCT Blood Glucose.: 110 mg/dL (19 Jul 2022 11:42)  POCT Blood Glucose.: 107 mg/dL (19 Jul 2022 07:40)  POCT Blood Glucose.: 106 mg/dL (18 Jul 2022 22:04)  POCT Blood Glucose.: 113 mg/dL (18 Jul 2022 16:30)            MEDICATIONS  (STANDING):  amLODIPine   Tablet 10 milliGRAM(s) Oral daily  budesonide  80 MICROgram(s)/formoterol 4.5 MICROgram(s) Inhaler 2 Puff(s) Inhalation two times a day  dextrose 5% + lactated ringers. 1000 milliLiter(s) (75 mL/Hr) IV Continuous <Continuous>  dextrose 5%. 1000 milliLiter(s) (100 mL/Hr) IV Continuous <Continuous>  dextrose 5%. 1000 milliLiter(s) (50 mL/Hr) IV Continuous <Continuous>  dextrose 50% Injectable 25 Gram(s) IV Push once  dextrose 50% Injectable 12.5 Gram(s) IV Push once  dextrose 50% Injectable 25 Gram(s) IV Push once  enoxaparin Injectable 40 milliGRAM(s) SubCutaneous every 24 hours  glucagon  Injectable 1 milliGRAM(s) IntraMuscular once  insulin lispro (ADMELOG) corrective regimen sliding scale   SubCutaneous three times a day before meals  LORazepam     Tablet 0.5 milliGRAM(s) Oral <User Schedule>  PARoxetine 30 milliGRAM(s) Oral daily    MEDICATIONS  (PRN):  dextrose Oral Gel 15 Gram(s) Oral once PRN Blood Glucose LESS THAN 70 milliGRAM(s)/deciliter          PHYSICAL EXAM:  GENERAL: NAD, well-groomed, well-developed  HEAD:  Atraumatic, Normocephalic  CHEST/LUNG: Clear to percussion bilaterally; No rales, rhonchi, wheezing, or rubs  HEART: Regular rate and rhythm; No murmurs, rubs, or gallops  ABDOMEN: Soft, Nontender, Nondistended; Bowel sounds present  EXTREMITIES:  2+ Peripheral Pulses, No clubbing, cyanosis, or edema  LYMPH: No lymphadenopathy noted  SKIN: No rashes or lesions    Care Discussed with Consultants/Other Providers [ ] YES  [ ] NO
Patient is a 82y old  Male who presents with a chief complaint of SI (19 Jul 2022 15:02)    Date of servie : 07-20-22 @ 13:49  INTERVAL HPI/OVERNIGHT EVENTS:  T(C): 36.6 (07-20-22 @ 13:19), Max: 36.6 (07-20-22 @ 13:19)  HR: 61 (07-20-22 @ 13:19) (55 - 61)  BP: 151/77 (07-20-22 @ 13:19) (122/68 - 151/77)  RR: 18 (07-20-22 @ 13:19) (16 - 18)  SpO2: 99% (07-20-22 @ 13:19) (96% - 100%)  Wt(kg): --  I&O's Summary    20 Jul 2022 07:01  -  20 Jul 2022 13:49  --------------------------------------------------------  IN: 0 mL / OUT: 800 mL / NET: -800 mL        LABS:                        13.4   5.24  )-----------( 240      ( 20 Jul 2022 07:00 )             38.7     07-20    136  |  98  |  10  ----------------------------<  107<H>  3.7   |  27  |  0.73    Ca    9.0      20 Jul 2022 07:00  Phos  3.1     07-20  Mg     1.90     07-20          CAPILLARY BLOOD GLUCOSE      POCT Blood Glucose.: 115 mg/dL (20 Jul 2022 11:57)  POCT Blood Glucose.: 114 mg/dL (20 Jul 2022 08:30)  POCT Blood Glucose.: 127 mg/dL (19 Jul 2022 21:49)  POCT Blood Glucose.: 109 mg/dL (19 Jul 2022 16:33)            MEDICATIONS  (STANDING):  amLODIPine   Tablet 10 milliGRAM(s) Oral daily  budesonide  80 MICROgram(s)/formoterol 4.5 MICROgram(s) Inhaler 2 Puff(s) Inhalation two times a day  dextrose 5% + lactated ringers. 1000 milliLiter(s) (75 mL/Hr) IV Continuous <Continuous>  dextrose 5%. 1000 milliLiter(s) (100 mL/Hr) IV Continuous <Continuous>  dextrose 5%. 1000 milliLiter(s) (50 mL/Hr) IV Continuous <Continuous>  dextrose 50% Injectable 25 Gram(s) IV Push once  dextrose 50% Injectable 12.5 Gram(s) IV Push once  dextrose 50% Injectable 25 Gram(s) IV Push once  enoxaparin Injectable 40 milliGRAM(s) SubCutaneous every 24 hours  glucagon  Injectable 1 milliGRAM(s) IntraMuscular once  insulin lispro (ADMELOG) corrective regimen sliding scale   SubCutaneous three times a day before meals  LORazepam     Tablet 0.5 milliGRAM(s) Oral <User Schedule>  PARoxetine 30 milliGRAM(s) Oral daily    MEDICATIONS  (PRN):  dextrose Oral Gel 15 Gram(s) Oral once PRN Blood Glucose LESS THAN 70 milliGRAM(s)/deciliter          PHYSICAL EXAM:  GENERAL: NAD, well-groomed, well-developed  HEAD:  Atraumatic, Normocephalic  CHEST/LUNG: Clear to percussion bilaterally; No rales, rhonchi, wheezing, or rubs  HEART: Regular rate and rhythm; No murmurs, rubs, or gallops  ABDOMEN: Soft, Nontender, Nondistended; Bowel sounds present  EXTREMITIES:  2+ Peripheral Pulses, No clubbing, cyanosis, or edema  LYMPH: No lymphadenopathy noted  SKIN: No rashes or lesions    Care Discussed with Consultants/Other Providers [ ] YES  [ ] NO
Patient is a 82y old  Male who presents with a chief complaint of SI (20 Jul 2022 13:53)    Date of servie : 07-21-22 @ 12:26  INTERVAL HPI/OVERNIGHT EVENTS:  T(C): 36.5 (07-21-22 @ 05:13), Max: 36.6 (07-20-22 @ 13:19)  HR: 72 (07-21-22 @ 05:13) (61 - 84)  BP: 160/74 (07-21-22 @ 05:13) (141/74 - 160/74)  RR: 18 (07-21-22 @ 05:13) (17 - 18)  SpO2: 97% (07-21-22 @ 05:13) (94% - 99%)  Wt(kg): --  I&O's Summary    20 Jul 2022 07:01  -  21 Jul 2022 07:00  --------------------------------------------------------  IN: 0 mL / OUT: 1300 mL / NET: -1300 mL        LABS:                        13.4   5.24  )-----------( 240      ( 20 Jul 2022 07:00 )             38.7     07-21    134<L>  |  98  |  10  ----------------------------<  103<H>  3.7   |  27  |  0.73    Ca    9.2      21 Jul 2022 06:35  Phos  3.4     07-21  Mg     2.00     07-21          CAPILLARY BLOOD GLUCOSE      POCT Blood Glucose.: 104 mg/dL (21 Jul 2022 11:44)  POCT Blood Glucose.: 100 mg/dL (21 Jul 2022 07:42)  POCT Blood Glucose.: 98 mg/dL (21 Jul 2022 02:44)  POCT Blood Glucose.: 118 mg/dL (20 Jul 2022 16:48)            MEDICATIONS  (STANDING):  amLODIPine   Tablet 10 milliGRAM(s) Oral daily  budesonide  80 MICROgram(s)/formoterol 4.5 MICROgram(s) Inhaler 2 Puff(s) Inhalation two times a day  dextrose 5%. 1000 milliLiter(s) (100 mL/Hr) IV Continuous <Continuous>  dextrose 5%. 1000 milliLiter(s) (50 mL/Hr) IV Continuous <Continuous>  dextrose 50% Injectable 25 Gram(s) IV Push once  dextrose 50% Injectable 12.5 Gram(s) IV Push once  dextrose 50% Injectable 25 Gram(s) IV Push once  enoxaparin Injectable 40 milliGRAM(s) SubCutaneous every 24 hours  glucagon  Injectable 1 milliGRAM(s) IntraMuscular once  insulin lispro (ADMELOG) corrective regimen sliding scale   SubCutaneous three times a day before meals  LORazepam     Tablet 0.5 milliGRAM(s) Oral <User Schedule>  PARoxetine 30 milliGRAM(s) Oral daily    MEDICATIONS  (PRN):  dextrose Oral Gel 15 Gram(s) Oral once PRN Blood Glucose LESS THAN 70 milliGRAM(s)/deciliter          PHYSICAL EXAM:  GENERAL: NAD, well-groomed, well-developed  HEAD:  Atraumatic, Normocephalic  CHEST/LUNG: Clear to percussion bilaterally; No rales, rhonchi, wheezing, or rubs  HEART: Regular rate and rhythm; No murmurs, rubs, or gallops  ABDOMEN: Soft, Nontender, Nondistended; Bowel sounds present  EXTREMITIES:  2+ Peripheral Pulses, No clubbing, cyanosis, or edema  LYMPH: No lymphadenopathy noted  SKIN: No rashes or lesions    Care Discussed with Consultants/Other Providers [ ] YES  [ ] NO
Patient is a 82y old  Male who presents with a chief complaint of SI (22 Jul 2022 13:10)    Date of servie : 07-22-22 @ 15:29  INTERVAL HPI/OVERNIGHT EVENTS:  T(C): 36.4 (07-22-22 @ 12:07), Max: 36.8 (07-21-22 @ 23:30)  HR: 64 (07-22-22 @ 12:07) (56 - 64)  BP: 135/83 (07-22-22 @ 12:07) (121/67 - 137/76)  RR: 18 (07-22-22 @ 12:07) (17 - 18)  SpO2: 97% (07-22-22 @ 12:07) (95% - 98%)  Wt(kg): --  I&O's Summary    22 Jul 2022 07:01  -  22 Jul 2022 15:29  --------------------------------------------------------  IN: 0 mL / OUT: 2 mL / NET: -2 mL        LABS:                        13.6   5.27  )-----------( 269      ( 22 Jul 2022 05:30 )             40.3     07-22    132<L>  |  96<L>  |  17  ----------------------------<  83  3.8   |  26  |  0.76    Ca    9.1      22 Jul 2022 05:30  Phos  3.4     07-21  Mg     2.00     07-21          CAPILLARY BLOOD GLUCOSE      POCT Blood Glucose.: 109 mg/dL (22 Jul 2022 11:27)  POCT Blood Glucose.: 98 mg/dL (22 Jul 2022 07:46)  POCT Blood Glucose.: 105 mg/dL (21 Jul 2022 21:57)  POCT Blood Glucose.: 145 mg/dL (21 Jul 2022 18:11)            MEDICATIONS  (STANDING):  amLODIPine   Tablet 10 milliGRAM(s) Oral daily  budesonide  80 MICROgram(s)/formoterol 4.5 MICROgram(s) Inhaler 2 Puff(s) Inhalation two times a day  dextrose 5%. 1000 milliLiter(s) (100 mL/Hr) IV Continuous <Continuous>  dextrose 5%. 1000 milliLiter(s) (50 mL/Hr) IV Continuous <Continuous>  dextrose 50% Injectable 25 Gram(s) IV Push once  dextrose 50% Injectable 12.5 Gram(s) IV Push once  dextrose 50% Injectable 25 Gram(s) IV Push once  enoxaparin Injectable 40 milliGRAM(s) SubCutaneous every 24 hours  glucagon  Injectable 1 milliGRAM(s) IntraMuscular once  insulin lispro (ADMELOG) corrective regimen sliding scale   SubCutaneous three times a day before meals  LORazepam     Tablet 0.5 milliGRAM(s) Oral <User Schedule>  PARoxetine 30 milliGRAM(s) Oral daily    MEDICATIONS  (PRN):  dextrose Oral Gel 15 Gram(s) Oral once PRN Blood Glucose LESS THAN 70 milliGRAM(s)/deciliter          PHYSICAL EXAM:  GENERAL: NAD, well-groomed, well-developed  HEAD:  Atraumatic, Normocephalic  CHEST/LUNG: Clear to percussion bilaterally; No rales, rhonchi, wheezing, or rubs  HEART: Regular rate and rhythm; No murmurs, rubs, or gallops  ABDOMEN: Soft, Nontender, Nondistended; Bowel sounds present  EXTREMITIES:  2+ Peripheral Pulses, No clubbing, cyanosis, or edema  LYMPH: No lymphadenopathy noted  SKIN: No rashes or lesions    Care Discussed with Consultants/Other Providers [ ] YES  [ ] NO
no new complaints   Vital Signs Last 24 Hrs  T(C): 36.6 (10 Jul 2022 14:29), Max: 36.6 (10 Jul 2022 09:31)  T(F): 97.8 (10 Jul 2022 14:29), Max: 97.9 (10 Jul 2022 09:31)  HR: 57 (10 Jul 2022 14:29) (57 - 99)  BP: 142/74 (10 Jul 2022 14:29) (136/71 - 160/76)  BP(mean): --  RR: 18 (10 Jul 2022 14:29) (18 - 18)  SpO2: 99% (10 Jul 2022 14:29) (64% - 99%)    Parameters below as of 10 Jul 2022 14:29  Patient On (Oxygen Delivery Method): room air                          13.4   3.33  )-----------( 210      ( 10 Jul 2022 06:00 )             41.5   07-10    139  |  102  |  9   ----------------------------<  117<H>  3.6   |  26  |  0.76    Ca    9.1      10 Jul 2022 06:00  Phos  3.3     07-10  Mg     1.90     07-10    MEDICATIONS  (STANDING):  amLODIPine   Tablet 10 milliGRAM(s) Oral daily  budesonide  80 MICROgram(s)/formoterol 4.5 MICROgram(s) Inhaler 2 Puff(s) Inhalation two times a day  dextrose 5% + lactated ringers. 1000 milliLiter(s) (75 mL/Hr) IV Continuous <Continuous>  dextrose 5%. 1000 milliLiter(s) (50 mL/Hr) IV Continuous <Continuous>  dextrose 5%. 1000 milliLiter(s) (100 mL/Hr) IV Continuous <Continuous>  dextrose 50% Injectable 25 Gram(s) IV Push once  dextrose 50% Injectable 12.5 Gram(s) IV Push once  dextrose 50% Injectable 25 Gram(s) IV Push once  enoxaparin Injectable 40 milliGRAM(s) SubCutaneous every 24 hours  glucagon  Injectable 1 milliGRAM(s) IntraMuscular once  insulin lispro (ADMELOG) corrective regimen sliding scale   SubCutaneous three times a day before meals  LORazepam     Tablet 0.5 milliGRAM(s) Oral <User Schedule>  PARoxetine 20 milliGRAM(s) Oral daily  
Neurology Progress Note    S: Patient seen and examined. No new events overnight.      Medication:    MEDICATIONS  (STANDING):  amLODIPine   Tablet 10 milliGRAM(s) Oral daily  budesonide  80 MICROgram(s)/formoterol 4.5 MICROgram(s) Inhaler 2 Puff(s) Inhalation two times a day  dextrose 5% + lactated ringers. 1000 milliLiter(s) (75 mL/Hr) IV Continuous <Continuous>  dextrose 5%. 1000 milliLiter(s) (100 mL/Hr) IV Continuous <Continuous>  dextrose 5%. 1000 milliLiter(s) (50 mL/Hr) IV Continuous <Continuous>  dextrose 50% Injectable 25 Gram(s) IV Push once  dextrose 50% Injectable 12.5 Gram(s) IV Push once  dextrose 50% Injectable 25 Gram(s) IV Push once  enoxaparin Injectable 40 milliGRAM(s) SubCutaneous every 24 hours  glucagon  Injectable 1 milliGRAM(s) IntraMuscular once  insulin lispro (ADMELOG) corrective regimen sliding scale   SubCutaneous three times a day before meals  LORazepam     Tablet 0.5 milliGRAM(s) Oral <User Schedule>  LORazepam     Tablet 0.5 milliGRAM(s) Oral <User Schedule>    MEDICATIONS  (PRN):  dextrose Oral Gel 15 Gram(s) Oral once PRN Blood Glucose LESS THAN 70 milliGRAM(s)/deciliter    Vitals:    Vital Signs Last 24 Hrs  T(C): 36.4 (11 Jul 2022 11:41), Max: 36.4 (10 Jul 2022 21:09)  T(F): 97.5 (11 Jul 2022 11:41), Max: 97.6 (10 Jul 2022 21:09)  HR: 58 (11 Jul 2022 11:41) (55 - 63)  BP: 131/74 (11 Jul 2022 11:41) (131/74 - 158/79)  BP(mean): --  RR: 16 (11 Jul 2022 11:41) (16 - 16)  SpO2: 98% (11 Jul 2022 11:41) (95% - 99%)    Parameters below as of 11 Jul 2022 11:41  Patient On (Oxygen Delivery Method): room air            General Exam:   General Appearance: Appropriately dressed and in no acute distress       Head: Normocephalic, atraumatic and no dysmorphic features  Ear, Nose, and Throat: Moist mucous membranes  CVS: S1S2+  Resp: No SOB, no wheeze or rhonchi  GI: soft NT/ND  Extremities: No edema or cyanosis  Skin: No bruises or rashes     Neurological Exam:  Mental Status: Awake, alert and oriented x 2-3.  Able to follow simple and complex verbal commands. Able to name and repeat. fluent speech. No obvious aphasia or dysarthria noted.   Cranial Nerves: PERRL, EOMI, VFFC, sensation V1-V3 intact,  no obvious facial asymmetry, equal elevation of palate, scm/trap 5/5, tongue is midline on protrusion. no obvious papilledema on fundoscopic exam. hearing is grossly intact.   Motor: generlzied weakness but at least 4+/5   Sensation: Intact to light touch and pinprick throughout. no right/left confusion. no extinction to tactile on DSS.   Reflexes: 1+ throughout at biceps, brachioradialis, triceps, patellars and ankles bilaterally and equal. No clonus. R toe and L toe were both downgoing.  Coordination: No dysmetria on FNF    Gait: deferred     I personally reviewed the below data/images/labs:  CBC Full  -  ( 11 Jul 2022 05:56 )  WBC Count : 3.84 K/uL  RBC Count : 4.38 M/uL  Hemoglobin : 13.9 g/dL  Hematocrit : 41.5 %  Platelet Count - Automated : 218 K/uL  Mean Cell Volume : 94.7 fL  Mean Cell Hemoglobin : 31.7 pg  Mean Cell Hemoglobin Concentration : 33.5 gm/dL  Auto Neutrophil # : 2.23 K/uL  Auto Lymphocyte # : 0.88 K/uL  Auto Monocyte # : 0.47 K/uL  Auto Eosinophil # : 0.20 K/uL  Auto Basophil # : 0.05 K/uL  Auto Neutrophil % : 58.1 %  Auto Lymphocyte % : 22.9 %  Auto Monocyte % : 12.2 %  Auto Eosinophil % : 5.2 %  Auto Basophil % : 1.3 %    07-11    139  |  100  |  6<L>  ----------------------------<  109<H>  3.6   |  27  |  0.75    Ca    9.4      11 Jul 2022 05:56  Phos  3.3     07-11  Mg     2.00     07-11            < from: CT Head No Cont (07.06.22 @ 03:46) >    ACC: [id number] EXAM:  CT BRAIN                          PROCEDURE DATE:  07/06/2022          INTERPRETATION:  CLINICAL INDICATION:  Altered mental status    TECHNIQUE : Axial CT scanning of the brain was obtained from the skull   base to the vertex without the administration of intravenous contrast.   Coronal and sagittal reformatted images were subsequently obtained.    COMPARISON: No prior imaging available for comparison.    FINDINGS:  No acute intracranial hemorrhage or suspicious extra-axial fluid   collection. No focal edema or acute mass effect. No hydrocephalus.   Basilar cisterns remain clear. Senescent changes are compatible with the   patient's age.    The scalp and imaged midfacial soft tissues are unremarkable. Calvarium   is intact. Minimal paranasal sinus mucosal thickening. Mastoid air cells   and middle ear cavities are clear.    IMPRESSION:  Unremarkable, unenhanced CT head for age.    --- End of Report ---          PETRA ABEBE MD; Resident Interventional Radiology  Thisdocument has been electronically signed.  SAMANTHA MITCHELL MD; Attending Radiologist  This document has been electronically signed. Jul 6 2022  4:40AM    < end of copied text >         
Neurology Progress Note    S: Patient seen and examined. No new events overnight.  dc planning     Medication:    MEDICATIONS  (STANDING):  amLODIPine   Tablet 10 milliGRAM(s) Oral daily  budesonide  80 MICROgram(s)/formoterol 4.5 MICROgram(s) Inhaler 2 Puff(s) Inhalation two times a day  dextrose 5% + lactated ringers. 1000 milliLiter(s) (75 mL/Hr) IV Continuous <Continuous>  dextrose 5%. 1000 milliLiter(s) (100 mL/Hr) IV Continuous <Continuous>  dextrose 5%. 1000 milliLiter(s) (50 mL/Hr) IV Continuous <Continuous>  dextrose 50% Injectable 25 Gram(s) IV Push once  dextrose 50% Injectable 12.5 Gram(s) IV Push once  dextrose 50% Injectable 25 Gram(s) IV Push once  enoxaparin Injectable 40 milliGRAM(s) SubCutaneous every 24 hours  glucagon  Injectable 1 milliGRAM(s) IntraMuscular once  insulin lispro (ADMELOG) corrective regimen sliding scale   SubCutaneous three times a day before meals  LORazepam     Tablet 0.5 milliGRAM(s) Oral <User Schedule>  LORazepam     Tablet 0.5 milliGRAM(s) Oral <User Schedule>  PARoxetine 30 milliGRAM(s) Oral daily    MEDICATIONS  (PRN):  dextrose Oral Gel 15 Gram(s) Oral once PRN Blood Glucose LESS THAN 70 milliGRAM(s)/deciliter    Vitals:      Vital Signs Last 24 Hrs  T(C): 36.3 (07-14-22 @ 10:13), Max: 36.6 (07-13-22 @ 18:03)  T(F): 97.4 (07-14-22 @ 10:13), Max: 97.9 (07-13-22 @ 18:03)  HR: 60 (07-14-22 @ 10:13) (54 - 66)  BP: 138/74 (07-14-22 @ 10:13) (138/74 - 174/77)  BP(mean): --  RR: 18 (07-14-22 @ 10:13) (17 - 18)  SpO2: 95% (07-14-22 @ 10:13) (95% - 99%)          General Exam:   General Appearance: Appropriately dressed and in no acute distress       Head: Normocephalic, atraumatic and no dysmorphic features  Ear, Nose, and Throat: Moist mucous membranes  CVS: S1S2+  Resp: No SOB, no wheeze or rhonchi  GI: soft NT/ND  Extremities: No edema or cyanosis  Skin: No bruises or rashes     Neurological Exam:  Mental Status: Awake, alert and oriented x 2-3.  Able to follow simple and complex verbal commands. Able to name and repeat. fluent speech. No obvious aphasia or dysarthria noted.   Cranial Nerves: PERRL, EOMI, VFFC, sensation V1-V3 intact,  no obvious facial asymmetry, equal elevation of palate, scm/trap 5/5, tongue is midline on protrusion. no obvious papilledema on fundoscopic exam. hearing is grossly intact.   Motor: generlzied weakness but at least 4+/5   Sensation: Intact to light touch and pinprick throughout. no right/left confusion. no extinction to tactile on DSS.   Reflexes: 1+ throughout at biceps, brachioradialis, triceps, patellars and ankles bilaterally and equal. No clonus. R toe and L toe were both downgoing.  Coordination: No dysmetria on FNF    Gait: deferred     I personally reviewed the below data/images/labs:  no new labs       < from: CT Head No Cont (07.06.22 @ 03:46) >    ACC: [id number] EXAM:  CT BRAIN                          PROCEDURE DATE:  07/06/2022          INTERPRETATION:  CLINICAL INDICATION:  Altered mental status    TECHNIQUE : Axial CT scanning of the brain was obtained from the skull   base to the vertex without the administration of intravenous contrast.   Coronal and sagittal reformatted images were subsequently obtained.    COMPARISON: No prior imaging available for comparison.    FINDINGS:  No acute intracranial hemorrhage or suspicious extra-axial fluid   collection. No focal edema or acute mass effect. No hydrocephalus.   Basilar cisterns remain clear. Senescent changes are compatible with the   patient's age.    The scalp and imaged midfacial soft tissues are unremarkable. Calvarium   is intact. Minimal paranasal sinus mucosal thickening. Mastoid air cells   and middle ear cavities are clear.    IMPRESSION:  Unremarkable, unenhanced CT head for age.    --- End of Report ---          PETRA ABEBE MD; Resident Interventional Radiology  Thisdocument has been electronically signed.  SAMANTHA MITCHELL MD; Attending Radiologist  This document has been electronically signed. Jul 6 2022  4:40AM    < end of copied text >         
Neurology Progress Note    S: Patient seen and examined. No new events overnight. 1:1     Medication:  amLODIPine   Tablet 10 milliGRAM(s) Oral daily  budesonide  80 MICROgram(s)/formoterol 4.5 MICROgram(s) Inhaler 2 Puff(s) Inhalation two times a day  dextrose 5% + lactated ringers. 1000 milliLiter(s) IV Continuous <Continuous>  dextrose 5%. 1000 milliLiter(s) IV Continuous <Continuous>  dextrose 5%. 1000 milliLiter(s) IV Continuous <Continuous>  dextrose 50% Injectable 25 Gram(s) IV Push once  dextrose 50% Injectable 12.5 Gram(s) IV Push once  dextrose 50% Injectable 25 Gram(s) IV Push once  dextrose Oral Gel 15 Gram(s) Oral once PRN  enoxaparin Injectable 40 milliGRAM(s) SubCutaneous every 24 hours  glucagon  Injectable 1 milliGRAM(s) IntraMuscular once  insulin lispro (ADMELOG) corrective regimen sliding scale   SubCutaneous three times a day before meals  LORazepam     Tablet 0.5 milliGRAM(s) Oral <User Schedule>  PARoxetine 20 milliGRAM(s) Oral daily      Vitals:  Vital Signs Last 24 Hrs  T(C): 36.4 (08 Jul 2022 10:14), Max: 36.4 (08 Jul 2022 10:14)  T(F): 97.6 (08 Jul 2022 10:14), Max: 97.6 (08 Jul 2022 10:14)  HR: 61 (08 Jul 2022 10:14) (56 - 63)  BP: 125/76 (08 Jul 2022 10:14) (125/76 - 157/79)  BP(mean): --  RR: 18 (08 Jul 2022 10:14) (16 - 18)  SpO2: 98% (08 Jul 2022 10:14) (98% - 100%)    Parameters below as of 08 Jul 2022 10:14  Patient On (Oxygen Delivery Method): room air        General Exam:   General Appearance: Appropriately dressed and in no acute distress       Head: Normocephalic, atraumatic and no dysmorphic features  Ear, Nose, and Throat: Moist mucous membranes  CVS: S1S2+  Resp: No SOB, no wheeze or rhonchi  GI: soft NT/ND  Extremities: No edema or cyanosis  Skin: No bruises or rashes     Neurological Exam:  Mental Status: Awake, alert and oriented x 2-3.  Able to follow simple and complex verbal commands. Able to name and repeat. fluent speech. No obvious aphasia or dysarthria noted.   Cranial Nerves: PERRL, EOMI, VFFC, sensation V1-V3 intact,  no obvious facial asymmetry, equal elevation of palate, scm/trap 5/5, tongue is midline on protrusion. no obvious papilledema on fundoscopic exam. hearing is grossly intact.   Motor: generlzied weakness but at least 4+/5   Sensation: Intact to light touch and pinprick throughout. no right/left confusion. no extinction to tactile on DSS.   Reflexes: 1+ throughout at biceps, brachioradialis, triceps, patellars and ankles bilaterally and equal. No clonus. R toe and L toe were both downgoing.  Coordination: No dysmetria on FNF    Gait: deferred     I personally reviewed the below data/images/labs:      CBC Full  -  ( 08 Jul 2022 06:00 )  WBC Count : 4.76 K/uL  RBC Count : 4.41 M/uL  Hemoglobin : 14.6 g/dL  Hematocrit : 41.7 %  Platelet Count - Automated : 205 K/uL  Mean Cell Volume : 94.6 fL  Mean Cell Hemoglobin : 33.1 pg  Mean Cell Hemoglobin Concentration : 35.0 gm/dL  Auto Neutrophil # : 3.11 K/uL  Auto Lymphocyte # : 0.78 K/uL  Auto Monocyte # : 0.65 K/uL  Auto Eosinophil # : 0.17 K/uL  Auto Basophil # : 0.05 K/uL  Auto Neutrophil % : 65.2 %  Auto Lymphocyte % : 16.4 %  Auto Monocyte % : 13.7 %  Auto Eosinophil % : 3.6 %  Auto Basophil % : 1.1 %    07-08    137  |  99  |  6<L>  ----------------------------<  134<H>  3.6   |  28  |  0.79    Ca    9.1      08 Jul 2022 06:00  Phos  3.3     07-08  Mg     1.90     07-08        < from: CT Head No Cont (07.06.22 @ 03:46) >    ACC: [id number] EXAM:  CT BRAIN                          PROCEDURE DATE:  07/06/2022          INTERPRETATION:  CLINICAL INDICATION:  Altered mental status    TECHNIQUE : Axial CT scanning of the brain was obtained from the skull   base to the vertex without the administration of intravenous contrast.   Coronal and sagittal reformatted images were subsequently obtained.    COMPARISON: No prior imaging available for comparison.    FINDINGS:  No acute intracranial hemorrhage or suspicious extra-axial fluid   collection. No focal edema or acute mass effect. No hydrocephalus.   Basilar cisterns remain clear. Senescent changes are compatible with the   patient's age.    The scalp and imaged midfacial soft tissues are unremarkable. Calvarium   is intact. Minimal paranasal sinus mucosal thickening. Mastoid air cells   and middle ear cavities are clear.    IMPRESSION:  Unremarkable, unenhanced CT head for age.    --- End of Report ---          PETRA ABEBE MD; Resident Interventional Radiology  Thisdocument has been electronically signed.  SAMANTHA MITCHELL MD; Attending Radiologist  This document has been electronically signed. Jul 6 2022  4:40AM    < end of copied text >         
Neurology Progress Note    S: Patient seen and examined. No new events overnight. resting in bed     Medication:    MEDICATIONS  (STANDING):  amLODIPine   Tablet 10 milliGRAM(s) Oral daily  budesonide  80 MICROgram(s)/formoterol 4.5 MICROgram(s) Inhaler 2 Puff(s) Inhalation two times a day  dextrose 5% + lactated ringers. 1000 milliLiter(s) (75 mL/Hr) IV Continuous <Continuous>  dextrose 5%. 1000 milliLiter(s) (100 mL/Hr) IV Continuous <Continuous>  dextrose 5%. 1000 milliLiter(s) (50 mL/Hr) IV Continuous <Continuous>  dextrose 50% Injectable 25 Gram(s) IV Push once  dextrose 50% Injectable 12.5 Gram(s) IV Push once  dextrose 50% Injectable 25 Gram(s) IV Push once  enoxaparin Injectable 40 milliGRAM(s) SubCutaneous every 24 hours  glucagon  Injectable 1 milliGRAM(s) IntraMuscular once  insulin lispro (ADMELOG) corrective regimen sliding scale   SubCutaneous three times a day before meals  LORazepam     Tablet 0.5 milliGRAM(s) Oral <User Schedule>  PARoxetine 30 milliGRAM(s) Oral daily    MEDICATIONS  (PRN):  dextrose Oral Gel 15 Gram(s) Oral once PRN Blood Glucose LESS THAN 70 milliGRAM(s)/deciliter        Vitals:        Vital Signs Last 24 Hrs  T(C): 36.4 (07-19-22 @ 09:21), Max: 36.7 (07-18-22 @ 21:36)  T(F): 97.6 (07-19-22 @ 09:21), Max: 98 (07-18-22 @ 21:36)  HR: 60 (07-19-22 @ 09:21) (60 - 70)  BP: 124/71 (07-19-22 @ 09:21) (124/71 - 159/84)  BP(mean): --  RR: 18 (07-19-22 @ 09:21) (18 - 18)  SpO2: 98% (07-19-22 @ 09:21) (97% - 98%)                General Exam:   General Appearance: Appropriately dressed and in no acute distress       Head: Normocephalic, atraumatic and no dysmorphic features  Ear, Nose, and Throat: Moist mucous membranes  CVS: S1S2+  Resp: No SOB, no wheeze or rhonchi  GI: soft NT/ND  Extremities: No edema or cyanosis  Skin: No bruises or rashes     Neurological Exam:  Mental Status: Awake, alert and oriented x 2-3.  Able to follow simple and complex verbal commands. Able to name and repeat. fluent speech. No obvious aphasia or dysarthria noted.   Cranial Nerves: PERRL, EOMI, VFFC, sensation V1-V3 intact,  no obvious facial asymmetry, equal elevation of palate, scm/trap 5/5, tongue is midline on protrusion. no obvious papilledema on fundoscopic exam. hearing is grossly intact.   Motor: generlzied weakness but at least 4+/5   Sensation: Intact to light touch and pinprick throughout. no right/left confusion. no extinction to tactile on DSS.   Reflexes: 1+ throughout at biceps, brachioradialis, triceps, patellars and ankles bilaterally and equal. No clonus. R toe and L toe were both downgoing.  Coordination: No dysmetria on FNF    Gait: deferred     I personally reviewed the below data/images/labs:     CBC Full  -  ( 19 Jul 2022 07:56 )  WBC Count : 5.55 K/uL  RBC Count : 4.50 M/uL  Hemoglobin : 14.5 g/dL  Hematocrit : 42.7 %  Platelet Count - Automated : 264 K/uL  Mean Cell Volume : 94.9 fL  Mean Cell Hemoglobin : 32.2 pg  Mean Cell Hemoglobin Concentration : 34.0 gm/dL  Auto Neutrophil # : x  Auto Lymphocyte # : x  Auto Monocyte # : x  Auto Eosinophil # : x  Auto Basophil # : x  Auto Neutrophil % : x  Auto Lymphocyte % : x  Auto Monocyte % : x  Auto Eosinophil % : x  Auto Basophil % : x  07-19    135  |  98  |  9   ----------------------------<  122<H>  3.8   |  26  |  0.62    Ca    9.2      19 Jul 2022 07:56  Phos  2.9     07-19  Mg     2.10     07-19        < from: CT Head No Cont (07.06.22 @ 03:46) >    ACC: [id number] EXAM:  CT BRAIN                          PROCEDURE DATE:  07/06/2022          INTERPRETATION:  CLINICAL INDICATION:  Altered mental status    TECHNIQUE : Axial CT scanning of the brain was obtained from the skull   base to the vertex without the administration of intravenous contrast.   Coronal and sagittal reformatted images were subsequently obtained.    COMPARISON: No prior imaging available for comparison.    FINDINGS:  No acute intracranial hemorrhage or suspicious extra-axial fluid   collection. No focal edema or acute mass effect. No hydrocephalus.   Basilar cisterns remain clear. Senescent changes are compatible with the   patient's age.    The scalp and imaged midfacial soft tissues are unremarkable. Calvarium   is intact. Minimal paranasal sinus mucosal thickening. Mastoid air cells   and middle ear cavities are clear.    IMPRESSION:  Unremarkable, unenhanced CT head for age.    --- End of Report ---          PETRA ABEBE MD; Resident Interventional Radiology  Thisdocument has been electronically signed.  SAMANTHA MITCHELL MD; Attending Radiologist  This document has been electronically signed. Jul 6 2022  4:40AM    < end of copied text >         
Patient is a 82y old  Male who presents with a chief complaint of SI (14 Jul 2022 12:36)    Date of servie : 07-14-22 @ 15:32  INTERVAL HPI/OVERNIGHT EVENTS:  T(C): 36.3 (07-14-22 @ 10:13), Max: 36.6 (07-13-22 @ 18:03)  HR: 60 (07-14-22 @ 10:13) (54 - 66)  BP: 138/74 (07-14-22 @ 10:13) (138/74 - 174/77)  RR: 18 (07-14-22 @ 10:13) (17 - 18)  SpO2: 95% (07-14-22 @ 10:13) (95% - 99%)  Wt(kg): --  I&O's Summary    14 Jul 2022 07:01  -  14 Jul 2022 15:32  --------------------------------------------------------  IN: 0 mL / OUT: 700 mL / NET: -700 mL        LABS:              CAPILLARY BLOOD GLUCOSE      POCT Blood Glucose.: 107 mg/dL (14 Jul 2022 11:11)  POCT Blood Glucose.: 109 mg/dL (14 Jul 2022 07:41)  POCT Blood Glucose.: 114 mg/dL (13 Jul 2022 16:49)            MEDICATIONS  (STANDING):  amLODIPine   Tablet 10 milliGRAM(s) Oral daily  budesonide  80 MICROgram(s)/formoterol 4.5 MICROgram(s) Inhaler 2 Puff(s) Inhalation two times a day  dextrose 5% + lactated ringers. 1000 milliLiter(s) (75 mL/Hr) IV Continuous <Continuous>  dextrose 5%. 1000 milliLiter(s) (100 mL/Hr) IV Continuous <Continuous>  dextrose 5%. 1000 milliLiter(s) (50 mL/Hr) IV Continuous <Continuous>  dextrose 50% Injectable 25 Gram(s) IV Push once  dextrose 50% Injectable 12.5 Gram(s) IV Push once  dextrose 50% Injectable 25 Gram(s) IV Push once  enoxaparin Injectable 40 milliGRAM(s) SubCutaneous every 24 hours  glucagon  Injectable 1 milliGRAM(s) IntraMuscular once  insulin lispro (ADMELOG) corrective regimen sliding scale   SubCutaneous three times a day before meals  LORazepam     Tablet 0.5 milliGRAM(s) Oral <User Schedule>  LORazepam     Tablet 0.5 milliGRAM(s) Oral <User Schedule>  PARoxetine 30 milliGRAM(s) Oral daily    MEDICATIONS  (PRN):  dextrose Oral Gel 15 Gram(s) Oral once PRN Blood Glucose LESS THAN 70 milliGRAM(s)/deciliter          PHYSICAL EXAM:  GENERAL: NAD, well-groomed, well-developed  HEAD:  Atraumatic, Normocephalic  CHEST/LUNG: Clear to percussion bilaterally; No rales, rhonchi, wheezing, or rubs  HEART: Regular rate and rhythm; No murmurs, rubs, or gallops  ABDOMEN: Soft, Nontender, Nondistended; Bowel sounds present  EXTREMITIES:  2+ Peripheral Pulses, No clubbing, cyanosis, or edema  LYMPH: No lymphadenopathy noted  SKIN: No rashes or lesions    Care Discussed with Consultants/Other Providers [ ] YES  [ ] NO
pt remainsstill on 1:1  Vital Signs Last 24 Hrs  T(C): 36.7 (09 Jul 2022 09:48), Max: 36.8 (09 Jul 2022 05:32)  T(F): 98 (09 Jul 2022 09:48), Max: 98.2 (09 Jul 2022 05:32)  HR: 71 (09 Jul 2022 09:48) (64 - 71)  BP: 125/76 (09 Jul 2022 09:48) (125/76 - 139/70)  BP(mean): --  RR: 18 (09 Jul 2022 09:48) (17 - 18)  SpO2: 97% (09 Jul 2022 09:48) (96% - 97%)    Parameters below as of 09 Jul 2022 09:48  Patient On (Oxygen Delivery Method): room air                          13.8   4.30  )-----------( 205      ( 09 Jul 2022 05:33 )             40.0   07-09    138  |  102  |  7   ----------------------------<  117<H>  3.5   |  27  |  0.86    Ca    8.9      09 Jul 2022 05:33  Phos  3.4     07-09  Mg     1.90     07-09    MEDICATIONS  (STANDING):  amLODIPine   Tablet 10 milliGRAM(s) Oral daily  budesonide  80 MICROgram(s)/formoterol 4.5 MICROgram(s) Inhaler 2 Puff(s) Inhalation two times a day  dextrose 5% + lactated ringers. 1000 milliLiter(s) (75 mL/Hr) IV Continuous <Continuous>  dextrose 5%. 1000 milliLiter(s) (100 mL/Hr) IV Continuous <Continuous>  dextrose 5%. 1000 milliLiter(s) (50 mL/Hr) IV Continuous <Continuous>  dextrose 50% Injectable 25 Gram(s) IV Push once  dextrose 50% Injectable 12.5 Gram(s) IV Push once  dextrose 50% Injectable 25 Gram(s) IV Push once  enoxaparin Injectable 40 milliGRAM(s) SubCutaneous every 24 hours  glucagon  Injectable 1 milliGRAM(s) IntraMuscular once  insulin lispro (ADMELOG) corrective regimen sliding scale   SubCutaneous three times a day before meals  LORazepam     Tablet 0.5 milliGRAM(s) Oral <User Schedule>  PARoxetine 20 milliGRAM(s) Oral daily

## 2022-07-22 NOTE — BH CONSULTATION LIAISON PROGRESS NOTE - NSICDXBHPRIMARYDX_PSY_ALL_CORE
Schizophrenia   F20.9  

## 2022-07-22 NOTE — BH CONSULTATION LIAISON PROGRESS NOTE - NSBHFUPINTERVALCCFT_PSY_A_CORE
" I am okay today" 
"I'm okay"
" I told the nurse I want to die"
"I'm good." 
" I am depressed, but I ate my breakfast"
"im sad" 
" I am here because I want to kill myself"
" I am good" 
" Sometimes I feel depressed"
" I don't know"
"I tried to jump off a bridge"
"I'm so-so"
"I'm good."
SI
"I'm ok."

## 2022-07-22 NOTE — PROGRESS NOTE ADULT - NUTRITIONAL ASSESSMENT
This patient has been assessed with a concern for Malnutrition and has been determined to have a diagnosis/diagnoses of Severe protein-calorie malnutrition.    This patient is being managed with:   Diet Consistent Carbohydrate Renal/No Snacks-  Soft and Bite Sized (SOFTBTSZ)  Mildly Thick Liquids (MILDTHICKLIQS)  Entered: Jul 12 2022  3:36PM    
This patient has been assessed with a concern for Malnutrition and has been determined to have a diagnosis/diagnoses of Severe protein-calorie malnutrition.    This patient is being managed with:   Diet Consistent Carbohydrate Renal/No Snacks-  Soft and Bite Sized (SOFTBTSZ)  Mildly Thick Liquids (MILDTHICKLIQS)  Supplement Feeding Modality:  Oral  Glucerna Shake Cans or Servings Per Day:  1       Frequency:  Three Times a day  Entered: Jul 21 2022  5:38PM

## 2022-07-22 NOTE — BH CONSULTATION LIAISON PROGRESS NOTE - NSBHMSETHTCONTENT_PSY_A_CORE
Unremarkable
Suicidality
Hopelessness
Hopelessness/Suicidality
Unremarkable
Hopelessness
Unremarkable
Hopelessness
Ruminations/Hopelessness/Suicidality
Unremarkable
Unremarkable
Suicidality
Unremarkable

## 2022-07-22 NOTE — BH CONSULTATION LIAISON PROGRESS NOTE - NSBHCHARTREVIEWINVESTIGATE_PSY_A_CORE FT
13.4   5.24  )-----------( 240      ( 20 Jul 2022 07:00 )             38.7   07-20    136  |  98  |  10  ----------------------------<  107<H>  3.7   |  27  |  0.73    Ca    9.0      20 Jul 2022 07:00  Phos  3.1     07-20  Mg     1.90     07-20      
                      13.4   5.24  )-----------( 240      ( 20 Jul 2022 07:00 )             38.7   07-20    136  |  98  |  10  ----------------------------<  107<H>  3.7   |  27  |  0.73    Ca    9.0      20 Jul 2022 07:00  Phos  3.1     07-20  Mg     1.90     07-20      
                      14.5   5.55  )-----------( 264      ( 19 Jul 2022 07:56 )             42.7   07-19    135  |  98  |  9   ----------------------------<  122<H>  3.8   |  26  |  0.62    Ca    9.2      19 Jul 2022 07:56  Phos  2.9     07-19  Mg     2.10     07-19    
No
                      13.6   5.27  )-----------( 269      ( 22 Jul 2022 05:30 )             40.3   07-22    132<L>  |  96<L>  |  17  ----------------------------<  83  3.8   |  26  |  0.76    Ca    9.1      22 Jul 2022 05:30  Phos  3.4     07-21  Mg     2.00     07-21

## 2022-07-22 NOTE — BH INPATIENT PSYCHIATRY ASSESSMENT NOTE - NSBHASSESSSUMMFT_PSY_ALL_CORE
82M PMH CHF, COPD, DM, dementia, schizophrenia, HTN sent in from [facility] for dehydration and SI. Patient is a poor historian. Patient does report a PPHx of schizophrenia. Treated at [facility] for dehydration and catatonia, improved on Ativan and Invega Sustenna GARCIA.  Now admitted at [facility].  Denies SI on assessment.        Admit 9.27 Status  Routine Checks, no CO 1:1 needed; questionable need for hospital bed, no hospital bed for tonight  - Continue Paxil 30mg daily to target depression  - Continue Ativan 0.5mg PO qHS (HOLD for sedation or RR < 10)  - Patient on Invega Sustenna IM 156mg given on the 3rd of the month last on 7/3 - NEXT DUE 8/3/22 **  - PRN for agitation: Ativan 0.5mg q6hrs as patient with recent hx r/o catatonia   - Continue CO 1:1 due to impulsivity and recent SI with plan - has been denying SI, L will follow  - Encourage PO intake

## 2022-07-22 NOTE — BH CONSULTATION LIAISON PROGRESS NOTE - NSBHASSESSMENTFT_PSY_ALL_CORE
Patient is an 82M PMH CHF, COPD, DM, dementia, schizophrenia, HTN sent in from [facility] for dehydration and SI. Patient is a poor historian. Patient does report a PPHx of schizophrenia. Spoke with psychiatrist from [facility] Dr. Moeller [phone] who reports patient has a hx of schizophrenia - has requested to be on invega sustenna as he has done well on this medication in the past. Was given this medication about a year ago and has been doing well. Patient does have a "close catatonic episode" about a year ago, and this past monday, he was called by staff saying the patient was note eating, not speaking, not moving. Unsure if the ativan 0.5mg qhs is new as MD is not at a computer at this time. Psychiatry was called for SI as patient reported not eating or drinking in attempt to kill self.    7/8: AAOX3, cooperative, continues to have SI with plans, unable to report any specific stressors, +psychomotor retardation, likely thought blocked, however  no significant catatonic sxs noted on exam.   7/9: A&Ox3, cooperative, ongoing SI with plans. Not suspicious for catatonia.  7/10: Per staff, patient has been alert, cooperative, talkative.   7/11: oriented, remains depressed/withdrawn, less engaging today, possibly thought blocked, no significant catatonic sxs noted on exam, today he denies SI, PO intake somewhat improved.   7/12: Presentation largely unchanged, however more verbal/talkative today, reports SI with plan. No significant catatonic sxs noted on exam, paxil and ativan increased on 7/11.   7/13: AAOX3, remains depressed/withdrawn, denies SI today, po intake improving, continues to need an inpatient psychiatric admission for further stabilization/safety  7/14: oriented, remains depressed/withdrawn, continues to report intermittent SI with plans. Po intake has been improving. Patient will need an inpatient psychiatric admission for safety/stability.   7/15: denies SI on this exam however remains withdrawn and possibly thought blocked. WIll follow   7/16: continues to present as withdrawn; endorses passive SI without plan/intent  7/17: denies SI, per staff has been calm and cooperative with adequate PO intake.   7/18: No changes, no PRNs, mood content, calm/cooperative, flat/constricted; Attempted to call [phone] going to voicemail upon transfer to nursing station.  7/19: no SI today, however states mood is sad. Poor insight. Flat, withdrawn. INVEGA INJECTION GIVEN 7/3 confirmed with RN on 3rd floor, [facility].   7/20: remains constricted with limited insight. no SI.   7/21: Remains constricted with limited insight no suicidality or psychosis noted; Eating meals.  7/22: No events overnight. Patient content and constricted. Primary team reported that patient has been receiving Ativan 0.5mg PO in the evening and has not received daytime doses since July 15th as order has fallen off. Patient has been behaviorally well controlled and no active signs of catatonia at this time. Denies SI/HI/AH/VH.    PLAN  - Continue Paxil 30mg daily to target depression  - Continue Ativan 0.5mg PO qHS (HOLD for sedation or RR < 10)  - Patient on Invega Sustenna IM 156mg given on the 3rd of the month last on 7/3 - NEXT DUE 8/3/22 **  - PRN for agitation: Ativan 0.5mg q6hrs as patient with recent hx r/o catatonia   - Continue CO 1:1 due to impulsivity and recent SI with plan - has been denying SI, L will follow  - Encourage PO intake, calorie count, PT/OT and OOB as tolerated.  - Dispo: Pt. will need an inpatient psychiatric admission 2PC complete - Awaiting hospital bed

## 2022-07-22 NOTE — BH CONSULTATION LIAISON PROGRESS NOTE - NSBHMSESPEECH_PSY_A_CORE
Abnormal as indicated, otherwise normal...
Normal volume, rate, productivity, spontaneity and articulation
Abnormal as indicated, otherwise normal...
Normal volume, rate, productivity, spontaneity and articulation
Abnormal as indicated, otherwise normal...

## 2022-07-22 NOTE — BH CONSULTATION LIAISON PROGRESS NOTE - NSBHCONSFOLLOWNEEDS_PSY_ALL_CORE
Needs further psych safety assessment prior to discharge

## 2022-07-22 NOTE — BH CONSULTATION LIAISON PROGRESS NOTE - NSBHPSYCHOLCOGABN_PSY_A_CORE
disoriented to time/disoriented to place
disoriented to time/disoriented to situation
disoriented to time
disoriented to time/disoriented to place
disoriented to time/disoriented to place
disoriented to time
disoriented to time/disoriented to place
disoriented to time/disoriented to place
disoriented to time

## 2022-07-22 NOTE — BH CONSULTATION LIAISON PROGRESS NOTE - NSBHATTESTBILLINGAW_PSY_A_CORE
[id number] Inpatient care - high complexity - 35 minutes
Billing in another system
[id number] Inpatient care - moderate complexity - 25 minutes
[id number] Inpatient care - low complexity - 15 minutes
[id number] Inpatient care - moderate complexity - 25 minutes
[id number] Inpatient care - moderate complexity - 25 minutes
Non-billable
[id number] Inpatient care - high complexity - 35 minutes
[id number] Inpatient care - high complexity - 35 minutes
Non-billable
Billing in another system
Non-billable
[id number] Inpatient care - moderate complexity - 25 minutes

## 2022-07-22 NOTE — PROGRESS NOTE ADULT - NSPROGADDITIONALINFOA_GEN_ALL_CORE
- Counseling on diet, exercise, and medication adherence was done  - Counseling on smoking cessation and alcohol consumption offered when appropriate.  - Pain assessed and judicious use of narcotics when appropriate was discussed.    - Stroke education given when appropriate.  - Importance of fall prevention discussed.   - Differential diagnosis and plan of care discussed with patient and/or family and primary team

## 2022-07-22 NOTE — BH INPATIENT PSYCHIATRY ASSESSMENT NOTE - RISK ASSESSMENT
risk: recent SI, recent SA?, hx schizophrenia  Protective: in treatment, at baseline compliant with medication, on GARCIA, has a psychiatrist

## 2022-07-22 NOTE — BH CONSULTATION LIAISON PROGRESS NOTE - NSBHCHARTREVIEWVS_PSY_A_CORE FT
Vital Signs Last 24 Hrs  T(C): 36.6 (16 Jul 2022 05:40), Max: 36.8 (15 Jul 2022 20:20)  T(F): 97.8 (16 Jul 2022 05:40), Max: 98.3 (15 Jul 2022 20:20)  HR: 59 (16 Jul 2022 06:49) (58 - 63)  BP: 167/70 (16 Jul 2022 06:49) (118/73 - 179/92)  BP(mean): --  RR: 18 (16 Jul 2022 06:49) (18 - 18)  SpO2: 95% (16 Jul 2022 06:49) (94% - 98%)    Parameters below as of 16 Jul 2022 06:49  Patient On (Oxygen Delivery Method): room air    
Vital Signs Last 24 Hrs  T(C): 36.4 (14 Jul 2022 05:20), Max: 37.3 (13 Jul 2022 11:54)  T(F): 97.5 (14 Jul 2022 05:20), Max: 99.1 (13 Jul 2022 11:54)  HR: 66 (14 Jul 2022 07:00) (54 - 70)  BP: 151/77 (14 Jul 2022 07:00) (130/76 - 174/77)  BP(mean): --  RR: 18 (14 Jul 2022 07:00) (17 - 18)  SpO2: 99% (14 Jul 2022 07:00) (96% - 99%)    Parameters below as of 14 Jul 2022 07:00  Patient On (Oxygen Delivery Method): room air    
Vital Signs Last 24 Hrs  T(C): 36.5 (12 Jul 2022 05:04), Max: 36.9 (11 Jul 2022 18:23)  T(F): 97.7 (12 Jul 2022 05:04), Max: 98.4 (11 Jul 2022 18:23)  HR: 70 (12 Jul 2022 07:20) (57 - 70)  BP: 138/74 (12 Jul 2022 07:20) (102/72 - 140/76)  BP(mean): --  RR: 16 (12 Jul 2022 05:04) (15 - 17)  SpO2: 100% (12 Jul 2022 05:04) (98% - 100%)    Parameters below as of 12 Jul 2022 05:04  Patient On (Oxygen Delivery Method): room air    
Vital Signs Last 24 Hrs  T(C): 36.3 (20 Jul 2022 05:21), Max: 36.4 (19 Jul 2022 14:04)  T(F): 97.4 (20 Jul 2022 05:21), Max: 97.6 (19 Jul 2022 14:04)  HR: 55 (20 Jul 2022 05:21) (55 - 60)  BP: 149/69 (20 Jul 2022 05:21) (122/68 - 149/69)  BP(mean): --  RR: 18 (20 Jul 2022 05:21) (16 - 18)  SpO2: 98% (20 Jul 2022 05:21) (96% - 100%)    Parameters below as of 20 Jul 2022 05:21  Patient On (Oxygen Delivery Method): room air    
Vital Signs Last 24 Hrs  T(C): 36.5 (21 Jul 2022 12:38), Max: 36.5 (21 Jul 2022 05:13)  T(F): 97.7 (21 Jul 2022 12:38), Max: 97.7 (21 Jul 2022 05:13)  HR: 61 (21 Jul 2022 12:38) (61 - 84)  BP: 134/70 (21 Jul 2022 12:38) (134/70 - 160/74)  BP(mean): --  RR: 18 (21 Jul 2022 12:38) (17 - 18)  SpO2: 98% (21 Jul 2022 12:38) (94% - 98%)    Parameters below as of 21 Jul 2022 12:38  Patient On (Oxygen Delivery Method): room air    
Vital Signs Last 24 Hrs  T(C): 36.6 (10 Jul 2022 09:31), Max: 36.6 (10 Jul 2022 09:31)  T(F): 97.9 (10 Jul 2022 09:31), Max: 97.9 (10 Jul 2022 09:31)  HR: 63 (10 Jul 2022 09:31) (61 - 99)  BP: 153/69 (10 Jul 2022 09:31) (136/71 - 160/76)  BP(mean): --  RR: 18 (10 Jul 2022 09:31) (18 - 18)  SpO2: 98% (10 Jul 2022 09:31) (64% - 99%)    Parameters below as of 10 Jul 2022 09:31  Patient On (Oxygen Delivery Method): room air    
Vital Signs Last 24 Hrs  T(C): 36.7 (09 Jul 2022 09:48), Max: 36.8 (09 Jul 2022 05:32)  T(F): 98 (09 Jul 2022 09:48), Max: 98.2 (09 Jul 2022 05:32)  HR: 71 (09 Jul 2022 09:48) (64 - 71)  BP: 125/76 (09 Jul 2022 09:48) (125/76 - 139/70)  BP(mean): --  RR: 18 (09 Jul 2022 09:48) (17 - 18)  SpO2: 97% (09 Jul 2022 09:48) (96% - 97%)    Parameters below as of 09 Jul 2022 09:48  Patient On (Oxygen Delivery Method): room air    
Vital Signs Last 24 Hrs  T(C): 36.4 (08 Jul 2022 10:14), Max: 36.6 (07 Jul 2022 12:53)  T(F): 97.6 (08 Jul 2022 10:14), Max: 97.9 (07 Jul 2022 12:53)  HR: 61 (08 Jul 2022 10:14) (56 - 64)  BP: 125/76 (08 Jul 2022 10:14) (125/76 - 157/79)  BP(mean): --  RR: 18 (08 Jul 2022 10:14) (16 - 18)  SpO2: 98% (08 Jul 2022 10:14) (98% - 100%)    Parameters below as of 08 Jul 2022 10:14  Patient On (Oxygen Delivery Method): room air    
Vital Signs Last 24 Hrs  T(C): 36.4 (17 Jul 2022 06:14), Max: 36.7 (16 Jul 2022 13:44)  T(F): 97.5 (17 Jul 2022 06:14), Max: 98.1 (16 Jul 2022 13:44)  HR: 58 (17 Jul 2022 06:14) (56 - 62)  BP: 134/70 (17 Jul 2022 06:14) (108/67 - 136/61)  BP(mean): --  RR: 18 (17 Jul 2022 06:14) (18 - 18)  SpO2: 95% (17 Jul 2022 06:14) (94% - 96%)    Parameters below as of 17 Jul 2022 06:14  Patient On (Oxygen Delivery Method): room air    
Vital Signs Last 24 Hrs  T(C): 37.6 (13 Jul 2022 07:00), Max: 37.6 (13 Jul 2022 07:00)  T(F): 99.7 (13 Jul 2022 07:00), Max: 99.7 (13 Jul 2022 07:00)  HR: 60 (13 Jul 2022 07:00) (55 - 61)  BP: 167/73 (13 Jul 2022 07:00) (126/65 - 167/73)  BP(mean): --  RR: 18 (13 Jul 2022 07:00) (18 - 18)  SpO2: 96% (13 Jul 2022 07:00) (96% - 100%)    Parameters below as of 13 Jul 2022 07:00  Patient On (Oxygen Delivery Method): room air    
Vital Signs Last 24 Hrs  T(C): 36.4 (10 Jul 2022 21:09), Max: 36.6 (10 Jul 2022 14:29)  T(F): 97.6 (10 Jul 2022 21:09), Max: 97.8 (10 Jul 2022 14:29)  HR: 63 (11 Jul 2022 05:40) (55 - 63)  BP: 158/79 (11 Jul 2022 05:40) (142/74 - 158/79)  BP(mean): --  RR: 16 (11 Jul 2022 05:40) (16 - 18)  SpO2: 99% (11 Jul 2022 05:40) (95% - 99%)    Parameters below as of 11 Jul 2022 05:40  Patient On (Oxygen Delivery Method): room air    
Vital Signs Last 24 Hrs  T(C): 36.8 (18 Jul 2022 11:00), Max: 36.8 (18 Jul 2022 11:00)  T(F): 98.3 (18 Jul 2022 11:00), Max: 98.3 (18 Jul 2022 11:00)  HR: 67 (18 Jul 2022 11:00) (50 - 67)  BP: 134/66 (18 Jul 2022 11:00) (129/82 - 155/85)  BP(mean): --  RR: 18 (18 Jul 2022 11:00) (17 - 19)  SpO2: 97% (18 Jul 2022 11:00) (95% - 97%)    Parameters below as of 18 Jul 2022 11:00  Patient On (Oxygen Delivery Method): room air    
Vital Signs Last 24 Hrs  T(C): 36.4 (19 Jul 2022 14:04), Max: 36.7 (18 Jul 2022 21:36)  T(F): 97.6 (19 Jul 2022 14:04), Max: 98 (18 Jul 2022 21:36)  HR: 60 (19 Jul 2022 14:04) (60 - 70)  BP: 122/68 (19 Jul 2022 14:04) (122/68 - 159/84)  BP(mean): --  RR: 18 (19 Jul 2022 14:04) (18 - 18)  SpO2: 100% (19 Jul 2022 14:04) (97% - 100%)    Parameters below as of 19 Jul 2022 14:04  Patient On (Oxygen Delivery Method): room air    
Vital Signs Last 24 Hrs  T(C): 36.8 (15 Jul 2022 10:55), Max: 37.1 (15 Jul 2022 05:50)  T(F): 98.2 (15 Jul 2022 10:55), Max: 98.7 (15 Jul 2022 05:50)  HR: 58 (15 Jul 2022 10:55) (58 - 65)  BP: 110/68 (15 Jul 2022 10:55) (110/68 - 162/86)  BP(mean): --  RR: 18 (15 Jul 2022 10:55) (18 - 18)  SpO2: 96% (15 Jul 2022 10:55) (95% - 97%)    Parameters below as of 15 Jul 2022 10:55  Patient On (Oxygen Delivery Method): room air    
Vital Signs Last 24 Hrs  T(C): 36.3 (22 Jul 2022 05:19), Max: 36.8 (21 Jul 2022 23:30)  T(F): 97.4 (22 Jul 2022 05:19), Max: 98.2 (21 Jul 2022 23:30)  HR: 58 (22 Jul 2022 05:19) (56 - 61)  BP: 137/76 (22 Jul 2022 05:19) (121/67 - 137/76)  BP(mean): --  RR: 17 (22 Jul 2022 05:19) (17 - 18)  SpO2: 95% (22 Jul 2022 05:19) (95% - 98%)    Parameters below as of 22 Jul 2022 05:19  Patient On (Oxygen Delivery Method): room air

## 2022-07-23 LAB
ANION GAP SERPL CALC-SCNC: 9 MMOL/L — SIGNIFICANT CHANGE UP (ref 7–14)
BUN SERPL-MCNC: 23 MG/DL — SIGNIFICANT CHANGE UP (ref 7–23)
CALCIUM SERPL-MCNC: 9.6 MG/DL — SIGNIFICANT CHANGE UP (ref 8.4–10.5)
CHLORIDE SERPL-SCNC: 94 MMOL/L — LOW (ref 98–107)
CO2 SERPL-SCNC: 29 MMOL/L — SIGNIFICANT CHANGE UP (ref 22–31)
CREAT SERPL-MCNC: 1.03 MG/DL — SIGNIFICANT CHANGE UP (ref 0.5–1.3)
EGFR: 73 ML/MIN/1.73M2 — SIGNIFICANT CHANGE UP
GLUCOSE BLDC GLUCOMTR-MCNC: 97 MG/DL — SIGNIFICANT CHANGE UP (ref 70–99)
GLUCOSE SERPL-MCNC: 152 MG/DL — HIGH (ref 70–99)
MAGNESIUM SERPL-MCNC: 2.2 MG/DL — SIGNIFICANT CHANGE UP (ref 1.6–2.6)
OSMOLALITY SERPL: 291 MOSM/KG — SIGNIFICANT CHANGE UP (ref 275–295)
PHOSPHATE SERPL-MCNC: 3.9 MG/DL — SIGNIFICANT CHANGE UP (ref 2.5–4.5)
POTASSIUM SERPL-MCNC: 4.2 MMOL/L — SIGNIFICANT CHANGE UP (ref 3.5–5.3)
POTASSIUM SERPL-SCNC: 4.2 MMOL/L — SIGNIFICANT CHANGE UP (ref 3.5–5.3)
SODIUM SERPL-SCNC: 132 MMOL/L — LOW (ref 135–145)

## 2022-07-23 PROCEDURE — 99223 1ST HOSP IP/OBS HIGH 75: CPT

## 2022-07-23 RX ORDER — ENOXAPARIN SODIUM 100 MG/ML
30 INJECTION SUBCUTANEOUS EVERY 24 HOURS
Refills: 0 | Status: DISCONTINUED | OUTPATIENT
Start: 2022-07-23 | End: 2022-07-27

## 2022-07-23 RX ADMIN — METFORMIN HYDROCHLORIDE 500 MILLIGRAM(S): 850 TABLET ORAL at 09:50

## 2022-07-23 RX ADMIN — METFORMIN HYDROCHLORIDE 500 MILLIGRAM(S): 850 TABLET ORAL at 21:33

## 2022-07-23 RX ADMIN — AMLODIPINE BESYLATE 10 MILLIGRAM(S): 2.5 TABLET ORAL at 09:50

## 2022-07-23 RX ADMIN — Medication 20 MILLIGRAM(S): at 09:50

## 2022-07-23 RX ADMIN — Medication 0.5 MILLIGRAM(S): at 21:33

## 2022-07-23 RX ADMIN — BUDESONIDE AND FORMOTEROL FUMARATE DIHYDRATE 2 PUFF(S): 160; 4.5 AEROSOL RESPIRATORY (INHALATION) at 21:33

## 2022-07-23 RX ADMIN — BUDESONIDE AND FORMOTEROL FUMARATE DIHYDRATE 2 PUFF(S): 160; 4.5 AEROSOL RESPIRATORY (INHALATION) at 09:49

## 2022-07-23 NOTE — CONSULT NOTE ADULT - ASSESSMENT
82M PMH CHF, COPD, DM, dementia, schizophrenia, HTN sent in from [facility] for dehydration and lack of PO intake as SA. Now taking adequate PO. Course c/b mild hyponatremia.     #Hyponatremia:   -last Na 132  -repeat BMP today to ensure not worsening  -order urine studies if pt develops worsening hyponatremia    #SA attempt via lack of PO:   -now taking adequate PO as per RN  -no episodes of hypoglycemia noted  -continue to encourage PO intake    #COPD:   -continue symbicort    #Schizophrenia:   -management as per psych

## 2022-07-23 NOTE — PSYCHIATRIC REHAB INITIAL EVALUATION - NSBHPRRECOMMEND_PSY_ALL_CORE
Per medical records, patient is an 82 man, with a extensive medical history, and psychiatric history of dementia, schizophrenia, who was initially sent from [facility] on 7/6/22 for dehydration and SI. Pt was admitted at [facility] were he was treated by CL service for dehydration, and catatonia with SI. Pt is now medically and being admitted to [facility] for continued treatment and stabilization of psychiatric symptoms. Per medical records, patient is an 82 man, with an extensive medical history, and psychiatric history of dementia, schizophrenia, who was initially sent from [facility] on 7/6/22 for dehydration and SI. Pt was admitted at [facility] were he was treated by CL service for dehydration, and catatonia with SI. Pt is now medically and being admitted to [facility] for continued treatment and stabilization of psychiatric symptoms. Per medical records, patient is an 82 man, with an extensive medical history, and psychiatric history of dementia, schizophrenia, who was initially sent from [facility] on 7/6/22 for dehydration and SI. Pt was admitted at [facility] were he was treated by CL service for dehydration, and catatonia with SI. Pt is now medically and being admitted to [facility] for continued treatment and stabilization of psychiatric symptoms. Writer met with patient and introduced self, psychiatric rehabilitation staff and services.  Patient was provided with a large print book which he expressed appreciation for.  Patient was pleasant, and minimally engaged; he often had a delayed response to questions.  Patient demonstrated good impulse control during assessment, displayed poor insight and poor judgment into his symptoms and treatment at this time.  Writer encouraged patient to attend psychiatric rehabilitation activities and engage in treatment.  Psychiatric Rehabilitation staff will continue to engage patient daily in order to develop therapeutic rapport. Writer and patient were unable to establish a collaborative psychiatric rehabilitation goal, therefore one will be selected for him.

## 2022-07-23 NOTE — PSYCHIATRIC REHAB INITIAL EVALUATION - NSBHSTRENGTHHOME_PSY_ALL_CORE
Pt was unable to identify strengths at this time. He reports not liking his nursing home, however, had difficulty elaborating why.

## 2022-07-23 NOTE — BH PATIENT PROFILE - HOME MEDICATIONS
paliperidone 156 mg/mL intramuscular suspension, extended release ,  intramuscular once a month next 8/3  LORazepam 0.5 mg oral tablet , 1 tab(s) orally once a day [id number]  metFORMIN 500 mg oral tablet , 1 tab(s) orally 2 times a day  amLODIPine 10 mg oral tablet , 1 tab(s) orally once a day  Symbicort 80 mcg-4.5 mcg/inh inhalation aerosol , 2 puff(s) inhaled 2 times a day  PARoxetine 20 mg oral tablet , 1 tab(s) orally once a day

## 2022-07-23 NOTE — BH PATIENT PROFILE - FALL HARM RISK - HARM RISK INTERVENTIONS
Assistance with ambulation/Assistance OOB with selected safe patient handling equipment/Communicate Risk of Fall with Harm to all staff/Discuss with provider need for PT consult/Reinforce activity limits and safety measures with patient and family/Tailored Fall Risk Interventions/Visual Cue: Yellow wristband and red socks/Bed in lowest position, wheels locked, appropriate side rails in place/Call bell, personal items and telephone in reach/Instruct patient to call for assistance before getting out of bed or chair/Non-slip footwear when patient is out of bed/[place] to call system/Physically safe environment - no spills, clutter or unnecessary equipment/Purposeful Proactive Rounding/Room/bathroom lighting operational, light cord in reach

## 2022-07-23 NOTE — CONSULT NOTE ADULT - SUBJECTIVE AND OBJECTIVE BOX
HPI:     82M PMH CHF, COPD, DM, dementia, schizophrenia, HTN sent in from [facility] for dehydration and SI. Patient stated that he had stopped eating in order to kill himself. At [facility], patient was initially placed on D5 given insufficient PO. PO intake eventually improved with encouragement from staff. FS remained adequate off of D5 for 24 hours. Patient is now transferred to [facility] for further psychiatric optimization. Medicine is consulted for assistance with medical comorbidities.     Patient is sitting comfortably in day room. Denies any pain or discomfort. Per RN staff, pt is taking adequate PO.     PAST MEDICAL & SURGICAL HISTORY:  HTN (hypertension)      COPD, severity to be determined      DM (diabetes mellitus)      Schizophrenia      CHF (congestive heart failure)      No significant past surgical history          Review of Systems:   CONSTITUTIONAL: No fever, weight loss, or fatigue  EYES: No eye pain, visual disturbances, or discharge  ENMT:  No difficulty hearing, tinnitus, vertigo; No sinus or throat pain  NECK: No pain or stiffness  RESPIRATORY: No cough, wheezing, chills or hemoptysis; No shortness of breath  CARDIOVASCULAR: No chest pain, palpitations, dizziness, or leg swelling  GASTROINTESTINAL: No abdominal or epigastric pain. No nausea, vomiting, or hematemesis; No diarrhea or constipation. No melena or hematochezia.  GENITOURINARY: No dysuria, frequency, hematuria, or incontinence  NEUROLOGICAL: No headaches, memory loss, loss of strength, numbness, or tremors  SKIN: No itching, burning, rashes, or lesions   LYMPH NODES: No enlarged glands  ENDOCRINE: No heat or cold intolerance; No hair loss  MUSCULOSKELETAL: No joint pain or swelling; No muscle, back, or extremity pain  HEME/LYMPH: No easy bruising, or bleeding gums  ALLERGY AND IMMUNOLOGIC: No hives or eczema    Allergies    Allergy Status Unknown    Intolerances        Social History:   Patient resides at NH. No toxic habits.     FAMILY HISTORY:  No pertinent family history in first degree relatives of psychiatric illness        MEDICATIONS  (STANDING):  amLODIPine   Tablet 10 milliGRAM(s) Oral daily  budesonide  80 MICROgram(s)/formoterol 4.5 MICROgram(s) Inhaler 2 Puff(s) Inhalation two times a day  dextrose 5%. 1000 milliLiter(s) (50 mL/Hr) IV Continuous <Continuous>  dextrose 5%. 1000 milliLiter(s) (100 mL/Hr) IV Continuous <Continuous>  dextrose 50% Injectable 25 Gram(s) IV Push once  dextrose 50% Injectable 12.5 Gram(s) IV Push once  dextrose 50% Injectable 25 Gram(s) IV Push once  glucagon  Injectable 1 milliGRAM(s) IntraMuscular once  insulin lispro (ADMELOG) corrective regimen sliding scale   SubCutaneous Before meals and at bedtime  LORazepam     Tablet 0.5 milliGRAM(s) Oral <User Schedule>  metFORMIN 500 milliGRAM(s) Oral two times a day  PARoxetine 20 milliGRAM(s) Oral daily    MEDICATIONS  (PRN):  dextrose Oral Gel 15 Gram(s) Oral once PRN Blood Glucose LESS THAN 70 milliGRAM(s)/deciliter  LORazepam     Tablet 0.5 milliGRAM(s) Oral every 4 hours PRN agitation due to catatonia  OLANZapine Injectable 2.5 milliGRAM(s) IntraMuscular once PRN agitation      Vital Signs Last 24 Hrs  T(C): 36.7 (22 Jul 2022 22:20), Max: 36.7 (22 Jul 2022 22:20)  T(F): 98.1 (22 Jul 2022 22:20), Max: 98.1 (22 Jul 2022 22:20)  HR: 66 (22 Jul 2022 22:20) (64 - 66)  BP: 129/73 (22 Jul 2022 22:20) (129/73 - 135/83)  BP(mean): --  RR: 17 (22 Jul 2022 22:20) (17 - 18)  SpO2: 97% (22 Jul 2022 12:07) (97% - 97%)      CAPILLARY BLOOD GLUCOSE      POCT Blood Glucose.: 105 mg/dL (22 Jul 2022 17:00)  POCT Blood Glucose.: 109 mg/dL (22 Jul 2022 11:27)        PHYSICAL EXAM:  GENERAL: NAD, well-developed  HEAD:  Atraumatic, Normocephalic  EYES: EOMI, conjunctiva and sclera clear  NECK: Supple, No JVD  CHEST/LUNG: Clear to auscultation bilaterally; No wheeze  HEART: Regular rate and rhythm; No murmurs, rubs, or gallops  ABDOMEN: Soft, Nontender, Nondistended; Bowel sounds present  EXTREMITIES:  2+ Peripheral Pulses, No clubbing, cyanosis, or edema  NEUROLOGY: non-focal  SKIN: No rashes or lesions    LABS:                        13.6   5.27  )-----------( 269      ( 22 Jul 2022 05:30 )             40.3     07-22    132<L>  |  96<L>  |  17  ----------------------------<  83  3.8   |  26  |  0.76    Ca    9.1      22 Jul 2022 05:30                EKG(personally reviewed):    RADIOLOGY & ADDITIONAL TESTS:    Imaging Personally Reviewed:    Consultant(s) Notes Reviewed:      Care Discussed with Consultants/Other Providers:

## 2022-07-24 LAB
GLUCOSE BLDC GLUCOMTR-MCNC: 104 MG/DL — HIGH (ref 70–99)
GLUCOSE BLDC GLUCOMTR-MCNC: 86 MG/DL — SIGNIFICANT CHANGE UP (ref 70–99)
GLUCOSE BLDC GLUCOMTR-MCNC: 90 MG/DL — SIGNIFICANT CHANGE UP (ref 70–99)
GLUCOSE BLDC GLUCOMTR-MCNC: 91 MG/DL — SIGNIFICANT CHANGE UP (ref 70–99)

## 2022-07-24 PROCEDURE — 99231 SBSQ HOSP IP/OBS SF/LOW 25: CPT

## 2022-07-24 RX ADMIN — ENOXAPARIN SODIUM 30 MILLIGRAM(S): 100 INJECTION SUBCUTANEOUS at 09:09

## 2022-07-24 RX ADMIN — METFORMIN HYDROCHLORIDE 500 MILLIGRAM(S): 850 TABLET ORAL at 21:19

## 2022-07-24 RX ADMIN — AMLODIPINE BESYLATE 10 MILLIGRAM(S): 2.5 TABLET ORAL at 09:08

## 2022-07-24 RX ADMIN — METFORMIN HYDROCHLORIDE 500 MILLIGRAM(S): 850 TABLET ORAL at 09:08

## 2022-07-24 RX ADMIN — BUDESONIDE AND FORMOTEROL FUMARATE DIHYDRATE 2 PUFF(S): 160; 4.5 AEROSOL RESPIRATORY (INHALATION) at 09:08

## 2022-07-24 RX ADMIN — BUDESONIDE AND FORMOTEROL FUMARATE DIHYDRATE 2 PUFF(S): 160; 4.5 AEROSOL RESPIRATORY (INHALATION) at 21:19

## 2022-07-24 RX ADMIN — Medication 20 MILLIGRAM(S): at 09:08

## 2022-07-24 RX ADMIN — Medication 0.5 MILLIGRAM(S): at 17:03

## 2022-07-24 NOTE — BH INPATIENT PSYCHIATRY PROGRESS NOTE - NSBHCHARTREVIEWVS_PSY_A_CORE FT
Vital Signs Last 24 Hrs  T(C): 36.8 (07-24-22 @ 07:58), Max: 36.8 (07-24-22 @ 07:58)  T(F): 98.3 (07-24-22 @ 07:58), Max: 98.3 (07-24-22 @ 07:58)  HR: --  BP: --  BP(mean): --  RR: --  SpO2: --    Orthostatic VS  07-24-22 @ 07:58  Lying BP: --/-- HR: --  Sitting BP: 124/69 HR: 80  Standing BP: --/-- HR: --  Site: --  Mode: --

## 2022-07-24 NOTE — BH INPATIENT PSYCHIATRY PROGRESS NOTE - CURRENT MEDICATION
MEDICATIONS  (STANDING):  amLODIPine   Tablet 10 milliGRAM(s) Oral daily  budesonide  80 MICROgram(s)/formoterol 4.5 MICROgram(s) Inhaler 2 Puff(s) Inhalation two times a day  dextrose 5%. 1000 milliLiter(s) (50 mL/Hr) IV Continuous <Continuous>  dextrose 5%. 1000 milliLiter(s) (100 mL/Hr) IV Continuous <Continuous>  dextrose 50% Injectable 25 Gram(s) IV Push once  dextrose 50% Injectable 12.5 Gram(s) IV Push once  dextrose 50% Injectable 25 Gram(s) IV Push once  enoxaparin Injectable 30 milliGRAM(s) SubCutaneous every 24 hours  glucagon  Injectable 1 milliGRAM(s) IntraMuscular once  insulin lispro (ADMELOG) corrective regimen sliding scale   SubCutaneous Before meals and at bedtime  LORazepam     Tablet 0.5 milliGRAM(s) Oral <User Schedule>  metFORMIN 500 milliGRAM(s) Oral two times a day  PARoxetine 20 milliGRAM(s) Oral daily    MEDICATIONS  (PRN):  dextrose Oral Gel 15 Gram(s) Oral once PRN Blood Glucose LESS THAN 70 milliGRAM(s)/deciliter  LORazepam     Tablet 0.5 milliGRAM(s) Oral every 4 hours PRN agitation due to catatonia  OLANZapine Injectable 2.5 milliGRAM(s) IntraMuscular once PRN agitation

## 2022-07-24 NOTE — BH INPATIENT PSYCHIATRY PROGRESS NOTE - NSBHMETABOLIC_PSY_ALL_CORE_FT
BMI:   HbA1c: A1C with Estimated Average Glucose Result: 5.8 % (07-07-22 @ 05:30)    Glucose: POCT Blood Glucose.: 91 mg/dL (07-24-22 @ 08:01)    BP: 129/73 (07-22-22 @ 22:20) (129/73 - 129/73)  Lipid Panel:

## 2022-07-24 NOTE — BH INPATIENT PSYCHIATRY PROGRESS NOTE - PRN MEDS
MEDICATIONS  (PRN):  dextrose Oral Gel 15 Gram(s) Oral once PRN Blood Glucose LESS THAN 70 milliGRAM(s)/deciliter  LORazepam     Tablet 0.5 milliGRAM(s) Oral every 4 hours PRN agitation due to catatonia  OLANZapine Injectable 2.5 milliGRAM(s) IntraMuscular once PRN agitation

## 2022-07-24 NOTE — BH INPATIENT PSYCHIATRY PROGRESS NOTE - OTHER
moderate TD of jaw, tongue, legs bilaterally; no catatonia signs noted near flat impulsive hx stutter  laconic

## 2022-07-25 LAB
GLUCOSE BLDC GLUCOMTR-MCNC: 100 MG/DL — HIGH (ref 70–99)
GLUCOSE BLDC GLUCOMTR-MCNC: 108 MG/DL — HIGH (ref 70–99)
GLUCOSE BLDC GLUCOMTR-MCNC: 127 MG/DL — HIGH (ref 70–99)
GLUCOSE BLDC GLUCOMTR-MCNC: 90 MG/DL — SIGNIFICANT CHANGE UP (ref 70–99)
GLUCOSE BLDC GLUCOMTR-MCNC: 98 MG/DL — SIGNIFICANT CHANGE UP (ref 70–99)

## 2022-07-25 PROCEDURE — 99232 SBSQ HOSP IP/OBS MODERATE 35: CPT | Mod: GC

## 2022-07-25 RX ADMIN — Medication 0.5 MILLIGRAM(S): at 17:36

## 2022-07-25 RX ADMIN — BUDESONIDE AND FORMOTEROL FUMARATE DIHYDRATE 2 PUFF(S): 160; 4.5 AEROSOL RESPIRATORY (INHALATION) at 21:33

## 2022-07-25 RX ADMIN — AMLODIPINE BESYLATE 10 MILLIGRAM(S): 2.5 TABLET ORAL at 11:02

## 2022-07-25 RX ADMIN — METFORMIN HYDROCHLORIDE 500 MILLIGRAM(S): 850 TABLET ORAL at 11:01

## 2022-07-25 RX ADMIN — Medication 0: at 21:22

## 2022-07-25 RX ADMIN — Medication 20 MILLIGRAM(S): at 11:01

## 2022-07-25 RX ADMIN — METFORMIN HYDROCHLORIDE 500 MILLIGRAM(S): 850 TABLET ORAL at 21:33

## 2022-07-25 RX ADMIN — BUDESONIDE AND FORMOTEROL FUMARATE DIHYDRATE 2 PUFF(S): 160; 4.5 AEROSOL RESPIRATORY (INHALATION) at 11:02

## 2022-07-25 NOTE — BH INPATIENT PSYCHIATRY PROGRESS NOTE - NSBHFUPINTERVALHXFT_PSY_A_CORE
82 y.o. male being seen for SI and psychosis. Pt presents with paucity of speech, poor historian, at one point stating that he had SI "long time ago" 82 y.o. male being seen for SI and psychosis. Pt presents with paucity of speech, poor historian, at one point stating that he had SI "long time ago" telling him to jump into the river from the bridge, however pt did not jump. He also reported previous hx of wanting to kill himself by "jumping [place] water" at the beach but he did not do so either. Pt does not give clear history on recent SI, however per chart had apparently stopped eating to kill himself prior to presentation to hospital. During evaluation, patient denied current S/H IIP. No evidence of catatonic symptoms. Pt admits to feeling anxious sometimes, but unable to verbalize further on it.     Attempted to contact [facility] [phone], however was wrong number. Attempted to contact at [phone], however no answer at nursing office. Left voicemail.    Attempted to contact Dr. Moeller, [phone], no answer.

## 2022-07-25 NOTE — BH INPATIENT PSYCHIATRY PROGRESS NOTE - NSBHASSESSSUMMFT_PSY_ALL_CORE
82M PMH CHF, COPD, DM, dementia, schizophrenia, HTN sent in from [facility] for dehydration and SI. Patient is a poor historian. Patient does report a PPHx of schizophrenia. Treated at [facility] for dehydration and catatonia, improved on Ativan and Invega Sustenna GARCIA.  Now admitted at [facility].  Denies SI on assessment.        Admit 9.27 Status  Routine Checks, no CO 1:1 needed; questionable need for hospital bed, no hospital bed for tonight  - Continue Paxil 30mg daily to target depression  - Continue Ativan 0.5mg PO qHS (HOLD for sedation or RR < 10)  - Patient on Invega Sustenna IM 156mg given on the 3rd of the month last on 7/3 - NEXT DUE 8/3/22 **  - PRN for agitation: Ativan 0.5mg q6hrs as patient with recent hx r/o catatonia   - Continue CO 1:1 due to impulsivity and recent SI with plan - has been denying SI, L will follow  - Encourage PO intake 82M PMH CHF, COPD, DM, dementia, schizophrenia, HTN sent in from [facility] for dehydration and SI. Patient is a poor historian. Patient does report a PPHx of schizophrenia. Treated at [facility] for dehydration and catatonia, improved on Ativan and Invega Sustenna GARCIA.  Now admitted at [facility].  Denies SI on assessment.      Legal: 9.27    Psychiatry: Pt on Invega Sustenna 156mg IM last received 7/19/22. Next Dose 8/3/22 per documentation. unclear if loading or maintenance dose. Paxil 20mg PO daily, Ativan 0.5mg PO q4PM    Psychotherapy: Individual, group and milieu therapy as appropriate.    Medical: Na 132. Will reorder labs to evaluate for hyponatremia. Continue Symbicort, Metformin 500mg BID, Norvasc 10mg PO daily. PT evaluation. Speech & Swallow eval.     Dispo: Nursing home when stable.

## 2022-07-25 NOTE — BH INPATIENT PSYCHIATRY PROGRESS NOTE - NSBHCHARTREVIEWVS_PSY_A_CORE FT
Vital Signs Last 24 Hrs  T(C): 36.3 (07-25-22 @ 07:49), Max: 36.3 (07-25-22 @ 07:49)  T(F): 97.4 (07-25-22 @ 07:49), Max: 97.4 (07-25-22 @ 07:49)  HR: --  BP: --  BP(mean): --  RR: --  SpO2: --    Orthostatic VS  07-25-22 @ 07:49  Lying BP: --/-- HR: --  Sitting BP: 124/70 HR: 71  Standing BP: 118/61 HR: 68  Site: upper left arm  Mode: electronic  Orthostatic VS  07-24-22 @ 07:58  Lying BP: --/-- HR: --  Sitting BP: 124/69 HR: 80  Standing BP: --/-- HR: --  Site: --  Mode: --

## 2022-07-25 NOTE — BH SOCIAL WORK INITIAL PSYCHOSOCIAL EVALUATION - OTHER PAST PSYCHIATRIC HISTORY (INCLUDE DETAILS REGARDING ONSET, COURSE OF ILLNESS, INPATIENT/OUTPATIENT TREATMENT)
Reported past psychiatric admissions and outpatient treatment for diagnosis of schizophrenia.  Pt and sister are unable to provide exact details.  Pt has lived in [facility] since 2019, some type of group home setting prior to that as per sister.  Pt currently followed by Dr. Moeller onsite at [facility] [phone]

## 2022-07-25 NOTE — BH INPATIENT PSYCHIATRY PROGRESS NOTE - NSBHMETABOLIC_PSY_ALL_CORE_FT
BMI:   HbA1c: A1C with Estimated Average Glucose Result: 5.8 % (07-07-22 @ 05:30)    Glucose: POCT Blood Glucose.: 127 mg/dL (07-25-22 @ 12:15)    BP: 129/73 (07-22-22 @ 22:20) (129/73 - 129/73)  Lipid Panel:  BMI:   HbA1c: A1C with Estimated Average Glucose Result: 5.8 % (07-07-22 @ 05:30)    Glucose: POCT Blood Glucose.: 98 mg/dL (07-25-22 @ 16:36)    BP: 129/73 (07-22-22 @ 22:20) (129/73 - 129/73)  Lipid Panel:

## 2022-07-25 NOTE — BH INPATIENT PSYCHIATRY PROGRESS NOTE - OTHER
moderate TD of jaw, tongue, legs bilaterally; no catatonia signs noted laconic impulsive hx near flat stutter  [place]

## 2022-07-25 NOTE — BH SOCIAL WORK INITIAL PSYCHOSOCIAL EVALUATION - NSPASTPSYCHHX_PSY_ALL_CORE
MEDICARE WELLNESS VISIT NOTE      HISTORY OF PRESENT ILLNESS:   Abeba Quispe presents for her Subsequent Annual Medicare Wellness Visit.   She has no current complaints or concerns.      Patient Care Team:  Karla Gordon MD as PCP - General (Internal Medicine)  Toney Ling MD as Referring Provider (Psychiatry)  Other Other (Dentist - General Practice)  Cody Nieves MD (Ophthalmology)  Kortney Ignacio MD (General Surgery)  Bari Antonio CNP as Referring Provider (General Practice)  Tres Garcia MD (Orthopedic Surgery)  TERI Banks as Nurse Practitioner (Nurse Practitioner)        Patient Active Problem List   Diagnosis   • Primary open-angle glaucoma, bilateral, mild stage   • Dry eye syndrome   • Immature cataract   • Postmenopausal disorder   • Osteopenia   • Insomnia   • Hypothyroidism   • Hiatal hernia   • Cataract, immature   • Numbness and tingling of left arm and leg   • Left leg weakness   • Type 2 diabetes with nephropathy (CMS/HCC)   • Chronic kidney disease (CKD), stage III (moderate) (CMS/[facility])   • Gastroesophageal reflux disease without esophagitis   • Esophageal dysphagia   • Anxiety   • Combined form of senile cataract   • Endometrial carcinoma (CMS/[facility])   • Generalized anxiety disorder   • Glaucoma suspect   • Neurocognitive disorder   • Postartificial menopausal syndrome   • Dyslipidemia, goal LDL below 70         Past Medical History:   Diagnosis Date   • Anxiousness    • COAG (chronic open-angle glaucoma)    • Combined form of senile cataract    • Depression    • Diabetes mellitus (CMS/[facility])    • Endometrial cancer (CMS/[facility])    • Gastritis    • Gastroesophageal reflux disease     hiatal hernia   • Glaucoma    • Hiatal hernia    • Hypothyroidism 9/5/2014   • Insomnia    • Insomnia 9/5/2014   • Left leg weakness    • Neurocognitive disorder    • Osteopenia 9/5/2014   • Plantar fasciitis resolved   • Postmenopausal disorder 9/5/2014   • Urinary incontinence    •  Urinary tract infection          Past Surgical History:   Procedure Laterality Date   •  delivery+postpartum care         • Colonoscopy  2017    Dr. Townsend, Screening colonoscopy in 10 years time   • Colonoscopy  10/28/2014    Dr. Adler   • Colonoscopy diagnostic  04/15/2009   • Cyst removal  2018    infected back cyst   • Cysto transresect incise ejac ducts     • Egd  03/15/2011    EGD with bx 3/15/11   • Egd  10/28/2014   • Esophageal dilation  2002   • Esophagogastroduodenoscopy  2019    Dr. Townsend    • Foot surgery     • Hysterectomy  2007    done for endometrial cancer   • Laparoscopic nissen fundoplication  1/30/15   • Posterior fossa decompression      Chiari malformation   • Removal gallbladder     • Xray mammogram screening bilat  2011         Social History     Tobacco Use   • Smoking status: Never Smoker   • Smokeless tobacco: Never Used   Substance Use Topics   • Alcohol use: No     Alcohol/week: 0.0 standard drinks   • Drug use: No     Drug use:    Drug Use:    No              Family History - Reviewed    Current Outpatient Medications   Medication Sig Dispense Refill   • trospium (SANCTURA) 20 MG tablet Take 1 tablet by mouth daily. 90 tablet 3   • amLODIPine (NORVASC) 5 MG tablet Take 1 tablet by mouth daily. 90 tablet 0   • albuterol 108 (90 Base) MCG/ACT inhaler Inhale 2 puffs into the lungs every 4 hours as needed for Shortness of Breath or Wheezing. 1 Inhaler 0   • Spacer/Aero-Holding Chambers (E-Z Spacer) Device Dispense one smallest spacer that is covered by her insurance. To be used with an albuterol inhaler. 1 Device 0   • traZODone (DESYREL) 50 MG tablet Take 50 mg by mouth nightly.     • Fish Oil Oil 250 Units daily.      • ziprasidone (GEODON) 20 MG capsule Take 20 mg by mouth 2 times daily (with meals).     • Multiple Vitamins-Minerals (MULTIVITAMIN PO) Take  by mouth daily.     • Glucosamine Sulfate 1000 MG CAPS Take 1 capsule by  mouth daily.     • LECITHIN PO Take 1 tablet by mouth daily.     • Coenzyme Q10 (CO Q-10) 100 MG CAPS Take 1 capsule by mouth daily.     • levothyroxine (SYNTHROID, LEVOTHROID) 75 MCG tablet Take 75 mcg by mouth daily.     • ALPRAZolam (XANAX) 0.5 MG tablet Take 0.5 mg by mouth daily.      • amitriptyline (ELAVIL) 10 MG tablet Take 10 mg by mouth nightly.     • Calcium-Vitamin D 600-200 MG-UNIT TABS Take 1 tablet by mouth 3 times daily.     • b complex vitamins tablet Take 3 tablets by mouth daily.     • omeprazole (PRILOSEC) 20 MG capsule Take 1 capsule by mouth daily.     • atorvastatin (Lipitor) 10 MG tablet Take 1 tablet by mouth daily. 30 tablet 2   • benzonatate (TESSALON PERLES) 200 MG capsule Take 1 capsule by mouth 3 times daily as needed for Cough. 30 capsule 0   • fluticasone (FLONASE) 50 MCG/ACT nasal spray SPRAY 2 SPRAYS INTO EACH NOSTRIL EVERY DAY 16 mL 1     No current facility-administered medications for this visit.         The following items on the Medicare Health Risk Assessment were found to be positive  4.) During the past 4 weeks, what was the hardest physical activity you could do for at least 2 minutes?: Light     6 a.) How many servings of Fruits and Vegetables do you have each day ( 1 serving = 1 piece of fruit, 1/2 cup fruits or vegetables): 1 per day     6 b.) How many servings of High Fiber / Whole Grain Foods to you have each day ( 1 serving = 1 cup cold cereal, 1/2 cup cooked cereal, 1 slice bread): 1 per day         Vision and Hearing screens: performed and reviewed    Advance Directive:   The patient has the following documents:  Power of  for Health Care  Living Will (Declaration for Physicians)    Cognitive Assessment: no evidence of cognitive dysfunction by direct observation    Recent PHQ 2/9 Score    PHQ 2:  Date Adult PHQ 2 Score Adult PHQ 2 Interpretation   1/15/2021 1 No further screening needed       PHQ 9:  Date Adult PHQ 9 Score Adult PHQ 9 Interpretation    1/24/2020 5 Mild Depression       DEPRESSION ASSESSMENT/PLAN:  Depression managed by psychiatry.      Body mass index is 27.12 kg/m².    BMI ASSESSMENT/PLAN:  Patient is overweight.    45  minutes of physical activity a day        Needed Screening/Treatment:   None  Needed follow up:  None    See orders.   See Patient Instructions section.   Return in about 1 year (around 1/15/2022) for Medicare Wellness Visit.     HISTORY OF PRESENT ILLNESS:   The patient is a 71 year old female, a patient of Karla Gordon MD, come in for the above mentioned problems.    1- Reflux disease. Currently on omeprazole. Denies any substernal   burning sensation or acidic taste in the mouth.  2- Chronic kidney disease, currently stable with frequent monitoring, avoids  NSAIDs.  Established with Nephrology  3- Diabetes mellitus. - patient does not do home blood glucose monitoring.  Currently not on medication.  Patient states that she is on strict 1800 ADA diet along with regular exercise activity. Denies any hypoglycemic episodes. Up to date on her  ophthalmologist and dentist appointments.  4- Hypothyroidism. Currently, the patient states her energy level is pretty  good. Denies any heat or cold intolerance. Denies any diarrhea or  Constipation. Compliant with med's, up to date on TSH tesing  5- history of endometrial cancer-status post hysterectomy- currently not following up with gynecology.  Patient denies any problems with abdominal pain, vaginal discharge or bleeding          PHYSICAL EXAMINATION:  VITALS:   Visit Vitals  /66   Pulse 100   Ht 5' 4\" (1.626 m)   Wt 71.7 kg   SpO2 93%   BMI 27.12 kg/m²     GENERAL:  Pt is awake, alert, oriented x 3, not in any apparent distress.  No dyspnea,no distress present.  HEENT:   PERRLA.  Conjunctivae show no icterus.  Throat shows moist mucous membranes.  No erythema or congestion present.  NECK:  Supple.  No JVD, no lymphadenopathy present.  CARDIOVASCULAR SYSTEM:  S1, S2 heard,  regular rate and rhythm.  No murmur.  RESPIRATORY SYSTEM:  Lungs clear to auscultation.  No rhonchi or wheezing heard.  Bilateral air entry good.  GASTROINTESTINAL SYSTEM:  Abdomen soft.  Bowel sounds heard.  No tenderness.  No hepatomegaly or splenomegaly present.  MUSCULOSKELETAL:  Bilateral extremities show no signs of edema, cyanosis or clubbing present.    Lab Services on 01/11/2021   Component Date Value Ref Range Status   • TSH 01/11/2021 0.686  0.350 - 5.000 mcUnits/mL Final    Findings most consistent with euthyroid state, no additional testing suggested. TSH may be normal in patients with thyroid dysfunction and pituitary disease. Clinical correlation recommended.    (Reflex TSH algorithm is not recommended in hospitalized patients. A variety of drugs, as well as serious acute and chronic illnesses may alter thyroid function tests. Commonly implicated drugs include glucocorticoids, dopamine, carbamazepine, iodine, amiodarone, lithium and heparin.)   • Fasting Status 01/11/2021 12  Hours Final   • Cholesterol 01/11/2021 187  <=199 mg/dL Final   • Triglycerides 01/11/2021 74  <=149 mg/dL Final   • HDL 01/11/2021 99  >=50 mg/dL Final   • LDL 01/11/2021 73  <=129 mg/dL Final    OPTIMAL           <100  NEAR OPTIMAL      100 to 129  BORDERLINE HIGH   130 to 159  HIGH              160 to 189  VERY HIGH         >=190   • Non-HDL Cholesterol 01/11/2021 88  mg/dL Final    Therapeutic Target:  CHD and risk equivalents  <130  Multiple risk factors     <160  0 to 1 risk factor        <190   • Cholesterol/ HDL Ratio 01/11/2021 1.9  <=4.4 Final   • Fasting Status 01/11/2021 12  Hours Final   • Sodium 01/11/2021 143  135 - 145 mmol/L Final   • Potassium 01/11/2021 3.7  3.4 - 5.1 mmol/L Final   • Chloride 01/11/2021 104  98 - 107 mmol/L Final   • Carbon Dioxide 01/11/2021 27  21 - 32 mmol/L Final   • Anion Gap 01/11/2021 16  10 - 20 mmol/L Final   • Glucose 01/11/2021 116* 65 - 99 mg/dL Final   • BUN 01/11/2021 22* 6 - 20  mg/dL Final   • Creatinine 01/11/2021 1.48* 0.51 - 0.95 mg/dL Final   • Glomerular Filtration Rate 01/11/2021 36* >90 mL/min/1.73m2 Final    eGFR 30-59 mL/min/1.73m2 = Moderate decrease in kidney function. Stage 3 CKD (chronic kidney disease) or moderate kidney disease.   • BUN/ Creatinine Ratio 01/11/2021 15  7 - 25 Final   • Calcium 01/11/2021 9.6  8.4 - 10.2 mg/dL Final   • Bilirubin, Total 01/11/2021 0.4  0.2 - 1.0 mg/dL Final   • GOT/AST 01/11/2021 26  <=37 Units/L Final   • GPT/ALT 01/11/2021 26  <64 Units/L Final   • Alkaline Phosphatase 01/11/2021 104  45 - 117 Units/L Final   • Albumin 01/11/2021 3.6  3.6 - 5.1 g/dL Final   • Protein, Total 01/11/2021 7.5  6.4 - 8.2 g/dL Final   • Globulin 01/11/2021 3.9  2.0 - 4.0 g/dL Final   • A/G Ratio 01/11/2021 0.9* 1.0 - 2.4 Final   • Hemoglobin A1C 01/11/2021 6.3* 4.5 - 5.6 % Final      Diabetic Screening  Non Diabetic:             <5.7%  Increased Risk:           5.7-6.4%  Diagnostic For Diabetes:  >6.4%    Diabetic Control  A1C%       eAG mg/dL  6.0            126  6.5            140  7.0            154  7.5            169  8.0            183  8.5            197  9.0            212  9.5            226  10.0           240       ASSESSMENT:  1. Gastroesophageal reflux disease without esophagitis    2. Stage 3a chronic kidney disease (CMS/HCC)    3. Type 2 diabetes with nephropathy (CMS/HCC)    4. Congenital hypothyroidism without goiter    5. Endometrial carcinoma (CMS/HCC)    6. Medicare annual wellness visit, subsequent    7. Postmenopausal status    8. Dyslipidemia, goal LDL below 70      1- Reflux disease. Symptoms well controlled on omeprazole. Continue the   same  2- Chronic kidney disease, currently stable. Repeat BMP in 6 months'  duration. Continue to avoid NSAIDs.  Patient will continue to follow-up with nephrology  3-  Diabetes Mellitus-Blood sugars are under pretty good control. Last  hemoglobin A1c at 6.3. The patient will continue the same diet.   Continue 1800 ADA diet along with regular exercise activity.  Repeat A1 c in 6 months  4- Hypothyroidism. Symptoms under pretty good control. Continue on the  same dose of  Levothyroxine. Repeat TSH in 6 months duration.  5- history of endometrial cancer- patient has been recommended to follow up with [facility].  Currently asymptomatic  6- dyslipidemia- uncontrolled with high LDL.  Patient has been recommended Lipitor 10 mg  daily.  Side effects of the medication explained to the patient and written information provided.  Repeat fasting cholesterol and liver panel in 3 months  PLAN:  Orders Placed This Encounter   • OFFICE/OUTPT VISIT EST LEVEL III   • Lipid Panel With Reflex   • Comprehensive Metabolic Panel   • BD Dexa Axial Skeleton   • atorvastatin (Lipitor) 10 MG tablet   • benzonatate (TESSALON PERLES) 200 MG capsule       Return in about 1 year (around 1/15/2022) for Medicare Wellness Visit.    Total time spent 40 minutes, greater than half of which was spent in counseling the patient regarding her multiple medical problems including GERD, chronic kidney disease, diabetes hypothyroidism and history of endometrial cancer   Yes

## 2022-07-25 NOTE — BH INPATIENT PSYCHIATRY PROGRESS NOTE - NSBHATTESTCOMMENTATTENDFT_PSY_A_CORE
Mr. Olivia is an 82M PMH CHF, COPD, DM, dementia, schizophrenia, HTN sent in from [facility] for dehydration and SI. Patient is a poor historian. Patient does report a PPHx of schizophrenia. Treated at [facility] for dehydration and catatonia, improved on Ativan and Invega Sustenna GARCIA.  Now admitted at [facility].  Denies SI on assessment. On initial encounter today pt is a poor historian, with paucity of speech and poverty of thought. Pt endorsing remote SI with thoughts of jumping into various bodies of water to drown himself but reports he never attempted. Pt unable to confirm or elaborate on recent hx of SI and stopping eating in the NH in order to end his life. Pt has been eating since arrival to  and today denies SIIP, Ah/VH/paranoid thoughts. In good behavioral control. Withdrawn. Pt going for cineesophagogram tomorrow. Currently on soft and bite sized diet with mildly thick liq as per recs from bedside swallow eval done at [facility]. Will continue current regimen.

## 2022-07-25 NOTE — BH SOCIAL WORK INITIAL PSYCHOSOCIAL EVALUATION - NSHIGHRISKBEHFT_PSY_ALL_CORE
pt currently with SI, with plan to stop po intake.  As per sister, pt has a history of throwing objects when angry.

## 2022-07-26 LAB
A1C WITH ESTIMATED AVERAGE GLUCOSE RESULT: 5.8 % — HIGH (ref 4–5.6)
ANION GAP SERPL CALC-SCNC: 13 MMOL/L — SIGNIFICANT CHANGE UP (ref 7–14)
BUN SERPL-MCNC: 23 MG/DL — SIGNIFICANT CHANGE UP (ref 7–23)
CALCIUM SERPL-MCNC: 9.9 MG/DL — SIGNIFICANT CHANGE UP (ref 8.4–10.5)
CHLORIDE SERPL-SCNC: 97 MMOL/L — LOW (ref 98–107)
CO2 SERPL-SCNC: 26 MMOL/L — SIGNIFICANT CHANGE UP (ref 22–31)
CREAT SERPL-MCNC: 0.95 MG/DL — SIGNIFICANT CHANGE UP (ref 0.5–1.3)
EGFR: 80 ML/MIN/1.73M2 — SIGNIFICANT CHANGE UP
ESTIMATED AVERAGE GLUCOSE: 120 — SIGNIFICANT CHANGE UP
FOLATE SERPL-MCNC: 13 NG/ML — SIGNIFICANT CHANGE UP (ref 3.1–17.5)
GLUCOSE BLDC GLUCOMTR-MCNC: 101 MG/DL — HIGH (ref 70–99)
GLUCOSE BLDC GLUCOMTR-MCNC: 111 MG/DL — HIGH (ref 70–99)
GLUCOSE BLDC GLUCOMTR-MCNC: 88 MG/DL — SIGNIFICANT CHANGE UP (ref 70–99)
GLUCOSE BLDC GLUCOMTR-MCNC: 94 MG/DL — SIGNIFICANT CHANGE UP (ref 70–99)
GLUCOSE SERPL-MCNC: 119 MG/DL — HIGH (ref 70–99)
MAGNESIUM SERPL-MCNC: 2.2 MG/DL — SIGNIFICANT CHANGE UP (ref 1.6–2.6)
PHOSPHATE SERPL-MCNC: 3.6 MG/DL — SIGNIFICANT CHANGE UP (ref 2.5–4.5)
POTASSIUM SERPL-MCNC: 4 MMOL/L — SIGNIFICANT CHANGE UP (ref 3.5–5.3)
POTASSIUM SERPL-SCNC: 4 MMOL/L — SIGNIFICANT CHANGE UP (ref 3.5–5.3)
SODIUM SERPL-SCNC: 136 MMOL/L — SIGNIFICANT CHANGE UP (ref 135–145)
VIT B12 SERPL-MCNC: 805 PG/ML — SIGNIFICANT CHANGE UP (ref 200–900)

## 2022-07-26 PROCEDURE — 74230 X-RAY XM SWLNG FUNCJ C+: CPT | Mod: 26

## 2022-07-26 PROCEDURE — 99232 SBSQ HOSP IP/OBS MODERATE 35: CPT

## 2022-07-26 RX ADMIN — BUDESONIDE AND FORMOTEROL FUMARATE DIHYDRATE 2 PUFF(S): 160; 4.5 AEROSOL RESPIRATORY (INHALATION) at 21:47

## 2022-07-26 RX ADMIN — BUDESONIDE AND FORMOTEROL FUMARATE DIHYDRATE 2 PUFF(S): 160; 4.5 AEROSOL RESPIRATORY (INHALATION) at 10:17

## 2022-07-26 RX ADMIN — Medication 0: at 21:30

## 2022-07-26 RX ADMIN — METFORMIN HYDROCHLORIDE 500 MILLIGRAM(S): 850 TABLET ORAL at 21:47

## 2022-07-26 RX ADMIN — Medication 0.5 MILLIGRAM(S): at 17:26

## 2022-07-26 RX ADMIN — Medication 20 MILLIGRAM(S): at 10:16

## 2022-07-26 NOTE — BH INPATIENT PSYCHIATRY PROGRESS NOTE - NSBHCHARTREVIEWVS_PSY_A_CORE FT
Vital Signs Last 24 Hrs  T(C): 36.2 (07-26-22 @ 07:46), Max: 36.2 (07-26-22 @ 07:46)  T(F): 97.2 (07-26-22 @ 07:46), Max: 97.2 (07-26-22 @ 07:46)  HR: --  BP: --  BP(mean): --  RR: --  SpO2: --    Orthostatic VS  07-26-22 @ 10:38  Lying BP: --/-- HR: --  Sitting BP: 122/72 HR: 76  Standing BP: 90/66 HR: 84  Site: upper left arm  Mode: auscultated w/ stethoscope  Orthostatic VS  07-26-22 @ 08:30  Lying BP: --/-- HR: --  Sitting BP: 120/62 HR: 74  Standing BP: 110/64 HR: 72  Site: upper left arm  Mode: electronic  Orthostatic VS  07-26-22 @ 07:46  Lying BP: --/-- HR: --  Sitting BP: 125/56 HR: 76  Standing BP: 99/51 HR: 85  Site: upper left arm  Mode: electronic  Orthostatic VS  07-25-22 @ 07:49  Lying BP: --/-- HR: --  Sitting BP: 124/70 HR: 71  Standing BP: 118/61 HR: 68  Site: upper left arm  Mode: electronic

## 2022-07-26 NOTE — BH INPATIENT PSYCHIATRY PROGRESS NOTE - NSBHFUPINTERVALHXFT_PSY_A_CORE
82 y.o. male being seen for SI and psychosis. No significant overnight events reported by staff. Pt has not required PRN medications and is accepting his meds. As per staff he has been eating his meals. Pt went for Cinesophagram this morning and a minced and moist diet with moderately thickened liquids was recommended. Pt seen in the unit during rounds after he returned from his study. Pt was calm and cooperative. Allowed for bloodwork to be done. Pt stated he felt the study "went well" and asked writer if he could eat breakfast. Pt denied any concerns. He was noted to be more visible in the unit today when compared to yesterday. He was in the dayroom watching television in the afternoon.     Attempted to contact [facility] [phone], however was wrong number. Attempted to contact at [phone], however no answer at nursing office. Left voicemail.    Attempted to contact Dr. Moeller, [phone], no answer.

## 2022-07-26 NOTE — BH INPATIENT PSYCHIATRY PROGRESS NOTE - NSBHASSESSSUMMFT_PSY_ALL_CORE
82M PMH CHF, COPD, DM, dementia, schizophrenia, HTN sent in from [facility] for dehydration and SI. Patient is a poor historian. Patient does report a PPHx of schizophrenia. Treated at [facility] for dehydration and catatonia, improved on Ativan and Invega Sustenna GARCIA.  Now admitted at [facility].  Denies SI on assessment.      Legal: 9.27    Psychiatry: Pt on Invega Sustenna 156mg IM last received 7/19/22. Next Dose 8/3/22 per documentation. unclear if loading or maintenance dose. Paxil 20mg PO daily, Ativan 0.5mg PO q4PM    Psychotherapy: Individual, group and milieu therapy as appropriate.    Medical: Na improved today (7/26) at 136, will monitor again in 1 week. Continue Symbicort, Metformin 500mg BID, Norvasc 10mg PO daily. Continue follow up with PT. Speech & Swallow eval completed this morning. Diet changed to minced and moist with moderately thickened liquids as per recommendations.    Dispo: Nursing home when stable.

## 2022-07-26 NOTE — BH INPATIENT PSYCHIATRY PROGRESS NOTE - NSBHMETABOLIC_PSY_ALL_CORE_FT
BMI:   HbA1c: A1C with Estimated Average Glucose Result: 5.8 % (07-26-22 @ 09:35)    Glucose: POCT Blood Glucose.: 111 mg/dL (07-26-22 @ 12:13)    BP: --  Lipid Panel:

## 2022-07-26 NOTE — LEGAL STATUS PROGRESS NOTE - NSEFFECTIVEDATE_PSY_ALL_CORE
SHARA ELLER  : 1958  ACCOUNT:  [id number]  [phone]  PCP: Dr. Stephanie Dressler     TODAY'S DATE: 04/10/2018  DICTATED BY:  [JOHN Toth]      CHIEF COMPLAINT: [Followup of CAD, of native vessels and Followup of History of CABG.]    HPI:    [On 04/10/2018, Shara Eller, a 60 year old female, presented with no interim cardiac complaints.]    Patient is here for follow-up after hospital admission for coronary artery disease.  Patient was found to have an abnormal Ultrafast CT underwent cardiac catheterization and then subsequent bypass surgery.  On  she had a double vessel bypass surgery with a LIMA to the LAD and a saphenous vein graft to the RCA.  Postoperatively she had an unremarkable course.  She has been able to increase her activity without much difficulty.  She has had a persistent cough that has been nonproductive.  But that continues to improve.  She denies any shortness of breath, chest pain, dizziness and/or palpitations.  She did have some bleeding problems postoperatively from her colitis.  This is now since resolved as well.  She has had no fever or chills.  Appetite is been fair.  Her blood pressure remains on the lower side and she has been off her antihypertensives.    Patient had recently seen Dr. Navarro and was started on neuropathic.  She is given herself 1 injection she is samples for 1 more injection and is awaiting insurance approval.        RISK FACTORS:  CAD - Hypertension Weight    PAST HISTORY: dyslipidemia, pre-DM  and tonsillectomy Over 20 years ago    PAST CV HISTORY: 2 vessel, LIMA to LAD, SVG to RCA on 3/22/2018, cardiac catheterization 2018, dyslipidemia, hypertension, questionable murmur, cholesterol 308,  in  and stress test 5-10 years ago    SOCIAL HISTORY: SMOKING: Never used tobacco. denies smoking. CAFFEINE: decaf coffee. ALCOHOL: 2 per week. EXERCISE: no regular exercise. DIET: no special diet. MARITAL STATUS: .  LIFESTYLE: active lifestyle. EDUCATION: post graduate degree. OCCUPATION:  and HR at [employer].     ALLERGIES: No Known Allergies    MEDICATIONS: Selected prescriptions see below    VITAL SIGNS: [B/P - 108/60 , Pulse - 68, Weight -  168, Height -   62 , BMI - 30.7 ]    CONS: comfortable. WEIGHT: Discussed and diet, exercise program prescribed. HEAD/FACE: no trauma and normocephalic. EYES: no arcus senilis. ENT: mucosa pink and moist. NECK: jugular venous pressure not elevated. RESP: respirations with normal rate and rhythm, clear to auscultation. CHEST/BREASTS: healing sternotomy. GI: no masses, tenderness or hepatosplenomegaly, rectal deferred and central adiposity. MS: adequate gait for exercise/testing. EXT: no xanthomas.  SKIN: no rashes, lesions, ulcers.  NEURO/PSYCH: alert and oriented to time, place and person and normal affect.      CV: PALP: PMI not displaced, no lifts and thrills or rub. AUSC:  regular rhythm, normal S1, S2 without S3; no pathologic murmurs. CAROTIDS: carotid pulses normal. PEDAL: pedal pulses intact. EXT: no peripheral edema and left achilles tendon Xanthoma.     LABORATORY DATA: [April second 2008  Vitamin D 26.8  Hemoglobin 9.2  Platelets 475  Ferritin 157  BUN 10  Creatinine 0.79  Glucose 106  AST 22  ALT 20  Potassium is 3.4  April 9, 2018 chest x-ray  Small bilateral pleural effusions left greater than right]    DECISION MAKING:    New diagnosis of coronary artery disease no status post bypass surgery.  Normal LV function.  Incisions are healing up nicely.  Patient is ready to proceed with a stress test and phase 2 cardiac rehab.  Patient to remain on aspirin therapy.  Hypertension blood pressures continue to run on the low side patient will remain off antihypertensives at this time.  May need to consider reinitiation of ACE inhibitor and beta-blocker in the future.  Familial hypercholesteremia patient met with Rico GRAY today and regarding the status of her  20-Jul-2022 repass a prescription.  Fasting labs per Dr. Navarro      ASSESSMENT:  1. CAD, of native vessels  2. History of CABG, 2 vessel, LIMA to LAD, SVG to RCA on 3/22/2018  3. Hypercholesterolemia, familial  4. Hypertension, benign  5. Metabolic syndrome  6. Obesity      PLAN:  [[1.  Continue on current medications,   2.  Proceed with Phase2 Cardiac Rehab after regular stress test  3.  Low fat, low cholesterol diet.  4.  Contact the office with any new or worsening symptoms.  5.  Follow-up in 2 months with Dr. Deric Guerrier.  6.  Home blood pressure monitoring, notify the office of systolic blood pressure averages greater than 140]    JOHN Toth]    PRESCRIPTIONS:   04/10/18 ALPRAZolam            0.25MG    1 tablet as needed                       04/10/18 Furosemide            20MG      1 tablet daily for 14 days               04/10/18 Krill Oil             1000MG    1 capsule daily                          04/10/18 Metoprolol Succinate E25MG      1 tablet daily                           02/16/18 Crestor               40MG      1 TABLET DAILY.                          02/16/18 Repatha SureClick     140MG/ML  INJECT 1 ML SUBCUTANEOUSLY EVERY OTH

## 2022-07-26 NOTE — BH INPATIENT PSYCHIATRY PROGRESS NOTE - OTHER
stutter  moderate TD of jaw, tongue, legs bilaterally; no catatonia signs noted impulsive hx [place]

## 2022-07-27 LAB
GLUCOSE BLDC GLUCOMTR-MCNC: 120 MG/DL — HIGH (ref 70–99)
GLUCOSE BLDC GLUCOMTR-MCNC: 75 MG/DL — SIGNIFICANT CHANGE UP (ref 70–99)
GLUCOSE BLDC GLUCOMTR-MCNC: 88 MG/DL — SIGNIFICANT CHANGE UP (ref 70–99)
GLUCOSE BLDC GLUCOMTR-MCNC: 94 MG/DL — SIGNIFICANT CHANGE UP (ref 70–99)
SARS-COV-2 RNA SPEC QL NAA+PROBE: SIGNIFICANT CHANGE UP

## 2022-07-27 PROCEDURE — 99232 SBSQ HOSP IP/OBS MODERATE 35: CPT | Mod: GC

## 2022-07-27 RX ORDER — MIRTAZAPINE 45 MG/1
7.5 TABLET, ORALLY DISINTEGRATING ORAL AT BEDTIME
Refills: 0 | Status: DISCONTINUED | OUTPATIENT
Start: 2022-07-27 | End: 2022-08-02

## 2022-07-27 RX ADMIN — BUDESONIDE AND FORMOTEROL FUMARATE DIHYDRATE 2 PUFF(S): 160; 4.5 AEROSOL RESPIRATORY (INHALATION) at 20:22

## 2022-07-27 RX ADMIN — MIRTAZAPINE 7.5 MILLIGRAM(S): 45 TABLET, ORALLY DISINTEGRATING ORAL at 20:23

## 2022-07-27 NOTE — BH INPATIENT PSYCHIATRY PROGRESS NOTE - NSBHCHARTREVIEWVS_PSY_A_CORE FT
Vital Signs Last 24 Hrs  T(C): 36.7 (07-27-22 @ 08:00), Max: 36.7 (07-27-22 @ 08:00)  T(F): 98 (07-27-22 @ 08:00), Max: 98 (07-27-22 @ 08:00)  HR: --  BP: --  BP(mean): --  RR: --  SpO2: --    Orthostatic VS  07-27-22 @ 08:00  Lying BP: --/-- HR: --  Sitting BP: 115/70 HR: 75  Standing BP: 83/59 HR: 83  Site: upper left arm  Mode: electronic  Orthostatic VS  07-26-22 @ 10:38  Lying BP: --/-- HR: --  Sitting BP: 122/72 HR: 76  Standing BP: 90/66 HR: 84  Site: upper left arm  Mode: auscultated w/ stethoscope  Orthostatic VS  07-26-22 @ 08:30  Lying BP: --/-- HR: --  Sitting BP: 120/62 HR: 74  Standing BP: 110/64 HR: 72  Site: upper left arm  Mode: electronic  Orthostatic VS  07-26-22 @ 07:46  Lying BP: --/-- HR: --  Sitting BP: 125/56 HR: 76  Standing BP: 99/51 HR: 85  Site: upper left arm  Mode: electronic

## 2022-07-27 NOTE — BH INPATIENT PSYCHIATRY PROGRESS NOTE - NSBHATTESTCOMMENTATTENDFT_PSY_A_CORE
Mr. Olivia refused breakfast and lunch today. On encounter he was lying in bed, refusing to come out of his room for activities or meals. Pt endorsed feeling depressed and wanting to die. Pt states he has chosen not to eat in order to end his life. Unable to contract for safety. Will continue encouraging PO intake, ensures. D/C insulin sliding scale in the context of no PO intake. Will continue fingersticks and ordered bloodwork for tomorrow. If pt's presents hypoglycemia or signs of dehydration will transfer to the ED.

## 2022-07-27 NOTE — BH INPATIENT PSYCHIATRY PROGRESS NOTE - NSBHASSESSSUMMFT_PSY_ALL_CORE
82M PMH CHF, COPD, DM, dementia, schizophrenia, HTN sent in from [facility] for dehydration and SI. Patient is a poor historian. Patient does report a PPHx of schizophrenia. Treated at [facility] for dehydration and catatonia, improved on Ativan and Invega Sustenna GARCIA.  Now admitted at [facility].  Denies SI on assessment.      7/27: Pt presents as depressed, with SI with plan to stop eating. Will monitor intake and fingersticks. CO1:1 for safety & fall risk in light of positive orthostatics.     Legal: 9.27    Safety: in light of verbalized SI, will place on CO1:1 for suicidal & fall precautions. Reassess in 24 hrs.    Psychiatry: Pt on Invega Sustenna 156mg IM last received 7/3/22 in NH. Next Dose 8/3/22 per documentation. unclear if loading or maintenance dose. Decrease Paxil to 10mg and cross taper with Remeron 7.5mg PO HS to help with depression and appetite stimulation.     Psychotherapy: Individual, group and milieu therapy as appropriate.    Medical: Continue Symbicort, Metformin 500mg BID, Norvasc 10mg PO daily. Monitor oral intake and fingersticks to assess for hypoglycemia. Sliding scale coverage discontinued.     Dispo: Nursing home when stable.

## 2022-07-27 NOTE — CHART NOTE - NSCHARTNOTEFT_GEN_A_CORE
Reviewed recent labs as well as FS. It has been trending on the low normal side.   Recent A1c 5.8.    In this elderly patient, w/ history of poor PO intake, would err on side of caution and DC insulin sliding scale. Would prefer to avoid tight control. Ok to continue metformin.    Closely monitor PO intake. Pls call us with any questions.

## 2022-07-27 NOTE — BH INPATIENT PSYCHIATRY PROGRESS NOTE - NSBHFUPINTERVALHXFT_PSY_A_CORE
82 y.o. male being seen for SI and psychosis. No significant overnight events reported by staff. Pt has not required PRN medications. Per staff, pt refused his medications this morning. During evaluation, pt was lying in bed and had not had breakfast this morning. Fingersticks WNL. Pt reported feeling depressed, and did not eat because he did not want to live. When asked if he would do anything else to kill himself, pt reported "I don't know". Pt had short limited responses. Denied AVH. Encouraged pt to go to patio and/or do activities, however pt declined. Pt denied any pain or other discomfort. Pt declined any intervention to further engage pt.

## 2022-07-27 NOTE — BH INPATIENT PSYCHIATRY PROGRESS NOTE - OTHER
passive [place] impulsive hx moderate TD of jaw, tongue, legs bilaterally; no catatonia signs noted stutter

## 2022-07-27 NOTE — BH INPATIENT PSYCHIATRY PROGRESS NOTE - CURRENT MEDICATION
MEDICATIONS  (STANDING):  amLODIPine   Tablet 10 milliGRAM(s) Oral daily  budesonide  80 MICROgram(s)/formoterol 4.5 MICROgram(s) Inhaler 2 Puff(s) Inhalation two times a day  dextrose 5%. 1000 milliLiter(s) (50 mL/Hr) IV Continuous <Continuous>  dextrose 5%. 1000 milliLiter(s) (100 mL/Hr) IV Continuous <Continuous>  dextrose 50% Injectable 25 Gram(s) IV Push once  dextrose 50% Injectable 12.5 Gram(s) IV Push once  dextrose 50% Injectable 25 Gram(s) IV Push once  glucagon  Injectable 1 milliGRAM(s) IntraMuscular once  LORazepam     Tablet 0.5 milliGRAM(s) Oral <User Schedule>  metFORMIN 500 milliGRAM(s) Oral two times a day  mirtazapine 7.5 milliGRAM(s) Oral at bedtime  PARoxetine 10 milliGRAM(s) Oral daily    MEDICATIONS  (PRN):  dextrose Oral Gel 15 Gram(s) Oral once PRN Blood Glucose LESS THAN 70 milliGRAM(s)/deciliter  LORazepam     Tablet 0.5 milliGRAM(s) Oral every 4 hours PRN agitation due to catatonia  OLANZapine Injectable 2.5 milliGRAM(s) IntraMuscular once PRN agitation

## 2022-07-27 NOTE — BH INPATIENT PSYCHIATRY PROGRESS NOTE - NSBHMETABOLIC_PSY_ALL_CORE_FT
BMI:   HbA1c: A1C with Estimated Average Glucose Result: 5.8 % (07-26-22 @ 09:35)    Glucose: POCT Blood Glucose.: 120 mg/dL (07-27-22 @ 12:17)    BP: --  Lipid Panel:

## 2022-07-28 ENCOUNTER — EMERGENCY (EMERGENCY)
Facility: HOSPITAL | Age: 82
LOS: 1 days | Discharge: HOME | End: 2022-07-28
Attending: EMERGENCY MEDICINE | Admitting: EMERGENCY MEDICINE

## 2022-07-28 VITALS
DIASTOLIC BLOOD PRESSURE: 71 MMHG | RESPIRATION RATE: 16 BRPM | OXYGEN SATURATION: 98 % | HEART RATE: 56 BPM | TEMPERATURE: 98 F | SYSTOLIC BLOOD PRESSURE: 173 MMHG

## 2022-07-28 LAB
ANION GAP SERPL CALC-SCNC: 16 MMOL/L — HIGH (ref 7–14)
BUN SERPL-MCNC: 21 MG/DL — SIGNIFICANT CHANGE UP (ref 7–23)
CALCIUM SERPL-MCNC: 9.8 MG/DL — SIGNIFICANT CHANGE UP (ref 8.4–10.5)
CHLORIDE SERPL-SCNC: 98 MMOL/L — SIGNIFICANT CHANGE UP (ref 98–107)
CO2 SERPL-SCNC: 23 MMOL/L — SIGNIFICANT CHANGE UP (ref 22–31)
CREAT SERPL-MCNC: 0.81 MG/DL — SIGNIFICANT CHANGE UP (ref 0.5–1.3)
EGFR: 89 ML/MIN/1.73M2 — SIGNIFICANT CHANGE UP
GLUCOSE BLDC GLUCOMTR-MCNC: 121 MG/DL — HIGH (ref 70–99)
GLUCOSE BLDC GLUCOMTR-MCNC: 83 MG/DL — SIGNIFICANT CHANGE UP (ref 70–99)
GLUCOSE BLDC GLUCOMTR-MCNC: 90 MG/DL — SIGNIFICANT CHANGE UP (ref 70–99)
GLUCOSE BLDC GLUCOMTR-MCNC: 97 MG/DL — SIGNIFICANT CHANGE UP (ref 70–99)
GLUCOSE SERPL-MCNC: 130 MG/DL — HIGH (ref 70–99)
HCT VFR BLD CALC: 43.6 % — SIGNIFICANT CHANGE UP (ref 39–50)
HGB BLD-MCNC: 14.4 G/DL — SIGNIFICANT CHANGE UP (ref 13–17)
MCHC RBC-ENTMCNC: 32.1 PG — SIGNIFICANT CHANGE UP (ref 27–34)
MCHC RBC-ENTMCNC: 33 GM/DL — SIGNIFICANT CHANGE UP (ref 32–36)
MCV RBC AUTO: 97.1 FL — SIGNIFICANT CHANGE UP (ref 80–100)
NRBC # BLD: 0 /100 WBCS — SIGNIFICANT CHANGE UP
NRBC # FLD: 0 K/UL — SIGNIFICANT CHANGE UP
PLATELET # BLD AUTO: 286 K/UL — SIGNIFICANT CHANGE UP (ref 150–400)
POTASSIUM SERPL-MCNC: 3.9 MMOL/L — SIGNIFICANT CHANGE UP (ref 3.5–5.3)
POTASSIUM SERPL-SCNC: 3.9 MMOL/L — SIGNIFICANT CHANGE UP (ref 3.5–5.3)
RBC # BLD: 4.49 M/UL — SIGNIFICANT CHANGE UP (ref 4.2–5.8)
RBC # FLD: 12.9 % — SIGNIFICANT CHANGE UP (ref 10.3–14.5)
SODIUM SERPL-SCNC: 137 MMOL/L — SIGNIFICANT CHANGE UP (ref 135–145)
WBC # BLD: 5.51 K/UL — SIGNIFICANT CHANGE UP (ref 3.8–10.5)
WBC # FLD AUTO: 5.51 K/UL — SIGNIFICANT CHANGE UP (ref 3.8–10.5)

## 2022-07-28 PROCEDURE — 99284 EMERGENCY DEPT VISIT MOD MDM: CPT | Mod: GC

## 2022-07-28 RX ADMIN — Medication 0.5 MILLIGRAM(S): at 17:06

## 2022-07-28 RX ADMIN — METFORMIN HYDROCHLORIDE 500 MILLIGRAM(S): 850 TABLET ORAL at 09:55

## 2022-07-28 RX ADMIN — BUDESONIDE AND FORMOTEROL FUMARATE DIHYDRATE 2 PUFF(S): 160; 4.5 AEROSOL RESPIRATORY (INHALATION) at 09:55

## 2022-07-28 RX ADMIN — Medication 10 MILLIGRAM(S): at 09:56

## 2022-07-28 RX ADMIN — AMLODIPINE BESYLATE 10 MILLIGRAM(S): 2.5 TABLET ORAL at 09:55

## 2022-07-28 NOTE — BH CHART NOTE - NSEVENTNOTEFT_PSY_ALL_CORE
PHI called by nursing to renew CO for SI. Per last note, CO discontinued due to patient being able to contract for safety, denying SI. Communicated with nursing staff who state that discontinuation of CO was not communicated with them during day shift, requested repeat safety assessment. Upon approach, patient somewhat lethargic, but arousable. Patient refused to speak with interviewer, unable to assess for safety. Renewed CO for SI as unable to assess patient for SI at this moment. Discussed with nursing staff.

## 2022-07-28 NOTE — ED ADULT TRIAGE NOTE - CHIEF COMPLAINT QUOTE
Pt. is brought to [facility] ED by EMS [facility] for failure to thrive. Also, pt. has suicidal ideation. Loretta told EMS that patient says he doesn't want to live anymore. Pt. is A & O x 3. Has a lot of secretions in back of throat. No respiratory distress noted. Pt. denies homocidal ideation. Didn't respond to suicidal ideation questions.  performed by EMT's.  PMH: CHF, COPD, DM, dementia. NAD noted.

## 2022-07-28 NOTE — BH INPATIENT PSYCHIATRY PROGRESS NOTE - NSBHASSESSSUMMFT_PSY_ALL_CORE
82M PMH CHF, COPD, DM, dementia, schizophrenia, HTN sent in from [facility] for dehydration and SI. Patient is a poor historian. Patient does report a PPHx of schizophrenia. Treated at [facility] for dehydration and catatonia, improved on Ativan and Invega Sustenna GARCIA.  Now admitted at [facility].  Denies SI on assessment.      7/27: Pt presents as depressed, with SI with plan to stop eating. Will monitor intake and fingersticks. CO1:1 for safety & fall risk in light of positive orthostatics.     Legal: 9.27    Safety: in light of verbalized SI, will place on CO1:1 for suicidal & fall precautions. Reassess in 24 hrs.    Psychiatry: Pt on Invega Sustenna 156mg IM last received 7/3/22 in NH. Next Dose 8/3/22 per documentation. unclear if loading or maintenance dose. Decrease Paxil to 10mg and cross taper with Remeron 7.5mg PO HS to help with depression and appetite stimulation.     Psychotherapy: Individual, group and milieu therapy as appropriate.    Medical: Continue Symbicort, Metformin 500mg BID, Norvasc 10mg PO daily. Monitor oral intake and fingersticks to assess for hypoglycemia. Sliding scale coverage discontinued.     Dispo: Nursing home when stable.   82M PMH CHF, COPD, DM, dementia, schizophrenia, HTN sent in from [facility] for dehydration and SI. Patient is a poor historian. Patient does report a PPHx of schizophrenia. Treated at [facility] for dehydration and catatonia, improved on Ativan and Invega Sustenna GARCIA.  Now admitted at [facility].  Denies SI on assessment.      7/27: Pt presents as depressed, with SI with plan to stop eating. Will monitor intake and fingersticks. CO1:1 for safety & fall risk in light of positive orthostatics.   7/28: Pt resumed oral intake, less depressed. Denies SI. Labs reassuring. Will continue with current meds with plan to titrate Remeron and D/c Paxil.    Legal: 9.27    Safety: resumed oral intake, contracted for safety. D/C CO 1:1, resume q15 checks.    Psychiatry: Pt on Invega Sustenna 156mg IM last received 7/3/22 in NH. Next Dose 8/3/22 per documentation. unclear if loading or maintenance dose. Decrease Paxil to 10mg and cross taper with Remeron 7.5mg PO HS to help with depression and appetite stimulation.     Psychotherapy: Individual, group and milieu therapy as appropriate.    Medical: Continue Symbicort. Metformin 500mg BID, Norvasc 10mg PO daily were held yesterday due to poor oral intake. They were resumed today in light of increased BP and overall reassuring vitals. Monitor oral intake and fingersticks to assess for hypoglycemia. Will switch to BID fingersticks tomorrow if pt maintains regular oral intake.    Dispo: Remains fragile, needing inpatient care. Nursing home when stable.

## 2022-07-28 NOTE — BH INPATIENT PSYCHIATRY PROGRESS NOTE - OTHER
passive moderate TD of jaw, tongue, legs bilaterally; no catatonia signs noted [place] impulsive hx stutter

## 2022-07-28 NOTE — BH INPATIENT PSYCHIATRY PROGRESS NOTE - NSBHMETABOLIC_PSY_ALL_CORE_FT
BMI:   HbA1c: A1C with Estimated Average Glucose Result: 5.8 % (07-26-22 @ 09:35)    Glucose: POCT Blood Glucose.: 83 mg/dL (07-28-22 @ 08:09)    BP: --  Lipid Panel:

## 2022-07-28 NOTE — BH INPATIENT PSYCHIATRY PROGRESS NOTE - NSBHCHARTREVIEWVS_PSY_A_CORE FT
Vital Signs Last 24 Hrs  T(C): 36.7 (07-28-22 @ 09:04), Max: 36.7 (07-28-22 @ 09:04)  T(F): 98.1 (07-28-22 @ 09:04), Max: 98.1 (07-28-22 @ 09:04)  HR: --  BP: --  BP(mean): --  RR: --  SpO2: --    Orthostatic VS  07-28-22 @ 09:04  Lying BP: 155/75 HR: 71  Sitting BP: 139/73 HR: 92  Standing BP: --/-- HR: --  Site: --  Mode: --  Orthostatic VS  07-27-22 @ 08:00  Lying BP: --/-- HR: --  Sitting BP: 115/70 HR: 75  Standing BP: 83/59 HR: 83  Site: upper left arm  Mode: electronic

## 2022-07-28 NOTE — CHART NOTE - NSCHARTNOTEFT_GEN_A_CORE
[facility] Inpatient to ED Transfer Summary    Reason for Transfer/Medical Summary: Patient is a 82 year old male with PMHx of CHF, COPD, DM, dementia, schizophrenia, HTN. Patient admitted to [facility] with  recurrent major depressive disorder and SI on CO. Alerted by primary nurse with audible secretions.  Patient evaluated at bedside. Patient denies SOB or chest pain. On exam, patient is tachycardic, warm to touch and coughing with increased work of breathing and unable to clear secretions. Will transfer to ED to evaluate for possible aspiration pneumonia    PAST MEDICAL & SURGICAL HISTORY:  HTN (hypertension)    COPD, severity to be determined    DM (diabetes mellitus)    Schizophrenia    CHF (congestive heart failure)    No significant past surgical history    ALLERGIES:    Allergy Status Unknown    Intolerances    MEDICATIONS  (STANDING):  amLODIPine   Tablet 10 milliGRAM(s) Oral daily  budesonide  80 MICROgram(s)/formoterol 4.5 MICROgram(s) Inhaler 2 Puff(s) Inhalation two times a day  dextrose 5%. 1000 milliLiter(s) (100 mL/Hr) IV Continuous <Continuous>  dextrose 5%. 1000 milliLiter(s) (50 mL/Hr) IV Continuous <Continuous>  dextrose 50% Injectable 25 Gram(s) IV Push once  dextrose 50% Injectable 12.5 Gram(s) IV Push once  dextrose 50% Injectable 25 Gram(s) IV Push once  glucagon  Injectable 1 milliGRAM(s) IntraMuscular once  LORazepam     Tablet 0.5 milliGRAM(s) Oral <User Schedule>  metFORMIN 500 milliGRAM(s) Oral two times a day  mirtazapine 7.5 milliGRAM(s) Oral at bedtime  PARoxetine 10 milliGRAM(s) Oral daily    MEDICATIONS  (PRN):  dextrose Oral Gel 15 Gram(s) Oral once PRN Blood Glucose LESS THAN 70 milliGRAM(s)/deciliter  LORazepam     Tablet 0.5 milliGRAM(s) Oral every 4 hours PRN agitation due to catatonia  OLANZapine Injectable 2.5 milliGRAM(s) IntraMuscular once PRN agitation    CAPILLARY BLOOD GLUCOSE    POCT Blood Glucose.: 97 mg/dL (28 Jul 2022 20:25)  POCT Blood Glucose.: 90 mg/dL (28 Jul 2022 16:28)  POCT Blood Glucose.: 121 mg/dL (28 Jul 2022 12:13)  POCT Blood Glucose.: 83 mg/dL (28 Jul 2022 08:09)    Vital Signs Last 24 Hrs  T(C): 36.4 (28 Jul 2022 22:15), Max: 36.7 (28 Jul 2022 09:04)  T(F): 97.6 (28 Jul 2022 22:15), Max: 98.1 (28 Jul 2022 09:04)  HR: 109 (28 Jul 2022 22:15) (109 - 109)  BP: 132/88 (28 Jul 2022 22:15) (129/71 - 132/88)  BP(mean): --  RR: 20 (28 Jul 2022 22:15) (16 - 20)  SpO2: 97% (28 Jul 2022 22:15) (97% - 98%)    Parameters below as of 28 Jul 2022 22:15  Patient On (Oxygen Delivery Method): room air    PHYSICAL EXAM:  GENERAL: NAD, well-developed  HEAD:  Atraumatic, Normocephalic  EYES: EOMI, PERRLA, conjunctiva and sclera clear  NECK: Supple, No JVD  CHEST/LUNG: No wheezing or crackles noted. Auscultated lungs clear, diminished BS bilateral, respirations are even, unlabored on RA  HEART: Regular rate and rhythm; No murmurs, rubs, or gallops  ABDOMEN: Soft, Nontender, Nondistended; Bowel sounds present  EXTREMITIES:  2+ Peripheral Pulses, No clubbing, cyanosis, or edema  PSYCH: AAOx3  NEUROLOGY: non-focal  SKIN: No rashes or lesions    LABS:                        14.4   5.51  )-----------( 286      ( 28 Jul 2022 08:54 )             43.6     07-28    137  |  98  |  21  ----------------------------<  130<H>  3.9   |  23  |  0.81    Ca    9.8      28 Jul 2022 08:54    Psychiatry Section:  Psychiatric Summary/[facility] admitting diagnosis:    Psychiatric Recommendations:    Observation status (check one):   ( ) Constant Observation  ( ) Enhanced care  ( ) Routine checks    Risk Status (check all that apply if present):  ( ) at risk for suicide/self-injury  ( ) at risk for aggressive behavior  ( ) at risk for elopement  ( ) other risk: [facility] Inpatient to ED Transfer Summary    Reason for Transfer/Medical Summary: Patient is a 82 year old male with PMHx of CHF, COPD, DM, dementia, schizophrenia, HTN. Patient admitted to [facility] with  recurrent major depressive disorder and SI on CO. Alerted by primary nurse with audible secretions.  Patient evaluated at bedside. Patient denies SOB or chest pain. On exam, patient is tachycardic, warm to touch and coughing with increased work of breathing and unable to clear secretions. Will transfer to ED to evaluate for possible aspiration pneumonia and r/o COVID-19. Spoke to Zamzam at [facility] ED for transfer.     PAST MEDICAL & SURGICAL HISTORY:  HTN (hypertension)    COPD, severity to be determined    DM (diabetes mellitus)    Schizophrenia    CHF (congestive heart failure)    No significant past surgical history    ALLERGIES:    Allergy Status Unknown    Intolerances    MEDICATIONS  (STANDING):  amLODIPine   Tablet 10 milliGRAM(s) Oral daily  budesonide  80 MICROgram(s)/formoterol 4.5 MICROgram(s) Inhaler 2 Puff(s) Inhalation two times a day  dextrose 5%. 1000 milliLiter(s) (100 mL/Hr) IV Continuous <Continuous>  dextrose 5%. 1000 milliLiter(s) (50 mL/Hr) IV Continuous <Continuous>  dextrose 50% Injectable 25 Gram(s) IV Push once  dextrose 50% Injectable 12.5 Gram(s) IV Push once  dextrose 50% Injectable 25 Gram(s) IV Push once  glucagon  Injectable 1 milliGRAM(s) IntraMuscular once  LORazepam     Tablet 0.5 milliGRAM(s) Oral <User Schedule>  metFORMIN 500 milliGRAM(s) Oral two times a day  mirtazapine 7.5 milliGRAM(s) Oral at bedtime  PARoxetine 10 milliGRAM(s) Oral daily    MEDICATIONS  (PRN):  dextrose Oral Gel 15 Gram(s) Oral once PRN Blood Glucose LESS THAN 70 milliGRAM(s)/deciliter  LORazepam     Tablet 0.5 milliGRAM(s) Oral every 4 hours PRN agitation due to catatonia  OLANZapine Injectable 2.5 milliGRAM(s) IntraMuscular once PRN agitation    CAPILLARY BLOOD GLUCOSE    POCT Blood Glucose.: 97 mg/dL (28 Jul 2022 20:25)  POCT Blood Glucose.: 90 mg/dL (28 Jul 2022 16:28)  POCT Blood Glucose.: 121 mg/dL (28 Jul 2022 12:13)  POCT Blood Glucose.: 83 mg/dL (28 Jul 2022 08:09)    Vital Signs Last 24 Hrs  T(C): 36.4 (28 Jul 2022 22:15), Max: 36.7 (28 Jul 2022 09:04)  T(F): 97.6 (28 Jul 2022 22:15), Max: 98.1 (28 Jul 2022 09:04)  HR: 109 (28 Jul 2022 22:15) (109 - 109)  BP: 132/88 (28 Jul 2022 22:15) (129/71 - 132/88)  BP(mean): --  RR: 20 (28 Jul 2022 22:15) (16 - 20)  SpO2: 97% (28 Jul 2022 22:15) (97% - 98%)    Parameters below as of 28 Jul 2022 22:15  Patient On (Oxygen Delivery Method): room air    PHYSICAL EXAM:  GENERAL: NAD, well-developed  HEAD:  Atraumatic, Normocephalic  EYES: EOMI, PERRLA, conjunctiva and sclera clear  NECK: Supple, No JVD  CHEST/LUNG: No wheezing or crackles noted. Auscultated lungs clear, diminished BS bilateral, respirations are even, unlabored on RA  HEART: Regular rate and rhythm; No murmurs, rubs, or gallops  ABDOMEN: Soft, Nontender, Nondistended; Bowel sounds present  EXTREMITIES:  2+ Peripheral Pulses, No clubbing, cyanosis, or edema  PSYCH: AAOx3  NEUROLOGY: non-focal  SKIN: No rashes or lesions    LABS:                        14.4   5.51  )-----------( 286      ( 28 Jul 2022 08:54 )             43.6     07-28    137  |  98  |  21  ----------------------------<  130<H>  3.9   |  23  |  0.81    Ca    9.8      28 Jul 2022 08:54    Psychiatry Section:  Psychiatric Summary/[facility] admitting diagnosis:    Psychiatric Recommendations:    Observation status (check one):   ( ) Constant Observation  ( ) Enhanced care  ( ) Routine checks    Risk Status (check all that apply if present):  ( ) at risk for suicide/self-injury  ( ) at risk for aggressive behavior  ( ) at risk for elopement  ( ) other risk: [facility] Inpatient to ED Transfer Summary    Reason for Transfer/Medical Summary: Patient is a 82 year old male with PMHx of CHF, COPD, DM, dementia, schizophrenia, HTN. Patient admitted to [facility] with  recurrent major depressive disorder and SI on CO. Alerted by primary nurse with audible secretions.  Patient evaluated at bedside. Patient denies SOB or chest pain. On exam, patient is tachycardic, warm to touch and coughing with increased work of breathing and unable to clear secretions. Will transfer to ED to evaluate for possible aspiration pneumonia and r/o COVID-19. Spoke to Zamzam at [facility] ED for transfer.     PAST MEDICAL & SURGICAL HISTORY:  HTN (hypertension)    COPD, severity to be determined    DM (diabetes mellitus)    Schizophrenia    CHF (congestive heart failure)    No significant past surgical history    ALLERGIES:    Allergy Status Unknown    Intolerances    MEDICATIONS  (STANDING):  amLODIPine   Tablet 10 milliGRAM(s) Oral daily  budesonide  80 MICROgram(s)/formoterol 4.5 MICROgram(s) Inhaler 2 Puff(s) Inhalation two times a day  dextrose 5%. 1000 milliLiter(s) (100 mL/Hr) IV Continuous <Continuous>  dextrose 5%. 1000 milliLiter(s) (50 mL/Hr) IV Continuous <Continuous>  dextrose 50% Injectable 25 Gram(s) IV Push once  dextrose 50% Injectable 12.5 Gram(s) IV Push once  dextrose 50% Injectable 25 Gram(s) IV Push once  glucagon  Injectable 1 milliGRAM(s) IntraMuscular once  LORazepam     Tablet 0.5 milliGRAM(s) Oral <User Schedule>  metFORMIN 500 milliGRAM(s) Oral two times a day  mirtazapine 7.5 milliGRAM(s) Oral at bedtime  PARoxetine 10 milliGRAM(s) Oral daily    MEDICATIONS  (PRN):  dextrose Oral Gel 15 Gram(s) Oral once PRN Blood Glucose LESS THAN 70 milliGRAM(s)/deciliter  LORazepam     Tablet 0.5 milliGRAM(s) Oral every 4 hours PRN agitation due to catatonia  OLANZapine Injectable 2.5 milliGRAM(s) IntraMuscular once PRN agitation    CAPILLARY BLOOD GLUCOSE    POCT Blood Glucose.: 97 mg/dL (28 Jul 2022 20:25)  POCT Blood Glucose.: 90 mg/dL (28 Jul 2022 16:28)  POCT Blood Glucose.: 121 mg/dL (28 Jul 2022 12:13)  POCT Blood Glucose.: 83 mg/dL (28 Jul 2022 08:09)    Vital Signs Last 24 Hrs  T(C): 36.4 (28 Jul 2022 22:15), Max: 36.7 (28 Jul 2022 09:04)  T(F): 97.6 (28 Jul 2022 22:15), Max: 98.1 (28 Jul 2022 09:04)  HR: 109 (28 Jul 2022 22:15) (109 - 109)  BP: 132/88 (28 Jul 2022 22:15) (129/71 - 132/88)  BP(mean): --  RR: 20 (28 Jul 2022 22:15) (16 - 20)  SpO2: 97% (28 Jul 2022 22:15) (97% - 98%)    Parameters below as of 28 Jul 2022 22:15  Patient On (Oxygen Delivery Method): room air    PHYSICAL EXAM:  GENERAL: NAD, well-developed  HEAD:  Atraumatic, Normocephalic  EYES: EOMI, PERRLA, conjunctiva and sclera clear  NECK: Supple, No JVD  CHEST/LUNG: No wheezing or crackles noted. Auscultated lungs clear, diminished BS bilateral, respirations are even, unlabored on RA  HEART: Regular rate and rhythm; No murmurs, rubs, or gallops  ABDOMEN: Soft, Nontender, Nondistended; Bowel sounds present  EXTREMITIES:  2+ Peripheral Pulses, No clubbing, cyanosis, or edema  PSYCH: AAOx3  NEUROLOGY: non-focal  SKIN: No rashes or lesions    LABS:                        14.4   5.51  )-----------( 286      ( 28 Jul 2022 08:54 )             43.6     07-28    137  |  98  |  21  ----------------------------<  130<H>  3.9   |  23  |  0.81    Ca    9.8      28 Jul 2022 08:54    Psychiatry Section:  Psychiatric Summary/[facility] admitting diagnosis:    Psychiatric Recommendations:    Observation status (check one):   (X) Constant Observation  ( ) Enhanced care  ( ) Routine checks    Risk Status (check all that apply if present):  (X ) at risk for suicide/self-injury  ( ) at risk for aggressive behavior  ( ) at risk for elopement  ( ) other risk:

## 2022-07-28 NOTE — ED ADULT TRIAGE NOTE - ESI TRIAGE ACUITY LEVEL, MLM
2 - DVT ppx: Eliquis  - Diet: DASH diet. Kosher.  - will need PT eval given her baseline balance issue and advancing AS symptoms.  - fall precaution.  - Dispo - pending due to clinical status. Lives alone at home and using walker, may benefit from rehab upon discharge.  - Follow up with PNA team following d/c. - DVT ppx: Eliquis  - Diet: DASH diet. Kosher.  - PT eval: Home with outpatient PT  - Dispo: pending, likely home  - fall precaution.  - Dispo - pending due to clinical status. Lives alone at home and using walker, may benefit from rehab upon discharge.  - Follow up with PNA team following d/c. - DVT ppx: Eliquis  - Diet: DASH diet. Kosher.  - PT eval: Home with outpatient PT  - fall precaution.  - Follow up with PNA team following d/c.  - Dispo - home with outpatient PT - DVT ppx: Eliquis  - Diet: DASH diet. Kosher.  - PT eval: Home with outpatient PT  - fall precaution.  - Follow up with PNA/pulm team following d/c.  - Dispo - home with outpatient PT

## 2022-07-28 NOTE — BH INPATIENT PSYCHIATRY PROGRESS NOTE - NSBHFUPINTERVALHXFT_PSY_A_CORE
82 y.o. male being seen for SI and psychosis. No significant overnight events reported by staff. Pt has not required PRN medications. Patient has been adherent to nighttime medications and ate breakfast this morning per staff. Upon evaluation, Pt denied feeling depressed today. Denies SI. Denied AVH. Pt remains in his room. Encouraged pt to go to patio and/or do activities, pt responded by inquiring how to do so.  82 y.o. male being seen for SI and psychosis. No significant overnight events reported by staff. Pt has not required PRN medications. Patient has been adherent to nighttime medications and ate breakfast this morning per staff. Vitals improved. Upon evaluation, Pt denied feeling depressed today. Denies SI. Denied AVH. Pt remains in his room. Encouraged pt to go to patio and/or do activities, pt responded by inquiring how to do so.

## 2022-07-29 VITALS
OXYGEN SATURATION: 98 % | SYSTOLIC BLOOD PRESSURE: 164 MMHG | DIASTOLIC BLOOD PRESSURE: 74 MMHG | RESPIRATION RATE: 16 BRPM | HEART RATE: 62 BPM

## 2022-07-29 LAB
ALBUMIN SERPL ELPH-MCNC: 4.1 G/DL — SIGNIFICANT CHANGE UP (ref 3.3–5)
ALP SERPL-CCNC: 40 U/L — SIGNIFICANT CHANGE UP (ref 40–120)
ALT FLD-CCNC: 14 U/L — SIGNIFICANT CHANGE UP (ref 4–41)
ANION GAP SERPL CALC-SCNC: 12 MMOL/L — SIGNIFICANT CHANGE UP (ref 7–14)
APPEARANCE UR: CLEAR — SIGNIFICANT CHANGE UP
AST SERPL-CCNC: 14 U/L — SIGNIFICANT CHANGE UP (ref 4–40)
BACTERIA # UR AUTO: NEGATIVE — SIGNIFICANT CHANGE UP
BASOPHILS # BLD AUTO: 0.07 K/UL — SIGNIFICANT CHANGE UP (ref 0–0.2)
BASOPHILS NFR BLD AUTO: 1.2 % — SIGNIFICANT CHANGE UP (ref 0–2)
BILIRUB SERPL-MCNC: 0.5 MG/DL — SIGNIFICANT CHANGE UP (ref 0.2–1.2)
BILIRUB UR-MCNC: NEGATIVE — SIGNIFICANT CHANGE UP
BUN SERPL-MCNC: 14 MG/DL — SIGNIFICANT CHANGE UP (ref 7–23)
CALCIUM SERPL-MCNC: 9.3 MG/DL — SIGNIFICANT CHANGE UP (ref 8.4–10.5)
CHLORIDE SERPL-SCNC: 97 MMOL/L — LOW (ref 98–107)
CO2 SERPL-SCNC: 26 MMOL/L — SIGNIFICANT CHANGE UP (ref 22–31)
COLOR SPEC: SIGNIFICANT CHANGE UP
CREAT SERPL-MCNC: 0.74 MG/DL — SIGNIFICANT CHANGE UP (ref 0.5–1.3)
DIFF PNL FLD: NEGATIVE — SIGNIFICANT CHANGE UP
EGFR: 91 ML/MIN/1.73M2 — SIGNIFICANT CHANGE UP
EOSINOPHIL # BLD AUTO: 0.27 K/UL — SIGNIFICANT CHANGE UP (ref 0–0.5)
EOSINOPHIL NFR BLD AUTO: 4.8 % — SIGNIFICANT CHANGE UP (ref 0–6)
EPI CELLS # UR: 1 /HPF — SIGNIFICANT CHANGE UP (ref 0–5)
GLUCOSE BLDC GLUCOMTR-MCNC: 105 MG/DL — HIGH (ref 70–99)
GLUCOSE BLDC GLUCOMTR-MCNC: 114 MG/DL — HIGH (ref 70–99)
GLUCOSE BLDC GLUCOMTR-MCNC: 123 MG/DL — HIGH (ref 70–99)
GLUCOSE SERPL-MCNC: 102 MG/DL — HIGH (ref 70–99)
GLUCOSE UR QL: NEGATIVE — SIGNIFICANT CHANGE UP
HCT VFR BLD CALC: 40.6 % — SIGNIFICANT CHANGE UP (ref 39–50)
HGB BLD-MCNC: 13.6 G/DL — SIGNIFICANT CHANGE UP (ref 13–17)
HYALINE CASTS # UR AUTO: 1 /LPF — SIGNIFICANT CHANGE UP (ref 0–7)
IANC: 3.6 K/UL — SIGNIFICANT CHANGE UP (ref 1.8–7.4)
IMM GRANULOCYTES NFR BLD AUTO: 0.4 % — SIGNIFICANT CHANGE UP (ref 0–1.5)
KETONES UR-MCNC: ABNORMAL
LEUKOCYTE ESTERASE UR-ACNC: NEGATIVE — SIGNIFICANT CHANGE UP
LYMPHOCYTES # BLD AUTO: 1.11 K/UL — SIGNIFICANT CHANGE UP (ref 1–3.3)
LYMPHOCYTES # BLD AUTO: 19.5 % — SIGNIFICANT CHANGE UP (ref 13–44)
MCHC RBC-ENTMCNC: 31.9 PG — SIGNIFICANT CHANGE UP (ref 27–34)
MCHC RBC-ENTMCNC: 33.5 GM/DL — SIGNIFICANT CHANGE UP (ref 32–36)
MCV RBC AUTO: 95.3 FL — SIGNIFICANT CHANGE UP (ref 80–100)
MONOCYTES # BLD AUTO: 0.61 K/UL — SIGNIFICANT CHANGE UP (ref 0–0.9)
MONOCYTES NFR BLD AUTO: 10.7 % — SIGNIFICANT CHANGE UP (ref 2–14)
NEUTROPHILS # BLD AUTO: 3.6 K/UL — SIGNIFICANT CHANGE UP (ref 1.8–7.4)
NEUTROPHILS NFR BLD AUTO: 63.4 % — SIGNIFICANT CHANGE UP (ref 43–77)
NITRITE UR-MCNC: NEGATIVE — SIGNIFICANT CHANGE UP
NRBC # BLD: 0 /100 WBCS — SIGNIFICANT CHANGE UP
NRBC # FLD: 0 K/UL — SIGNIFICANT CHANGE UP
PH UR: 6.5 — SIGNIFICANT CHANGE UP (ref 5–8)
PLATELET # BLD AUTO: 259 K/UL — SIGNIFICANT CHANGE UP (ref 150–400)
POTASSIUM SERPL-MCNC: 3.7 MMOL/L — SIGNIFICANT CHANGE UP (ref 3.5–5.3)
POTASSIUM SERPL-SCNC: 3.7 MMOL/L — SIGNIFICANT CHANGE UP (ref 3.5–5.3)
PROT SERPL-MCNC: 6.7 G/DL — SIGNIFICANT CHANGE UP (ref 6–8.3)
PROT UR-MCNC: ABNORMAL
RBC # BLD: 4.26 M/UL — SIGNIFICANT CHANGE UP (ref 4.2–5.8)
RBC # FLD: 13 % — SIGNIFICANT CHANGE UP (ref 10.3–14.5)
RBC CASTS # UR COMP ASSIST: 2 /HPF — SIGNIFICANT CHANGE UP (ref 0–4)
SODIUM SERPL-SCNC: 135 MMOL/L — SIGNIFICANT CHANGE UP (ref 135–145)
SP GR SPEC: 1.02 — SIGNIFICANT CHANGE UP (ref 1–1.05)
UROBILINOGEN FLD QL: SIGNIFICANT CHANGE UP
WBC # BLD: 5.68 K/UL — SIGNIFICANT CHANGE UP (ref 3.8–10.5)
WBC # FLD AUTO: 5.68 K/UL — SIGNIFICANT CHANGE UP (ref 3.8–10.5)
WBC UR QL: 1 /HPF — SIGNIFICANT CHANGE UP (ref 0–5)

## 2022-07-29 PROCEDURE — 99232 SBSQ HOSP IP/OBS MODERATE 35: CPT

## 2022-07-29 PROCEDURE — 71045 X-RAY EXAM CHEST 1 VIEW: CPT | Mod: 26

## 2022-07-29 PROCEDURE — 99232 SBSQ HOSP IP/OBS MODERATE 35: CPT | Mod: GC

## 2022-07-29 RX ORDER — AMLODIPINE BESYLATE 2.5 MG/1
10 TABLET ORAL ONCE
Refills: 0 | Status: COMPLETED | OUTPATIENT
Start: 2022-07-29 | End: 2022-07-29

## 2022-07-29 RX ORDER — SODIUM CHLORIDE 9 MG/ML
500 INJECTION INTRAMUSCULAR; INTRAVENOUS; SUBCUTANEOUS ONCE
Refills: 0 | Status: COMPLETED | OUTPATIENT
Start: 2022-07-29 | End: 2022-07-29

## 2022-07-29 RX ADMIN — AMLODIPINE BESYLATE 10 MILLIGRAM(S): 2.5 TABLET ORAL at 07:23

## 2022-07-29 RX ADMIN — BUDESONIDE AND FORMOTEROL FUMARATE DIHYDRATE 2 PUFF(S): 160; 4.5 AEROSOL RESPIRATORY (INHALATION) at 09:37

## 2022-07-29 RX ADMIN — METFORMIN HYDROCHLORIDE 500 MILLIGRAM(S): 850 TABLET ORAL at 09:37

## 2022-07-29 RX ADMIN — BUDESONIDE AND FORMOTEROL FUMARATE DIHYDRATE 2 PUFF(S): 160; 4.5 AEROSOL RESPIRATORY (INHALATION) at 21:26

## 2022-07-29 RX ADMIN — SODIUM CHLORIDE 500 MILLILITER(S): 9 INJECTION INTRAMUSCULAR; INTRAVENOUS; SUBCUTANEOUS at 01:07

## 2022-07-29 RX ADMIN — Medication 10 MILLIGRAM(S): at 09:37

## 2022-07-29 RX ADMIN — MIRTAZAPINE 7.5 MILLIGRAM(S): 45 TABLET, ORALLY DISINTEGRATING ORAL at 21:24

## 2022-07-29 RX ADMIN — Medication 0.25 MILLIGRAM(S): at 09:37

## 2022-07-29 RX ADMIN — METFORMIN HYDROCHLORIDE 500 MILLIGRAM(S): 850 TABLET ORAL at 21:26

## 2022-07-29 NOTE — ED ADULT NURSE NOTE - NSIMPLEMENTINTERV_GEN_ALL_ED
Implemented All Fall Risk Interventions:  [place] to call system. Call bell, personal items and telephone within reach. Instruct patient to call for assistance. Room bathroom lighting operational. Non-slip footwear when patient is off stretcher. Physically safe environment: no spills, clutter or unnecessary equipment. Stretcher in lowest position, wheels locked, appropriate side rails in place. Provide visual cue, wrist band, yellow gown, etc. Monitor gait and stability. Monitor for mental status changes and reorient to person, place, and time. Review medications for side effects contributing to fall risk. Reinforce activity limits and safety measures with patient and family.

## 2022-07-29 NOTE — BH INPATIENT PSYCHIATRY PROGRESS NOTE - NSBHASSESSSUMMFT_PSY_ALL_CORE
82M PMH CHF, COPD, DM, dementia, schizophrenia, HTN sent in from [facility] for dehydration and SI. Patient is a poor historian. Patient does report a PPHx of schizophrenia. Treated at [facility] for dehydration and catatonia, improved on Ativan and Invega Sustenna GARCIA.  Now admitted at [facility].  Denies SI on assessment.      7/27: Pt presents as depressed, with SI with plan to stop eating. Will monitor intake and fingersticks. CO1:1 for safety & fall risk in light of positive orthostatics.   7/28: Pt resumed oral intake, less depressed. Denies SI. Labs reassuring. Will continue with current meds with plan to titrate Remeron and D/c Paxil.  7/29: episode of dyspnea last night, however doing well this AM. Appears to be having intermittent depressed mood with passive SI vs ?sundowning. Would consider having CO1:1 over the weekend to assure safety.     Legal: 9.27    Safety: resumed oral intake, contracted for safety. D/C CO 1:1, resume q15 checks.    Psychiatry: Pt on Invega Sustenna 156mg IM last received 7/3/22 in NH. Next Dose 8/3/22 per documentation. unclear if loading or maintenance dose. Decrease Paxil to 10mg and cross taper with Remeron 7.5mg PO HS to help with depression and appetite stimulation.     Psychotherapy: Individual, group and milieu therapy as appropriate.    Medical: Continue Symbicort. Metformin 500mg BID, Norvasc 10mg PO daily were held yesterday due to poor oral intake. They were resumed today in light of increased BP and overall reassuring vitals. Monitor oral intake and fingersticks to assess for hypoglycemia. Will switch to BID fingersticks tomorrow if pt maintains regular oral intake.    Dispo: Remains fragile, needing inpatient care. Nursing home when stable.   82M PMH CHF, COPD, DM, dementia, schizophrenia, HTN sent in from [facility] for dehydration and SI. Patient is a poor historian. Patient does report a PPHx of schizophrenia. Treated at [facility] for dehydration and catatonia, improved on Ativan and Invega Sustenna GARCIA.  Now admitted at [facility].  Denies SI on assessment.      7/27: Pt presents as depressed, with SI with plan to stop eating. Will monitor intake and fingersticks. CO1:1 for safety & fall risk in light of positive orthostatics.   7/28: Pt resumed oral intake, less depressed. Denies SI. Labs reassuring. Will continue with current meds with plan to titrate Remeron and D/c Paxil.  7/29: episode of dyspnea last night, however doing well this AM. Appears to be having intermittent depressed mood with passive SI vs ?sundowning. Would consider having CO1:1 over the weekend to assure safety.     Legal: 9.27    Safety: in light of recent aspiration, intermittent SI, will keep CO 1:1 for safety over the weekend and reassess.    Psychiatry: Pt on Invega Sustenna 156mg IM last received 7/3/22 in NH. Next Dose 8/3/22 per documentation. unclear if loading or maintenance dose. Decrease Paxil to 10mg and cross taper with Remeron 7.5mg PO HS to help with depression and appetite stimulation.     Psychotherapy: Individual, group and milieu therapy as appropriate.    Medical: Continue Symbicort. Metformin 500mg BID, Norvasc 10mg PO daily were held yesterday due to poor oral intake. They were resumed today in light of increased BP and overall reassuring vitals. Monitor oral intake and fingersticks to assess for hypoglycemia. Will switch to BID fingersticks tomorrow if pt maintains regular oral intake.    Dispo: Remains fragile, needing inpatient care. Nursing home when stable.   82M PMH CHF, COPD, DM, dementia, schizophrenia, HTN sent in from [facility] for dehydration and SI. Patient is a poor historian. Patient does report a PPHx of schizophrenia. Treated at [facility] for dehydration and catatonia, improved on Ativan and Invega Sustenna GARCIA.  Now admitted at [facility].  Denies SI on assessment.      7/27: Pt presents as depressed, with SI with plan to stop eating. Will monitor intake and fingersticks. CO1:1 for safety & fall risk in light of positive orthostatics.   7/28: Pt resumed oral intake, less depressed. Denies SI. Labs reassuring. Will continue with current meds with plan to titrate Remeron and D/c Paxil.  7/29: episode of dyspnea last night, however doing well this AM. Appears to be having intermittent depressed mood with passive SI vs ?sundowning. Will continue to monitor. Might consider q15 checks with aspiration precautions.    Legal: 9.27    Safety: Consider D/c CO1:1 and q15 checks reinstated.    Psychiatry: Pt on Invega Sustenna 156mg IM last received 7/3/22 in NH. Next Dose 8/3/22 per documentation. unclear if loading or maintenance dose. Decrease Paxil to 10mg and cross taper with Remeron 7.5mg PO HS to help with depression and appetite stimulation.     Psychotherapy: Individual, group and milieu therapy as appropriate.    Medical: Continue Symbicort. Metformin 500mg BID, Norvasc 10mg PO daily were held yesterday due to poor oral intake. They were resumed today in light of increased BP and overall reassuring vitals. Monitor oral intake and fingersticks to assess for hypoglycemia. Will switch to BID fingersticks tomorrow if pt maintains regular oral intake.    Dispo: Remains fragile, needing inpatient care. Nursing home when stable.   82M PMH CHF, COPD, DM, dementia, schizophrenia, HTN sent in from [facility] for dehydration and SI. Patient is a poor historian. Patient does report a PPHx of schizophrenia. Treated at [facility] for dehydration and catatonia, improved on Ativan and Invega Sustenna GARCIA.  Now admitted at [facility].  Denies SI on assessment.      7/27: Pt presents as depressed, with SI with plan to stop eating. Will monitor intake and fingersticks. CO1:1 for safety & fall risk in light of positive orthostatics.   7/28: Pt resumed oral intake, less depressed. Denies SI. Labs reassuring. Will continue with current meds with plan to titrate Remeron and D/c Paxil.  7/29: episode of dyspnea last night, however doing well this AM. Appears to be having intermittent depressed mood with passive SI vs ?sundowning. Will continue to monitor. In light of intermittent SI and recent unclear episode of ?dysphagia, will keep on CO1:1 for safety for the weekend.    Legal: 9.27    Safety: CO1:1 for safety for the weekend and reassess    Psychiatry: Pt on Invega Sustenna 156mg IM last received 7/3/22 in NH. Next Dose 8/3/22 per documentation. Decrease Paxil to 10mg and cross taper with Remeron 7.5mg PO HS to help with depression and appetite stimulation.     Psychotherapy: Individual, group and milieu therapy as appropriate.    Medical: Continue Symbicort. Metformin 500mg BID, Norvasc 10mg PO daily.    Dispo: Remains fragile, needing inpatient care. Nursing home when stable.

## 2022-07-29 NOTE — BH INPATIENT PSYCHIATRY PROGRESS NOTE - NSBHCHARTREVIEWVS_PSY_A_CORE FT
Vital Signs Last 24 Hrs  T(C): 36.5 (07-29-22 @ 06:43), Max: 36.7 (07-28-22 @ 21:46)  T(F): 97.7 (07-29-22 @ 06:43), Max: 98.1 (07-28-22 @ 21:46)  HR: 62 (07-29-22 @ 07:24) (56 - 109)  BP: 164/74 (07-29-22 @ 07:24) (129/71 - 173/71)  BP(mean): --  RR: 16 (07-29-22 @ 07:24) (16 - 20)  SpO2: 98% (07-29-22 @ 07:24) (97% - 99%)    Orthostatic VS  07-29-22 @ 08:30  Lying BP: --/-- HR: --  Sitting BP: 126/74 HR: 92  Standing BP: --/-- HR: --  Site: --  Mode: --  Orthostatic VS  07-28-22 @ 09:04  Lying BP: 155/75 HR: 71  Sitting BP: 139/73 HR: 92  Standing BP: --/-- HR: --  Site: --  Mode: --

## 2022-07-29 NOTE — BH INPATIENT PSYCHIATRY PROGRESS NOTE - OTHER
stutter  moderate TD of jaw, tongue, legs bilaterally; no catatonia signs noted Mason passive impulsive hx

## 2022-07-29 NOTE — BH INPATIENT PSYCHIATRY PROGRESS NOTE - CURRENT MEDICATION
MEDICATIONS  (STANDING):  amLODIPine   Tablet 10 milliGRAM(s) Oral daily  budesonide  80 MICROgram(s)/formoterol 4.5 MICROgram(s) Inhaler 2 Puff(s) Inhalation two times a day  dextrose 5%. 1000 milliLiter(s) (50 mL/Hr) IV Continuous <Continuous>  dextrose 5%. 1000 milliLiter(s) (100 mL/Hr) IV Continuous <Continuous>  dextrose 50% Injectable 25 Gram(s) IV Push once  dextrose 50% Injectable 12.5 Gram(s) IV Push once  dextrose 50% Injectable 25 Gram(s) IV Push once  glucagon  Injectable 1 milliGRAM(s) IntraMuscular once  LORazepam     Tablet   Oral   LORazepam     Tablet 0.25 milliGRAM(s) Oral daily  metFORMIN 500 milliGRAM(s) Oral two times a day  mirtazapine 7.5 milliGRAM(s) Oral at bedtime  PARoxetine 10 milliGRAM(s) Oral daily    MEDICATIONS  (PRN):  dextrose Oral Gel 15 Gram(s) Oral once PRN Blood Glucose LESS THAN 70 milliGRAM(s)/deciliter  LORazepam     Tablet 0.5 milliGRAM(s) Oral every 4 hours PRN agitation due to catatonia  OLANZapine Injectable 2.5 milliGRAM(s) IntraMuscular once PRN agitation

## 2022-07-29 NOTE — ED PROVIDER NOTE - PHYSICAL EXAMINATION
GEN: Patient awake alert NAD.   HEENT: normocephalic, atraumatic, no scleral icterus, moist MM  CARDIAC: RRR, S1, S2, no murmur.   PULM: CTA B/L no wheeze, rhonchi, rales.   ABD: soft NT, ND, no rebound no guarding  MSK: Moving all extremities, no edema.      NEURO: Awake and minimally verbal   SKIN: warm, dry, no rash.

## 2022-07-29 NOTE — BH INPATIENT PSYCHIATRY PROGRESS NOTE - NSBHATTESTCOMMENTATTENDFT_PSY_A_CORE
Considering recent ambivalence about suicidal ideation, will continue CO over the weekend. Will reassess Monday.

## 2022-07-29 NOTE — PROGRESS NOTE ADULT - SUBJECTIVE AND OBJECTIVE BOX
[facility] Division of Hospital Medicine  Gurinder Lua) MD [place]  Pager [phone]    SUBJECTIVE:  Chief complaint: Dysphagia    Pt seen and evaluated at bedside this AM. Overnight events noted. Pt was sent to the ED for evaluation as he was noted to have inc secretions and also felt warm. Pt states he feels fine. Denies any SOB/CP/sore throat cough. States he ate breakfast today and did not have any difficulty. This was confirmed w/ his sitter.       ROS: All systems negative except as noted.      Vital Signs Last 24 Hrs  T(C): 36.5 (2022 06:43), Max: 36.7 (2022 21:46)  T(F): 97.7 (2022 06:43), Max: 98.1 (2022 21:46)  HR: 62 (2022 07:24) (56 - 109)  BP: 164/74 (2022 07:24) (129/71 - 173/71)  BP(mean): --  RR: 16 (2022 07:24) (16 - 20)  SpO2: 98% (2022 07:24) (97% - 99%)    Parameters below as of 2022 22:15  Patient On (Oxygen Delivery Method): room air      PHYSICAL EXAM:  GENERAL: NAD, in bed  HEAD:  Atraumatic, Normocephalic  CHEST/LUNG: Clear to auscultation bilaterally; No wheeze  HEART: Regular rate and rhythm; No murmurs, rubs, or gallops  ABDOMEN: Soft, Nontender, Nondistended; Bowel sounds present  EXTREMITIES:  2+ Peripheral Pulses, No clubbing, cyanosis, or edema  NEUROLOGY: non-focal  SKIN: No rashes or lesions    MEDICATION:  MEDICATIONS  (STANDING):  amLODIPine   Tablet 10 milliGRAM(s) Oral daily  budesonide  80 MICROgram(s)/formoterol 4.5 MICROgram(s) Inhaler 2 Puff(s) Inhalation two times a day  dextrose 5%. 1000 milliLiter(s) (50 mL/Hr) IV Continuous <Continuous>  dextrose 5%. 1000 milliLiter(s) (100 mL/Hr) IV Continuous <Continuous>  dextrose 50% Injectable 25 Gram(s) IV Push once  dextrose 50% Injectable 12.5 Gram(s) IV Push once  dextrose 50% Injectable 25 Gram(s) IV Push once  glucagon  Injectable 1 milliGRAM(s) IntraMuscular once  LORazepam     Tablet   Oral   LORazepam     Tablet 0.25 milliGRAM(s) Oral daily  metFORMIN 500 milliGRAM(s) Oral two times a day  mirtazapine 7.5 milliGRAM(s) Oral at bedtime  PARoxetine 10 milliGRAM(s) Oral daily    MEDICATIONS  (PRN):  dextrose Oral Gel 15 Gram(s) Oral once PRN Blood Glucose LESS THAN 70 milliGRAM(s)/deciliter  LORazepam     Tablet 0.5 milliGRAM(s) Oral every 4 hours PRN agitation due to catatonia  OLANZapine Injectable 2.5 milliGRAM(s) IntraMuscular once PRN agitation        LABORATORY:                          13.6   5.68  )-----------( 259      ( 2022 00:58 )             40.6     07-29    135  |  97<L>  |  14  ----------------------------<  102<H>  3.7   |  26  |  0.74    Ca    9.3      2022 00:58    TPro  6.7  /  Alb  4.1  /  TBili  0.5  /  DBili  x   /  AST  14  /  ALT  14  /  AlkPhos  40        Urinalysis Basic - ( 2022 03:07 )    Color: Light Yellow / Appearance: Clear / S.018 / pH: x  Gluc: x / Ketone: Small  / Bili: Negative / Urobili: <2 mg/dL   Blood: x / Protein: Trace / Nitrite: Negative   Leuk Esterase: Negative / RBC: 2 /HPF / WBC 1 /HPF   Sq Epi: x / Non Sq Epi: 1 /HPF / Bacteria: Negative        COVID-19 PCR: NotDetec (2022 21:38)  COVID-19 PCR: NotDetec (2022 11:48)  COVID-19 PCR: NotDetec (2022 15:47)  COVID-19 PCR: NotDetec (2022 05:52)  COVID-19 PCR: NotDetec (2022 09:44)  COVID-19 PCR: NotDetec (2022 14:12)                
[facility] Division of Hospital Medicine  Gurinder Lua) MD [place]  Pager [phone]    SUBJECTIVE:  Chief complaint: Hyponatremia    Pt seen and evaluated at bedside this AM.       ROS: All systems negative except as noted.      Vital Signs Last 24 Hrs  T(C): 36.2 (26 Jul 2022 07:46), Max: 36.2 (26 Jul 2022 07:46)  T(F): 97.2 (26 Jul 2022 07:46), Max: 97.2 (26 Jul 2022 07:46)  125/56, 76 -> 99/51, 85      PHYSICAL EXAM:  GENERAL: NAD, sitting in chair  HEAD:  Atraumatic, Normocephalic  EYES: EOMI, conjunctiva and sclera clear  NECK: Supple, No JVD  CHEST/LUNG: Clear to auscultation bilaterally; No wheeze  HEART: Regular rate and rhythm; No murmurs, rubs, or gallops  ABDOMEN: Soft, Nontender, Nondistended; Bowel sounds present  EXTREMITIES:  2+ Peripheral Pulses, No clubbing, cyanosis, or edema  NEUROLOGY: non-focal  SKIN: No rashes or lesions    MEDICATION:  MEDICATIONS  (STANDING):  amLODIPine   Tablet 10 milliGRAM(s) Oral daily  budesonide  80 MICROgram(s)/formoterol 4.5 MICROgram(s) Inhaler 2 Puff(s) Inhalation two times a day  dextrose 5%. 1000 milliLiter(s) (50 mL/Hr) IV Continuous <Continuous>  dextrose 5%. 1000 milliLiter(s) (100 mL/Hr) IV Continuous <Continuous>  dextrose 50% Injectable 25 Gram(s) IV Push once  dextrose 50% Injectable 12.5 Gram(s) IV Push once  dextrose 50% Injectable 25 Gram(s) IV Push once  enoxaparin Injectable 30 milliGRAM(s) SubCutaneous every 24 hours  glucagon  Injectable 1 milliGRAM(s) IntraMuscular once  insulin lispro (ADMELOG) corrective regimen sliding scale   SubCutaneous Before meals and at bedtime  LORazepam     Tablet 0.5 milliGRAM(s) Oral <User Schedule>  metFORMIN 500 milliGRAM(s) Oral two times a day  PARoxetine 20 milliGRAM(s) Oral daily    MEDICATIONS  (PRN):  dextrose Oral Gel 15 Gram(s) Oral once PRN Blood Glucose LESS THAN 70 milliGRAM(s)/deciliter  LORazepam     Tablet 0.5 milliGRAM(s) Oral every 4 hours PRN agitation due to catatonia  OLANZapine Injectable 2.5 milliGRAM(s) IntraMuscular once PRN agitation            LABORATORY:  Labs pending.            COVID-19 PCR: NotDetec (22 Jul 2022 11:48)  COVID-19 PCR: NotDetec (19 Jul 2022 15:47)  COVID-19 PCR: NotDetec (17 Jul 2022 05:52)  COVID-19 PCR: NotDetec (14 Jul 2022 09:44)  COVID-19 PCR: NotDetec (11 Jul 2022 14:12)

## 2022-07-29 NOTE — ED ADULT NURSE REASSESSMENT NOTE - NS ED NURSE REASSESS COMMENT FT1
EMS at bedside, pt medicated with home dose of BP meds as per MD orders. Pt stable at this time, awaiting transport back to [facility]

## 2022-07-29 NOTE — ED PROVIDER NOTE - CLINICAL SUMMARY MEDICAL DECISION MAKING FREE TEXT BOX
Angel Pritchett,  PGY-2: 82yM with PMH of COPD, schizophrenia, dementia, MDD, CHF presents to the ED from [facility] for concern for aspiration PNA, failure to thrive as per the NP note from [facility]. Pt is minimally verbally and is with aide from [facility]. As per [facility] note and triage note the pt endorses SI without a plan but is on CO at [facility]. Concern for aspiration, dehydration. Will obtain labs, ua, cxr. Reassess.

## 2022-07-29 NOTE — ED ADULT NURSE NOTE - CHIEF COMPLAINT QUOTE
Pt. is brought to [facility] ED by EMS [facility] for failure to thrive. Also, pt. has suicidal ideation. [facility] told EMS that patient says he doesn't want to live anymore. Pt. is A & O x 3. Has a lot of secretions in back of throat. No respiratory distress noted. Pt. denies homocidal ideation. Didn't respond to suicidal ideation questions.  performed by EMT's.  PMH: CHF, COPD, DM, dementia. NAD noted.

## 2022-07-29 NOTE — ED PROVIDER NOTE - OBJECTIVE STATEMENT
82yM with PMH of COPD, schizophrenia, dementia, MDD, CHF presents to the ED from [facility] for concern for aspiration PNA, failure to thrive as per the NP note from [facility]. Pt is minimally verbally and is with aide from [facility]. As per [facility] note and triage note the pt endorses SI without a plan but is on CO at [facility].

## 2022-07-29 NOTE — ED ADULT NURSE NOTE - OBJECTIVE STATEMENT
Pt received to room 8. Pt is A&Ox2, presenting from [facility] for aspiration concern. As per staff, they are concerned for FTT. Pt minimally verbal. As per triage note, pt endorsing SI. Pt states he "sometimes" has thoughts of hurting himself, denies any plan. Denies HI. RR even and unlabored. Pt calm and cooperative. IV access established with 20G to the L AC, labs collected and sent. [facility] staff remains at bedside

## 2022-07-29 NOTE — ED PROVIDER NOTE - PROGRESS NOTE DETAILS
Angel Pritchett, DO PGY-2: Spoke with the resident on call for [facility], the resident was going to page the NP who sent the pt to the ED to obtain further collateral regarding the pts condition. Awaiting a call back. Angel Pritchett, DO PGY-2: [facility] main concern was the pt was having gurgling with cough/SOB and ?tachycardia. concerned for aspiration pna. If the w/u is neg, [facility] is willing to accept the pt back. Angel Pritchett, DO PGY-2: labs/urine/cxr non actionable, pt is resting comfortably, not tachycardic/tachypneic/ hypoxic. Spoke with PHI for [facility] who accepts the pt to be returned to [facility]. Rosalio bowser.

## 2022-07-29 NOTE — ED PROVIDER NOTE - NSFOLLOWUPINSTRUCTIONS_ED_ALL_ED_FT
Aspiration Precautions, Adult      Aspiration is the breathing in (inhalation) of a liquid or other material into the lungs. Adults with neurologicalimpairments, swallowing difficulties (dysphagia), decreased gag reflex, or impaired physical mobility are at risk for aspiration. Things that can be inhaled into the lungs include:  •Food.      •Any type of liquid, such as drinks or saliva.      •Stomach contents, such as vomit or stomach acid.      Aspiration can cause pneumonia. You can take steps to reduce the risk of aspiration.      What are the signs of aspiration?    Signs of aspiration include:•Coughing:  •Coughing after swallowing food or liquids.    •Coughing up phlegm (sputum) that:  •Is yellow, tan, or green.      •Has pieces of food in it.      •Smells bad.        •Coughing when lying down or having to sit up quickly after lying down.      •Having a hoarse, barky cough.      •Trouble breathing. This may include:  •Breathing quickly.      •Breathing very slowly.      •Loud breathing.      •Rumbling sounds from the lungs while breathing.      •Eating troubles, such as:  •Clearing the throat often while eating.      •Drooling while eating.      •A feeling of fullness in the throat or a feeling that something is stuck in the throat.      •Having a runny nose while eating.      •Watery eyes while eating.      •A pained look on the face while eating.      •Speaking problems such as:  •Not being able to speak.      •A hoarse voice.        •Choking often.      •A change in skin color. The skin may look red or blue.      •Fever.      •Pain in the chest or back.        What are the complications of aspiration?    Complications of aspiration include:  •Losing weight because the person is not absorbing needed nutrients.      •Loss of enjoyment and the social benefits of eating.      •Choking.      •Lung irritation, if someone aspirates acidic food or drinks.      •Lung infection (pneumonia).      •Lung abscess, which is a collection of infected liquid (pus) in the lungs.      In serious cases, death can occur.      What can I do to prevent aspiration?    Caring for someone who has a feeding tube     If you are caring for someone who has a feeding tube and cannot eat or drink safely through his or her mouth:  •Keep the person in an upright position as much as possible.      •Do not lay the person flat if he or she is getting continuous feedings. If you need to lay the person flat for any reason, turn the feeding pump off.      •Check feeding tube residuals as told by the health care provider. Ask the health care provider what residual amount is too high.      Caring for someone who can eat and drink safely by mouth    If you are caring for someone who can eat and drink safely by mouth:•Have the person sit in an upright position when eating food or drinking fluids. This can be done in two ways:  •Have the person sit up in a chair.      •If sitting in a chair is not possible, position the person in bed so he or she is upright.        •Remind the person to eat slowly and chew well. Make sure the person is awake and alert while eating. Never put food or liquids in the mouth of a person who is not fully alert.      •Do not distract the person. This is especially important for people with thinking or memory (cognitive) problems.      •Allow foods to cool. Hot foods may be more difficult to swallow.      •Provide small meals more frequently instead of three large meals. This may reduce fatigue during eating.    •After the person has finishing eating:  •Check the person's mouth thoroughly for leftover food.      •Keep the person sitting upright for 30–45 minutes.        •Do not serve food or drink within 2 hours of bedtime.        General instructions    Follow these general guidelines to prevent aspiration in someone who can eat and drink safely by mouth:  •Feed small amounts of food. Do not force-feed.      •For a person who is on a diet for swallowing difficulty (dysphagia diet), follow the recommended food and drink consistency. For example, you may be told to thicken a liquid using a commercial food thickening product.      •Use as little water as possible when brushing the person's teeth or cleaning his or her mouth.      •Provide oral care before and after meals.      •Use adaptive devices, such as cut-out cups or other utensils, as told by the health care provider.      •Crush pills and put them in soft food such as pudding or ice cream. Some pills should not be crushed. Check with the health care provider before crushing any medicine.      •If someone is choking on food or an object, perform the Heimlich maneuver (abdominal thrusts).        Contact a health care provider if the person:    •Has a feeding tube, and the feeding tube residual amount is too high.      •Has a fever.      •Tries to avoid food or water, such as refusing to eat, drink, or be fed, or is eating less than normal.      •May have aspirated food or liquid.      •Is showing warning signs, such as choking or coughing, when he or she eats or drinks.        Get help right away if the person:    •Has trouble breathing or starts to breathe quickly.      •Is breathing very slowly or stops breathing.      •Coughs a lot after eating or drinking.      •Has a long-lasting (chronic) cough.      •Coughs up thick, yellow, or tan phlegm.    •Has symptoms of pneumonia, such as:  •Coughing a lot.      •Coughing up mucus with a bad smell or blood in it.      •Feeling short of breath.      •Complaining of chest pain.      •Sweating, fever, and chills.      •Feeling tired.      •Complaining of trouble breathing.      •Wheezing.        •Cannot stop choking.      •Is unable to breathe, turns blue, faints, or seems confused.      These symptoms may represent a serious problem that is an emergency. Do not wait to see if the symptoms will go away. Get medical help right away. Call your local emergency services (911 in the [place]        Summary    •Aspiration is the breathing in (inhalation) of a liquid or material into the lungs. Things that can be inhaled into the lungs include food, liquids, saliva, or stomach contents.      •Aspiration can cause pneumonia or choking.      •One sign of aspiration is coughing after swallowing food or liquids.      •Contact your health care provider if you notice signs of aspiration.      This information is not intended to replace advice given to you by your health care provider. Make sure you discuss any questions you have with your health care provider.

## 2022-07-29 NOTE — PROVIDER CONTACT NOTE (OTHER) - ASSESSMENT
As per provider, pt is cleared to return to [facility] at this time will require S AMBULANCE. Writer arranged transport with [facility] #[phone]; spoke with Jamel and call transferred to RN for report. Pt to return to unit [facility] (ext. [phone]). Non-emergent transportation form to be placed in chart.

## 2022-07-29 NOTE — ED PROVIDER NOTE - ATTENDING CONTRIBUTION TO CARE
I performed a face-to-face evaluation of the patient and performed a history and physical examination along with the resident or ACP, and/or medical student above.  I agree with the history and physical examination as documented by the resident or ACP, and/or medical student above.  Rudd:  83yo M / pmh as above, sent from inpt [facility] for r/o aspiration pna after found coughing w/ "gurgling sound". Pt appears well w/ stable vitals, and O2 sat of 100% on RA. Also mention of ?poor diet or FTT. labs, cxr, and will speak to [facility] staff for collateral on ?FTT.

## 2022-07-29 NOTE — BH INPATIENT PSYCHIATRY PROGRESS NOTE - NSBHFUPINTERVALHXFT_PSY_A_CORE
82 y.o. male being seen for SI and psychosis. Pt had verbalized SI last night and had refused meds overnight. Pt also had episode overnight of dyspnea, cough with inability to clear secretions and was transferred to [facility] ED to evaluate for aspiration PNA. Pt received was not noted to be hypoxic or tachycardic while in ED. Per staff, patient has had breakfast this morning. Upon evaluation, pt denied feeling depressed. He reported he had been feeling depressed last night and had stopped wanting to eat. Pt was unable to elaborate on having cough or difficulty breathing last night. Denies current SI, HI, AVH. 82 y.o. male being seen for SI and psychosis. Pt had verbalized SI last night and had refused meds overnight. Pt also had episode overnight of dyspnea vs dysphagia, cough with inability to clear secretions and was transferred to [facility] ED to evaluate for aspiration PNA. Pt received was not noted to be hypoxic or tachycardic while in ED. Per staff, patient has had breakfast this morning. Upon evaluation, pt denied feeling depressed. He reported he had been feeling depressed last night and had stopped wanting to eat. He reported that when he feels depressed, he would like to become "motionless". Pt was unable to elaborate on having cough or difficulty breathing last night. Denies current SI, HI, AVH.

## 2022-07-29 NOTE — PROGRESS NOTE ADULT - ASSESSMENT
82M PMH CHF, COPD, DM, dementia, schizophrenia, HTN sent in from [facility] for dehydration and lack of PO intake as SA. Now taking adequate PO. Course c/b mild hyponatremia.     #Hyponatremia:   -Resolved.  -Monitor PO intake. Suspect initial hyponatremia is likely from hypovolemic hyponatremia.  -Repeat Na improved to 136.  -F/u cinesophagram.      #SA attempt via lack of PO:   -now taking adequate PO as per primary team.  -continue to encourage PO intake    #COPD:   -continue symbicort    #Schizophrenia:   -management as per psych    Please call us back if any questions. 
82M PMH CHF, COPD, DM, dementia, schizophrenia, HTN sent in from [facility] for dehydration and lack of PO intake as SA. Now taking adequate PO. Course c/b mild hyponatremia. Na now normalized.     #Hx of aspiration  -CXR personalyl reviewed/interpreted: clear, no acute cardio pulm process.  -Maintain aspriatoin precautions.   -Continue modified diet as per SLP recommendations.  -Recent cineesophagogram noted on 7/26.    #Hyponatremia:   -Resolved.  -Monitor PO intake. Suspect initial hyponatremia is likely from hypovolemic hyponatremia.  -Repeat Na improved to 136.  -F/u cinesophagram.    #SA attempt via lack of PO:   -now taking adequate PO as per primary team.  -continue to encourage PO intake    #COPD:   -continue symbicort    #Schizophrenia:   -management as per psych    Please call us back if any questions.

## 2022-07-29 NOTE — BH INPATIENT PSYCHIATRY PROGRESS NOTE - NSBHMETABOLIC_PSY_ALL_CORE_FT
BMI:   HbA1c: A1C with Estimated Average Glucose Result: 5.8 % (07-26-22 @ 09:35)    Glucose: POCT Blood Glucose.: 97 mg/dL (07-28-22 @ 20:25)    BP: 132/88 (07-28-22 @ 22:15) (129/71 - 132/88)  Lipid Panel:  BMI:   HbA1c: A1C with Estimated Average Glucose Result: 5.8 % (07-26-22 @ 09:35)    Glucose: POCT Blood Glucose.: 105 mg/dL (07-29-22 @ 12:14)    BP: 132/88 (07-28-22 @ 22:15) (129/71 - 132/88)  Lipid Panel:

## 2022-07-29 NOTE — ED ADULT NURSE NOTE - NSFALLRSKASSESASSIST_ED_ALL_ED
"Patient: Wendy Newby    Procedure Summary     Date:  11/14/19 Room / Location:  [facility] ENDOSCOPY 5 / [facility] ENDOSCOPY    Anesthesia Start:  1241 Anesthesia Stop:  1252    Procedure:  ESOPHAGOGASTRODUODENOSCOPY WITH ANESTHESIA (N/A ) Diagnosis:       Abnormal esophagram      (Abnormal esophagram [R93.3])    Surgeon:  Bebeto Rivera MD Provider:  Carlos Joseph CRNA    Anesthesia Type:  MAC ASA Status:  2          Anesthesia Type: MAC  Last vitals  BP   138/95 (11/14/19 1130)   Temp   98 °F (36.7 °C) (11/14/19 1130)   Pulse   69 (11/14/19 1130)   Resp   16 (11/14/19 1130)     SpO2   95 % (11/14/19 1130)     Post Anesthesia Care and Evaluation    Patient location during evaluation: PACU  Patient participation: complete - patient participated  Level of consciousness: awake and alert  Pain management: adequate  Airway patency: patent  Anesthetic complications: No anesthetic complications    Cardiovascular status: acceptable  Respiratory status: acceptable  Hydration status: acceptable    Comments: Blood pressure 138/95, pulse 69, temperature 98 °F (36.7 °C), temperature source Temporal, resp. rate 16, height 154.9 cm (61\"), weight 69.9 kg (154 lb), last menstrual period 10/20/2019, SpO2 95 %, not currently breastfeeding.    Pt discharged from PACU based on abhinav score >8      "
yes

## 2022-07-30 LAB
GLUCOSE BLDC GLUCOMTR-MCNC: 107 MG/DL — HIGH (ref 70–99)
GLUCOSE BLDC GLUCOMTR-MCNC: 98 MG/DL — SIGNIFICANT CHANGE UP (ref 70–99)
SARS-COV-2 RNA SPEC QL NAA+PROBE: SIGNIFICANT CHANGE UP

## 2022-07-30 PROCEDURE — 99233 SBSQ HOSP IP/OBS HIGH 50: CPT

## 2022-07-30 RX ADMIN — AMLODIPINE BESYLATE 10 MILLIGRAM(S): 2.5 TABLET ORAL at 09:41

## 2022-07-30 RX ADMIN — METFORMIN HYDROCHLORIDE 500 MILLIGRAM(S): 850 TABLET ORAL at 09:41

## 2022-07-30 RX ADMIN — Medication 0.25 MILLIGRAM(S): at 09:41

## 2022-07-30 RX ADMIN — BUDESONIDE AND FORMOTEROL FUMARATE DIHYDRATE 2 PUFF(S): 160; 4.5 AEROSOL RESPIRATORY (INHALATION) at 21:05

## 2022-07-30 RX ADMIN — BUDESONIDE AND FORMOTEROL FUMARATE DIHYDRATE 2 PUFF(S): 160; 4.5 AEROSOL RESPIRATORY (INHALATION) at 09:46

## 2022-07-30 RX ADMIN — MIRTAZAPINE 7.5 MILLIGRAM(S): 45 TABLET, ORALLY DISINTEGRATING ORAL at 21:05

## 2022-07-30 RX ADMIN — Medication 10 MILLIGRAM(S): at 09:41

## 2022-07-30 RX ADMIN — METFORMIN HYDROCHLORIDE 500 MILLIGRAM(S): 850 TABLET ORAL at 21:05

## 2022-07-30 NOTE — BH INPATIENT PSYCHIATRY PROGRESS NOTE - CURRENT MEDICATION
MEDICATIONS  (STANDING):  amLODIPine   Tablet 10 milliGRAM(s) Oral daily  budesonide  80 MICROgram(s)/formoterol 4.5 MICROgram(s) Inhaler 2 Puff(s) Inhalation two times a day  dextrose 5%. 1000 milliLiter(s) (100 mL/Hr) IV Continuous <Continuous>  dextrose 5%. 1000 milliLiter(s) (50 mL/Hr) IV Continuous <Continuous>  dextrose 50% Injectable 25 Gram(s) IV Push once  dextrose 50% Injectable 12.5 Gram(s) IV Push once  dextrose 50% Injectable 25 Gram(s) IV Push once  glucagon  Injectable 1 milliGRAM(s) IntraMuscular once  LORazepam     Tablet   Oral   LORazepam     Tablet 0.25 milliGRAM(s) Oral daily  metFORMIN 500 milliGRAM(s) Oral two times a day  mirtazapine 7.5 milliGRAM(s) Oral at bedtime  PARoxetine 10 milliGRAM(s) Oral daily    MEDICATIONS  (PRN):  dextrose Oral Gel 15 Gram(s) Oral once PRN Blood Glucose LESS THAN 70 milliGRAM(s)/deciliter  OLANZapine Injectable 2.5 milliGRAM(s) IntraMuscular once PRN agitation

## 2022-07-30 NOTE — BH INPATIENT PSYCHIATRY PROGRESS NOTE - NSBHMETABOLIC_PSY_ALL_CORE_FT
BMI:   HbA1c: A1C with Estimated Average Glucose Result: 5.8 % (07-26-22 @ 09:35)    Glucose: POCT Blood Glucose.: 98 mg/dL (07-30-22 @ 08:02)    BP: 136/79 (07-30-22 @ 08:38) (129/71 - 136/79)  Lipid Panel:

## 2022-07-30 NOTE — BH INPATIENT PSYCHIATRY PROGRESS NOTE - NSBHCHARTREVIEWVS_PSY_A_CORE FT
Vital Signs Last 24 Hrs  T(C): 36.6 (07-30-22 @ 08:38), Max: 36.6 (07-30-22 @ 08:38)  T(F): 97.8 (07-30-22 @ 08:38), Max: 97.8 (07-30-22 @ 08:38)  HR: 94 (07-30-22 @ 08:38) (94 - 94)  BP: 136/79 (07-30-22 @ 08:38) (136/79 - 136/79)  BP(mean): --  RR: --  SpO2: --    Orthostatic VS  07-29-22 @ 08:30  Lying BP: --/-- HR: --  Sitting BP: 126/74 HR: 92  Standing BP: --/-- HR: --  Site: --  Mode: --

## 2022-07-30 NOTE — BH INPATIENT PSYCHIATRY PROGRESS NOTE - NSBHASSESSSUMMFT_PSY_ALL_CORE
82M PMH CHF, COPD, DM, dementia, schizophrenia, HTN sent in from [facility] for dehydration and SI. Patient is a poor historian. Patient does report a PPHx of schizophrenia. Treated at [facility] for dehydration and catatonia, improved on Ativan and Invega Sustenna GARCIA.  Now admitted at [facility].  Denies SI on assessment.      7/27: Pt presents as depressed, with SI with plan to stop eating. Will monitor intake and fingersticks. CO1:1 for safety & fall risk in light of positive orthostatics.   7/28: Pt resumed oral intake, less depressed. Denies SI. Labs reassuring. Will continue with current meds with plan to titrate Remeron and D/c Paxil.  7/29: episode of dyspnea last night, however doing well this AM. Appears to be having intermittent depressed mood with passive SI vs ?sundowning. Will continue to monitor. In light of intermittent SI and recent unclear episode of ?dysphagia, will keep on CO1:1 for safety for the weekend.  7/30: continues to have SI; continue CO    Legal: 9.27    Safety: CO1:1 for safety for the weekend and reassess    Psychiatry: Pt on Invega Sustenna 156mg IM last received 7/3/22 in [facility]. Next Dose 8/3/22 per documentation. Continue Paxil 10mg po daily and cross taper with Remeron, currently 7.5mg PO HS to help with depression and appetite stimulation.     Psychotherapy: Individual, group and milieu therapy as appropriate.    Medical: Continue Symbicort. Metformin 500mg BID, Norvasc 10mg PO daily.    Dispo: Remains fragile, needing inpatient care. Nursing home when stable.

## 2022-07-30 NOTE — BH INPATIENT PSYCHIATRY PROGRESS NOTE - PRN MEDS
MEDICATIONS  (PRN):  dextrose Oral Gel 15 Gram(s) Oral once PRN Blood Glucose LESS THAN 70 milliGRAM(s)/deciliter  OLANZapine Injectable 2.5 milliGRAM(s) IntraMuscular once PRN agitation

## 2022-07-30 NOTE — BH INPATIENT PSYCHIATRY PROGRESS NOTE - NSBHFUPINTERVALHXFT_PSY_A_CORE
Patient continues to report suicidal thoughts and depressed mood. He has been med compliant this last interval.

## 2022-07-31 LAB
GLUCOSE BLDC GLUCOMTR-MCNC: 110 MG/DL — HIGH (ref 70–99)
GLUCOSE BLDC GLUCOMTR-MCNC: 112 MG/DL — HIGH (ref 70–99)
GLUCOSE BLDC GLUCOMTR-MCNC: 90 MG/DL — SIGNIFICANT CHANGE UP (ref 70–99)
GLUCOSE BLDC GLUCOMTR-MCNC: 92 MG/DL — SIGNIFICANT CHANGE UP (ref 70–99)

## 2022-07-31 PROCEDURE — 99232 SBSQ HOSP IP/OBS MODERATE 35: CPT

## 2022-07-31 RX ADMIN — BUDESONIDE AND FORMOTEROL FUMARATE DIHYDRATE 2 PUFF(S): 160; 4.5 AEROSOL RESPIRATORY (INHALATION) at 08:52

## 2022-07-31 RX ADMIN — Medication 10 MILLIGRAM(S): at 08:51

## 2022-07-31 RX ADMIN — METFORMIN HYDROCHLORIDE 500 MILLIGRAM(S): 850 TABLET ORAL at 21:21

## 2022-07-31 RX ADMIN — BUDESONIDE AND FORMOTEROL FUMARATE DIHYDRATE 2 PUFF(S): 160; 4.5 AEROSOL RESPIRATORY (INHALATION) at 21:20

## 2022-07-31 RX ADMIN — Medication 0.25 MILLIGRAM(S): at 08:51

## 2022-07-31 RX ADMIN — METFORMIN HYDROCHLORIDE 500 MILLIGRAM(S): 850 TABLET ORAL at 08:51

## 2022-07-31 RX ADMIN — AMLODIPINE BESYLATE 10 MILLIGRAM(S): 2.5 TABLET ORAL at 08:51

## 2022-07-31 RX ADMIN — MIRTAZAPINE 7.5 MILLIGRAM(S): 45 TABLET, ORALLY DISINTEGRATING ORAL at 21:21

## 2022-07-31 NOTE — BH INPATIENT PSYCHIATRY PROGRESS NOTE - NSBHMETABOLIC_PSY_ALL_CORE_FT
HbA1c: A1C with Estimated Average Glucose Result: 5.8 % (07-26-22 @ 09:35)  Glucose: POCT Blood Glucose.: 90 mg/dL (07-31-22 @ 16:18)  BP: 136/79 (07-30-22 @ 08:38) (129/71 - 136/79)

## 2022-07-31 NOTE — BH INPATIENT PSYCHIATRY PROGRESS NOTE - NSBHASSESSSUMMFT_PSY_ALL_CORE
82-year-old male with PMH CHF, COPD, DM, dementia, schizophrenia, HTN sent in from [facility] for dehydration and SI. Patient is a poor historian. Patient does report a PPH of schizophrenia. Treated at [facility] for dehydration and catatonia, improved on Ativan and Invega Sustenna GARCIA. Now admitted at [facility]     07/31 Clinical Update  Patient with no reported SI today. However is minimally engaging and noted to have some PMR and paucity of thought content. Currently on CO for suicidal behavior. Patient on Invega Sustenna 156mg IM last received 7/3/22 in NH. Next Dose 8/3/22 per documentation.     Plan:  -CO continued  -Will continue Paxil and Remeron at current dose  -Will continue meds for medical comorbidities: Symbicort. Metformin 500mg BID, Norvasc 10mg PO daily.

## 2022-07-31 NOTE — BH INPATIENT PSYCHIATRY PROGRESS NOTE - MSE UNSTRUCTURED FT
The patient appears stated age, somewhat disheveled. Calm but minimally cooperative with the interview with intermittent eye contact. +PMR. No abnormal movements noted. The patient's speech is fluent and soft with paucity of content. Reported mood "okay" with constricted affect. The patient's thought process mostly linear. Thought content is impoverished; denies any suicidal thought today. No hallucinations. Insight and judgment are improving. Impulse control intact at the time of exam. Oriented to place and person.   
Appearance: poor dentition, appears malnourished, poor grooming  Behavior: psychomotor retardation noted, lying in bed, minimal spontaneous movements, poorly engaged  Speech: soft, limited spontaneity  Mood: depressed  Affect: congruent, stable, flat  Thought process: delayed thought process  Thought content: reports SI, has paucity of content; no delusions elicited  Perceptual disturbances: no appearance of internal preoccupation  Orientation: loosely oriented  Insight: poor  Judgement: poor

## 2022-07-31 NOTE — BH INPATIENT PSYCHIATRY PROGRESS NOTE - CURRENT MEDICATION
MEDICATIONS  (STANDING):  amLODIPine   Tablet 10 milliGRAM(s) Oral daily  budesonide  80 MICROgram(s)/formoterol 4.5 MICROgram(s) Inhaler 2 Puff(s) Inhalation two times a day  dextrose 5%. 1000 milliLiter(s) (50 mL/Hr) IV Continuous <Continuous>  dextrose 5%. 1000 milliLiter(s) (100 mL/Hr) IV Continuous <Continuous>  dextrose 50% Injectable 25 Gram(s) IV Push once  dextrose 50% Injectable 12.5 Gram(s) IV Push once  dextrose 50% Injectable 25 Gram(s) IV Push once  glucagon  Injectable 1 milliGRAM(s) IntraMuscular once  metFORMIN 500 milliGRAM(s) Oral two times a day  mirtazapine 7.5 milliGRAM(s) Oral at bedtime  PARoxetine 10 milliGRAM(s) Oral daily    MEDICATIONS  (PRN):  dextrose Oral Gel 15 Gram(s) Oral once PRN Blood Glucose LESS THAN 70 milliGRAM(s)/deciliter  OLANZapine Injectable 2.5 milliGRAM(s) IntraMuscular once PRN agitation

## 2022-07-31 NOTE — BH INPATIENT PSYCHIATRY PROGRESS NOTE - NSBHFUPINTERVALHXFT_PSY_A_CORE
Patient seen for follow-up for depressed mood and suicidal behavior. Chart reviewed and case discussed with nursing staff. No significant overnight event reported. Patient is minimally engaging on encounter but denies any concern and reports mood as "okay". Patient denies any thought of self-harm. Appetite and sleep are adequate. Taking meds. Vitals are stable.

## 2022-07-31 NOTE — BH INPATIENT PSYCHIATRY PROGRESS NOTE - NSBHCHARTREVIEWVS_PSY_A_CORE FT
178 Vital Signs Last 24 Hrs  T(C): 36.8 (07-31-22 @ 07:48), Max: 36.8 (07-31-22 @ 07:48)  T(F): 98.3 (07-31-22 @ 07:48), Max: 98.3 (07-31-22 @ 07:48)    Orthostatic VS  07-31-22 @ 07:48  Lying BP: 143/83 HR: 64  Sitting BP: 135/74 HR: 76

## 2022-08-01 LAB
GLUCOSE BLDC GLUCOMTR-MCNC: 103 MG/DL — HIGH (ref 70–99)
GLUCOSE BLDC GLUCOMTR-MCNC: 87 MG/DL — SIGNIFICANT CHANGE UP (ref 70–99)
GLUCOSE BLDC GLUCOMTR-MCNC: 91 MG/DL — SIGNIFICANT CHANGE UP (ref 70–99)
GLUCOSE BLDC GLUCOMTR-MCNC: 95 MG/DL — SIGNIFICANT CHANGE UP (ref 70–99)

## 2022-08-01 PROCEDURE — 99232 SBSQ HOSP IP/OBS MODERATE 35: CPT

## 2022-08-01 RX ADMIN — BUDESONIDE AND FORMOTEROL FUMARATE DIHYDRATE 2 PUFF(S): 160; 4.5 AEROSOL RESPIRATORY (INHALATION) at 21:34

## 2022-08-01 RX ADMIN — BUDESONIDE AND FORMOTEROL FUMARATE DIHYDRATE 2 PUFF(S): 160; 4.5 AEROSOL RESPIRATORY (INHALATION) at 09:38

## 2022-08-01 RX ADMIN — METFORMIN HYDROCHLORIDE 500 MILLIGRAM(S): 850 TABLET ORAL at 09:37

## 2022-08-01 RX ADMIN — MIRTAZAPINE 7.5 MILLIGRAM(S): 45 TABLET, ORALLY DISINTEGRATING ORAL at 21:34

## 2022-08-01 RX ADMIN — AMLODIPINE BESYLATE 10 MILLIGRAM(S): 2.5 TABLET ORAL at 09:37

## 2022-08-01 RX ADMIN — Medication 10 MILLIGRAM(S): at 09:37

## 2022-08-01 RX ADMIN — METFORMIN HYDROCHLORIDE 500 MILLIGRAM(S): 850 TABLET ORAL at 21:34

## 2022-08-01 NOTE — BH INPATIENT PSYCHIATRY PROGRESS NOTE - NSBHCHARTREVIEWVS_PSY_A_CORE FT
Vital Signs Last 24 Hrs  T(C): 36.5 (08-01-22 @ 08:07), Max: 36.5 (08-01-22 @ 08:07)  T(F): 97.7 (08-01-22 @ 08:07), Max: 97.7 (08-01-22 @ 08:07)  HR: --  BP: --  BP(mean): --  RR: --  SpO2: --    Orthostatic VS  08-01-22 @ 08:07  Lying BP: --/-- HR: --  Sitting BP: 137/79 HR: 64  Standing BP: 134/69 HR: 70  Site: --  Mode: --  Orthostatic VS  07-31-22 @ 07:48  Lying BP: 143/83 HR: 64  Sitting BP: 135/74 HR: 76  Standing BP: --/-- HR: --  Site: --  Mode: --

## 2022-08-01 NOTE — BH INPATIENT PSYCHIATRY PROGRESS NOTE - NSBHATTESTCOMMENTATTENDFT_PSY_A_CORE
Considering recent ambivalence about suicidal ideation, will continue CO over the weekend. Will reassess Monday.  Agree with assessment above. On encounter today pt denies SIIP and agrees to verbalize needs to staff. As per staff pt ate breakfast and PO intake was adequate over the weekend. Discontinue CO.

## 2022-08-01 NOTE — BH INPATIENT PSYCHIATRY PROGRESS NOTE - NSBHASSESSSUMMFT_PSY_ALL_CORE
82M PMH CHF, COPD, DM, dementia, schizophrenia, HTN sent in from [facility] for dehydration and SI. Patient is a poor historian. Patient does report a PPHx of schizophrenia. Treated at [facility] for dehydration and catatonia, improved on Ativan and Invega Sustenna GARCIA.  Now admitted at [facility].  Denies SI on assessment.      7/27: Pt presents as depressed, with SI with plan to stop eating. Will monitor intake and fingersticks. CO1:1 for safety & fall risk in light of positive orthostatics.   7/28: Pt resumed oral intake, less depressed. Denies SI. Labs reassuring. Will continue with current meds with plan to titrate Remeron and D/c Paxil.  7/29: episode of dyspnea last night, however doing well this AM. Appears to be having intermittent depressed mood with passive SI vs ?sundowning. Will continue to monitor. In light of intermittent SI and recent unclear episode of ?dysphagia, will keep on CO1:1 for safety for the weekend.    Legal: 9.27    Safety: CO1:1 for safety for the weekend and reassess    Psychiatry: Pt on Invega Sustenna 156mg IM last received 7/3/22 in NH. Next Dose 8/3/22 per documentation. Decrease Paxil to 10mg and cross taper with Remeron 7.5mg PO HS to help with depression and appetite stimulation.     Psychotherapy: Individual, group and milieu therapy as appropriate.    Medical: Continue Symbicort. Metformin 500mg BID, Norvasc 10mg PO daily.    Dispo: Remains fragile, needing inpatient care. Nursing home when stable.   82M PMH CHF, COPD, DM, dementia, schizophrenia, HTN sent in from [facility] for dehydration and SI. Patient is a poor historian. Patient does report a PPHx of schizophrenia. Treated at [facility] for dehydration and catatonia, improved on Ativan and Invega Sustenna GARCIA.  Now admitted at [facility].  Denies SI on assessment.      7/27: Pt presents as depressed, with SI with plan to stop eating. Will monitor intake and fingersticks. CO1:1 for safety & fall risk in light of positive orthostatics.   7/28: Pt resumed oral intake, less depressed. Denies SI. Labs reassuring. Will continue with current meds with plan to titrate Remeron and D/c Paxil.  7/29: episode of dyspnea last night, however doing well this AM. Appears to be having intermittent depressed mood with passive SI vs ?sundowning. Will continue to monitor. In light of intermittent SI and recent unclear episode of ?dysphagia, will keep on CO1:1 for safety for the weekend.  8/1: Pt has had no significant events over the weekend. Remains depressed and anxious. Denies current SI, HI. Will remove CO and increase Remeron. Plan to Dc Paxil and monitor for withdrawal.     Legal: 9.27    Safety: D/C CO1:1. Routine checks. Aspiration precautions.     Psychiatry: Pt on Invega Sustenna 156mg IM last received 7/3/22 in NH. Next Dose 8/3/22 per documentation. Increase Remeron to 15mg PO HS. Continue Paxil 10mg PO daily and taper off. Monitor for SSRI withdrawal.     Psychotherapy: Individual, group and milieu therapy as appropriate.    Medical: Continue Symbicort. Metformin 500mg BID, Norvasc 10mg PO daily.    Dispo: Remains fragile, needing inpatient care. Nursing home when stable.   82M PMH CHF, COPD, DM, dementia, schizophrenia, HTN sent in from [facility] for dehydration and SI. Patient is a poor historian. Patient does report a PPHx of schizophrenia. Treated at [facility] for dehydration and catatonia, improved on Ativan and Invega Sustenna GARCIA.  Now admitted at [facility].  Denies SI on assessment.      7/27: Pt presents as depressed, with SI with plan to stop eating. Will monitor intake and fingersticks. CO1:1 for safety & fall risk in light of positive orthostatics.   7/28: Pt resumed oral intake, less depressed. Denies SI. Labs reassuring. Will continue with current meds with plan to titrate Remeron and D/c Paxil.  7/29: episode of dyspnea last night, however doing well this AM. Appears to be having intermittent depressed mood with passive SI vs ?sundowning. Will continue to monitor. In light of intermittent SI and recent unclear episode of ?dysphagia, will keep on CO1:1 for safety for the weekend.  8/1: Pt has had no significant events over the weekend. Remains depressed and anxious. Denies current SI, HI. Will remove CO and increase Remeron. Plan to Dc Paxil and monitor for withdrawal. Mitigating factors include providing fluids per pt request, change in medications to address depression, and therapeutic milieu of the unit.    Legal: 9.27    Safety: D/C CO1:1. Routine checks. Aspiration precautions.     Psychiatry: Pt on Invega Sustenna 156mg IM last received 7/3/22 in NH. Next Dose 8/3/22 per documentation. Increase Remeron to 15mg PO HS. Continue Paxil 10mg PO daily and taper off. Monitor for SSRI withdrawal.     Psychotherapy: Individual, group and milieu therapy as appropriate.    Medical: Continue Symbicort. Metformin 500mg BID, Norvasc 10mg PO daily.    Dispo: Remains fragile, needing inpatient care. Nursing home when stable.

## 2022-08-01 NOTE — BH INPATIENT PSYCHIATRY PROGRESS NOTE - NSBHFUPINTERVALHXFT_PSY_A_CORE
82 y.o. male being seen for SI and psychosis. Per staff, patient had breakfast this morning. Pt has had intermittent complaints of SI over the weekend. Upon evaluation, pt reported that he had been having suicidal ideations in the past but appears to be unable to elaborate on content. Pt simply states he would "do nothing" when asked about plans and intent. Patient verbalizes that he would feel better supported with receiving water when he requires it. He denies current suicidal ideation, intent or plan. Pt reports poor sleep last night, due to anxiety.

## 2022-08-01 NOTE — BH INPATIENT PSYCHIATRY PROGRESS NOTE - NSBHMETABOLIC_PSY_ALL_CORE_FT
BMI:   HbA1c: A1C with Estimated Average Glucose Result: 5.8 % (07-26-22 @ 09:35)    Glucose: POCT Blood Glucose.: 87 mg/dL (08-01-22 @ 08:09)    BP: 136/79 (07-30-22 @ 08:38) (136/79 - 136/79)  Lipid Panel:  BMI:   HbA1c: A1C with Estimated Average Glucose Result: 5.8 % (07-26-22 @ 09:35)    Glucose: POCT Blood Glucose.: 103 mg/dL (08-01-22 @ 12:11)    BP: 136/79 (07-30-22 @ 08:38) (136/79 - 136/79)  Lipid Panel:

## 2022-08-02 LAB
GLUCOSE BLDC GLUCOMTR-MCNC: 107 MG/DL — HIGH (ref 70–99)
GLUCOSE BLDC GLUCOMTR-MCNC: 111 MG/DL — HIGH (ref 70–99)
GLUCOSE BLDC GLUCOMTR-MCNC: 157 MG/DL — HIGH (ref 70–99)
GLUCOSE BLDC GLUCOMTR-MCNC: 99 MG/DL — SIGNIFICANT CHANGE UP (ref 70–99)

## 2022-08-02 PROCEDURE — 99231 SBSQ HOSP IP/OBS SF/LOW 25: CPT

## 2022-08-02 RX ORDER — PALIPERIDONE 1.5 MG/1
156 TABLET, EXTENDED RELEASE ORAL ONCE
Refills: 0 | Status: COMPLETED | OUTPATIENT
Start: 2022-08-03 | End: 2022-08-03

## 2022-08-02 RX ORDER — MIRTAZAPINE 45 MG/1
15 TABLET, ORALLY DISINTEGRATING ORAL AT BEDTIME
Refills: 0 | Status: DISCONTINUED | OUTPATIENT
Start: 2022-08-02 | End: 2022-08-10

## 2022-08-02 RX ADMIN — MIRTAZAPINE 15 MILLIGRAM(S): 45 TABLET, ORALLY DISINTEGRATING ORAL at 21:15

## 2022-08-02 RX ADMIN — BUDESONIDE AND FORMOTEROL FUMARATE DIHYDRATE 2 PUFF(S): 160; 4.5 AEROSOL RESPIRATORY (INHALATION) at 09:14

## 2022-08-02 RX ADMIN — Medication 10 MILLIGRAM(S): at 09:15

## 2022-08-02 RX ADMIN — METFORMIN HYDROCHLORIDE 500 MILLIGRAM(S): 850 TABLET ORAL at 21:15

## 2022-08-02 RX ADMIN — METFORMIN HYDROCHLORIDE 500 MILLIGRAM(S): 850 TABLET ORAL at 09:15

## 2022-08-02 RX ADMIN — AMLODIPINE BESYLATE 10 MILLIGRAM(S): 2.5 TABLET ORAL at 09:16

## 2022-08-02 RX ADMIN — BUDESONIDE AND FORMOTEROL FUMARATE DIHYDRATE 2 PUFF(S): 160; 4.5 AEROSOL RESPIRATORY (INHALATION) at 21:16

## 2022-08-02 NOTE — BH INPATIENT PSYCHIATRY PROGRESS NOTE - NSBHMETABOLIC_PSY_ALL_CORE_FT
BMI:   HbA1c: A1C with Estimated Average Glucose Result: 5.8 % (07-26-22 @ 09:35)    Glucose: POCT Blood Glucose.: 99 mg/dL (08-02-22 @ 12:06)    BP: --  Lipid Panel:

## 2022-08-02 NOTE — BH INPATIENT PSYCHIATRY PROGRESS NOTE - NSBHASSESSSUMMFT_PSY_ALL_CORE
82M PMH CHF, COPD, DM, dementia, schizophrenia, HTN sent in from [facility] for dehydration and SI. Patient is a poor historian. Patient does report a PPHx of schizophrenia. Treated at [facility] for dehydration and catatonia, improved on Ativan and Invega Sustenna GARCIA.  Now admitted at [facility].  Denies SI on assessment.      7/27: Pt presents as depressed, with SI with plan to stop eating. Will monitor intake and fingersticks. CO1:1 for safety & fall risk in light of positive orthostatics.   7/28: Pt resumed oral intake, less depressed. Denies SI. Labs reassuring. Will continue with current meds with plan to titrate Remeron and D/c Paxil.  7/29: episode of dyspnea last night, however doing well this AM. Appears to be having intermittent depressed mood with passive SI vs ?sundowning. Will continue to monitor. In light of intermittent SI and recent unclear episode of ?dysphagia, will keep on CO1:1 for safety for the weekend.  8/1: Pt has had no significant events over the weekend. Remains depressed and anxious. Denies current SI, HI. Will remove CO and increase Remeron. Plan to Dc Paxil and monitor for withdrawal. Mitigating factors include providing fluids per pt request, change in medications to address depression, and therapeutic milieu of the unit.  8/2: On encounter pt appears more alert and responsive. Reports feeling better in terms of his mood, denies sx of depression today as well as suicidal ideation, intent or plan. Denies AH/VH. Will begin process to transfer pt back to NH. Invega Sustenna due tomorrow 8/3.     Legal: 9.27    Safety: D/C CO1:1. Routine checks. Aspiration precautions.     Psychiatry: Pt on Invega Sustenna 156mg IM last received 7/3/22 in NH. Next Dose 8/3/22 per documentation. Increase Remeron to 15mg PO HS. Continue Paxil 10mg PO daily and taper off. Monitor for SSRI withdrawal.     Psychotherapy: Individual, group and milieu therapy as appropriate.    Medical: Continue Symbicort. Metformin 500mg BID, Norvasc 10mg PO daily.    Dispo: Remains fragile, needing inpatient care. Nursing home when stable.

## 2022-08-02 NOTE — BH INPATIENT PSYCHIATRY PROGRESS NOTE - NSBHFUPINTERVALHXFT_PSY_A_CORE
82 y.o. male being seen for SI and psychosis. As per staff pt has been in good behavioral control, eating and drinking well. Accepting all meds. On encounter pt appears more alert and responsive. Reports feeling better in terms of his mood, denies sx of depression today as well as suicidal ideation, intent or plan. Denies AH/VH. Will begin process to transfer pt back to NH.

## 2022-08-02 NOTE — BH INPATIENT PSYCHIATRY PROGRESS NOTE - CURRENT MEDICATION
MEDICATIONS  (STANDING):  amLODIPine   Tablet 10 milliGRAM(s) Oral daily  budesonide  80 MICROgram(s)/formoterol 4.5 MICROgram(s) Inhaler 2 Puff(s) Inhalation two times a day  dextrose 5%. 1000 milliLiter(s) (50 mL/Hr) IV Continuous <Continuous>  dextrose 5%. 1000 milliLiter(s) (100 mL/Hr) IV Continuous <Continuous>  dextrose 50% Injectable 25 Gram(s) IV Push once  dextrose 50% Injectable 12.5 Gram(s) IV Push once  dextrose 50% Injectable 25 Gram(s) IV Push once  glucagon  Injectable 1 milliGRAM(s) IntraMuscular once  metFORMIN 500 milliGRAM(s) Oral two times a day  mirtazapine 15 milliGRAM(s) Oral at bedtime    MEDICATIONS  (PRN):  dextrose Oral Gel 15 Gram(s) Oral once PRN Blood Glucose LESS THAN 70 milliGRAM(s)/deciliter  OLANZapine Injectable 2.5 milliGRAM(s) IntraMuscular once PRN agitation

## 2022-08-02 NOTE — BH INPATIENT PSYCHIATRY PROGRESS NOTE - NSBHCHARTREVIEWVS_PSY_A_CORE FT
Vital Signs Last 24 Hrs  T(C): 36 (08-02-22 @ 08:04), Max: 36 (08-02-22 @ 08:04)  T(F): 96.8 (08-02-22 @ 08:04), Max: 96.8 (08-02-22 @ 08:04)  HR: --  BP: --  BP(mean): --  RR: --  SpO2: --    Orthostatic VS  08-02-22 @ 08:25  Lying BP: --/-- HR: --  Sitting BP: 128/72 HR: 73  Standing BP: 108/61 HR: 77  Site: --  Mode: --  Orthostatic VS  08-01-22 @ 08:07  Lying BP: --/-- HR: --  Sitting BP: 137/79 HR: 64  Standing BP: 134/69 HR: 70  Site: --  Mode: --

## 2022-08-03 LAB
GLUCOSE BLDC GLUCOMTR-MCNC: 101 MG/DL — HIGH (ref 70–99)
GLUCOSE BLDC GLUCOMTR-MCNC: 86 MG/DL — SIGNIFICANT CHANGE UP (ref 70–99)
GLUCOSE BLDC GLUCOMTR-MCNC: 94 MG/DL — SIGNIFICANT CHANGE UP (ref 70–99)
GLUCOSE BLDC GLUCOMTR-MCNC: 98 MG/DL — SIGNIFICANT CHANGE UP (ref 70–99)

## 2022-08-03 PROCEDURE — 99231 SBSQ HOSP IP/OBS SF/LOW 25: CPT

## 2022-08-03 RX ADMIN — BUDESONIDE AND FORMOTEROL FUMARATE DIHYDRATE 2 PUFF(S): 160; 4.5 AEROSOL RESPIRATORY (INHALATION) at 10:41

## 2022-08-03 RX ADMIN — METFORMIN HYDROCHLORIDE 500 MILLIGRAM(S): 850 TABLET ORAL at 21:19

## 2022-08-03 RX ADMIN — BUDESONIDE AND FORMOTEROL FUMARATE DIHYDRATE 2 PUFF(S): 160; 4.5 AEROSOL RESPIRATORY (INHALATION) at 21:19

## 2022-08-03 RX ADMIN — MIRTAZAPINE 15 MILLIGRAM(S): 45 TABLET, ORALLY DISINTEGRATING ORAL at 21:19

## 2022-08-03 RX ADMIN — PALIPERIDONE 156 MILLIGRAM(S): 1.5 TABLET, EXTENDED RELEASE ORAL at 15:29

## 2022-08-03 RX ADMIN — METFORMIN HYDROCHLORIDE 500 MILLIGRAM(S): 850 TABLET ORAL at 10:40

## 2022-08-03 RX ADMIN — AMLODIPINE BESYLATE 10 MILLIGRAM(S): 2.5 TABLET ORAL at 10:40

## 2022-08-03 NOTE — BH INPATIENT PSYCHIATRY PROGRESS NOTE - NSBHMETABOLIC_PSY_ALL_CORE_FT
BMI:   HbA1c: A1C with Estimated Average Glucose Result: 5.8 % (07-26-22 @ 09:35)    Glucose: POCT Blood Glucose.: 101 mg/dL (08-03-22 @ 07:51)    BP: --  Lipid Panel:  BMI:   HbA1c: A1C with Estimated Average Glucose Result: 5.8 % (07-26-22 @ 09:35)    Glucose: POCT Blood Glucose.: 94 mg/dL (08-03-22 @ 11:42)    BP: --  Lipid Panel:

## 2022-08-03 NOTE — BH INPATIENT PSYCHIATRY PROGRESS NOTE - NSBHFUPINTERVALHXFT_PSY_A_CORE
82 y.o. male being seen for SI and psychosis. No acute events overnight. Per MAR, pt has been adherent to medication regimen. Upon evaluation, pt denied any concerns. Reports feeling fine. Sleeping and eating well. Denies SI, HI. Pt continues to have paucity of thought which may be baseline for pt. No other acute concerns.  82 y.o. male being seen for SI and psychosis. No acute events overnight. Per MAR, pt has been adherent to medication regimen. Upon evaluation, pt denied any concerns. Reports feeling fine. Sleeping and eating well. Denies SI, HI. Pt continues to have paucity of thought which may be baseline for pt. Pt had expressed to staff that he wished to resume regular diet. Discussed risks and benefits and need for swallow study prior to advancing diet, however pt declined to have swallow study done despite education.

## 2022-08-03 NOTE — BH INPATIENT PSYCHIATRY PROGRESS NOTE - CURRENT MEDICATION
MEDICATIONS  (STANDING):  amLODIPine   Tablet 10 milliGRAM(s) Oral daily  budesonide  80 MICROgram(s)/formoterol 4.5 MICROgram(s) Inhaler 2 Puff(s) Inhalation two times a day  dextrose 5%. 1000 milliLiter(s) (50 mL/Hr) IV Continuous <Continuous>  dextrose 5%. 1000 milliLiter(s) (100 mL/Hr) IV Continuous <Continuous>  dextrose 50% Injectable 25 Gram(s) IV Push once  dextrose 50% Injectable 12.5 Gram(s) IV Push once  dextrose 50% Injectable 25 Gram(s) IV Push once  glucagon  Injectable 1 milliGRAM(s) IntraMuscular once  metFORMIN 500 milliGRAM(s) Oral two times a day  mirtazapine 15 milliGRAM(s) Oral at bedtime  paliperidone Injectable, Long Acting 156 milliGRAM(s) IntraMuscular once    MEDICATIONS  (PRN):  dextrose Oral Gel 15 Gram(s) Oral once PRN Blood Glucose LESS THAN 70 milliGRAM(s)/deciliter  OLANZapine Injectable 2.5 milliGRAM(s) IntraMuscular once PRN agitation   MEDICATIONS  (STANDING):  amLODIPine   Tablet 10 milliGRAM(s) Oral daily  budesonide  80 MICROgram(s)/formoterol 4.5 MICROgram(s) Inhaler 2 Puff(s) Inhalation two times a day  dextrose 5%. 1000 milliLiter(s) (100 mL/Hr) IV Continuous <Continuous>  dextrose 5%. 1000 milliLiter(s) (50 mL/Hr) IV Continuous <Continuous>  dextrose 50% Injectable 25 Gram(s) IV Push once  dextrose 50% Injectable 12.5 Gram(s) IV Push once  dextrose 50% Injectable 25 Gram(s) IV Push once  glucagon  Injectable 1 milliGRAM(s) IntraMuscular once  metFORMIN 500 milliGRAM(s) Oral two times a day  mirtazapine 15 milliGRAM(s) Oral at bedtime  paliperidone Injectable, Long Acting 156 milliGRAM(s) IntraMuscular once    MEDICATIONS  (PRN):  dextrose Oral Gel 15 Gram(s) Oral once PRN Blood Glucose LESS THAN 70 milliGRAM(s)/deciliter  OLANZapine Injectable 2.5 milliGRAM(s) IntraMuscular once PRN agitation

## 2022-08-03 NOTE — BH INPATIENT PSYCHIATRY PROGRESS NOTE - NSBHCHARTREVIEWVS_PSY_A_CORE FT
Vital Signs Last 24 Hrs  T(C): 36.3 (08-03-22 @ 07:50), Max: 36.3 (08-03-22 @ 07:50)  T(F): 97.4 (08-03-22 @ 07:50), Max: 97.4 (08-03-22 @ 07:50)  HR: --  BP: --  BP(mean): --  RR: --  SpO2: --    Orthostatic VS  08-03-22 @ 07:50  Lying BP: --/-- HR: --  Sitting BP: 104/67 HR: 66  Standing BP: 112/71 HR: 74  Site: --  Mode: --  Orthostatic VS  08-02-22 @ 08:25  Lying BP: --/-- HR: --  Sitting BP: 128/72 HR: 73  Standing BP: 108/61 HR: 77  Site: --  Mode: --

## 2022-08-03 NOTE — BH INPATIENT PSYCHIATRY PROGRESS NOTE - NSBHASSESSSUMMFT_PSY_ALL_CORE
82M PMH CHF, COPD, DM, dementia, schizophrenia, HTN sent in from [facility] for dehydration and SI. Patient is a poor historian. Patient does report a PPHx of schizophrenia. Treated at [facility] for dehydration and catatonia, improved on Ativan and Invega Sustenna GARCIA.  Now admitted at [facility].  Denies SI on assessment.      7/27-7/29: Pt was kept on CO1:1 monitoring for intermittent SI and fall risk in light of poor intake. Also started on Remeron with plan to cross taper to it from Paxil.  8/1: Pt has had no significant events over the weekend. Remains depressed and anxious. Denies current SI, HI. Will remove CO and increase Remeron. Plan to Dc Paxil and monitor for withdrawal. Mitigating factors include providing fluids per pt request, change in medications to address depression, and therapeutic milieu of the unit.  8/2: On encounter pt appears more alert and responsive. Reports feeling better in terms of his mood, denies sx of depression today as well as suicidal ideation, intent or plan. Denies AH/VH. Will begin process to transfer pt back to NH. Invega Sustenna due tomorrow 8/3.   8/3: Pt appears more alert and responsive. Denies any acute concerns. Denies SI or other depressive sxs. No acute perceptual disturbances elicited. Pt due for maintenance dose of Invega Sustenna 156mg IM today, will administer and monitor. Protocol for transfer back to NH in process.    Legal: 9.27    Safety: D/C CO1:1. Routine checks. Aspiration precautions.     Psychiatry: Pt on Invega Sustenna 156mg IM last received 7/3/22 in NH. Next Dose 8/3/22 per documentation. Increase Remeron to 15mg PO HS. Monitor for SSRI withdrawal as Paxil recently tapered and D/allie.     Psychotherapy: Individual, group and milieu therapy as appropriate.    Medical: Continue Symbicort. Metformin 500mg BID, Norvasc 10mg PO daily.    Dispo: NH when stable.
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
## 2022-08-04 ENCOUNTER — EMERGENCY (EMERGENCY)
Facility: HOSPITAL | Age: 82
LOS: 1 days | Discharge: HOME | End: 2022-08-04
Attending: EMERGENCY MEDICINE
Payer: COMMERCIAL

## 2022-08-04 VITALS
WEIGHT: 160.06 LBS | SYSTOLIC BLOOD PRESSURE: 125 MMHG | HEART RATE: 68 BPM | HEIGHT: 66 IN | OXYGEN SATURATION: 97 % | RESPIRATION RATE: 18 BRPM | DIASTOLIC BLOOD PRESSURE: 77 MMHG

## 2022-08-04 LAB
ALBUMIN SERPL ELPH-MCNC: 4.2 G/DL — SIGNIFICANT CHANGE UP (ref 3.3–5)
ALP SERPL-CCNC: 38 U/L — LOW (ref 40–120)
ALT FLD-CCNC: 17 U/L — SIGNIFICANT CHANGE UP (ref 10–45)
ANION GAP SERPL CALC-SCNC: 12 MMOL/L — SIGNIFICANT CHANGE UP (ref 5–17)
APTT BLD: 33.2 SEC — SIGNIFICANT CHANGE UP (ref 27.5–35.5)
AST SERPL-CCNC: 16 U/L — SIGNIFICANT CHANGE UP (ref 10–40)
BASOPHILS # BLD AUTO: 0.06 K/UL — SIGNIFICANT CHANGE UP (ref 0–0.2)
BASOPHILS NFR BLD AUTO: 1.1 % — SIGNIFICANT CHANGE UP (ref 0–2)
BILIRUB SERPL-MCNC: 0.5 MG/DL — SIGNIFICANT CHANGE UP (ref 0.2–1.2)
BLD GP AB SCN SERPL QL: NEGATIVE — SIGNIFICANT CHANGE UP
BUN SERPL-MCNC: 7 MG/DL — SIGNIFICANT CHANGE UP (ref 7–23)
CALCIUM SERPL-MCNC: 9.7 MG/DL — SIGNIFICANT CHANGE UP (ref 8.4–10.5)
CHLORIDE SERPL-SCNC: 99 MMOL/L — SIGNIFICANT CHANGE UP (ref 96–108)
CO2 SERPL-SCNC: 26 MMOL/L — SIGNIFICANT CHANGE UP (ref 22–31)
CREAT SERPL-MCNC: 0.83 MG/DL — SIGNIFICANT CHANGE UP (ref 0.5–1.3)
EGFR: 88 ML/MIN/1.73M2 — SIGNIFICANT CHANGE UP
EOSINOPHIL # BLD AUTO: 0.23 K/UL — SIGNIFICANT CHANGE UP (ref 0–0.5)
EOSINOPHIL NFR BLD AUTO: 4.2 % — SIGNIFICANT CHANGE UP (ref 0–6)
FLUAV AG NPH QL: SIGNIFICANT CHANGE UP
FLUBV AG NPH QL: SIGNIFICANT CHANGE UP
GLUCOSE BLDC GLUCOMTR-MCNC: 103 MG/DL — HIGH (ref 70–99)
GLUCOSE BLDC GLUCOMTR-MCNC: 92 MG/DL — SIGNIFICANT CHANGE UP (ref 70–99)
GLUCOSE BLDC GLUCOMTR-MCNC: 99 MG/DL — SIGNIFICANT CHANGE UP (ref 70–99)
GLUCOSE SERPL-MCNC: 88 MG/DL — SIGNIFICANT CHANGE UP (ref 70–99)
HCT VFR BLD CALC: 40 % — SIGNIFICANT CHANGE UP (ref 39–50)
HGB BLD-MCNC: 13.5 G/DL — SIGNIFICANT CHANGE UP (ref 13–17)
IMM GRANULOCYTES NFR BLD AUTO: 0.2 % — SIGNIFICANT CHANGE UP (ref 0–1.5)
INR BLD: 0.97 RATIO — SIGNIFICANT CHANGE UP (ref 0.88–1.16)
LYMPHOCYTES # BLD AUTO: 1.08 K/UL — SIGNIFICANT CHANGE UP (ref 1–3.3)
LYMPHOCYTES # BLD AUTO: 19.6 % — SIGNIFICANT CHANGE UP (ref 13–44)
MCHC RBC-ENTMCNC: 32.5 PG — SIGNIFICANT CHANGE UP (ref 27–34)
MCHC RBC-ENTMCNC: 33.8 GM/DL — SIGNIFICANT CHANGE UP (ref 32–36)
MCV RBC AUTO: 96.2 FL — SIGNIFICANT CHANGE UP (ref 80–100)
MONOCYTES # BLD AUTO: 0.62 K/UL — SIGNIFICANT CHANGE UP (ref 0–0.9)
MONOCYTES NFR BLD AUTO: 11.3 % — SIGNIFICANT CHANGE UP (ref 2–14)
NEUTROPHILS # BLD AUTO: 3.5 K/UL — SIGNIFICANT CHANGE UP (ref 1.8–7.4)
NEUTROPHILS NFR BLD AUTO: 63.6 % — SIGNIFICANT CHANGE UP (ref 43–77)
NRBC # BLD: 0 /100 WBCS — SIGNIFICANT CHANGE UP (ref 0–0)
PLATELET # BLD AUTO: 255 K/UL — SIGNIFICANT CHANGE UP (ref 150–400)
POTASSIUM SERPL-MCNC: 4.1 MMOL/L — SIGNIFICANT CHANGE UP (ref 3.5–5.3)
POTASSIUM SERPL-SCNC: 4.1 MMOL/L — SIGNIFICANT CHANGE UP (ref 3.5–5.3)
PROT SERPL-MCNC: 6.9 G/DL — SIGNIFICANT CHANGE UP (ref 6–8.3)
PROTHROM AB SERPL-ACNC: 11.2 SEC — SIGNIFICANT CHANGE UP (ref 10.5–13.4)
RBC # BLD: 4.16 M/UL — LOW (ref 4.2–5.8)
RBC # FLD: 13.1 % — SIGNIFICANT CHANGE UP (ref 10.3–14.5)
RH IG SCN BLD-IMP: POSITIVE — SIGNIFICANT CHANGE UP
RSV RNA NPH QL NAA+NON-PROBE: SIGNIFICANT CHANGE UP
SARS-COV-2 RNA SPEC QL NAA+PROBE: SIGNIFICANT CHANGE UP
SODIUM SERPL-SCNC: 137 MMOL/L — SIGNIFICANT CHANGE UP (ref 135–145)
WBC # BLD: 5.5 K/UL — SIGNIFICANT CHANGE UP (ref 3.8–10.5)
WBC # FLD AUTO: 5.5 K/UL — SIGNIFICANT CHANGE UP (ref 3.8–10.5)

## 2022-08-04 PROCEDURE — 93010 ELECTROCARDIOGRAM REPORT: CPT

## 2022-08-04 PROCEDURE — 87637 SARSCOV2&INF A&B&RSV AMP PRB: CPT

## 2022-08-04 PROCEDURE — 86901 BLOOD TYPING SEROLOGIC RH(D): CPT

## 2022-08-04 PROCEDURE — 70450 CT HEAD/BRAIN W/O DYE: CPT | Mod: MA

## 2022-08-04 PROCEDURE — 70450 CT HEAD/BRAIN W/O DYE: CPT | Mod: 26,MA

## 2022-08-04 PROCEDURE — 86900 BLOOD TYPING SEROLOGIC ABO: CPT

## 2022-08-04 PROCEDURE — 85610 PROTHROMBIN TIME: CPT

## 2022-08-04 PROCEDURE — 80053 COMPREHEN METABOLIC PANEL: CPT

## 2022-08-04 PROCEDURE — 99284 EMERGENCY DEPT VISIT MOD MDM: CPT | Mod: 25

## 2022-08-04 PROCEDURE — 70450 CT HEAD/BRAIN W/O DYE: CPT | Mod: 26

## 2022-08-04 PROCEDURE — 99232 SBSQ HOSP IP/OBS MODERATE 35: CPT | Mod: GC

## 2022-08-04 PROCEDURE — 85730 THROMBOPLASTIN TIME PARTIAL: CPT

## 2022-08-04 PROCEDURE — 93005 ELECTROCARDIOGRAM TRACING: CPT

## 2022-08-04 PROCEDURE — 70486 CT MAXILLOFACIAL W/O DYE: CPT | Mod: 26

## 2022-08-04 PROCEDURE — 86850 RBC ANTIBODY SCREEN: CPT

## 2022-08-04 PROCEDURE — 99285 EMERGENCY DEPT VISIT HI MDM: CPT | Mod: 25

## 2022-08-04 PROCEDURE — 85025 COMPLETE CBC W/AUTO DIFF WBC: CPT

## 2022-08-04 RX ORDER — ENOXAPARIN SODIUM 100 MG/ML
30 INJECTION SUBCUTANEOUS EVERY 24 HOURS
Refills: 0 | Status: DISCONTINUED | OUTPATIENT
Start: 2022-08-04 | End: 2022-08-05

## 2022-08-04 RX ADMIN — AMLODIPINE BESYLATE 10 MILLIGRAM(S): 2.5 TABLET ORAL at 09:44

## 2022-08-04 RX ADMIN — METFORMIN HYDROCHLORIDE 500 MILLIGRAM(S): 850 TABLET ORAL at 09:44

## 2022-08-04 RX ADMIN — BUDESONIDE AND FORMOTEROL FUMARATE DIHYDRATE 2 PUFF(S): 160; 4.5 AEROSOL RESPIRATORY (INHALATION) at 09:44
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
## 2022-08-04 NOTE — ED PROVIDER NOTE - PROGRESS NOTE DETAILS
Albert PGY2: Neurosurgery consulted. Agreeable to evaluate the patient. Shannon PGY 2 pt has no change in subdural will call NS to confirm safe to dc Shannon PGY 2 NS confirmed can f/u out pt

## 2022-08-04 NOTE — ED PROVIDER NOTE - PATIENT PORTAL LINK FT
You can access the FollowMyHealth Patient Portal offered by [facility] by registering at the following website: [email or web address]. By joining [facility]’s FollowMyHealth portal, you will also be able to view your health information using other applications (apps) compatible with our system.
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
## 2022-08-04 NOTE — ED PROVIDER NOTE - CARE PROVIDERS DIRECT ADDRESSES
[email or web address]
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
## 2022-08-04 NOTE — ED PROVIDER NOTE - PHYSICAL EXAMINATION
Gen: Patient is well-appearing, NAD, AAOx3, follows commands, flat affect   HEENT: NCAT, EOMI, PEERLA, normal conjunctiva, tongue midline, oral mucosa moist  Lung: CTAB, no respiratory distress, no wheezes/rhonchi/rales B/L, speaking in full sentences  CV: RRR, no murmurs, rubs or gallops, distal pulses 2+ b/l  Abd: soft, NT, ND, no guarding, no rigidity, no rebound tenderness, no CVA tenderness   MSK: no visible deformities, ROM normal in UE/LE, no back TTP  Neuro: No focal sensory or motor deficits, normal CN exam  Skin: Warm, well perfused, no leg swelling  Psych: flat affect, calm
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
## 2022-08-04 NOTE — ED PROVIDER NOTE - NSFOLLOWUPINSTRUCTIONS_ED_ALL_ED_FT
Today you were seen in the ED for a subdural     It was found that it is stable on repeat CT scan.  Please follow up with     Please do not take any anticoagulation or antiplatelet medication.     Please follow up with a neurosurgeon  Dr. Mc information for him can be found below, please follow up in 2 weeks.     Please return to the ED if you have any of the following:  Change in vision, loss of hearing, change in mental status, pain that is not controlled with at home medication.

## 2022-08-04 NOTE — ED ADULT NURSE NOTE - ISOLATION TYPE:
Attempted to contact patient to discuss upcoming right shoulder MRI arthrogram at [facility] [place] - CLOSED Radiology  Message left 
Received a call back from patient to discuss upcoming right shoulder MRI arthrogram at [facility]  Allergies reviewed and verified patient does not currently take any anticoagulant medications  Pre procedure instructions including diet and taking own medications discussed with patient  Patient instructed that he may eat normally and take medications as usual before the procedure  Patient verbalizes understanding  Patient had this procedure before and denies any questions at this time  Reminded of the location, date and time of the expected procedure 
None
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
## 2022-08-04 NOTE — ED ADULT NURSE REASSESSMENT NOTE - NS ED NURSE REASSESS COMMENT FT1
Pt assessed, safety maintained with constant 1:1 observation. Pt denies any complaints at this time, no new neuro deficits noted.

## 2022-08-04 NOTE — BH INPATIENT PSYCHIATRY PROGRESS NOTE - CURRENT MEDICATION
MEDICATIONS  (STANDING):  amLODIPine   Tablet 10 milliGRAM(s) Oral daily  budesonide  80 MICROgram(s)/formoterol 4.5 MICROgram(s) Inhaler 2 Puff(s) Inhalation two times a day  dextrose 5%. 1000 milliLiter(s) (100 mL/Hr) IV Continuous <Continuous>  dextrose 5%. 1000 milliLiter(s) (50 mL/Hr) IV Continuous <Continuous>  dextrose 50% Injectable 25 Gram(s) IV Push once  dextrose 50% Injectable 12.5 Gram(s) IV Push once  dextrose 50% Injectable 25 Gram(s) IV Push once  glucagon  Injectable 1 milliGRAM(s) IntraMuscular once  metFORMIN 500 milliGRAM(s) Oral two times a day  mirtazapine 15 milliGRAM(s) Oral at bedtime    MEDICATIONS  (PRN):  dextrose Oral Gel 15 Gram(s) Oral once PRN Blood Glucose LESS THAN 70 milliGRAM(s)/deciliter  OLANZapine Injectable 2.5 milliGRAM(s) IntraMuscular once PRN agitation   MEDICATIONS  (STANDING):  amLODIPine   Tablet 10 milliGRAM(s) Oral daily  budesonide  80 MICROgram(s)/formoterol 4.5 MICROgram(s) Inhaler 2 Puff(s) Inhalation two times a day  dextrose 5%. 1000 milliLiter(s) (100 mL/Hr) IV Continuous <Continuous>  dextrose 5%. 1000 milliLiter(s) (50 mL/Hr) IV Continuous <Continuous>  dextrose 50% Injectable 25 Gram(s) IV Push once  dextrose 50% Injectable 12.5 Gram(s) IV Push once  dextrose 50% Injectable 25 Gram(s) IV Push once  enoxaparin Injectable 30 milliGRAM(s) SubCutaneous every 24 hours  glucagon  Injectable 1 milliGRAM(s) IntraMuscular once  metFORMIN 500 milliGRAM(s) Oral two times a day  mirtazapine 15 milliGRAM(s) Oral at bedtime    MEDICATIONS  (PRN):  dextrose Oral Gel 15 Gram(s) Oral once PRN Blood Glucose LESS THAN 70 milliGRAM(s)/deciliter  OLANZapine Injectable 2.5 milliGRAM(s) IntraMuscular once PRN agitation

## 2022-08-04 NOTE — BH INPATIENT PSYCHIATRY PROGRESS NOTE - NSBHCHARTREVIEWVS_PSY_A_CORE FT
Vital Signs Last 24 Hrs  T(C): 36.8 (08-04-22 @ 07:55), Max: 36.8 (08-04-22 @ 07:55)  T(F): 98.2 (08-04-22 @ 07:55), Max: 98.2 (08-04-22 @ 07:55)  HR: --  BP: --  BP(mean): --  RR: --  SpO2: --    Orthostatic VS  08-04-22 @ 07:55  Lying BP: --/-- HR: --  Sitting BP: 133/69 HR: 65  Standing BP: 124/66 HR: 73  Site: --  Mode: --  Orthostatic VS  08-03-22 @ 07:50  Lying BP: --/-- HR: --  Sitting BP: 104/67 HR: 66  Standing BP: 112/71 HR: 74  Site: --  Mode: --
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
## 2022-08-04 NOTE — BH INPATIENT PSYCHIATRY PROGRESS NOTE - NSBHMETABOLIC_PSY_ALL_CORE_FT
BMI:   HbA1c: A1C with Estimated Average Glucose Result: 5.8 % (07-26-22 @ 09:35)    Glucose: POCT Blood Glucose.: 99 mg/dL (08-04-22 @ 07:37)    BP: --  Lipid Panel:  BMI:   HbA1c: A1C with Estimated Average Glucose Result: 5.8 % (07-26-22 @ 09:35)    Glucose: POCT Blood Glucose.: 103 mg/dL (08-04-22 @ 11:53)    BP: --  Lipid Panel:

## 2022-08-04 NOTE — ED PROVIDER NOTE - CARE PROVIDER_API CALL
Tray Mc)  Neurosurgery  [address], [address]  [place] [address]  Phone: [phone]  Fax: [phone]  Follow Up Time: Routine
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
## 2022-08-05 ENCOUNTER — EMERGENCY (EMERGENCY)
Facility: HOSPITAL | Age: 82
LOS: 1 days | Discharge: HOME | End: 2022-08-05
Attending: EMERGENCY MEDICINE

## 2022-08-05 VITALS
OXYGEN SATURATION: 100 % | RESPIRATION RATE: 18 BRPM | TEMPERATURE: 98 F | DIASTOLIC BLOOD PRESSURE: 67 MMHG | SYSTOLIC BLOOD PRESSURE: 117 MMHG | HEART RATE: 65 BPM

## 2022-08-05 VITALS
OXYGEN SATURATION: 97 % | SYSTOLIC BLOOD PRESSURE: 116 MMHG | RESPIRATION RATE: 18 BRPM | DIASTOLIC BLOOD PRESSURE: 68 MMHG | TEMPERATURE: 98 F | HEART RATE: 63 BPM

## 2022-08-05 VITALS
OXYGEN SATURATION: 100 % | SYSTOLIC BLOOD PRESSURE: 124 MMHG | RESPIRATION RATE: 17 BRPM | HEART RATE: 64 BPM | DIASTOLIC BLOOD PRESSURE: 81 MMHG | TEMPERATURE: 98 F

## 2022-08-05 LAB
GLUCOSE BLDC GLUCOMTR-MCNC: 95 MG/DL — SIGNIFICANT CHANGE UP (ref 70–99)
GLUCOSE BLDC GLUCOMTR-MCNC: 95 MG/DL — SIGNIFICANT CHANGE UP (ref 70–99)
GLUCOSE BLDC GLUCOMTR-MCNC: 99 MG/DL — SIGNIFICANT CHANGE UP (ref 70–99)

## 2022-08-05 PROCEDURE — 99284 EMERGENCY DEPT VISIT MOD MDM: CPT

## 2022-08-05 RX ADMIN — METFORMIN HYDROCHLORIDE 500 MILLIGRAM(S): 850 TABLET ORAL at 21:12

## 2022-08-05 RX ADMIN — BUDESONIDE AND FORMOTEROL FUMARATE DIHYDRATE 2 PUFF(S): 160; 4.5 AEROSOL RESPIRATORY (INHALATION) at 21:12

## 2022-08-05 RX ADMIN — MIRTAZAPINE 15 MILLIGRAM(S): 45 TABLET, ORALLY DISINTEGRATING ORAL at 21:10

## 2022-08-05 RX ADMIN — BUDESONIDE AND FORMOTEROL FUMARATE DIHYDRATE 2 PUFF(S): 160; 4.5 AEROSOL RESPIRATORY (INHALATION) at 08:26

## 2022-08-05 RX ADMIN — METFORMIN HYDROCHLORIDE 500 MILLIGRAM(S): 850 TABLET ORAL at 08:25
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
## 2022-08-05 NOTE — BH INPATIENT PSYCHIATRY PROGRESS NOTE - CURRENT MEDICATION
MEDICATIONS  (STANDING):  amLODIPine   Tablet 10 milliGRAM(s) Oral daily  budesonide  80 MICROgram(s)/formoterol 4.5 MICROgram(s) Inhaler 2 Puff(s) Inhalation two times a day  dextrose 5%. 1000 milliLiter(s) (100 mL/Hr) IV Continuous <Continuous>  dextrose 5%. 1000 milliLiter(s) (50 mL/Hr) IV Continuous <Continuous>  dextrose 50% Injectable 25 Gram(s) IV Push once  dextrose 50% Injectable 12.5 Gram(s) IV Push once  dextrose 50% Injectable 25 Gram(s) IV Push once  glucagon  Injectable 1 milliGRAM(s) IntraMuscular once  metFORMIN 500 milliGRAM(s) Oral two times a day  mirtazapine 15 milliGRAM(s) Oral at bedtime    MEDICATIONS  (PRN):  dextrose Oral Gel 15 Gram(s) Oral once PRN Blood Glucose LESS THAN 70 milliGRAM(s)/deciliter  OLANZapine Injectable 2.5 milliGRAM(s) IntraMuscular once PRN agitation   MEDICATIONS  (STANDING):  budesonide  80 MICROgram(s)/formoterol 4.5 MICROgram(s) Inhaler 2 Puff(s) Inhalation two times a day  dextrose 5%. 1000 milliLiter(s) (100 mL/Hr) IV Continuous <Continuous>  dextrose 5%. 1000 milliLiter(s) (50 mL/Hr) IV Continuous <Continuous>  dextrose 50% Injectable 25 Gram(s) IV Push once  dextrose 50% Injectable 12.5 Gram(s) IV Push once  dextrose 50% Injectable 25 Gram(s) IV Push once  glucagon  Injectable 1 milliGRAM(s) IntraMuscular once  metFORMIN 500 milliGRAM(s) Oral two times a day  mirtazapine 15 milliGRAM(s) Oral at bedtime    MEDICATIONS  (PRN):  dextrose Oral Gel 15 Gram(s) Oral once PRN Blood Glucose LESS THAN 70 milliGRAM(s)/deciliter  OLANZapine Injectable 2.5 milliGRAM(s) IntraMuscular once PRN agitation

## 2022-08-05 NOTE — ED PROVIDER NOTE - OBJECTIVE STATEMENT
82 y M with PMH of COPD, DM, HTN,, schizophrenia, dementia, MDD, CHF p/w for concerns for hypotension from [facility]. At [facility] today, pt's BP ranged from 76/56-92/64 w/ appropriate oral intake according to accompanying [facility] nurse. Pt does not report fevers, chills, HA, changes in vision, CP, SOB, N/V/D, or extremity pain/edema. Pt does report a mild pain in RUQ that was not elicited upon palpation. 82 y M with PMH of COPD, DM, HTN,, schizophrenia, dementia, MDD, CHF p/w for concerns for hypotension from [facility]. At [facility] today, pt's BP ranged from 76/56-92/64 w/ appropriate oral intake according to accompanying [facility] nurse. Pt does not report fevers, chills, HA, changes in vision, CP, SOB, N/V/D, or extremity pain/edema. Pt does report a mild pain in RUQ that was not elicited upon palpation.    denies (through Dr. Ingram)  N/V/abd pain/ fever/ chest pain/ SOB/  extremity issue
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
## 2022-08-05 NOTE — BH INPATIENT PSYCHIATRY PROGRESS NOTE - NSBHMETABOLIC_PSY_ALL_CORE_FT
BMI:   HbA1c: A1C with Estimated Average Glucose Result: 5.8 % (07-26-22 @ 09:35)    Glucose: POCT Blood Glucose.: 95 mg/dL (08-05-22 @ 07:29)    BP: 76/56 (08-05-22 @ 08:55) (76/56 - 92/64)  Lipid Panel:  BMI:   HbA1c: A1C with Estimated Average Glucose Result: 5.8 % (07-26-22 @ 09:35)    Glucose: POCT Blood Glucose.: 99 mg/dL (08-05-22 @ 13:30)    BP: 76/56 (08-05-22 @ 08:55) (76/56 - 92/64)  Lipid Panel:

## 2022-08-05 NOTE — BH INPATIENT PSYCHIATRY PROGRESS NOTE - NSBHCHARTREVIEWVS_PSY_A_CORE FT
Vital Signs Last 24 Hrs  T(C): 36.3 (08-05-22 @ 07:39), Max: 36.3 (08-05-22 @ 07:39)  T(F): 97.3 (08-05-22 @ 07:39), Max: 97.3 (08-05-22 @ 07:39)  HR: 64 (08-05-22 @ 08:55) (64 - 68)  BP: 76/56 (08-05-22 @ 08:55) (76/56 - 92/64)  BP(mean): --  RR: --  SpO2: --    Orthostatic VS  08-05-22 @ 07:39  Lying BP: --/-- HR: --  Sitting BP: 47/57 HR: 73  Standing BP: 72/55 HR: 85  Site: --  Mode: --  Orthostatic VS  08-04-22 @ 07:55  Lying BP: --/-- HR: --  Sitting BP: 133/69 HR: 65  Standing BP: 124/66 HR: 73  Site: --  Mode: --   Vital Signs Last 24 Hrs  T(C): 36.5 (08-05-22 @ 13:10), Max: 36.5 (08-05-22 @ 13:10)  T(F): 97.7 (08-05-22 @ 13:10), Max: 97.7 (08-05-22 @ 13:10)  HR: 63 (08-05-22 @ 13:10) (63 - 68)  BP: 116/68 (08-05-22 @ 13:10) (76/56 - 116/68)  BP(mean): --  RR: 18 (08-05-22 @ 13:10) (18 - 18)  SpO2: 97% (08-05-22 @ 13:10) (97% - 97%)    Orthostatic VS  08-05-22 @ 11:22  Lying BP: --/-- HR: --  Sitting BP: 82/60 HR: 72  Standing BP: 88/64 HR: 88  Site: upper left arm  Mode: auscultated w/ stethoscope  Orthostatic VS  08-05-22 @ 07:39  Lying BP: --/-- HR: --  Sitting BP: 47/57 HR: 73  Standing BP: 72/55 HR: 85  Site: --  Mode: --  Orthostatic VS  08-04-22 @ 07:55  Lying BP: --/-- HR: --  Sitting BP: 133/69 HR: 65  Standing BP: 124/66 HR: 73  Site: --  Mode: --
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
## 2022-08-05 NOTE — CHART NOTE - NSCHARTNOTEFT_GEN_A_CORE
[facility] Inpatient to ED Transfer Summary    Reason for Transfer/Medical Summary: 82M PMH CHF, COPD, DM, dementia, schizophrenia, HTN, being transferred to ED for persistent hypotension despite oral hydration.       PAST MEDICAL & SURGICAL HISTORY:  HTN (hypertension)      COPD, severity to be determined      DM (diabetes mellitus)      Schizophrenia      CHF (congestive heart failure)      No significant past surgical history          Allergies    Allergy Status Unknown    Intolerances        MEDICATIONS  (STANDING):  budesonide  80 MICROgram(s)/formoterol 4.5 MICROgram(s) Inhaler 2 Puff(s) Inhalation two times a day  dextrose 5%. 1000 milliLiter(s) (100 mL/Hr) IV Continuous <Continuous>  dextrose 5%. 1000 milliLiter(s) (50 mL/Hr) IV Continuous <Continuous>  dextrose 50% Injectable 25 Gram(s) IV Push once  dextrose 50% Injectable 12.5 Gram(s) IV Push once  dextrose 50% Injectable 25 Gram(s) IV Push once  glucagon  Injectable 1 milliGRAM(s) IntraMuscular once  metFORMIN 500 milliGRAM(s) Oral two times a day  mirtazapine 15 milliGRAM(s) Oral at bedtime    MEDICATIONS  (PRN):  dextrose Oral Gel 15 Gram(s) Oral once PRN Blood Glucose LESS THAN 70 milliGRAM(s)/deciliter  OLANZapine Injectable 2.5 milliGRAM(s) IntraMuscular once PRN agitation      Vital Signs Last 24 Hrs  T(C): 36.3 (05 Aug 2022 07:39), Max: 36.3 (05 Aug 2022 07:39)  T(F): 97.3 (05 Aug 2022 07:39), Max: 97.3 (05 Aug 2022 07:39)  HR: 64 (05 Aug 2022 08:55) (64 - 68)  BP: 76/56 (05 Aug 2022 08:55) (76/56 - 92/64)  BP(mean): --  RR: --  SpO2: --      CAPILLARY BLOOD GLUCOSE      POCT Blood Glucose.: 95 mg/dL (05 Aug 2022 07:29)  POCT Blood Glucose.: 92 mg/dL (04 Aug 2022 16:43)  POCT Blood Glucose.: 103 mg/dL (04 Aug 2022 11:53)        PHYSICAL EXAM:  GENERAL: NAD, well-developed  HEAD:  Atraumatic, Normocephalic  EYES: EOMI, PERRLA, conjunctiva and sclera clear  NECK: Supple, No JVD  CHEST/LUNG: Clear to auscultation bilaterally; No wheeze  HEART: Regular rate and rhythm; No murmurs, rubs, or gallops  ABDOMEN: Soft, Nontender, Nondistended; Bowel sounds present  EXTREMITIES:  2+ Peripheral Pulses, No clubbing, cyanosis, or edema  PSYCH: AAOx3  NEUROLOGY: non-focal  SKIN: No rashes or lesions    LABS:                    Psychiatry Section:  Psychiatric Summary/[facility] admitting diagnosis: Schizophrenia    Psychiatric Recommendations: CO 1:1 for safety.   Continue Remeron 15mg PO HS  Of note, Pt received Invega Sustenna 156mg IM on 8/3. Next due on 8/31.  Amlodipine being held due to hypotension.    Observation status (check one):   ( X) Constant Observation  ( ) Enhanced care  ( ) Routine checks    Risk Status (check all that apply if present):  (X ) at risk for suicide/self-injury - verbalizes SI intermittently however no active ideation or plan.  ( ) at risk for aggressive behavior  ( ) at risk for elopement  ( ) other risk:

## 2022-08-05 NOTE — ED PROVIDER NOTE - PATIENT PORTAL LINK FT
You can access the FollowMyHealth Patient Portal offered by [facility] by registering at the following website: [email or web address]. By joining [facility]’s FollowMyHealth portal, you will also be able to view your health information using other applications (apps) compatible with our system.

## 2022-08-06 LAB
GLUCOSE BLDC GLUCOMTR-MCNC: 107 MG/DL — HIGH (ref 70–99)
GLUCOSE BLDC GLUCOMTR-MCNC: 109 MG/DL — HIGH (ref 70–99)
GLUCOSE BLDC GLUCOMTR-MCNC: 86 MG/DL — SIGNIFICANT CHANGE UP (ref 70–99)
GLUCOSE BLDC GLUCOMTR-MCNC: 87 MG/DL — SIGNIFICANT CHANGE UP (ref 70–99)

## 2022-08-06 PROCEDURE — 99232 SBSQ HOSP IP/OBS MODERATE 35: CPT

## 2022-08-06 RX ORDER — ACETAMINOPHEN 500 MG
650 TABLET ORAL EVERY 6 HOURS
Refills: 0 | Status: DISCONTINUED | OUTPATIENT
Start: 2022-08-06 | End: 2022-08-19

## 2022-08-06 RX ADMIN — METFORMIN HYDROCHLORIDE 500 MILLIGRAM(S): 850 TABLET ORAL at 09:28

## 2022-08-06 RX ADMIN — MIRTAZAPINE 15 MILLIGRAM(S): 45 TABLET, ORALLY DISINTEGRATING ORAL at 21:45

## 2022-08-06 RX ADMIN — BUDESONIDE AND FORMOTEROL FUMARATE DIHYDRATE 2 PUFF(S): 160; 4.5 AEROSOL RESPIRATORY (INHALATION) at 09:28

## 2022-08-06 RX ADMIN — METFORMIN HYDROCHLORIDE 500 MILLIGRAM(S): 850 TABLET ORAL at 21:45

## 2022-08-06 RX ADMIN — BUDESONIDE AND FORMOTEROL FUMARATE DIHYDRATE 2 PUFF(S): 160; 4.5 AEROSOL RESPIRATORY (INHALATION) at 21:44

## 2022-08-06 NOTE — BH INPATIENT PSYCHIATRY PROGRESS NOTE - NSBHFUPINTERVALHXFT_PSY_A_CORE
Patient seen for follow-up of suicidal behavior and psychosis and recent nasal fracture. Chart reviewed and case discussed with nursing staff. No significant overnight event reported. On exam, patient states he feels "okay" and denies any pain. Patient denies current thought of self-harm. Appetite and sleep are adequate. Significant orthostatic drop in BP noted but patient denies dizziness.
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
## 2022-08-06 NOTE — BH INPATIENT PSYCHIATRY PROGRESS NOTE - NSBHMETABOLIC_PSY_ALL_CORE_FT
HbA1c: A1C with Estimated Average Glucose Result: 5.8 % (07-26-22 @ 09:35)  Glucose: POCT Blood Glucose.: 107 mg/dL (08-06-22 @ 11:29)  BP: 76/56 (08-05-22 @ 08:55) (76/56 - 92/64)

## 2022-08-06 NOTE — BH INPATIENT PSYCHIATRY PROGRESS NOTE - CURRENT MEDICATION
MEDICATIONS  (STANDING):  budesonide  80 MICROgram(s)/formoterol 4.5 MICROgram(s) Inhaler 2 Puff(s) Inhalation two times a day  dextrose 5%. 1000 milliLiter(s) (50 mL/Hr) IV Continuous <Continuous>  dextrose 5%. 1000 milliLiter(s) (100 mL/Hr) IV Continuous <Continuous>  dextrose 50% Injectable 25 Gram(s) IV Push once  dextrose 50% Injectable 12.5 Gram(s) IV Push once  dextrose 50% Injectable 25 Gram(s) IV Push once  glucagon  Injectable 1 milliGRAM(s) IntraMuscular once  metFORMIN 500 milliGRAM(s) Oral two times a day  mirtazapine 15 milliGRAM(s) Oral at bedtime    MEDICATIONS  (PRN):  dextrose Oral Gel 15 Gram(s) Oral once PRN Blood Glucose LESS THAN 70 milliGRAM(s)/deciliter  OLANZapine Injectable 2.5 milliGRAM(s) IntraMuscular once PRN agitation

## 2022-08-06 NOTE — BH INPATIENT PSYCHIATRY PROGRESS NOTE - NSBHCHARTREVIEWVS_PSY_A_CORE FT
Vital Signs Last 24 Hrs  T(C): 36.6 (08-06-22 @ 08:15), Max: 36.6 (08-06-22 @ 08:15)  T(F): 97.9 (08-06-22 @ 08:15), Max: 97.9 (08-06-22 @ 08:15)  HR: 64 (08-05-22 @ 20:25) (64 - 64)  BP: 124/81 (08-05-22 @ 20:25) (124/81 - 124/81)  RR: 17 (08-05-22 @ 20:25) (17 - 17)  SpO2: 100% (08-05-22 @ 20:25) (100% - 100%)    Orthostatic VS  08-06-22 @ 08:15  Sitting BP: 153/70 HR: 76  Standing BP: 116/66 HR: 84

## 2022-08-07 LAB
GLUCOSE BLDC GLUCOMTR-MCNC: 89 MG/DL — SIGNIFICANT CHANGE UP (ref 70–99)
GLUCOSE BLDC GLUCOMTR-MCNC: 93 MG/DL — SIGNIFICANT CHANGE UP (ref 70–99)
GLUCOSE BLDC GLUCOMTR-MCNC: 96 MG/DL — SIGNIFICANT CHANGE UP (ref 70–99)

## 2022-08-07 PROCEDURE — 99231 SBSQ HOSP IP/OBS SF/LOW 25: CPT

## 2022-08-07 RX ADMIN — MIRTAZAPINE 15 MILLIGRAM(S): 45 TABLET, ORALLY DISINTEGRATING ORAL at 21:08

## 2022-08-07 RX ADMIN — BUDESONIDE AND FORMOTEROL FUMARATE DIHYDRATE 2 PUFF(S): 160; 4.5 AEROSOL RESPIRATORY (INHALATION) at 10:03

## 2022-08-07 RX ADMIN — METFORMIN HYDROCHLORIDE 500 MILLIGRAM(S): 850 TABLET ORAL at 21:07

## 2022-08-07 RX ADMIN — METFORMIN HYDROCHLORIDE 500 MILLIGRAM(S): 850 TABLET ORAL at 10:03

## 2022-08-07 RX ADMIN — BUDESONIDE AND FORMOTEROL FUMARATE DIHYDRATE 2 PUFF(S): 160; 4.5 AEROSOL RESPIRATORY (INHALATION) at 21:08

## 2022-08-07 NOTE — BH INPATIENT PSYCHIATRY PROGRESS NOTE - NSBHCHARTREVIEWVS_PSY_A_CORE FT
Vital Signs Last 24 Hrs  T(C): 36.2 (08-07-22 @ 07:46), Max: 36.2 (08-07-22 @ 07:46)  T(F): 97.2 (08-07-22 @ 07:46), Max: 97.2 (08-07-22 @ 07:46)    Orthostatic VS  08-07-22 @ 07:46  Sitting BP: 152/73 HR: 75  Standing BP: 141/78 HR: 93

## 2022-08-07 NOTE — BH INPATIENT PSYCHIATRY PROGRESS NOTE - PRN MEDS
MEDICATIONS  (PRN):  acetaminophen     Tablet .. 650 milliGRAM(s) Oral every 6 hours PRN Moderate Pain (4 - 6), Severe Pain (7 - 10)  dextrose Oral Gel 15 Gram(s) Oral once PRN Blood Glucose LESS THAN 70 milliGRAM(s)/deciliter  OLANZapine Injectable 2.5 milliGRAM(s) IntraMuscular once PRN agitation

## 2022-08-07 NOTE — BH INPATIENT PSYCHIATRY PROGRESS NOTE - CURRENT MEDICATION
MEDICATIONS  (STANDING):  budesonide  80 MICROgram(s)/formoterol 4.5 MICROgram(s) Inhaler 2 Puff(s) Inhalation two times a day  dextrose 5%. 1000 milliLiter(s) (50 mL/Hr) IV Continuous <Continuous>  dextrose 5%. 1000 milliLiter(s) (100 mL/Hr) IV Continuous <Continuous>  dextrose 50% Injectable 25 Gram(s) IV Push once  dextrose 50% Injectable 12.5 Gram(s) IV Push once  dextrose 50% Injectable 25 Gram(s) IV Push once  glucagon  Injectable 1 milliGRAM(s) IntraMuscular once  metFORMIN 500 milliGRAM(s) Oral two times a day  mirtazapine 15 milliGRAM(s) Oral at bedtime    MEDICATIONS  (PRN):  acetaminophen     Tablet .. 650 milliGRAM(s) Oral every 6 hours PRN Moderate Pain (4 - 6), Severe Pain (7 - 10)  dextrose Oral Gel 15 Gram(s) Oral once PRN Blood Glucose LESS THAN 70 milliGRAM(s)/deciliter  OLANZapine Injectable 2.5 milliGRAM(s) IntraMuscular once PRN agitation

## 2022-08-07 NOTE — BH INPATIENT PSYCHIATRY PROGRESS NOTE - NSBHCHARTREVIEWLAB_PSY_A_CORE FT
Labs reviewed. CBC WNL. BMP showing mild elevation in BUN/Cr (21/0.81), will continue to monitor. 
07-29    135  |  97<L>  |  14  ----------------------------<  102<H>  3.7   |  26  |  0.74
07-29    135  |  97<L>  |  14  ----------------------------<  102<H>  3.7   |  26  |  0.74  
Labs reviewed. CBC WNL. BMP showing mild elevation in BUN/Cr (21/0.81), will continue to monitor. 

## 2022-08-07 NOTE — BH INPATIENT PSYCHIATRY PROGRESS NOTE - NSBHFUPINTERVALHXFT_PSY_A_CORE
Patient seen for follow-up of suicidal behavior and psychosis and recent nasal fracture. Chart reviewed and case discussed with nursing staff. No significant overnight event reported. On exam, patient reports mood as "okay" and denies any concern including pain. Patient denies current thought of self-harm. Appetite and sleep are adequate. Vitals are stable
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
## 2022-08-07 NOTE — BH INPATIENT PSYCHIATRY PROGRESS NOTE - NSBHMETABOLIC_PSY_ALL_CORE_FT
HbA1c: A1C with Estimated Average Glucose Result: 5.8 % (07-26-22 @ 09:35)  Glucose: POCT Blood Glucose.: 89 mg/dL (08-07-22 @ 16:39)  BP: 76/56 (08-05-22 @ 08:55) (76/56 - 92/64)

## 2022-08-08 LAB
GLUCOSE BLDC GLUCOMTR-MCNC: 77 MG/DL — SIGNIFICANT CHANGE UP (ref 70–99)
GLUCOSE BLDC GLUCOMTR-MCNC: 90 MG/DL — SIGNIFICANT CHANGE UP (ref 70–99)
GLUCOSE BLDC GLUCOMTR-MCNC: 93 MG/DL — SIGNIFICANT CHANGE UP (ref 70–99)
GLUCOSE BLDC GLUCOMTR-MCNC: 98 MG/DL — SIGNIFICANT CHANGE UP (ref 70–99)

## 2022-08-08 RX ADMIN — BUDESONIDE AND FORMOTEROL FUMARATE DIHYDRATE 2 PUFF(S): 160; 4.5 AEROSOL RESPIRATORY (INHALATION) at 09:19

## 2022-08-08 RX ADMIN — BUDESONIDE AND FORMOTEROL FUMARATE DIHYDRATE 2 PUFF(S): 160; 4.5 AEROSOL RESPIRATORY (INHALATION) at 20:55

## 2022-08-08 RX ADMIN — METFORMIN HYDROCHLORIDE 500 MILLIGRAM(S): 850 TABLET ORAL at 20:54

## 2022-08-08 RX ADMIN — MIRTAZAPINE 15 MILLIGRAM(S): 45 TABLET, ORALLY DISINTEGRATING ORAL at 20:55

## 2022-08-08 RX ADMIN — METFORMIN HYDROCHLORIDE 500 MILLIGRAM(S): 850 TABLET ORAL at 09:20

## 2022-08-08 NOTE — BH INPATIENT PSYCHIATRY PROGRESS NOTE - NSBHCHARTREVIEWVS_PSY_A_CORE FT
Vital Signs Last 24 Hrs  T(C): 36.7 (08-08-22 @ 07:50), Max: 36.7 (08-08-22 @ 07:50)  T(F): 98 (08-08-22 @ 07:50), Max: 98 (08-08-22 @ 07:50)  HR: --  BP: --  BP(mean): --  RR: --  SpO2: --    Orthostatic VS  08-08-22 @ 07:50  Lying BP: --/-- HR: --  Sitting BP: 144/75 HR: 71  Standing BP: 127/83 HR: 78  Site: --  Mode: --  Orthostatic VS  08-07-22 @ 07:46  Lying BP: --/-- HR: --  Sitting BP: 152/73 HR: 75  Standing BP: 141/78 HR: 93  Site: --  Mode: --

## 2022-08-08 NOTE — BH INPATIENT PSYCHIATRY PROGRESS NOTE - NSBHMETABOLIC_PSY_ALL_CORE_FT
BMI:   HbA1c: A1C with Estimated Average Glucose Result: 5.8 % (07-26-22 @ 09:35)    Glucose: POCT Blood Glucose.: 77 mg/dL (08-08-22 @ 11:40)    BP: --  Lipid Panel:

## 2022-08-08 NOTE — BH INPATIENT PSYCHIATRY PROGRESS NOTE - OTHER
Impoverished, soft  superficially cooperative Noted to have left eye ptosis. PERRL, EOM fairly intact, ?weakness upon upward gaze. Unable to assess for CN VII, IX and X due to limited pt cooperation. No other focal deficits elicited.
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
## 2022-08-08 NOTE — BH INPATIENT PSYCHIATRY PROGRESS NOTE - NSBHFUPINTERVALHXFT_PSY_A_CORE
82 y.o. male being seen for SI and psychosis. Patient received maintenance dose of Invega Sustenna 156mg IM on 8/3/22. Next Dose Invega Sustenna 156mg IM due on 8/31/2022. Chart reviewed. Upon evaluation, pt reported feeling better. Pt denied having any pain. Pt reported having thoughts on what to do with his life and what to do while at the hospital, but denies any SI, HI. He reports that he just walks in his room or gets water from the common area. No paranoid delusions elicited.
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
## 2022-08-09 LAB
GLUCOSE BLDC GLUCOMTR-MCNC: 112 MG/DL — HIGH (ref 70–99)
GLUCOSE BLDC GLUCOMTR-MCNC: 97 MG/DL — SIGNIFICANT CHANGE UP (ref 70–99)

## 2022-08-09 RX ORDER — POLYETHYLENE GLYCOL 3350 17 G/17G
17 POWDER, FOR SOLUTION ORAL EVERY 12 HOURS
Refills: 0 | Status: DISCONTINUED | OUTPATIENT
Start: 2022-08-09 | End: 2022-08-18

## 2022-08-09 RX ORDER — SENNA PLUS 8.6 MG/1
2 TABLET ORAL AT BEDTIME
Refills: 0 | Status: DISCONTINUED | OUTPATIENT
Start: 2022-08-09 | End: 2022-08-18

## 2022-08-09 RX ADMIN — BUDESONIDE AND FORMOTEROL FUMARATE DIHYDRATE 2 PUFF(S): 160; 4.5 AEROSOL RESPIRATORY (INHALATION) at 20:52

## 2022-08-09 RX ADMIN — BUDESONIDE AND FORMOTEROL FUMARATE DIHYDRATE 2 PUFF(S): 160; 4.5 AEROSOL RESPIRATORY (INHALATION) at 09:43

## 2022-08-09 RX ADMIN — SENNA PLUS 2 TABLET(S): 8.6 TABLET ORAL at 20:52

## 2022-08-09 RX ADMIN — MIRTAZAPINE 15 MILLIGRAM(S): 45 TABLET, ORALLY DISINTEGRATING ORAL at 20:51

## 2022-08-09 RX ADMIN — METFORMIN HYDROCHLORIDE 500 MILLIGRAM(S): 850 TABLET ORAL at 20:51

## 2022-08-09 RX ADMIN — Medication 5 MILLIGRAM(S): at 13:48

## 2022-08-09 RX ADMIN — METFORMIN HYDROCHLORIDE 500 MILLIGRAM(S): 850 TABLET ORAL at 09:43

## 2022-08-09 NOTE — BH INPATIENT PSYCHIATRY PROGRESS NOTE - OTHER
superficially cooperative Noted to have left eye ptosis. PERRL, EOM fairly intact, ?weakness upon upward gaze. Unable to assess for CN VII, IX and X due to limited pt cooperation. No other focal deficits elicited.  Impoverished, soft

## 2022-08-09 NOTE — BH INPATIENT PSYCHIATRY PROGRESS NOTE - PRN MEDS
MEDICATIONS  (PRN):  acetaminophen     Tablet .. 650 milliGRAM(s) Oral every 6 hours PRN Moderate Pain (4 - 6), Severe Pain (7 - 10)  bisacodyl 5 milliGRAM(s) Oral every 12 hours PRN Constipation  dextrose Oral Gel 15 Gram(s) Oral once PRN Blood Glucose LESS THAN 70 milliGRAM(s)/deciliter  OLANZapine Injectable 2.5 milliGRAM(s) IntraMuscular once PRN agitation  polyethylene glycol 3350 17 Gram(s) Oral every 12 hours PRN Constipation

## 2022-08-09 NOTE — BH INPATIENT PSYCHIATRY PROGRESS NOTE - NSBHMETABOLIC_PSY_ALL_CORE_FT
BMI:   HbA1c: A1C with Estimated Average Glucose Result: 5.8 % (07-26-22 @ 09:35)    Glucose: POCT Blood Glucose.: 93 mg/dL (08-08-22 @ 20:37)    BP: --  Lipid Panel:

## 2022-08-09 NOTE — BH INPATIENT PSYCHIATRY PROGRESS NOTE - NSBHCHARTREVIEWVS_PSY_A_CORE FT
Vital Signs Last 24 Hrs  T(C): 36.2 (08-09-22 @ 08:21), Max: 36.2 (08-09-22 @ 08:21)  T(F): 97.1 (08-09-22 @ 08:21), Max: 97.1 (08-09-22 @ 08:21)  HR: --  BP: --  BP(mean): --  RR: --  SpO2: --    Orthostatic VS  08-09-22 @ 08:21  Lying BP: --/-- HR: --  Sitting BP: 129/83 HR: 75  Standing BP: 120/79 HR: 80  Site: --  Mode: --  Orthostatic VS  08-08-22 @ 07:50  Lying BP: --/-- HR: --  Sitting BP: 144/75 HR: 71  Standing BP: 127/83 HR: 78  Site: --  Mode: --

## 2022-08-09 NOTE — BH INPATIENT PSYCHIATRY PROGRESS NOTE - CURRENT MEDICATION
MEDICATIONS  (STANDING):  budesonide  80 MICROgram(s)/formoterol 4.5 MICROgram(s) Inhaler 2 Puff(s) Inhalation two times a day  dextrose 5%. 1000 milliLiter(s) (50 mL/Hr) IV Continuous <Continuous>  dextrose 5%. 1000 milliLiter(s) (100 mL/Hr) IV Continuous <Continuous>  dextrose 50% Injectable 25 Gram(s) IV Push once  dextrose 50% Injectable 12.5 Gram(s) IV Push once  dextrose 50% Injectable 25 Gram(s) IV Push once  glucagon  Injectable 1 milliGRAM(s) IntraMuscular once  metFORMIN 500 milliGRAM(s) Oral two times a day  mirtazapine 15 milliGRAM(s) Oral at bedtime  senna 2 Tablet(s) Oral at bedtime    MEDICATIONS  (PRN):  acetaminophen     Tablet .. 650 milliGRAM(s) Oral every 6 hours PRN Moderate Pain (4 - 6), Severe Pain (7 - 10)  bisacodyl 5 milliGRAM(s) Oral every 12 hours PRN Constipation  dextrose Oral Gel 15 Gram(s) Oral once PRN Blood Glucose LESS THAN 70 milliGRAM(s)/deciliter  OLANZapine Injectable 2.5 milliGRAM(s) IntraMuscular once PRN agitation  polyethylene glycol 3350 17 Gram(s) Oral every 12 hours PRN Constipation
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
## 2022-08-09 NOTE — BH INPATIENT PSYCHIATRY PROGRESS NOTE - NSBHFUPINTERVALHXFT_PSY_A_CORE
82 y.o. male being seen for SI and psychosis. Patient received maintenance dose of Invega Sustenna 156mg IM on 8/3/22. Next Dose Invega Sustenna 156mg IM due on 8/31/2022. Chart reviewed. Upon evaluation, pt reported feeling better. Pt denied having any pain. Pt denies any SI, HI. He reports that he just walks in his room or gets water from the common area.

## 2022-08-10 LAB
GLUCOSE BLDC GLUCOMTR-MCNC: 108 MG/DL — HIGH (ref 70–99)
GLUCOSE BLDC GLUCOMTR-MCNC: 84 MG/DL — SIGNIFICANT CHANGE UP (ref 70–99)

## 2022-08-10 RX ORDER — MIRTAZAPINE 45 MG/1
30 TABLET, ORALLY DISINTEGRATING ORAL AT BEDTIME
Refills: 0 | Status: DISCONTINUED | OUTPATIENT
Start: 2022-08-10 | End: 2022-08-19

## 2022-08-10 RX ADMIN — SENNA PLUS 2 TABLET(S): 8.6 TABLET ORAL at 20:45

## 2022-08-10 RX ADMIN — Medication 5 MILLIGRAM(S): at 20:45

## 2022-08-10 RX ADMIN — BUDESONIDE AND FORMOTEROL FUMARATE DIHYDRATE 2 PUFF(S): 160; 4.5 AEROSOL RESPIRATORY (INHALATION) at 20:45

## 2022-08-10 RX ADMIN — BUDESONIDE AND FORMOTEROL FUMARATE DIHYDRATE 2 PUFF(S): 160; 4.5 AEROSOL RESPIRATORY (INHALATION) at 08:46

## 2022-08-10 RX ADMIN — MIRTAZAPINE 30 MILLIGRAM(S): 45 TABLET, ORALLY DISINTEGRATING ORAL at 20:45

## 2022-08-10 RX ADMIN — METFORMIN HYDROCHLORIDE 500 MILLIGRAM(S): 850 TABLET ORAL at 08:46

## 2022-08-10 RX ADMIN — METFORMIN HYDROCHLORIDE 500 MILLIGRAM(S): 850 TABLET ORAL at 20:45
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
## 2022-08-10 NOTE — BH INPATIENT PSYCHIATRY PROGRESS NOTE - NSBHCHARTREVIEWVS_PSY_A_CORE FT
Vital Signs Last 24 Hrs  T(C): 36.7 (08-10-22 @ 06:39), Max: 36.7 (08-10-22 @ 06:39)  T(F): 98 (08-10-22 @ 06:39), Max: 98 (08-10-22 @ 06:39)  HR: --  BP: --  BP(mean): --  RR: --  SpO2: --    Orthostatic VS  08-10-22 @ 06:39  Lying BP: --/-- HR: --  Sitting BP: 146/84 HR: 112  Standing BP: 134/73 HR: 70  Site: upper left arm  Mode: electronic  Orthostatic VS  08-09-22 @ 08:21  Lying BP: --/-- HR: --  Sitting BP: 129/83 HR: 75  Standing BP: 120/79 HR: 80  Site: --  Mode: --

## 2022-08-10 NOTE — BH INPATIENT PSYCHIATRY PROGRESS NOTE - NSBHMETABOLIC_PSY_ALL_CORE_FT
BMI:   HbA1c: A1C with Estimated Average Glucose Result: 5.8 % (07-26-22 @ 09:35)    Glucose: POCT Blood Glucose.: 84 mg/dL (08-10-22 @ 08:06)    BP: --  Lipid Panel:

## 2022-08-10 NOTE — BH INPATIENT PSYCHIATRY PROGRESS NOTE - NSBHFUPINTERVALHXFT_PSY_A_CORE
82 y.o. male being seen for SI and psychosis. Patient received maintenance dose of Invega Sustenna 156mg IM on 8/3/22. Next Dose Invega Sustenna 156mg IM due on 8/31/2022. Chart reviewed. On today's encounter pt reports on and off feelings of depression, puts his hands up and states "im hopeless doc". Pt is unable to identify any triggers but is able to point out things he enjoys doing such as walking and listening to classical music. Will offer headphones so he can listen to music. Pt states he also reads sometimes but his eyes get tired and he cannot tolerate reading for long periods of time. Denies SIIP. Pt reports interrupted sleep. Discussed sleep hygiene and encouraged pt to stay out of bed as much as he can during the day. Pt agrees he spends a lot of time in bed during the daytime and often doesn't feel tired at night. Pt denied having any pain, headaches, dizziness or blurry vision today. Continues to endorse constipation, will adjust bowel regimen.

## 2022-08-10 NOTE — BH INPATIENT PSYCHIATRY PROGRESS NOTE - OTHER
Noted to have left eye ptosis. PERRL, EOM fairly intact, ?weakness upon upward gaze. Unable to assess for CN VII, IX and X due to limited pt cooperation. No other focal deficits elicited.  Impoverished, soft  superficially cooperative

## 2022-08-11 LAB — GLUCOSE BLDC GLUCOMTR-MCNC: 99 MG/DL — SIGNIFICANT CHANGE UP (ref 70–99)

## 2022-08-11 RX ADMIN — METFORMIN HYDROCHLORIDE 500 MILLIGRAM(S): 850 TABLET ORAL at 20:39

## 2022-08-11 RX ADMIN — BUDESONIDE AND FORMOTEROL FUMARATE DIHYDRATE 2 PUFF(S): 160; 4.5 AEROSOL RESPIRATORY (INHALATION) at 20:40

## 2022-08-11 RX ADMIN — METFORMIN HYDROCHLORIDE 500 MILLIGRAM(S): 850 TABLET ORAL at 09:36

## 2022-08-11 RX ADMIN — Medication 5 MILLIGRAM(S): at 20:39

## 2022-08-11 RX ADMIN — Medication 5 MILLIGRAM(S): at 09:36

## 2022-08-11 RX ADMIN — MIRTAZAPINE 30 MILLIGRAM(S): 45 TABLET, ORALLY DISINTEGRATING ORAL at 20:39

## 2022-08-11 RX ADMIN — SENNA PLUS 2 TABLET(S): 8.6 TABLET ORAL at 20:39

## 2022-08-11 RX ADMIN — BUDESONIDE AND FORMOTEROL FUMARATE DIHYDRATE 2 PUFF(S): 160; 4.5 AEROSOL RESPIRATORY (INHALATION) at 09:37

## 2022-08-11 NOTE — BH INPATIENT PSYCHIATRY PROGRESS NOTE - CURRENT MEDICATION
MEDICATIONS  (STANDING):  bisacodyl 5 milliGRAM(s) Oral every 12 hours  budesonide  80 MICROgram(s)/formoterol 4.5 MICROgram(s) Inhaler 2 Puff(s) Inhalation two times a day  dextrose 5%. 1000 milliLiter(s) (50 mL/Hr) IV Continuous <Continuous>  dextrose 5%. 1000 milliLiter(s) (100 mL/Hr) IV Continuous <Continuous>  dextrose 50% Injectable 25 Gram(s) IV Push once  dextrose 50% Injectable 12.5 Gram(s) IV Push once  dextrose 50% Injectable 25 Gram(s) IV Push once  glucagon  Injectable 1 milliGRAM(s) IntraMuscular once  metFORMIN 500 milliGRAM(s) Oral two times a day  mirtazapine 30 milliGRAM(s) Oral at bedtime  senna 2 Tablet(s) Oral at bedtime    MEDICATIONS  (PRN):  acetaminophen     Tablet .. 650 milliGRAM(s) Oral every 6 hours PRN Moderate Pain (4 - 6), Severe Pain (7 - 10)  dextrose Oral Gel 15 Gram(s) Oral once PRN Blood Glucose LESS THAN 70 milliGRAM(s)/deciliter  OLANZapine Injectable 2.5 milliGRAM(s) IntraMuscular once PRN agitation  polyethylene glycol 3350 17 Gram(s) Oral every 12 hours PRN Constipation

## 2022-08-11 NOTE — BH INPATIENT PSYCHIATRY PROGRESS NOTE - NSBHFUPINTERVALHXFT_PSY_A_CORE
82 y.o. male being seen for SI and psychosis. Patient received maintenance dose of Invega Sustenna 156mg IM on 8/3/22. Next Dose Invega Sustenna 156mg IM due on 9/3/2022. Chart reviewed. No events overnight. Upon evaluation, pt reports feeling okay today. Pt reports "okay" mood. Pt is noted to be in his room, states he likes laying in bed or walking in the hallway. Pt denied having any pain, headaches, dizziness, blurry vision or constipation. Pt reports having BM this morning. Pt denies SI, HI IP.

## 2022-08-11 NOTE — BH INPATIENT PSYCHIATRY PROGRESS NOTE - NSBHMETABOLIC_PSY_ALL_CORE_FT
BMI:   HbA1c: A1C with Estimated Average Glucose Result: 5.8 % (07-26-22 @ 09:35)    Glucose: POCT Blood Glucose.: 99 mg/dL (08-11-22 @ 07:57)    BP: --  Lipid Panel:  HbA1c: A1C with Estimated Average Glucose Result: 5.8 % (07-26-22 @ 09:35)  Glucose: POCT Blood Glucose.: 99 mg/dL (08-11-22 @ 07:57)

## 2022-08-11 NOTE — BH INPATIENT PSYCHIATRY PROGRESS NOTE - OTHER
moderate TD of jaw, tongue, legs bilaterally; no catatonia signs noted superficially cooperative Impoverished, soft  Noted to have left eye ptosis (confirmed to be chronic). PERRL, EOM fairly intact
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
## 2022-08-11 NOTE — BH INPATIENT PSYCHIATRY PROGRESS NOTE - PRN MEDS
MEDICATIONS  (PRN):  acetaminophen     Tablet .. 650 milliGRAM(s) Oral every 6 hours PRN Moderate Pain (4 - 6), Severe Pain (7 - 10)  dextrose Oral Gel 15 Gram(s) Oral once PRN Blood Glucose LESS THAN 70 milliGRAM(s)/deciliter  OLANZapine Injectable 2.5 milliGRAM(s) IntraMuscular once PRN agitation  polyethylene glycol 3350 17 Gram(s) Oral every 12 hours PRN Constipation

## 2022-08-11 NOTE — BH INPATIENT PSYCHIATRY PROGRESS NOTE - NSBHATTESTCOMMENTATTENDFT_PSY_A_CORE
Patient stable with no reported concerns today. No behavioral events on the unit. Patient remains med complaint and is tolerating recent increase in mirtazapine well. No med changes made today. Vitals are stable. Rest of the history, exam, assessment, and plan as documented in Dr. Priest's note.

## 2022-08-11 NOTE — BH INPATIENT PSYCHIATRY PROGRESS NOTE - NSBHCHARTREVIEWVS_PSY_A_CORE FT
Vital Signs Last 24 Hrs  T(C): 36.4 (08-11-22 @ 08:10), Max: 36.4 (08-11-22 @ 08:10)  T(F): 97.5 (08-11-22 @ 08:10), Max: 97.5 (08-11-22 @ 08:10)  HR: --  BP: --  BP(mean): --  RR: --  SpO2: --    Orthostatic VS  08-11-22 @ 08:10  Lying BP: --/-- HR: --  Sitting BP: 130/72 HR: 70  Standing BP: 126/71 HR: 80  Site: upper left arm  Mode: electronic  Orthostatic VS  08-10-22 @ 06:39  Lying BP: --/-- HR: --  Sitting BP: 146/84 HR: 112  Standing BP: 134/73 HR: 70  Site: upper left arm  Mode: electronic   Vital Signs Last 24 Hrs  T(C): 36.4 (08-11-22 @ 08:10), Max: 36.4 (08-11-22 @ 08:10)  T(F): 97.5 (08-11-22 @ 08:10), Max: 97.5 (08-11-22 @ 08:10)    Orthostatic VS  08-11-22 @ 08:10  Sitting BP: 130/72 HR: 70  Standing BP: 126/71 HR: 80  Site: upper left arm  Mode: electronic

## 2022-08-12 PROBLEM — F20.9 SCHIZOPHRENIA, UNSPECIFIED: Chronic | Status: ACTIVE | Noted: 2022-08-04

## 2022-08-12 PROBLEM — E11.9 TYPE 2 DIABETES MELLITUS WITHOUT COMPLICATIONS: Chronic | Status: ACTIVE | Noted: 2022-08-04

## 2022-08-12 PROBLEM — F32.9 MAJOR DEPRESSIVE DISORDER, SINGLE EPISODE, UNSPECIFIED: Chronic | Status: ACTIVE | Noted: 2022-08-04

## 2022-08-12 PROCEDURE — 99231 SBSQ HOSP IP/OBS SF/LOW 25: CPT | Mod: GC

## 2022-08-12 RX ADMIN — MIRTAZAPINE 30 MILLIGRAM(S): 45 TABLET, ORALLY DISINTEGRATING ORAL at 21:02

## 2022-08-12 RX ADMIN — Medication 5 MILLIGRAM(S): at 09:00

## 2022-08-12 RX ADMIN — METFORMIN HYDROCHLORIDE 500 MILLIGRAM(S): 850 TABLET ORAL at 21:02

## 2022-08-12 RX ADMIN — SENNA PLUS 2 TABLET(S): 8.6 TABLET ORAL at 21:02

## 2022-08-12 RX ADMIN — BUDESONIDE AND FORMOTEROL FUMARATE DIHYDRATE 2 PUFF(S): 160; 4.5 AEROSOL RESPIRATORY (INHALATION) at 21:02

## 2022-08-12 RX ADMIN — BUDESONIDE AND FORMOTEROL FUMARATE DIHYDRATE 2 PUFF(S): 160; 4.5 AEROSOL RESPIRATORY (INHALATION) at 09:00

## 2022-08-12 RX ADMIN — Medication 5 MILLIGRAM(S): at 21:01

## 2022-08-12 RX ADMIN — METFORMIN HYDROCHLORIDE 500 MILLIGRAM(S): 850 TABLET ORAL at 09:00

## 2022-08-12 NOTE — BH INPATIENT PSYCHIATRY PROGRESS NOTE - NSBHMETABOLIC_PSY_ALL_CORE_FT
BMI:   HbA1c: A1C with Estimated Average Glucose Result: 5.8 % (07-26-22 @ 09:35)    Glucose: POCT Blood Glucose.: 99 mg/dL (08-11-22 @ 07:57)    BP: --  Lipid Panel:

## 2022-08-12 NOTE — BH INPATIENT PSYCHIATRY PROGRESS NOTE - NSBHFUPINTERVALHXFT_PSY_A_CORE
82 y.o. male being seen for SI and psychosis. Patient received maintenance dose of Invega Sustenna 156mg IM on 8/3/22. Next Dose Invega Sustenna 156mg IM due on 9/3/2022. Chart reviewed. No events overnight. Upon evaluation, pt reports feeling okay today. Reports fair sleep and appetite. Pt denies any problems. Pt noted to be getting water before walking back to his room. He has been more visible around the unit. Pt denied having any pain, headaches, dizziness, blurry vision or constipation. Pt denies SI, HI IP.
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
## 2022-08-12 NOTE — BH INPATIENT PSYCHIATRY PROGRESS NOTE - OTHER
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 PERRL, EOM fairly intact Impoverished, soft  moderate TD of jaw, tongue, legs bilaterally; no catatonia signs noted

## 2022-08-12 NOTE — BH INPATIENT PSYCHIATRY PROGRESS NOTE - NSBHCHARTREVIEWVS_PSY_A_CORE FT
Vital Signs Last 24 Hrs  T(C): 36.7 (08-12-22 @ 08:12), Max: 36.7 (08-12-22 @ 08:12)  T(F): 98 (08-12-22 @ 08:12), Max: 98 (08-12-22 @ 08:12)  HR: --  BP: --  BP(mean): --  RR: --  SpO2: --    Orthostatic VS  08-12-22 @ 08:12  Lying BP: --/-- HR: --  Sitting BP: 149/77 HR: 67  Standing BP: 130/78 HR: 74  Site: upper left arm  Mode: electronic  Orthostatic VS  08-11-22 @ 08:10  Lying BP: --/-- HR: --  Sitting BP: 130/72 HR: 70  Standing BP: 126/71 HR: 80  Site: upper left arm  Mode: electronic

## 2022-08-13 RX ADMIN — METFORMIN HYDROCHLORIDE 500 MILLIGRAM(S): 850 TABLET ORAL at 10:08

## 2022-08-13 RX ADMIN — Medication 5 MILLIGRAM(S): at 10:07

## 2022-08-13 RX ADMIN — SENNA PLUS 2 TABLET(S): 8.6 TABLET ORAL at 20:11

## 2022-08-13 RX ADMIN — METFORMIN HYDROCHLORIDE 500 MILLIGRAM(S): 850 TABLET ORAL at 20:12

## 2022-08-13 RX ADMIN — MIRTAZAPINE 30 MILLIGRAM(S): 45 TABLET, ORALLY DISINTEGRATING ORAL at 20:11

## 2022-08-13 RX ADMIN — BUDESONIDE AND FORMOTEROL FUMARATE DIHYDRATE 2 PUFF(S): 160; 4.5 AEROSOL RESPIRATORY (INHALATION) at 20:12

## 2022-08-13 RX ADMIN — Medication 5 MILLIGRAM(S): at 20:12

## 2022-08-14 RX ADMIN — BUDESONIDE AND FORMOTEROL FUMARATE DIHYDRATE 2 PUFF(S): 160; 4.5 AEROSOL RESPIRATORY (INHALATION) at 10:23

## 2022-08-14 RX ADMIN — MIRTAZAPINE 30 MILLIGRAM(S): 45 TABLET, ORALLY DISINTEGRATING ORAL at 20:39

## 2022-08-14 RX ADMIN — METFORMIN HYDROCHLORIDE 500 MILLIGRAM(S): 850 TABLET ORAL at 10:23

## 2022-08-14 RX ADMIN — SENNA PLUS 2 TABLET(S): 8.6 TABLET ORAL at 20:40

## 2022-08-14 RX ADMIN — BUDESONIDE AND FORMOTEROL FUMARATE DIHYDRATE 2 PUFF(S): 160; 4.5 AEROSOL RESPIRATORY (INHALATION) at 20:39

## 2022-08-14 RX ADMIN — Medication 5 MILLIGRAM(S): at 20:40

## 2022-08-14 RX ADMIN — Medication 5 MILLIGRAM(S): at 10:23

## 2022-08-14 RX ADMIN — METFORMIN HYDROCHLORIDE 500 MILLIGRAM(S): 850 TABLET ORAL at 20:40

## 2022-08-15 PROCEDURE — 99231 SBSQ HOSP IP/OBS SF/LOW 25: CPT | Mod: GC

## 2022-08-15 RX ADMIN — BUDESONIDE AND FORMOTEROL FUMARATE DIHYDRATE 2 PUFF(S): 160; 4.5 AEROSOL RESPIRATORY (INHALATION) at 20:16

## 2022-08-15 RX ADMIN — MIRTAZAPINE 30 MILLIGRAM(S): 45 TABLET, ORALLY DISINTEGRATING ORAL at 20:15

## 2022-08-15 RX ADMIN — METFORMIN HYDROCHLORIDE 500 MILLIGRAM(S): 850 TABLET ORAL at 20:16

## 2022-08-15 RX ADMIN — METFORMIN HYDROCHLORIDE 500 MILLIGRAM(S): 850 TABLET ORAL at 08:07

## 2022-08-15 RX ADMIN — BUDESONIDE AND FORMOTEROL FUMARATE DIHYDRATE 2 PUFF(S): 160; 4.5 AEROSOL RESPIRATORY (INHALATION) at 08:07

## 2022-08-15 NOTE — BH TREATMENT PLAN - NSTXCOPEINTERPR_PSY_ALL_CORE
Patient will identify and utilize two coping skills daily to manage his anxiety within seven days.
Patient will identify and utilize two coping skills to manage his anxiety and depressive symptoms within seven days.

## 2022-08-15 NOTE — BH INPATIENT PSYCHIATRY PROGRESS NOTE - PRN MEDS
MEDICATIONS  (PRN):  acetaminophen     Tablet .. 650 milliGRAM(s) Oral every 6 hours PRN Moderate Pain (4 - 6), Severe Pain (7 - 10)  dextrose Oral Gel 15 Gram(s) Oral once PRN Blood Glucose LESS THAN 70 milliGRAM(s)/deciliter  OLANZapine Injectable 2.5 milliGRAM(s) IntraMuscular once PRN agitation  polyethylene glycol 3350 17 Gram(s) Oral every 12 hours PRN Constipation   MEDICATIONS  (PRN):  acetaminophen     Tablet .. 650 milliGRAM(s) Oral every 6 hours PRN Moderate Pain (4 - 6), Severe Pain (7 - 10)  bisacodyl 5 milliGRAM(s) Oral every 12 hours PRN Constipation  dextrose Oral Gel 15 Gram(s) Oral once PRN Blood Glucose LESS THAN 70 milliGRAM(s)/deciliter  OLANZapine Injectable 2.5 milliGRAM(s) IntraMuscular once PRN agitation  polyethylene glycol 3350 17 Gram(s) Oral every 12 hours PRN Constipation

## 2022-08-15 NOTE — BH TREATMENT PLAN - NSTXDCOTHRGOAL_PSY_ALL_CORE
Pt will comply with medication, with an improvement in symptoms, allowing him to eat adequately and participate in his care
Pt will comply with treatment with an improvement in symptoms, and resumption of baseline functioning

## 2022-08-15 NOTE — BH TREATMENT PLAN - NSTXDCOTHRINTERSW_PSY_ALL_CORE
SW will meet with pt to encourage his overall compliance, and offer support around his concerns.  SW will update with pt's sister, and SNF as treatment progresses.
SW meets with pt to encourage his compliance and participation in unit activity.  SW updates with pt's family (sister), as pt's treatment progresses

## 2022-08-15 NOTE — BH TREATMENT PLAN - NSTXPLANTHERAPYSESSIONSFT_PSY_ALL_CORE
07-29-22  Type of therapy: Coping skills,Creative arts therapy,Health and fitness,Spirituality,Stress management  Type of session: Group  Level of patient participation: Not engaged  Duration of participation: Less than 15 minutes  Therapy conducted by: Psych rehab  Therapy Summary: In response to the COVID-19 outbreak there has been a shift in hospital wide policies and protocols. As a result it should be noted the unit activities and structure have been temporarily modified to promote safety of patients and staff. Patient over the past couple of day made some gains towards his rehab goals. Today patient stated to writer he feeling less anxious and less dysphoric than in previous weeks. Today while speaking with writer denied having suicidal ideations. Today patient was watching television with the community and was more verbal with staff. However, eariler during the week patient expressed having suicidal ideations and was resistive to taking medications.  
  08-11-22  Type of therapy: Other,Physical therapy  Type of session: Individual  Level of patient participation: Participates  --  Therapy conducted by: Other (specify),Physical therapy  Therapy Summary: Patient participated in ambulation within unit    08-12-22  Type of therapy: Coping skills,Creative arts therapy,Health and fitness,Spirituality,Stress management  Type of session: Group  Level of patient participation: Not engaged  Duration of participation: Less than 15 minutes  Therapy conducted by: Psych rehab  Therapy Summary: In response to the COVID-19 outbreak there has been a shfit in hospital wide policies and protocols. As a result it should be noted the unit activities and structure have been temporarily modified to promote safety of patients and staff. Patient over the past week made some gains towards his rehab goals. Today patient reported to writer he is feeling less anxious and less dysphoric than in previous weeks. Patient denied having any suicidal ideations. Patient is complying with his medications, eats his meals with the community and ambulates independently. However, despite daily efforts by writer and other members of the treatment team patient has not participated in any of the group activities. Patient is generally isolative and does not initiate social interaction with his peers. Patient at times can be observed pacing in his room or in the halls.

## 2022-08-15 NOTE — BH INPATIENT PSYCHIATRY PROGRESS NOTE - OTHER
moderate TD of jaw, tongue, legs bilaterally; no catatonia signs noted Noted to have left eye ptosis (confirmed to be chronic). PERRL, EOM fairly intact

## 2022-08-15 NOTE — BH INPATIENT PSYCHIATRY PROGRESS NOTE - NSBHATTESTCOMMENTATTENDFT_PSY_A_CORE
Patient stable with no new concerns. Currently awaiting placement to NH. Standing bowel regimen changed to PRN as per patient's request. No other med changes made today. Rest of the history, exam, assessment, and plan as documented in Dr. Priest's note.

## 2022-08-15 NOTE — BH INPATIENT PSYCHIATRY PROGRESS NOTE - NSBHFUPINTERVALHXFT_PSY_A_CORE
82 y.o. male being seen for SI and psychosis. Patient received maintenance dose of Invega Sustenna 156mg IM on 8/3/22. Next Dose Invega Sustenna 156mg IM due on 9/3/2022. Chart reviewed. No events overnight. Upon evaluation, pt reports feeling okay today. Reports fair sleep and appetite. Pt denies any problems. Pt reported not requiring laxatives anymore and having improved BMs. He has been more visible around the unit. Pt denied having any pain, headaches, dizziness, blurry vision or constipation. Pt denies SI, HI IP.
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
## 2022-08-15 NOTE — BH TREATMENT PLAN - NSTXDEPRESINTERMD_PSY_ALL_CORE
Will continue CO over the weekend for SI. Continue titrating Remeron. 
Will continue CO over the weekend for SI. Continue titrating Remeron.

## 2022-08-15 NOTE — BH INPATIENT PSYCHIATRY PROGRESS NOTE - NSBHMETABOLIC_PSY_ALL_CORE_FT
BMI:   HbA1c: A1C with Estimated Average Glucose Result: 5.8 % (07-26-22 @ 09:35)    Glucose: POCT Blood Glucose.: 99 mg/dL (08-11-22 @ 07:57)    BP: 152/65 (08-14-22 @ 07:59) (152/65 - 152/65)  Lipid Panel:  HbA1c: A1C with Estimated Average Glucose Result: 5.8 % (07-26-22 @ 09:35)  Glucose: POCT Blood Glucose.: 99 mg/dL (08-11-22 @ 07:57)  BP: 152/65 (08-14-22 @ 07:59) (152/65 - 152/65)

## 2022-08-15 NOTE — BH INPATIENT PSYCHIATRY PROGRESS NOTE - CURRENT MEDICATION
MEDICATIONS  (STANDING):  bisacodyl 5 milliGRAM(s) Oral every 12 hours  budesonide  80 MICROgram(s)/formoterol 4.5 MICROgram(s) Inhaler 2 Puff(s) Inhalation two times a day  dextrose 5%. 1000 milliLiter(s) (50 mL/Hr) IV Continuous <Continuous>  dextrose 5%. 1000 milliLiter(s) (100 mL/Hr) IV Continuous <Continuous>  dextrose 50% Injectable 25 Gram(s) IV Push once  dextrose 50% Injectable 12.5 Gram(s) IV Push once  dextrose 50% Injectable 25 Gram(s) IV Push once  glucagon  Injectable 1 milliGRAM(s) IntraMuscular once  metFORMIN 500 milliGRAM(s) Oral two times a day  mirtazapine 30 milliGRAM(s) Oral at bedtime  senna 2 Tablet(s) Oral at bedtime    MEDICATIONS  (PRN):  acetaminophen     Tablet .. 650 milliGRAM(s) Oral every 6 hours PRN Moderate Pain (4 - 6), Severe Pain (7 - 10)  dextrose Oral Gel 15 Gram(s) Oral once PRN Blood Glucose LESS THAN 70 milliGRAM(s)/deciliter  OLANZapine Injectable 2.5 milliGRAM(s) IntraMuscular once PRN agitation  polyethylene glycol 3350 17 Gram(s) Oral every 12 hours PRN Constipation   MEDICATIONS  (STANDING):  budesonide  80 MICROgram(s)/formoterol 4.5 MICROgram(s) Inhaler 2 Puff(s) Inhalation two times a day  dextrose 5%. 1000 milliLiter(s) (50 mL/Hr) IV Continuous <Continuous>  dextrose 5%. 1000 milliLiter(s) (100 mL/Hr) IV Continuous <Continuous>  dextrose 50% Injectable 25 Gram(s) IV Push once  dextrose 50% Injectable 12.5 Gram(s) IV Push once  dextrose 50% Injectable 25 Gram(s) IV Push once  glucagon  Injectable 1 milliGRAM(s) IntraMuscular once  metFORMIN 500 milliGRAM(s) Oral two times a day  mirtazapine 30 milliGRAM(s) Oral at bedtime  senna 2 Tablet(s) Oral at bedtime    MEDICATIONS  (PRN):  acetaminophen     Tablet .. 650 milliGRAM(s) Oral every 6 hours PRN Moderate Pain (4 - 6), Severe Pain (7 - 10)  bisacodyl 5 milliGRAM(s) Oral every 12 hours PRN Constipation  dextrose Oral Gel 15 Gram(s) Oral once PRN Blood Glucose LESS THAN 70 milliGRAM(s)/deciliter  OLANZapine Injectable 2.5 milliGRAM(s) IntraMuscular once PRN agitation  polyethylene glycol 3350 17 Gram(s) Oral every 12 hours PRN Constipation

## 2022-08-15 NOTE — BH TREATMENT PLAN - NSTXPATIENTPARTICIPATE_PSY_ALL_CORE
Patient participated in identification of needs/problems/goals for treatment/Patient participated in defining interventions/Patient participated in development of after care plan
Patient participated in identification of needs/problems/goals for treatment/Patient participated in development of after care plan

## 2022-08-15 NOTE — BH INPATIENT PSYCHIATRY PROGRESS NOTE - NSBHCHARTREVIEWVS_PSY_A_CORE FT
Vital Signs Last 24 Hrs  T(C): 36.4 (08-15-22 @ 08:02), Max: 36.4 (08-15-22 @ 08:02)  T(F): 97.6 (08-15-22 @ 08:02), Max: 97.6 (08-15-22 @ 08:02)  HR: --  BP: --  BP(mean): --  RR: --  SpO2: --    Orthostatic VS  08-15-22 @ 08:02  Lying BP: --/-- HR: --  Sitting BP: 124/76 HR: 82  Standing BP: 125/74 HR: 69  Site: --  Mode: --   Vital Signs Last 24 Hrs  T(C): 36.4 (08-15-22 @ 08:02), Max: 36.4 (08-15-22 @ 08:02)  T(F): 97.6 (08-15-22 @ 08:02), Max: 97.6 (08-15-22 @ 08:02)    Orthostatic VS  08-15-22 @ 08:02  Sitting BP: 124/76 HR: 82  Standing BP: 125/74 HR: 69

## 2022-08-16 LAB
CHOLEST SERPL-MCNC: 142 MG/DL — SIGNIFICANT CHANGE UP
HDLC SERPL-MCNC: 57 MG/DL — SIGNIFICANT CHANGE UP
LIPID PNL WITH DIRECT LDL SERPL: 67 MG/DL — SIGNIFICANT CHANGE UP
NON HDL CHOLESTEROL: 85 MG/DL — SIGNIFICANT CHANGE UP
TRIGL SERPL-MCNC: 88 MG/DL — SIGNIFICANT CHANGE UP

## 2022-08-16 PROCEDURE — 99231 SBSQ HOSP IP/OBS SF/LOW 25: CPT | Mod: GC

## 2022-08-16 RX ADMIN — BUDESONIDE AND FORMOTEROL FUMARATE DIHYDRATE 2 PUFF(S): 160; 4.5 AEROSOL RESPIRATORY (INHALATION) at 20:40

## 2022-08-16 RX ADMIN — BUDESONIDE AND FORMOTEROL FUMARATE DIHYDRATE 2 PUFF(S): 160; 4.5 AEROSOL RESPIRATORY (INHALATION) at 08:19

## 2022-08-16 RX ADMIN — METFORMIN HYDROCHLORIDE 500 MILLIGRAM(S): 850 TABLET ORAL at 08:18

## 2022-08-16 RX ADMIN — MIRTAZAPINE 30 MILLIGRAM(S): 45 TABLET, ORALLY DISINTEGRATING ORAL at 20:39

## 2022-08-16 RX ADMIN — METFORMIN HYDROCHLORIDE 500 MILLIGRAM(S): 850 TABLET ORAL at 20:39

## 2022-08-16 NOTE — BH INPATIENT PSYCHIATRY PROGRESS NOTE - CURRENT MEDICATION
MEDICATIONS  (STANDING):  budesonide  80 MICROgram(s)/formoterol 4.5 MICROgram(s) Inhaler 2 Puff(s) Inhalation two times a day  dextrose 5%. 1000 milliLiter(s) (50 mL/Hr) IV Continuous <Continuous>  dextrose 5%. 1000 milliLiter(s) (100 mL/Hr) IV Continuous <Continuous>  dextrose 50% Injectable 25 Gram(s) IV Push once  dextrose 50% Injectable 12.5 Gram(s) IV Push once  dextrose 50% Injectable 25 Gram(s) IV Push once  glucagon  Injectable 1 milliGRAM(s) IntraMuscular once  metFORMIN 500 milliGRAM(s) Oral two times a day  mirtazapine 30 milliGRAM(s) Oral at bedtime  senna 2 Tablet(s) Oral at bedtime    MEDICATIONS  (PRN):  acetaminophen     Tablet .. 650 milliGRAM(s) Oral every 6 hours PRN Moderate Pain (4 - 6), Severe Pain (7 - 10)  bisacodyl 5 milliGRAM(s) Oral every 12 hours PRN Constipation  dextrose Oral Gel 15 Gram(s) Oral once PRN Blood Glucose LESS THAN 70 milliGRAM(s)/deciliter  OLANZapine Injectable 2.5 milliGRAM(s) IntraMuscular once PRN agitation  polyethylene glycol 3350 17 Gram(s) Oral every 12 hours PRN Constipation

## 2022-08-16 NOTE — BH INPATIENT PSYCHIATRY PROGRESS NOTE - NSBHCHARTREVIEWVS_PSY_A_CORE FT
Vital Signs Last 24 Hrs  T(C): 36.9 (08-16-22 @ 07:38), Max: 36.9 (08-16-22 @ 07:38)  T(F): 98.5 (08-16-22 @ 07:38), Max: 98.5 (08-16-22 @ 07:38)  HR: --  BP: --  BP(mean): --  RR: --  SpO2: --    Orthostatic VS  08-16-22 @ 07:38  Lying BP: --/-- HR: --  Sitting BP: 143/77 HR: 76  Standing BP: 140/76 HR: 74  Site: --  Mode: --  Orthostatic VS  08-15-22 @ 08:02  Lying BP: --/-- HR: --  Sitting BP: 124/76 HR: 82  Standing BP: 125/74 HR: 69  Site: --  Mode: --

## 2022-08-16 NOTE — BH INPATIENT PSYCHIATRY PROGRESS NOTE - NSBHATTESTCOMMENTATTENDFT_PSY_A_CORE
The patient continues to maintain gains on the unit, and reports feeling well.  He has been eating well, sleeping well.  Tolerating medications, which he feels have been helpful.  He denies any psychotic symptoms.  No SI.  Behavior well controlled.  Continue current medications.  Disposition planning.

## 2022-08-16 NOTE — DIETITIAN INITIAL EVALUATION ADULT - OTHER INFO
Pt domiciled in NH. Treated at [facility] for dehydration and catatonia. Then, admitted to [facility] with primary diagnosis of Schizophrenia. Medical history pertinent for CHF, COPD, Type 2 diabetes, Dementia, Schizophrenia, HTN.  Seen Pt in his room. reports good appetite/po intake at present. No GI distress noted. Noted Pt edentulous. 7/26 Cinesophagram recommends: Minced and moist with moderately thick liquids. Current diet: Minced and moist with moderately thick liquids. Pt denies chewing/swallowing difficulty with current diet order. Pt denies food allergies.

## 2022-08-16 NOTE — BH INPATIENT PSYCHIATRY PROGRESS NOTE - NSBHFUPINTERVALHXFT_PSY_A_CORE
82 y.o. male being seen for SI and psychosis. Patient received maintenance dose of Invega Sustenna 156mg IM on 8/3/22. Next Dose Invega Sustenna 156mg IM due on 9/3/2022. Chart reviewed. No events overnight. Vitals stable. Upon evaluation, pt reports feeling okay today. Reports fair sleep and appetite. Pt denies any problems. Pt reported not having a bowel movement today. Pt has PRN laxatives and was psychoeducated. Pt denied having any pain, headaches, dizziness, blurry vision. Pt denies SI, HI IP. Pt denies any other concerns.

## 2022-08-16 NOTE — DIETITIAN INITIAL EVALUATION ADULT - PERTINENT MEDS FT
MEDICATIONS  (STANDING):  budesonide  80 MICROgram(s)/formoterol 4.5 MICROgram(s) Inhaler 2 Puff(s) Inhalation two times a day  dextrose 5%. 1000 milliLiter(s) (50 mL/Hr) IV Continuous <Continuous>  dextrose 5%. 1000 milliLiter(s) (100 mL/Hr) IV Continuous <Continuous>  dextrose 50% Injectable 25 Gram(s) IV Push once  dextrose 50% Injectable 12.5 Gram(s) IV Push once  dextrose 50% Injectable 25 Gram(s) IV Push once  glucagon  Injectable 1 milliGRAM(s) IntraMuscular once  metFORMIN 500 milliGRAM(s) Oral two times a day  mirtazapine 30 milliGRAM(s) Oral at bedtime  senna 2 Tablet(s) Oral at bedtime

## 2022-08-16 NOTE — BH INPATIENT PSYCHIATRY PROGRESS NOTE - NSBHMETABOLIC_PSY_ALL_CORE_FT
BMI:   HbA1c: A1C with Estimated Average Glucose Result: 5.8 % (07-26-22 @ 09:35)    Glucose: POCT Blood Glucose.: 99 mg/dL (08-11-22 @ 07:57)    BP: 152/65 (08-14-22 @ 07:59) (152/65 - 152/65)  Lipid Panel: Date/Time: 08-16-22 @ 09:10  Cholesterol, Serum: 142  Direct LDL: --  HDL Cholesterol, Serum: 57  Total Cholesterol/HDL Ration Measurement: --  Triglycerides, Serum: 88

## 2022-08-17 PROCEDURE — 99231 SBSQ HOSP IP/OBS SF/LOW 25: CPT | Mod: GC

## 2022-08-17 RX ADMIN — METFORMIN HYDROCHLORIDE 500 MILLIGRAM(S): 850 TABLET ORAL at 08:43

## 2022-08-17 RX ADMIN — METFORMIN HYDROCHLORIDE 500 MILLIGRAM(S): 850 TABLET ORAL at 21:16

## 2022-08-17 RX ADMIN — BUDESONIDE AND FORMOTEROL FUMARATE DIHYDRATE 2 PUFF(S): 160; 4.5 AEROSOL RESPIRATORY (INHALATION) at 21:16

## 2022-08-17 RX ADMIN — POLYETHYLENE GLYCOL 3350 17 GRAM(S): 17 POWDER, FOR SOLUTION ORAL at 18:41

## 2022-08-17 RX ADMIN — BUDESONIDE AND FORMOTEROL FUMARATE DIHYDRATE 2 PUFF(S): 160; 4.5 AEROSOL RESPIRATORY (INHALATION) at 08:43

## 2022-08-17 RX ADMIN — SENNA PLUS 2 TABLET(S): 8.6 TABLET ORAL at 21:15

## 2022-08-17 RX ADMIN — MIRTAZAPINE 30 MILLIGRAM(S): 45 TABLET, ORALLY DISINTEGRATING ORAL at 21:16

## 2022-08-17 NOTE — UM REPORT PROGRESS NOTE - NSUMSWACTION_GEN_A_CORE FT
SW referred pt for his Level 2, which is now complete and approved by [facility].  SW sent out pt's referrals to multiple SNFs in [place], and spoke with Devan about pt's DC readiness.  Pt's sister calls regularly and is up to date.

## 2022-08-17 NOTE — BH INPATIENT PSYCHIATRY PROGRESS NOTE - NSBHMETABOLIC_PSY_ALL_CORE_FT
BMI:   HbA1c: A1C with Estimated Average Glucose Result: 5.8 % (07-26-22 @ 09:35)    Glucose: POCT Blood Glucose.: 99 mg/dL (08-11-22 @ 07:57)    BP: --  Lipid Panel: Date/Time: 08-16-22 @ 09:10  Cholesterol, Serum: 142  Direct LDL: --  HDL Cholesterol, Serum: 57  Total Cholesterol/HDL Ration Measurement: --  Triglycerides, Serum: 88

## 2022-08-17 NOTE — BH INPATIENT PSYCHIATRY PROGRESS NOTE - NSBHCHARTREVIEWVS_PSY_A_CORE FT
Vital Signs Last 24 Hrs  T(C): 36.3 (08-17-22 @ 07:36), Max: 36.3 (08-17-22 @ 07:36)  T(F): 97.4 (08-17-22 @ 07:36), Max: 97.4 (08-17-22 @ 07:36)  HR: --  BP: --  BP(mean): --  RR: --  SpO2: --    Orthostatic VS  08-17-22 @ 07:36  Lying BP: --/-- HR: --  Sitting BP: 136/85 HR: 67  Standing BP: 144/78 HR: 73  Site: --  Mode: --  Orthostatic VS  08-16-22 @ 07:38  Lying BP: --/-- HR: --  Sitting BP: 143/77 HR: 76  Standing BP: 140/76 HR: 74  Site: --  Mode: --

## 2022-08-17 NOTE — UM REPORT PROGRESS NOTE - NSUMSWNOTE_GEN_A_CORE FT
Pt admitted due to verbalized SI with plans to stop eating.  Pt is generally improved with medication adjustments. Pt participated basically in his DC plan, aware of Level 2, asking writer to send SNF referral to several facilities.  Pt able to sign his HIPAA and screen.

## 2022-08-17 NOTE — BH INPATIENT PSYCHIATRY PROGRESS NOTE - NSBHFUPINTERVALHXFT_PSY_A_CORE
82 y.o. male being seen for SI and psychosis. Patient received maintenance dose of Invega Sustenna 156mg IM on 8/3/22. Next Dose Invega Sustenna 156mg IM due on 9/3/2022. Chart reviewed. No events overnight. Vitals stable. Upon evaluation, pt denies any complaints. Reports fair sleep and appetite. Pt denied having any pain, headaches, dizziness, blurry vision. Pt denies SI, HI IP.

## 2022-08-17 NOTE — BH INPATIENT PSYCHIATRY PROGRESS NOTE - CURRENT MEDICATION
MEDICATIONS  (STANDING):  budesonide  80 MICROgram(s)/formoterol 4.5 MICROgram(s) Inhaler 2 Puff(s) Inhalation two times a day  dextrose 5%. 1000 milliLiter(s) (100 mL/Hr) IV Continuous <Continuous>  dextrose 5%. 1000 milliLiter(s) (50 mL/Hr) IV Continuous <Continuous>  dextrose 50% Injectable 12.5 Gram(s) IV Push once  dextrose 50% Injectable 25 Gram(s) IV Push once  dextrose 50% Injectable 25 Gram(s) IV Push once  glucagon  Injectable 1 milliGRAM(s) IntraMuscular once  metFORMIN 500 milliGRAM(s) Oral two times a day  mirtazapine 30 milliGRAM(s) Oral at bedtime  senna 2 Tablet(s) Oral at bedtime    MEDICATIONS  (PRN):  acetaminophen     Tablet .. 650 milliGRAM(s) Oral every 6 hours PRN Moderate Pain (4 - 6), Severe Pain (7 - 10)  bisacodyl 5 milliGRAM(s) Oral every 12 hours PRN Constipation  dextrose Oral Gel 15 Gram(s) Oral once PRN Blood Glucose LESS THAN 70 milliGRAM(s)/deciliter  OLANZapine Injectable 2.5 milliGRAM(s) IntraMuscular once PRN agitation  polyethylene glycol 3350 17 Gram(s) Oral every 12 hours PRN Constipation   MEDICATIONS  (STANDING):  budesonide  80 MICROgram(s)/formoterol 4.5 MICROgram(s) Inhaler 2 Puff(s) Inhalation two times a day  dextrose 5%. 1000 milliLiter(s) (50 mL/Hr) IV Continuous <Continuous>  dextrose 5%. 1000 milliLiter(s) (100 mL/Hr) IV Continuous <Continuous>  dextrose 50% Injectable 25 Gram(s) IV Push once  dextrose 50% Injectable 12.5 Gram(s) IV Push once  dextrose 50% Injectable 25 Gram(s) IV Push once  glucagon  Injectable 1 milliGRAM(s) IntraMuscular once  metFORMIN 500 milliGRAM(s) Oral two times a day  mirtazapine 30 milliGRAM(s) Oral at bedtime  senna 2 Tablet(s) Oral at bedtime    MEDICATIONS  (PRN):  acetaminophen     Tablet .. 650 milliGRAM(s) Oral every 6 hours PRN Moderate Pain (4 - 6), Severe Pain (7 - 10)  bisacodyl 5 milliGRAM(s) Oral every 12 hours PRN Constipation  dextrose Oral Gel 15 Gram(s) Oral once PRN Blood Glucose LESS THAN 70 milliGRAM(s)/deciliter  OLANZapine Injectable 2.5 milliGRAM(s) IntraMuscular once PRN agitation  polyethylene glycol 3350 17 Gram(s) Oral every 12 hours PRN Constipation

## 2022-08-17 NOTE — BH INPATIENT PSYCHIATRY PROGRESS NOTE - NSBHMSEGROOM_PSY_A_CORE
Fair Tremfya Counseling: I discussed with the patient the risks of guselkumab including but not limited to immunosuppression, serious infections, worsening of inflammatory bowel disease and drug reactions.  The patient understands that monitoring is required including a PPD at baseline and must alert us or the primary physician if symptoms of infection or other concerning signs are noted.

## 2022-08-17 NOTE — BH INPATIENT PSYCHIATRY PROGRESS NOTE - NSBHATTESTCOMMENTATTENDFT_PSY_A_CORE
The patient continues to do well on the unit.  He has been eating and sleeping well.  Mood is good, no SI.  Behavior well controlled.  Will continue current medications, tolerating them well.  Disposition planning.
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
## 2022-08-18 LAB — SARS-COV-2 RNA SPEC QL NAA+PROBE: SIGNIFICANT CHANGE UP

## 2022-08-18 PROCEDURE — 99231 SBSQ HOSP IP/OBS SF/LOW 25: CPT | Mod: GC

## 2022-08-18 RX ORDER — POLYETHYLENE GLYCOL 3350 17 G/17G
17 POWDER, FOR SOLUTION ORAL
Refills: 0 | Status: DISCONTINUED | OUTPATIENT
Start: 2022-08-18 | End: 2022-08-19

## 2022-08-18 RX ORDER — ACETAMINOPHEN 500 MG
2 TABLET ORAL
Qty: 0 | Refills: 0 | DISCHARGE
Start: 2022-08-18

## 2022-08-18 RX ORDER — POLYETHYLENE GLYCOL 3350 17 G/17G
17 POWDER, FOR SOLUTION ORAL
Qty: 0 | Refills: 0 | DISCHARGE
Start: 2022-08-18

## 2022-08-18 RX ORDER — METFORMIN HYDROCHLORIDE 850 MG/1
1 TABLET ORAL
Qty: 0 | Refills: 0 | DISCHARGE

## 2022-08-18 RX ORDER — METFORMIN HYDROCHLORIDE 850 MG/1
1 TABLET ORAL
Qty: 0 | Refills: 0 | DISCHARGE
Start: 2022-08-18

## 2022-08-18 RX ORDER — AMLODIPINE BESYLATE 2.5 MG/1
1 TABLET ORAL
Qty: 0 | Refills: 0 | DISCHARGE

## 2022-08-18 RX ORDER — BUDESONIDE AND FORMOTEROL FUMARATE DIHYDRATE 160; 4.5 UG/1; UG/1
2 AEROSOL RESPIRATORY (INHALATION)
Qty: 0 | Refills: 0 | DISCHARGE
Start: 2022-08-18

## 2022-08-18 RX ORDER — BUDESONIDE AND FORMOTEROL FUMARATE DIHYDRATE 160; 4.5 UG/1; UG/1
2 AEROSOL RESPIRATORY (INHALATION)
Qty: 0 | Refills: 0 | DISCHARGE

## 2022-08-18 RX ORDER — MIRTAZAPINE 45 MG/1
1 TABLET, ORALLY DISINTEGRATING ORAL
Qty: 0 | Refills: 0 | DISCHARGE
Start: 2022-08-18

## 2022-08-18 RX ADMIN — BUDESONIDE AND FORMOTEROL FUMARATE DIHYDRATE 2 PUFF(S): 160; 4.5 AEROSOL RESPIRATORY (INHALATION) at 21:00

## 2022-08-18 RX ADMIN — METFORMIN HYDROCHLORIDE 500 MILLIGRAM(S): 850 TABLET ORAL at 20:58

## 2022-08-18 RX ADMIN — METFORMIN HYDROCHLORIDE 500 MILLIGRAM(S): 850 TABLET ORAL at 08:38

## 2022-08-18 RX ADMIN — Medication 5 MILLIGRAM(S): at 17:44

## 2022-08-18 RX ADMIN — BUDESONIDE AND FORMOTEROL FUMARATE DIHYDRATE 2 PUFF(S): 160; 4.5 AEROSOL RESPIRATORY (INHALATION) at 08:38

## 2022-08-18 RX ADMIN — MIRTAZAPINE 30 MILLIGRAM(S): 45 TABLET, ORALLY DISINTEGRATING ORAL at 20:58

## 2022-08-18 NOTE — BH INPATIENT PSYCHIATRY PROGRESS NOTE - NSBHCHARTREVIEWVS_PSY_A_CORE FT
Vital Signs Last 24 Hrs  T(C): 36.4 (08-18-22 @ 08:10), Max: 36.4 (08-18-22 @ 08:10)  T(F): 97.5 (08-18-22 @ 08:10), Max: 97.5 (08-18-22 @ 08:10)  HR: --  BP: --  BP(mean): --  RR: --  SpO2: --    Orthostatic VS  08-18-22 @ 08:10  Lying BP: --/-- HR: --  Sitting BP: 143/69 HR: 62  Standing BP: 137/60 HR: 69  Site: upper left arm  Mode: electronic  Orthostatic VS  08-17-22 @ 07:36  Lying BP: --/-- HR: --  Sitting BP: 136/85 HR: 67  Standing BP: 144/78 HR: 73  Site: --  Mode: --

## 2022-08-18 NOTE — BH INPATIENT PSYCHIATRY PROGRESS NOTE - NSBHATTESTCOMMENTATTENDFT_PSY_A_CORE
The patient continues to do well on the unit.  Mood has been good, and he has been eating and sleeping well.  He is looking forward to returning to a nursing home.  Tolerating medications well, will continue.

## 2022-08-18 NOTE — BH INPATIENT PSYCHIATRY PROGRESS NOTE - NSBHFUPINTERVALHXFT_PSY_A_CORE
82 y.o. male being seen for SI and psychosis. Patient received maintenance dose of Invega Sustenna 156mg IM on 8/3/22. Next Dose Invega Sustenna 156mg IM due on 9/3/2022. Chart reviewed. No events overnight. Vitals stable. Upon evaluation, pt reports feeling constipated, last BM over 2 days ago. Reports fair sleep and appetite. Pt denied having any pain, headaches, dizziness, blurry vision. Pt denies SI, HI IP.

## 2022-08-18 NOTE — BH INPATIENT PSYCHIATRY PROGRESS NOTE - NSBHATTESTSTAFFAMEND_PSY_A_CORE
I have personally seen and examined this patient. I fully participated in the care of this patient. I have made amendments to the documentation where appropriate and otherwise agree with the history, physical exam, and plan as documented by the

## 2022-08-18 NOTE — BH INPATIENT PSYCHIATRY PROGRESS NOTE - CURRENT MEDICATION
MEDICATIONS  (STANDING):  budesonide  80 MICROgram(s)/formoterol 4.5 MICROgram(s) Inhaler 2 Puff(s) Inhalation two times a day  dextrose 5%. 1000 milliLiter(s) (50 mL/Hr) IV Continuous <Continuous>  dextrose 5%. 1000 milliLiter(s) (100 mL/Hr) IV Continuous <Continuous>  dextrose 50% Injectable 25 Gram(s) IV Push once  dextrose 50% Injectable 25 Gram(s) IV Push once  dextrose 50% Injectable 12.5 Gram(s) IV Push once  glucagon  Injectable 1 milliGRAM(s) IntraMuscular once  metFORMIN 500 milliGRAM(s) Oral two times a day  mirtazapine 30 milliGRAM(s) Oral at bedtime  senna 2 Tablet(s) Oral at bedtime    MEDICATIONS  (PRN):  acetaminophen     Tablet .. 650 milliGRAM(s) Oral every 6 hours PRN Moderate Pain (4 - 6), Severe Pain (7 - 10)  bisacodyl 5 milliGRAM(s) Oral every 12 hours PRN Constipation  dextrose Oral Gel 15 Gram(s) Oral once PRN Blood Glucose LESS THAN 70 milliGRAM(s)/deciliter  OLANZapine Injectable 2.5 milliGRAM(s) IntraMuscular once PRN agitation  polyethylene glycol 3350 17 Gram(s) Oral every 12 hours PRN Constipation   MEDICATIONS  (STANDING):  budesonide  80 MICROgram(s)/formoterol 4.5 MICROgram(s) Inhaler 2 Puff(s) Inhalation two times a day  dextrose 5%. 1000 milliLiter(s) (50 mL/Hr) IV Continuous <Continuous>  dextrose 5%. 1000 milliLiter(s) (100 mL/Hr) IV Continuous <Continuous>  dextrose 50% Injectable 25 Gram(s) IV Push once  dextrose 50% Injectable 12.5 Gram(s) IV Push once  dextrose 50% Injectable 25 Gram(s) IV Push once  glucagon  Injectable 1 milliGRAM(s) IntraMuscular once  metFORMIN 500 milliGRAM(s) Oral two times a day  mirtazapine 30 milliGRAM(s) Oral at bedtime  senna 2 Tablet(s) Oral at bedtime    MEDICATIONS  (PRN):  acetaminophen     Tablet .. 650 milliGRAM(s) Oral every 6 hours PRN Moderate Pain (4 - 6), Severe Pain (7 - 10)  bisacodyl 5 milliGRAM(s) Oral every 12 hours PRN Constipation  dextrose Oral Gel 15 Gram(s) Oral once PRN Blood Glucose LESS THAN 70 milliGRAM(s)/deciliter  OLANZapine Injectable 2.5 milliGRAM(s) IntraMuscular once PRN agitation  polyethylene glycol 3350 17 Gram(s) Oral every 12 hours PRN Constipation
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
## 2022-08-19 VITALS — TEMPERATURE: 98 F

## 2022-08-19 PROCEDURE — 99238 HOSP IP/OBS DSCHRG MGMT 30/<: CPT

## 2022-08-19 RX ADMIN — METFORMIN HYDROCHLORIDE 500 MILLIGRAM(S): 850 TABLET ORAL at 09:02

## 2022-08-19 RX ADMIN — POLYETHYLENE GLYCOL 3350 17 GRAM(S): 17 POWDER, FOR SOLUTION ORAL at 09:02

## 2022-08-19 RX ADMIN — BUDESONIDE AND FORMOTEROL FUMARATE DIHYDRATE 2 PUFF(S): 160; 4.5 AEROSOL RESPIRATORY (INHALATION) at 09:03

## 2022-08-19 NOTE — BH INPATIENT PSYCHIATRY DISCHARGE NOTE - NSDCMRMEDTOKEN_GEN_ALL_CORE_FT
acetaminophen 325 mg oral tablet: 2 tab(s) orally every 6 hours, As needed, Moderate Pain (4 - 6), Severe Pain (7 - 10)  budesonide-formoterol 80 mcg-4.5 mcg/inh inhalation aerosol: 2 puff(s) inhaled 2 times a day  metFORMIN 500 mg oral tablet: 1 tab(s) orally 2 times a day  mirtazapine 30 mg oral tablet: 1 tab(s) orally once a day (at bedtime)  paliperidone 156 mg/mL intramuscular suspension, extended release:  intramuscular once a month next 8/3  polyethylene glycol 3350 oral powder for reconstitution: 17 gram(s) orally every other day   acetaminophen 325 mg oral tablet: 2 tab(s) orally every 6 hours, As needed, Moderate Pain (4 - 6), Severe Pain (7 - 10)  budesonide-formoterol 80 mcg-4.5 mcg/inh inhalation aerosol: 2 puff(s) inhaled 2 times a day  metFORMIN 500 mg oral tablet: 1 tab(s) orally 2 times a day  mirtazapine 30 mg oral tablet: 1 tab(s) orally once a day (at bedtime)  paliperidone 156 mg/mL intramuscular suspension, extended release:  intramuscular once a month next 9/3  polyethylene glycol 3350 oral powder for reconstitution: 17 gram(s) orally every other day

## 2022-08-19 NOTE — BH INPATIENT PSYCHIATRY PROGRESS NOTE - NSBHROSSYSTEMS_PSY_ALL_CORE
Psychiatric

## 2022-08-19 NOTE — BH INPATIENT PSYCHIATRY PROGRESS NOTE - NSBHMSEIMPULSE_PSY_A_CORE
Other
Other
Normal
Other
Other
Normal
Other
Normal
Other
Normal
Other
Normal
Other
Normal
Other
Other
Normal

## 2022-08-19 NOTE — BH SAFETY PLAN - WARNING SIGN 1
Patient declined the safety plan. However, patient initially presented with symptoms of anxiety, depression, suicidal ideations and decreased daily functioning.

## 2022-08-19 NOTE — BH INPATIENT PSYCHIATRY PROGRESS NOTE - NSBHMSERECMEM_PSY_A_CORE
Unable to assess
Normal
Unable to assess
Normal
Unable to assess
Normal
Unable to assess
Unable to assess
Normal
Unable to assess
Normal
Unable to assess
Unable to assess
Normal

## 2022-08-19 NOTE — BH INPATIENT PSYCHIATRY PROGRESS NOTE - NSCGISEVERILLNESS_PSY_ALL_CORE
6 = Severely ill - disruptive pathology, behavior and function are frequently influenced by symptoms, may require assistance from others
5 = Markedly ill - intrusive symptoms that distinctly impair social/occupational function or cause intrusive levels of distress

## 2022-08-19 NOTE — BH INPATIENT PSYCHIATRY PROGRESS NOTE - NSBHATTESTBILLINGAW_PSY_A_CORE
[id number]-Hnaxbucozy Inpatient care - high complexity - 35 minutes
99221-Initial hospital care - low complexity
72669-Imqhstajfk Inpatient care - low complexity - 15 minutes
54607-Dhwukwrxni Inpatient care - moderate complexity - 25 minutes
Non-billable
29531-Lgrrztvnnc Inpatient care - moderate complexity - 25 minutes
69389-Cbnqobtuxg Inpatient care - moderate complexity - 25 minutes
43864-Brfbhoqdjw Inpatient care - low complexity - 15 minutes
98027-Vfnujlfowa Inpatient care - moderate complexity - 25 minutes
16811-Xhuchvesoj Inpatient care - moderate complexity - 25 minutes
79130-Avdwgdbmfx Inpatient care - moderate complexity - 25 minutes
72390-Knunlehcxi Inpatient care - low complexity - 15 minutes
72864-Gnalmoetxa Inpatient care - moderate complexity - 25 minutes
84412-Pjukjtyzhv Inpatient care - low complexity - 15 minutes
79172-Folqyblyxm Inpatient care - moderate complexity - 25 minutes
62440-Boozhvayre Inpatient care - moderate complexity - 25 minutes
97085-Qadmuboayu Inpatient care - moderate complexity - 25 minutes
27687-Mhwvcmitgb Inpatient care - low complexity - 15 minutes
66883-Iazfukyxav Inpatient care - low complexity - 15 minutes
92686-Igjnychimi Inpatient care - low complexity - 15 minutes
14718-Pqgeiexafy Inpatient care - low complexity - 15 minutes
61616-Enkcpnmbqn Inpatient care - low complexity - 15 minutes
17518-Hkfzzzkomh Inpatient care - low complexity - 15 minutes

## 2022-08-19 NOTE — BH INPATIENT PSYCHIATRY PROGRESS NOTE - NSTXDEPRESINTERMD_PSY_ALL_CORE
Will continue CO over the weekend for SI. Continue titrating Remeron. 

## 2022-08-19 NOTE — BH INPATIENT PSYCHIATRY PROGRESS NOTE - NSICDXBHPRIMARYDX_PSY_ALL_CORE
Schizophrenia   F20.9  

## 2022-08-19 NOTE — BH INPATIENT PSYCHIATRY PROGRESS NOTE - CURRENT MEDICATION
12/28/21      Esteban Miranda  [address]  [place] [address]      [facility] [place],    Esteban Lawsonage is being prescribed:    \"Simethicone 40 mg strips q6h prn for gas\" per Zafar Richardson MD.    This medication has been sent to [facility] [address]. Please let us know if you have any questions.     Sincerely,     Zafar Richardson MD team  [facility]  [address]  [address]  [facility] [address]  Phone: [phone]  Fax: [phone]               MEDICATIONS  (STANDING):  budesonide  80 MICROgram(s)/formoterol 4.5 MICROgram(s) Inhaler 2 Puff(s) Inhalation two times a day  dextrose 5%. 1000 milliLiter(s) (50 mL/Hr) IV Continuous <Continuous>  dextrose 5%. 1000 milliLiter(s) (100 mL/Hr) IV Continuous <Continuous>  dextrose 50% Injectable 25 Gram(s) IV Push once  dextrose 50% Injectable 12.5 Gram(s) IV Push once  dextrose 50% Injectable 25 Gram(s) IV Push once  glucagon  Injectable 1 milliGRAM(s) IntraMuscular once  metFORMIN 500 milliGRAM(s) Oral two times a day  mirtazapine 30 milliGRAM(s) Oral at bedtime  polyethylene glycol 3350 17 Gram(s) Oral <User Schedule>    MEDICATIONS  (PRN):  acetaminophen     Tablet .. 650 milliGRAM(s) Oral every 6 hours PRN Moderate Pain (4 - 6), Severe Pain (7 - 10)  dextrose Oral Gel 15 Gram(s) Oral once PRN Blood Glucose LESS THAN 70 milliGRAM(s)/deciliter  OLANZapine Injectable 2.5 milliGRAM(s) IntraMuscular once PRN agitation
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
## 2022-08-19 NOTE — BH INPATIENT PSYCHIATRY PROGRESS NOTE - NSTXDCOTHRDATEEST_PSY_ALL_CORE
25-Jul-2022
01-Aug-2022
01-Aug-2022
08-Aug-2022
25-Jul-2022
25-Jul-2022
01-Aug-2022
17-Aug-2022
17-Aug-2022
08-Aug-2022
08-Aug-2022
25-Jul-2022
08-Aug-2022
25-Jul-2022
25-Jul-2022
01-Aug-2022
08-Aug-2022
25-Jul-2022
08-Aug-2022
17-Aug-2022

## 2022-08-19 NOTE — BH INPATIENT PSYCHIATRY PROGRESS NOTE - NSBHANTIPSYCHOTIC_PSY_ALL_CORE
Yes...
No
Yes...

## 2022-08-19 NOTE — BH INPATIENT PSYCHIATRY PROGRESS NOTE - NSBHMSETHTPROC_PSY_A_CORE
Linear
Other
Linear
Other
Linear
Other
Linear
Other
Linear
Other
Linear
Linear
Other
Linear
Linear

## 2022-08-19 NOTE — BH INPATIENT PSYCHIATRY PROGRESS NOTE - NSCGIIMPROVESX_PSY_ALL_CORE
2 = Much improved - notably better with signficant reduction of symptoms; increase in the level of functioning but some symptoms remain
2 = Much improved - notably better with signficant reduction of symptoms; increase in the level of functioning but some symptoms remain
3 = Minimally improved - slightly better with little or no clinically meaningful reduction of symptoms.  Represents very little change in basic clinical status, level of care, or functional capacity.
2 = Much improved - notably better with signficant reduction of symptoms; increase in the level of functioning but some symptoms remain
3 = Minimally improved - slightly better with little or no clinically meaningful reduction of symptoms.  Represents very little change in basic clinical status, level of care, or functional capacity.
2 = Much improved - notably better with signficant reduction of symptoms; increase in the level of functioning but some symptoms remain
2 = Much improved - notably better with signficant reduction of symptoms; increase in the level of functioning but some symptoms remain
3 = Minimally improved - slightly better with little or no clinically meaningful reduction of symptoms.  Represents very little change in basic clinical status, level of care, or functional capacity.
2 = Much improved - notably better with signficant reduction of symptoms; increase in the level of functioning but some symptoms remain
3 = Minimally improved - slightly better with little or no clinically meaningful reduction of symptoms.  Represents very little change in basic clinical status, level of care, or functional capacity.
2 = Much improved - notably better with signficant reduction of symptoms; increase in the level of functioning but some symptoms remain

## 2022-08-19 NOTE — BH INPATIENT PSYCHIATRY PROGRESS NOTE - NSBHCHARTREVIEWVS_PSY_A_CORE FT
Vital Signs Last 24 Hrs  T(C): 36.5 (08-19-22 @ 08:06), Max: 36.5 (08-19-22 @ 08:06)  T(F): 97.7 (08-19-22 @ 08:06), Max: 97.7 (08-19-22 @ 08:06)  HR: --  BP: --  BP(mean): --  RR: --  SpO2: --    Orthostatic VS  08-19-22 @ 08:06  Lying BP: --/-- HR: --  Sitting BP: 125/74 HR: 82  Standing BP: 124/77 HR: 71  Site: upper left arm  Mode: electronic  Orthostatic VS  08-18-22 @ 08:10  Lying BP: --/-- HR: --  Sitting BP: 143/69 HR: 62  Standing BP: 137/60 HR: 69  Site: upper left arm  Mode: electronic

## 2022-08-19 NOTE — BH INPATIENT PSYCHIATRY PROGRESS NOTE - GENERAL APPEARANCE
No deformities present/Other
No deformities present
No deformities present/Other
No deformities present
No deformities present/Other
No deformities present
No deformities present/Other
No deformities present
No deformities present
No deformities present/Other
No deformities present
No deformities present/Other
No deformities present/Other

## 2022-08-19 NOTE — BH DISCHARGE NOTE NURSING/SOCIAL WORK/PSYCH REHAB - NSCDUDCCRISIS_PSY_A_CORE
[facility] Well  1 (174) [facility]-WELL ([phone])  Text "WELL" to 73106  Website: www.Searchmetrics/.Safe Horizons 1 (630) 171-XGOC (0349) Website: www.safehorizon.org/.National Suicide Prevention Lifeline 6 (018) 873-9269/.  [employer]  1 (665) LIFENET (174-2345)/.  [facility]  [address] [place] [address] [phone]   Hours:  Monday through Friday from 9 AM to 3 PM/.  [facility] - Veterans Crisis Line  7 (917) 464-3562, Option 1 [facility] Well  1 (164) [facility]-WELL ([phone])  Text "WELL" to 89911  Website: www.Flit/.Safe Horizons 1 (158) 441-NQAX (8262) Website: www.safehorizon.org/.National Suicide Prevention Lifeline 3 (022) 779-2435/.  [employer]  1 (751) LIFENET (351-9125)/.  [facility]  [address] [place] [address] [phone]   Hours:  Monday through Friday from 9 AM to 3 PM

## 2022-08-19 NOTE — BH INPATIENT PSYCHIATRY PROGRESS NOTE - NSTXDEPRESDATEEST_PSY_ALL_CORE
11-Aug-2022
29-Jul-2022
11-Aug-2022
29-Jul-2022
11-Aug-2022
11-Aug-2022
29-Jul-2022
11-Aug-2022
29-Jul-2022
11-Aug-2022
11-Aug-2022

## 2022-08-19 NOTE — BH INPATIENT PSYCHIATRY PROGRESS NOTE - NSBHMSEAFFRANGE_PSY_A_CORE
Constricted
Blunted
Constricted
Blunted
Other
Blunted
Constricted
Other
Constricted
Other
Constricted
Blunted
Constricted
Other
Constricted
Constricted

## 2022-08-19 NOTE — BH INPATIENT PSYCHIATRY PROGRESS NOTE - NSBHMSEMOVE_PSY_A_CORE
Tremors/Other
Tremors/Other
Tremors
Tremors
Tremors/Other
Tremors
Tremors
Tremors/Other
Tremors
Tremors
Tremors/Other
Tremors/Other
Tremors
Tremors/Other
Tremors
Tremors/Other

## 2022-08-19 NOTE — BH INPATIENT PSYCHIATRY PROGRESS NOTE - NSBHMSETHTCONTENT_PSY_A_CORE
Unremarkable
Other
Unremarkable
Other
Unremarkable
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
## 2022-08-19 NOTE — BH DISCHARGE NOTE NURSING/SOCIAL WORK/PSYCH REHAB - DISCHARGE INSTRUCTIONS AFTERCARE APPOINTMENTS
In order to check the location, date, or time of your aftercare appointment, please refer to your Discharge Instructions Document given to you upon leaving the hospital.  If you have lost the instructions please call [phone]

## 2022-08-19 NOTE — BH DISCHARGE NOTE NURSING/SOCIAL WORK/PSYCH REHAB - NSDCPRGOAL_PSY_ALL_CORE
Patient initially presented with symptoms of anxiety, depression, suicidal ideations and decreased daily functioning. Patient's initial goals included increasing his hope in recovery, increasing his coping skills, decreasing anxiety and increasing daily functioning. Patient made some gains towards his rehab goals as evidenced by patient's brighter affect, utilization of coping skills, increased daily functioning and denial of suicidal ideations. Patient also demonstrated daily medication compliance and participation in some of the rehab groups. However, patient was minimally social with his peers.    ***Patient did not complete a self-rating or a Press Ganey survey.

## 2022-08-19 NOTE — BH INPATIENT PSYCHIATRY PROGRESS NOTE - NSBHCONSDANGERSELF_PSY_A_CORE
suicidal ideation with plan and means/unable to care for self
unable to care for self
unable to care for self
suicidal ideation with plan and means/unable to care for self
unable to care for self
suicidal ideation with plan and means/unable to care for self
suicidal ideation with plan and means/unable to care for self
unable to care for self
unable to care for self
suicidal ideation with plan and means/unable to care for self
unable to care for self

## 2022-08-19 NOTE — BH INPATIENT PSYCHIATRY PROGRESS NOTE - NSBHMETABOLICLABS_PSY_ALL_CORE
Labs within last 12 months

## 2022-08-19 NOTE — BH INPATIENT PSYCHIATRY PROGRESS NOTE - MSE OPTIONS
Unstructured MSE
Structured MSE
Unstructured MSE
Structured MSE

## 2022-08-19 NOTE — BH INPATIENT PSYCHIATRY PROGRESS NOTE - NSTXCOPEPROGRES_PSY_ALL_CORE
Improving
No Change

## 2022-08-19 NOTE — BH INPATIENT PSYCHIATRY PROGRESS NOTE - NSTXDEPRESGOAL_PSY_ALL_CORE
Exhibit improvements in self-grooming, hygiene, sleep and appetite
Will identify 2 coping skills that assist in improving mood
Exhibit improvements in self-grooming, hygiene, sleep and appetite
Will identify 2 coping skills that assist in improving mood

## 2022-08-19 NOTE — BH INPATIENT PSYCHIATRY PROGRESS NOTE - NSBHMSESPEECH_PSY_A_CORE
Abnormal as indicated, otherwise normal...
Normal volume, rate, productivity, spontaneity and articulation
Abnormal as indicated, otherwise normal...
Normal volume, rate, productivity, spontaneity and articulation
Abnormal as indicated, otherwise normal...
Normal volume, rate, productivity, spontaneity and articulation

## 2022-08-19 NOTE — BH INPATIENT PSYCHIATRY PROGRESS NOTE - NSBHMSEBODY_PSY_A_CORE
Average build

## 2022-08-19 NOTE — BH INPATIENT PSYCHIATRY PROGRESS NOTE - NSBHFUPINTERVALHXFT_PSY_A_CORE
82 y.o. male being seen for SI and psychosis. Patient received maintenance dose of Invega Sustenna 156mg IM on 8/3/22. Next Dose Invega Sustenna 156mg IM due on 9/3/2022. Chart reviewed. No events overnight. Vitals stable. Upon evaluation, the patient continues to maintain gains.  He was able to have a BM.  The patient denies any depression, eating and sleeping well.  Behavior well controlled.  Pt denied having any pain, headaches, dizziness, blurry vision. Pt denies SI, HI IP. He is happy to be discharged back to nursing home today.

## 2022-08-19 NOTE — BH INPATIENT PSYCHIATRY PROGRESS NOTE - NSICDXBHSECONDARYDX_PSY_ALL_CORE
Diabetes mellitus   E11.9  

## 2022-08-19 NOTE — BH INPATIENT PSYCHIATRY PROGRESS NOTE - NSTXDCOTHRDATETRGT_PSY_ALL_CORE
08-Aug-2022
24-Aug-2022
01-Aug-2022
01-Aug-2022
24-Aug-2022
15-Aug-2022
15-Aug-2022
08-Aug-2022
08-Aug-2022
01-Aug-2022
08-Aug-2022
15-Aug-2022
08-Aug-2022
15-Aug-2022
01-Aug-2022
15-Aug-2022
01-Aug-2022
08-Aug-2022
24-Aug-2022
08-Aug-2022
01-Aug-2022
01-Aug-2022
15-Aug-2022

## 2022-08-19 NOTE — BH INPATIENT PSYCHIATRY PROGRESS NOTE - NSBHMSETHTASSOC_PSY_A_CORE
Normal
Unable to assess
Normal
Unable to assess
Normal
Unable to assess
Unable to assess
Normal
Unable to assess
Normal
Unable to assess
Normal
Unable to assess
Unable to assess
Normal
Unable to assess

## 2022-08-19 NOTE — BH DISCHARGE NOTE NURSING/SOCIAL WORK/PSYCH REHAB - PATIENT PORTAL LINK FT
You can access the FollowMyHealth Patient Portal offered by [facility] by registering at the following website: [email or web address]. By joining [facility]’s FollowMyHealth portal, you will also be able to view your health information using other applications (apps) compatible with our system.

## 2022-08-19 NOTE — BH INPATIENT PSYCHIATRY PROGRESS NOTE - OTHER
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 PERRL, EOM fairly intact moderate TD of jaw, tongue, legs bilaterally; no catatonia signs noted
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
## 2022-08-19 NOTE — BH INPATIENT PSYCHIATRY PROGRESS NOTE - NSTXDCOTHRGOAL_PSY_ALL_CORE
Pt will comply with medication, with an improvement in symptoms, allowing him to eat adequately and participate in his care
Pt will comply with treatment with an improvement in symptoms, and resumption of baseline functioning
Pt will comply with medication, with an improvement in symptoms, allowing him to eat adequately and participate in his care
Pt will comply with medication with an improvement of symptoms, allowing pt to accept care consistently
Pt will comply with medication, with an improvement in symptoms, allowing him to eat adequately and participate in his care
Pt will comply with medication, with an improvement in symptoms and resumption of baseline functioning
Pt will comply with medication with an improvement of symptoms, allowing pt to accept care consistently
Pt will comply with medication, with an improvement in symptoms and resumption of baseline functioning
Pt will comply with medication, with an improvement in symptoms, allowing him to eat adequately and participate in his care
Pt will comply with medication, with an improvement in symptoms, allowing him to eat adequately and participate in his care
Pt will comply with treatment with an improvement in symptoms, and resumption of baseline functioning
Pt will comply with treatment with an improvement in symptoms, and resumption of baseline functioning
Pt will comply with medication, with an improvement in symptoms and resumption of baseline functioning
Pt will comply with medication, with an improvement in symptoms, allowing him to eat adequately and participate in his care
Pt will comply with medication, with an improvement in symptoms, allowing him to eat adequately and participate in his care
Pt will comply with treatment with an improvement in symptoms, and resumption of baseline functioning
Pt will comply with medication, with an improvement in symptoms and resumption of baseline functioning
Pt will comply with treatment with an improvement in symptoms, and resumption of baseline functioning
Pt will comply with medication, with an improvement in symptoms and resumption of baseline functioning
Pt will comply with treatment with an improvement in symptoms, and resumption of baseline functioning
Pt will comply with medication with an improvement of symptoms, allowing pt to accept care consistently

## 2022-08-19 NOTE — BH INPATIENT PSYCHIATRY PROGRESS NOTE - NSTXDIABGOAL_PSY_ALL_CORE
Will identify modifiable risk factors and strategies to counteract them

## 2022-08-19 NOTE — BH INPATIENT PSYCHIATRY PROGRESS NOTE - NSTXPROBDIAB_PSY_ALL_CORE
DIABETES MANAGEMENT

## 2022-08-19 NOTE — BH INPATIENT PSYCHIATRY PROGRESS NOTE - NSBHMSEAFFQUAL_PSY_A_CORE
Euthymic
Euthymic
Other
Euthymic
Other
Euthymic

## 2022-08-19 NOTE — BH INPATIENT PSYCHIATRY PROGRESS NOTE - NSBHMSELANG_PSY_A_CORE
Unable to assess
No abnormalities noted
Unable to assess
Unable to assess
No abnormalities noted
Unable to assess
No abnormalities noted
Unable to assess
No abnormalities noted
No abnormalities noted

## 2022-08-19 NOTE — BH INPATIENT PSYCHIATRY PROGRESS NOTE - NSTXCOPEDATEEST_PSY_ALL_CORE
12-Aug-2022
05-Aug-2022
23-Jul-2022
12-Aug-2022
05-Aug-2022
29-Jul-2022
05-Aug-2022
05-Aug-2022
23-Jul-2022
12-Aug-2022
23-Jul-2022
23-Jul-2022
29-Jul-2022
23-Jul-2022
29-Jul-2022
29-Jul-2022
19-Aug-2022
23-Jul-2022
29-Jul-2022
29-Jul-2022
12-Aug-2022
05-Aug-2022
11-Aug-2022
12-Aug-2022
05-Aug-2022

## 2022-08-19 NOTE — BH INPATIENT PSYCHIATRY DISCHARGE NOTE - OTHER PAST PSYCHIATRIC HISTORY (INCLUDE DETAILS REGARDING ONSET, COURSE OF ILLNESS, INPATIENT/OUTPATIENT TREATMENT)
Reported past psychiatric admissions and outpatient treatment for diagnosis of schizophrenia.  Pt and sister are unable to provide exact details.  Pt has lived in [facility] since 2019, some type of group home setting prior to that as per sister.  Pt currently followed by Dr. Moeller onsite at [facility] [phone]

## 2022-08-19 NOTE — BH INPATIENT PSYCHIATRY PROGRESS NOTE - NSTXPROBDEPRES_PSY_ALL_CORE
DEPRESSIVE SYMPTOMS

## 2022-08-19 NOTE — BH INPATIENT PSYCHIATRY PROGRESS NOTE - NSBHATTESTTYPEVISIT_PSY_A_CORE
Attending Only
Attending with Resident/Fellow/Student
Attending with Resident/Fellow/Student
Attending Only
Attending with Resident/Fellow/Student
Attending Only
Attending with Resident/Fellow/Student

## 2022-08-19 NOTE — BH INPATIENT PSYCHIATRY PROGRESS NOTE - NSBHMSEGAIT_PSY_A_CORE
Unable to assess
Unable to assess
Normal gait / station
Unable to assess
Normal gait / station
Unable to assess
Unable to assess
Normal gait / station
Unable to assess
Normal gait / station
Normal gait / station
Unable to assess
Unable to assess
Normal gait / station
Unable to assess
Normal gait / station
Normal gait / station

## 2022-08-19 NOTE — BH INPATIENT PSYCHIATRY PROGRESS NOTE - NSDCCRITERIA_PSY_ALL_CORE
When pt is no longer an acute or imminent risk of harm to self or others, and is able to care for self safely, pt may then be discharged. 

## 2022-08-19 NOTE — BH INPATIENT PSYCHIATRY PROGRESS NOTE - NSTXCOPEDATETRGT_PSY_ALL_CORE
05-Aug-2022
19-Aug-2022
12-Aug-2022
12-Aug-2022
05-Aug-2022
12-Aug-2022
12-Aug-2022
30-Jul-2022
05-Aug-2022
05-Aug-2022
30-Jul-2022
30-Jul-2022
19-Aug-2022
30-Jul-2022
30-Jul-2022
19-Aug-2022
30-Jul-2022
12-Aug-2022
12-Aug-2022
18-Aug-2022
05-Aug-2022
19-Aug-2022
05-Aug-2022
19-Aug-2022
19-Aug-2022

## 2022-08-19 NOTE — BH INPATIENT PSYCHIATRY DISCHARGE NOTE - HOSPITAL COURSE
During course of hospitalization, patient initially reported SI, and had poor oral intake, was placed on close observation for fall risk and SI. Patient was switched from Paxil to Remeron for appetite stimulation and depression and showed better response on Remeron. Remeron increased to 30mg PO HS with good effect. Patient was more visible around the unit, would ambulate without assistance. Patient also received maintenance dose of Invega Sustenna 156mg IM on 8/3. Next dose of Invega Sustenna 156mg IM maintenance treatment due on 9/3. Patient had some episodes of orthostatic hypotension which improved with better oral intake while on Remeron 30mg PO HS. During course of hospitalization, patient initially reported SI, and had poor oral intake, was placed on close observation for fall risk and SI. Patient was switched from Paxil to Remeron for appetite stimulation and depression and showed better response on Remeron. Remeron increased to 30mg PO HS with good effect. Patient was more visible around the unit, would ambulate without assistance. Patient also received maintenance dose of Invega Sustenna 156mg IM on 8/3. Next dose of Invega Sustenna 156mg IM maintenance treatment due on 9/3. Patient had some episodes of orthostatic hypotension which improved with better oral intake while on Remeron 30mg PO HS.   He was eating and sleeping well, denied depression, no SI, and his behavior was well controlled.  He was looking forward to returning to his nursing home.

## 2022-08-19 NOTE — BH INPATIENT PSYCHIATRY PROGRESS NOTE - NSBHMSEBEHAV_PSY_A_CORE
Other
Other
Cooperative
Other
Other
Cooperative
Cooperative
Other
Other
Cooperative
Other
Cooperative
Other
Other
Cooperative
Other
Cooperative
Other
Cooperative
Other
Cooperative
Other
Other

## 2022-09-06 ENCOUNTER — INPATIENT (INPATIENT)
Facility: HOSPITAL | Age: 82
LOS: 5 days | Discharge: ANOTHER HOSPITAL | End: 2022-09-12
Attending: STUDENT IN AN ORGANIZED HEALTH CARE EDUCATION/TRAINING PROGRAM | Admitting: STUDENT IN AN ORGANIZED HEALTH CARE EDUCATION/TRAINING PROGRAM

## 2022-09-06 VITALS
SYSTOLIC BLOOD PRESSURE: 122 MMHG | TEMPERATURE: 98 F | RESPIRATION RATE: 18 BRPM | HEART RATE: 55 BPM | DIASTOLIC BLOOD PRESSURE: 63 MMHG | OXYGEN SATURATION: 97 %

## 2022-09-06 DIAGNOSIS — F20.9 SCHIZOPHRENIA, UNSPECIFIED: ICD-10-CM

## 2022-09-06 LAB
ALBUMIN SERPL ELPH-MCNC: 4.2 G/DL — SIGNIFICANT CHANGE UP (ref 3.3–5)
ALP SERPL-CCNC: 38 U/L — LOW (ref 40–120)
ALT FLD-CCNC: 8 U/L — SIGNIFICANT CHANGE UP (ref 4–41)
ANION GAP SERPL CALC-SCNC: 9 MMOL/L — SIGNIFICANT CHANGE UP (ref 7–14)
APAP SERPL-MCNC: <10 UG/ML — LOW (ref 15–25)
AST SERPL-CCNC: 12 U/L — SIGNIFICANT CHANGE UP (ref 4–40)
BASOPHILS # BLD AUTO: 0.09 K/UL — SIGNIFICANT CHANGE UP (ref 0–0.2)
BASOPHILS NFR BLD AUTO: 1.9 % — SIGNIFICANT CHANGE UP (ref 0–2)
BILIRUB SERPL-MCNC: 0.4 MG/DL — SIGNIFICANT CHANGE UP (ref 0.2–1.2)
BUN SERPL-MCNC: 14 MG/DL — SIGNIFICANT CHANGE UP (ref 7–23)
CALCIUM SERPL-MCNC: 9.4 MG/DL — SIGNIFICANT CHANGE UP (ref 8.4–10.5)
CARBAMAZEPINE SERPL-MCNC: <2 UG/ML — LOW (ref 4–12)
CHLORIDE SERPL-SCNC: 103 MMOL/L — SIGNIFICANT CHANGE UP (ref 98–107)
CO2 SERPL-SCNC: 25 MMOL/L — SIGNIFICANT CHANGE UP (ref 22–31)
CREAT SERPL-MCNC: 0.8 MG/DL — SIGNIFICANT CHANGE UP (ref 0.5–1.3)
EGFR: 89 ML/MIN/1.73M2 — SIGNIFICANT CHANGE UP
EOSINOPHIL # BLD AUTO: 0.21 K/UL — SIGNIFICANT CHANGE UP (ref 0–0.5)
EOSINOPHIL NFR BLD AUTO: 4.5 % — SIGNIFICANT CHANGE UP (ref 0–6)
ETHANOL SERPL-MCNC: <10 MG/DL — SIGNIFICANT CHANGE UP
GLUCOSE SERPL-MCNC: 99 MG/DL — SIGNIFICANT CHANGE UP (ref 70–99)
HCT VFR BLD CALC: 37.4 % — LOW (ref 39–50)
HGB BLD-MCNC: 12.7 G/DL — LOW (ref 13–17)
IANC: 2.62 K/UL — SIGNIFICANT CHANGE UP (ref 1.8–7.4)
IMM GRANULOCYTES NFR BLD AUTO: 0.2 % — SIGNIFICANT CHANGE UP (ref 0–1.5)
LITHIUM SERPL-MCNC: 0.1 MMOL/L — LOW (ref 0.6–1.2)
LYMPHOCYTES # BLD AUTO: 1.19 K/UL — SIGNIFICANT CHANGE UP (ref 1–3.3)
LYMPHOCYTES # BLD AUTO: 25.8 % — SIGNIFICANT CHANGE UP (ref 13–44)
MCHC RBC-ENTMCNC: 32.2 PG — SIGNIFICANT CHANGE UP (ref 27–34)
MCHC RBC-ENTMCNC: 34 GM/DL — SIGNIFICANT CHANGE UP (ref 32–36)
MCV RBC AUTO: 94.9 FL — SIGNIFICANT CHANGE UP (ref 80–100)
MONOCYTES # BLD AUTO: 0.5 K/UL — SIGNIFICANT CHANGE UP (ref 0–0.9)
MONOCYTES NFR BLD AUTO: 10.8 % — SIGNIFICANT CHANGE UP (ref 2–14)
NEUTROPHILS # BLD AUTO: 2.62 K/UL — SIGNIFICANT CHANGE UP (ref 1.8–7.4)
NEUTROPHILS NFR BLD AUTO: 56.8 % — SIGNIFICANT CHANGE UP (ref 43–77)
NRBC # BLD: 0 /100 WBCS — SIGNIFICANT CHANGE UP (ref 0–0)
NRBC # FLD: 0 K/UL — SIGNIFICANT CHANGE UP (ref 0–0)
PLATELET # BLD AUTO: 210 K/UL — SIGNIFICANT CHANGE UP (ref 150–400)
POTASSIUM SERPL-MCNC: 4 MMOL/L — SIGNIFICANT CHANGE UP (ref 3.5–5.3)
POTASSIUM SERPL-SCNC: 4 MMOL/L — SIGNIFICANT CHANGE UP (ref 3.5–5.3)
PROT SERPL-MCNC: 6.5 G/DL — SIGNIFICANT CHANGE UP (ref 6–8.3)
RBC # BLD: 3.94 M/UL — LOW (ref 4.2–5.8)
RBC # FLD: 13.3 % — SIGNIFICANT CHANGE UP (ref 10.3–14.5)
SALICYLATES SERPL-MCNC: <0.3 MG/DL — LOW (ref 15–30)
SODIUM SERPL-SCNC: 137 MMOL/L — SIGNIFICANT CHANGE UP (ref 135–145)
TOXICOLOGY SCREEN, DRUGS OF ABUSE, SERUM RESULT: SIGNIFICANT CHANGE UP
TROPONIN T, HIGH SENSITIVITY RESULT: 29 NG/L — SIGNIFICANT CHANGE UP
TROPONIN T, HIGH SENSITIVITY RESULT: 29 NG/L — SIGNIFICANT CHANGE UP
TSH SERPL-MCNC: 1.64 UIU/ML — SIGNIFICANT CHANGE UP (ref 0.27–4.2)
VALPROATE SERPL-MCNC: <3.15 UG/ML — LOW (ref 50–100)
WBC # BLD: 4.62 K/UL — SIGNIFICANT CHANGE UP (ref 3.8–10.5)
WBC # FLD AUTO: 4.62 K/UL — SIGNIFICANT CHANGE UP (ref 3.8–10.5)

## 2022-09-06 PROCEDURE — 99285 EMERGENCY DEPT VISIT HI MDM: CPT

## 2022-09-06 PROCEDURE — 93010 ELECTROCARDIOGRAM REPORT: CPT | Mod: GC

## 2022-09-06 PROCEDURE — 71045 X-RAY EXAM CHEST 1 VIEW: CPT | Mod: 26

## 2022-09-06 PROCEDURE — 99285 EMERGENCY DEPT VISIT HI MDM: CPT | Mod: 25,GC

## 2022-09-06 NOTE — ED ADULT NURSE NOTE - CHIEF COMPLAINT QUOTE
Patient brought to ER by EMS from [facility] for suicidal ideations. Nurse from facility called Dr. Rio Camilo ( [phone]) and was told to send him to [facility] through ER. Pt was at [facility] recently for SI. His plan now is to jump out of the window. Pt is also a DNR

## 2022-09-06 NOTE — ED BEHAVIORAL HEALTH ASSESSMENT NOTE - RISK ASSESSMENT
High Acute Suicide Risk risk: recent suicidal behavior, recent inpatient admission, unable to contract for safety, continued SI  Protective: in treatment, at baseline compliant with medication, on GARCIA, has a psychiatrist

## 2022-09-06 NOTE — ED BEHAVIORAL HEALTH ASSESSMENT NOTE - DETAILS
7/22-8/19 2022 [id number]; after hours order NH see hpi after hours informed EM Dr. Coronel regarding involuntary jaw movements- r/o TD?

## 2022-09-06 NOTE — ED BEHAVIORAL HEALTH ASSESSMENT NOTE - OTHER PAST PSYCHIATRIC HISTORY (INCLUDE DETAILS REGARDING ONSET, COURSE OF ILLNESS, INPATIENT/OUTPATIENT TREATMENT)
Hx of schizophrenia & dementia. Recent [facility] admission 7/22-8/19 2022. Reportedly prior to last admission stopped eating/drinking in suicide attempt. Sees Dr. Moeller

## 2022-09-06 NOTE — ED BEHAVIORAL HEALTH NOTE - BEHAVIORAL HEALTH NOTE
As per request of provider, writer contacted patient’s PCP Dr. Rio Camilo ([phone]) to obtain collateral information. The following information is per Dr Camilo.    Patient lives in a nursing home, hx of schizophrenia and depression. Today pt stated to one of the nursing supervisors that he intended to kill himself. Dr Camilo reports the nurses attempted to reach the patient’s psychiatrist but he did not answer. Nurses reached out to Dr. Camilo who recommended the patient to come to the ED.  Dr. Camilo reports patient has a hx of making suicidal statements but is unsure of attempts. Dr. Camilo was unsure of any current plan or intention but believes the patient mentioned something about jumping out of the window. Dr. Camilo reports patient was last hospitalized for making similar threats.Dr. Camilo reports patient did not appear unkempt and believes he is seen walking around the unit. He reports at baseline, patient is ambulatory, a little bit of depressed affect but has never sherry him tearful or crying. Patient has not reported any AVh. Medical problems include hypertension and arthritis. Dr. Camilo reports patient is not violent or aggressive. Dr. Camilo reports patient is able to elaborate on symptoms. Dr. Camilo advised writer to keep nursing home staff updated.    As per request of provider, writer contacted [phone] and spoke w/ nurse Parkinson. The following information is per Nurse.    Patient is an 81 Yo male w/ hx of schizophrenia and depression, domiciled at [facility]. Patient BIB EMS activated by staff due to patient endorsing Si stating he intended on killing himself. Patient’s psychiatrist was not available so the staff spoke w/ patient’s PCP Dr. Rio Camilo who recommended patient come to the ED for evaluation. As per Nurse, she reports patient said he wanted to die and kill himself. She reports patient had a plan of hanging himself. She reports a hx of Si and patient was last admitted for similar SI. Nurse reports patient last hospitalized at [facility] and was discharged on 8/19/22. Since being discharged, patient presents withdrawn, introverted, isolating himself and not social. She reports the patient’s sleep is poor, hygiene is poor and appetite is poor. NO AVH or anxiety reports. Nurse reports she does not know the stressors. Nurse says paperwork was sent over w/ medication, diagnoses and additional information. She reports patient is compliant w/ medication and got his Invega injection on 9/03. Patient’s psychiatrist is Dr. Rocky Moeller ([phone]). Patient is not violent or aggressive as per the nurse. Nurse reports patient is covid vaccinated and has no recent travel or exposure. Writer agreed to keep staff updated. As per request of provider, writer contacted patient’s PCP Dr. Rio Camilo ([phone]) to obtain collateral information. The following information is per Dr Camilo.    Patient lives in a nursing home, hx of schizophrenia and depression. Today pt stated to one of the nursing supervisors that he intended to kill himself. Dr Camilo reports the nurses attempted to reach the patient’s psychiatrist but he did not answer. Nurses reached out to Dr. Camilo who recommended the patient to come to the ED.  Dr. Camilo reports patient has a hx of making suicidal statements but is unsure of attempts. Dr. Camilo was unsure of any current plan or intention but believes the patient mentioned something about jumping out of the window. Dr. Camilo reports patient was last hospitalized for making similar threats.Dr. Camilo reports patient did not appear unkempt and believes he is seen walking around the unit. He reports at baseline, patient is ambulatory, a little bit of depressed affect but has never sherry him tearful or crying. Patient has not reported any AVh. Medical problems include hypertension and arthritis. Dr. Camilo reports patient is not violent or aggressive. Dr. Camilo reports patient is able to elaborate on symptoms. Dr. Camilo advised writer to keep nursing home staff updated.    As per request of provider, writer contacted [phone] and spoke w/ nurse Parkinson. The following information is per Nurse.    Patient is an 83 Yo male w/ hx of schizophrenia and depression, domiciled at [facility]. Patient BIB EMS activated by staff due to patient endorsing Si stating he intended on killing himself. Patient’s psychiatrist was not available so the staff spoke w/ patient’s PCP Dr. Rio Camilo who recommended patient come to the ED for evaluation. As per Nurse, she reports patient said he wanted to die and kill himself. She reports patient had a plan of hanging himself. She reports a hx of Si and patient was last admitted for similar SI. Nurse reports patient last hospitalized at [facility] and was discharged on 8/19/22. Since being discharged, patient presents withdrawn, introverted, isolating himself and not social. She reports the patient’s sleep is poor, hygiene is poor and appetite is poor. NO AVH or anxiety reports. Nurse reports she does not know the stressors. Nurse says paperwork was sent over w/ medication, diagnoses and additional information. She reports patient is compliant w/ medication and got his Invega injection on 9/03. Patient’s psychiatrist is Dr. Rocky Moeller ([phone]). Patient is not violent or aggressive as per the nurse. Nurse reports patient is covid vaccinated and has no recent travel or exposure. Writer agreed to keep staff updated.    writer contacted [phone] and informed nursing supervisor that patient would be admitted to [facility].

## 2022-09-06 NOTE — ED ADULT TRIAGE NOTE - CHIEF COMPLAINT QUOTE
Patient brought to ER by EMS from [facility] for suicidal ideations. Nurse from facility called Dr. Rio Camilo ( [phone]) and was told to send him to [facility] through ER. Pt was at [facility] recently for SI. His plan now is to jump out of the window. Pt is also a DNR

## 2022-09-06 NOTE — ED BEHAVIORAL HEALTH ASSESSMENT NOTE - SUMMARY
Patient is an 82M currently resides in [facility]. PPH Schizophrenia & Dementia. + history of inpaitent admissions last 7/22-8/19 at [facility] post admission to [facility] for dehydration/FTT 7/6-7/22 for dehydration and SI. Admitted at [facility] and seen by CL. Prior to last admission stopped eating/drinking in attempt to kill self. Unknown violence/aggression or substance use.  lega issues PMH- HTN, CHF, COPD & DM. BIBA referred by NH for suicidal ideation.     Patient presents to the ED in the context of suicidal ideation patient endorses continued thought to die and stated he is going to stop eating and drinking at NH because he want to be dead. Patient unable to contract for safety. Patient presents at imminent risk to self requiring inpatient admission for safety and stabilization. No beds available psychiatrically- patient to be admitted medically and psych CL to follow. Patient is an 82M currently resides in [facility]. PPH Schizophrenia & Dementia. + history of inpaitent admissions last 7/22-8/19 at [facility] post admission to [facility] for dehydration/SI/FTT 7/6-7/22I. Admitted at [facility] and seen by CL. Prior to last admission stopped eating/drinking in attempt to kill self. Unknown violence/aggression or substance use.  no legal issues PMH- HTN, CHF, COPD & DM. BIBA referred by NH fro suicidal ideation.     Patient presents to the ED in the context of suicidal ideation patient endorses continued thought to die and stated he is going to stop eating and drinking at NH because he want to be dead. Patient unable to contract for safety. Patient presents at imminent risk to self requiring inpatient admission for safety and stabilization. No beds available psychiatrically- patient to be admitted medically and psych CL to follow.

## 2022-09-06 NOTE — ED BEHAVIORAL HEALTH ASSESSMENT NOTE - HPI (INCLUDE ILLNESS QUALITY, SEVERITY, DURATION, TIMING, CONTEXT, MODIFYING FACTORS, ASSOCIATED SIGNS AND SYMPTOMS)
Patient is an 82M currently resides in [facility]. PPH Schizophrenia & Dementia. + history of inpaitent admissions last 7/22-8/19 at [facility] post admission to [facility] for dehydration/FTT 7/6-7/22 for dehydration and SI. Admitted at [facility] and seen by CL. Prior to last admission stopped eating/drinking in attempt to kill self. Unknown violence/aggression or substance use.  lega issues PMH- HTN, CHF, COPD & DM. BIBA referred by NH fro suicidal ideation.     Interview limited by stammering and laconic speech.  Patient reports he came to the ED because he is suicidal. He repeated multiple times "I want to die." He stated he plans to stop eating and drinking because he wants to kill himself. He reports "I just want to be dead." Patient stated he has lived at NH x 3 years and denies any specific issues there. He stated there is no point in living and he wants to die.    Patient denies psychotic or manic symptoms. He stated he sleeps. He denies any HI, violent thoughts.     He denied feeling depressed or sad stating "I want to be dead." He denied psychotic or manic symptoms. Patient noted to have involuntary movements of jaw- unclear if this is chronic issue vs. possible TD?    See  note for collateral information Patient is an 82M currently resides in [facility]. PPH Schizophrenia & Dementia. + history of inpaitent admissions last 7/22-8/19 at [facility] post admission to [facility] for dehydration/SI/FTT 7/6-7/22I. Admitted at [facility] and seen by CL. Prior to last admission stopped eating/drinking in attempt to kill self. Unknown violence/aggression or substance use. no legal issues PMH- HTN, CHF, COPD & DM. BIBA referred by NH fro suicidal ideation.     Interview limited by stammering and laconic speech- r/o TD?  Patient reports he came to the ED because he is suicidal. He repeated multiple times "I want to die." He stated he plans to stop eating and drinking because he wants to kill himself. He reports "I just want to be dead." Patient stated he has lived at NH x 3 years and denies any specific issues there. He stated there is no point in living and he wants to die.    Patient denies psychotic or manic symptoms. He stated he sleeps. He denies any HI, violent thoughts.     He denied feeling depressed or sad stating "I want to be dead." He denied psychotic or manic symptoms. Patient noted to have involuntary movements of jaw- unclear if this is chronic issue vs. possible TD?    See  note for collateral information

## 2022-09-06 NOTE — ED PROVIDER NOTE - MDM ORDERS SUBMITTED SELECTION
Jennie at home sent in [facility] form for insulin pump supplies.  Forms filled out and faxed Labs/EKG/Imaging Studies/Medications

## 2022-09-06 NOTE — ED PROVIDER NOTE - NS ED ROS FT
GENERAL: No fever or chills  EYES: no change in vision   HEENT: no trouble swallowing or speaking   CARDIAC: + chest pain   PULMONARY: no cough or SOB  GI:  No abdominal pain  : No changes in urination   SKIN: no rashes   NEURO: no headache   MSK: No joint pain     All other ROS negative unless otherwise specified in HPI.

## 2022-09-06 NOTE — ED PROVIDER NOTE - OBJECTIVE STATEMENT
83 y/o male w/ PMH CHF, COPD, DM, HTN, schizophrenia from [facility] for suicidal ideations. Nurse from facility called Dr. Rio Camilo ( [phone]) and was told to send him to [facility] through ER. Pt was at [facility] recently for SI. His plan now is to jump out of the window. Upon interview, pt stated that he will also starve himself. Pt also c/o 2 month history of intermittent chest pain that is brought on by stress, self-resolves when stress is over. Currently not having chest pain. Denies fevers, chills, nausea, vomiting, dizziness, SOB, abdominal pain. Denies auditory/visual hallucinations, HI.

## 2022-09-06 NOTE — ED BEHAVIORAL HEALTH ASSESSMENT NOTE - NSBHATTESTCOMMENTATTENDFT_PSY_A_CORE
82/M with hx of SCZ complicated by dementia; has prior psych admission; with prior hx of refusal to eat/ drink in an attempt to kill self. Today, presented to the ED BIB EMS due to SI    at this time, is severely affectively dysregulated, endorses continued thoughts of wanting to die with plan to stop eating and drinking.  currently, remains unable to contract for safety. he continues to be at imminent risk to self.  Pt would require inpatient admission for stabilization and ensuring safety.     RECOMMENDATIONS:  1. Continue Remeron 30mg HS  2. PRN: Zyprexa 2.5mg IM PRN (defer if qTC >/= 500)  3. No jenni beds available - hence, he will be admitted to med svc  4. Psych CL svc to follow

## 2022-09-06 NOTE — ED PROVIDER NOTE - CLINICAL SUMMARY MEDICAL DECISION MAKING FREE TEXT BOX
83 y/o male w/ PMH CHF, COPD, DM, HTN, schizophrenia from [facility] for suicidal ideations. Nurse from facility called Dr. Rio Camilo ( [phone]) and was told to send him to [facility] through ER. Pt was at [facility] recently for SI. His plan now is to jump out of the window. Upon interview, pt stated that he will also starve himself. Pt also c/o 2 month history of intermittent chest pain that is brought on by stress, self-resolves when stress is over. Currently not having chest pain. Concerning for angina pectoris. Will screen w/ EKG/CXR/trop and reassess w/ psych consult for SI and TBA for psych/cardiac workup.

## 2022-09-06 NOTE — ED BEHAVIORAL HEALTH ASSESSMENT NOTE - DESCRIPTION
During course of ED visit patient was calm and cooperative. Patient was not aggressive or violent and did not require PRN medications.    ICU Vital Signs Last 24 Hrs  T(C): 36.6 (06 Sep 2022 19:29), Max: 36.6 (06 Sep 2022 19:29)  T(F): 97.8 (06 Sep 2022 19:29), Max: 97.8 (06 Sep 2022 19:29)  HR: 55 (06 Sep 2022 19:29) (55 - 55)  BP: 122/63 (06 Sep 2022 19:29) (122/63 - 122/63)  BP(mean): --  ABP: --  ABP(mean): --  RR: 18 (06 Sep 2022 19:29) (18 - 18)  SpO2: 97% (06 Sep 2022 19:29) (97% - 97%)    O2 Parameters below as of 06 Sep 2022 19:29  Patient On (Oxygen Delivery Method): room air resident in [facility] HTN, CHF, COPD & DM.

## 2022-09-06 NOTE — ED ADULT NURSE NOTE - OBJECTIVE STATEMENT
pt presents to ED, room 20 A&03, ambualtory with walker at baseline coming in from [facility] for suicidal ideation. Nurse from facility stated that Pt had a plan to jump out the window. Upon interview pt states that he will also starve himself. Respirations even and unlabored. Lung sounds clear with equal chest rise bilaterally. ABD is soft, non tender, non distended with normal active bowel sounds No complaints of chest pain, headache, nausea, dizziness, vomiting  SOB, fever, chills, denies hurting other, auditory/visual hallucinations verbalized at this time. staff at [facility], safety measures in place, awaiting psych consult. belongings across room 21. 20GRAC in place.

## 2022-09-06 NOTE — ED BEHAVIORAL HEALTH ASSESSMENT NOTE - CURRENT MEDICATION
metFORMIN 500 milliGRAM(s) Oral two times a day, Mirtazepine 30mg QHS, Invega sustenna 156mg IM - given 9/3/22, Symbicort 80-4.5mcg- 2 puffs BID     Miralax 17GM daily PRN

## 2022-09-06 NOTE — ED BEHAVIORAL HEALTH ASSESSMENT NOTE - PSYCHIATRIC ISSUES AND PLAN (INCLUDE STANDING AND PRN MEDICATION)
(0) No drift; leg holds 30 degree position for full 5 secs Continue Remeron 30mg QHS, Zyprexa 2.5mg IM PRN Continue Remeron 30mg QHS, Zyprexa 2.5mg IM PRN pending QTC below 500

## 2022-09-06 NOTE — ED PROVIDER NOTE - ATTENDING CONTRIBUTION TO CARE
I have personally performed a face to face medical and diagnostic evaluation of the patient. I have discussed with and reviewed the Resident's note and agree with the History, ROS, Physical Exam and MDM unless otherwise indicated. A brief summary of my personal evaluation and impression can be found below.    82y M w/ PMHx CHF, COPD, DM, HTN, schizophrenia from [facility] for suicidal ideations. Nurse from facility called Dr. Rio Camilo ([phone]) and was told to send him to [facility] through ER. Pt was at [facility] recently for SI. His vocalized a plan to jump out of the window to staff. Upon interview, pt stated that he will also starve himself. Pt also c/o 2 month history of intermittent chest pain that is brought on by stress, self-resolves when stress is over. Currently not having chest pain. Denies fevers, chills, nausea, vomiting, dizziness, SOB, abdominal pain. Denies auditory/visual hallucinations, HI.    On exam: cooperative and calm, currently denies chest pain, VS WNL, lungs ctab, abd soft, NT, no CVA tenderness, no LE swelling or rash    Will obtain metabolic/infectious w/u, will require admission with jenni-psych consult

## 2022-09-07 DIAGNOSIS — F20.9 SCHIZOPHRENIA, UNSPECIFIED: ICD-10-CM

## 2022-09-07 DIAGNOSIS — E11.9 TYPE 2 DIABETES MELLITUS WITHOUT COMPLICATIONS: ICD-10-CM

## 2022-09-07 DIAGNOSIS — R45.851 SUICIDAL IDEATIONS: ICD-10-CM

## 2022-09-07 DIAGNOSIS — I10 ESSENTIAL (PRIMARY) HYPERTENSION: ICD-10-CM

## 2022-09-07 DIAGNOSIS — I50.9 HEART FAILURE, UNSPECIFIED: ICD-10-CM

## 2022-09-07 DIAGNOSIS — G24.01 DRUG INDUCED SUBACUTE DYSKINESIA: ICD-10-CM

## 2022-09-07 DIAGNOSIS — Z29.9 ENCOUNTER FOR PROPHYLACTIC MEASURES, UNSPECIFIED: ICD-10-CM

## 2022-09-07 DIAGNOSIS — R07.9 CHEST PAIN, UNSPECIFIED: ICD-10-CM

## 2022-09-07 LAB
APPEARANCE UR: CLEAR — SIGNIFICANT CHANGE UP
BILIRUB UR-MCNC: NEGATIVE — SIGNIFICANT CHANGE UP
COLOR SPEC: SIGNIFICANT CHANGE UP
DIFF PNL FLD: NEGATIVE — SIGNIFICANT CHANGE UP
GLUCOSE BLDC GLUCOMTR-MCNC: 102 MG/DL — HIGH (ref 70–99)
GLUCOSE BLDC GLUCOMTR-MCNC: 87 MG/DL — SIGNIFICANT CHANGE UP (ref 70–99)
GLUCOSE BLDC GLUCOMTR-MCNC: 97 MG/DL — SIGNIFICANT CHANGE UP (ref 70–99)
GLUCOSE UR QL: NEGATIVE — SIGNIFICANT CHANGE UP
KETONES UR-MCNC: ABNORMAL
LEUKOCYTE ESTERASE UR-ACNC: NEGATIVE — SIGNIFICANT CHANGE UP
NITRITE UR-MCNC: NEGATIVE — SIGNIFICANT CHANGE UP
PH UR: 7.5 — SIGNIFICANT CHANGE UP (ref 5–8)
PROT UR-MCNC: NEGATIVE — SIGNIFICANT CHANGE UP
SARS-COV-2 RNA SPEC QL NAA+PROBE: SIGNIFICANT CHANGE UP
SP GR SPEC: 1.01 — SIGNIFICANT CHANGE UP (ref 1.01–1.05)
UROBILINOGEN FLD QL: SIGNIFICANT CHANGE UP

## 2022-09-07 PROCEDURE — 99223 1ST HOSP IP/OBS HIGH 75: CPT

## 2022-09-07 PROCEDURE — 99233 SBSQ HOSP IP/OBS HIGH 50: CPT

## 2022-09-07 PROCEDURE — 12345: CPT | Mod: NC

## 2022-09-07 RX ORDER — DEXTROSE 50 % IN WATER 50 %
25 SYRINGE (ML) INTRAVENOUS ONCE
Refills: 0 | Status: DISCONTINUED | OUTPATIENT
Start: 2022-09-07 | End: 2022-09-09

## 2022-09-07 RX ORDER — INSULIN LISPRO 100/ML
VIAL (ML) SUBCUTANEOUS
Refills: 0 | Status: DISCONTINUED | OUTPATIENT
Start: 2022-09-07 | End: 2022-09-09

## 2022-09-07 RX ORDER — MIRTAZAPINE 45 MG/1
30 TABLET, ORALLY DISINTEGRATING ORAL AT BEDTIME
Refills: 0 | Status: DISCONTINUED | OUTPATIENT
Start: 2022-09-07 | End: 2022-09-12

## 2022-09-07 RX ORDER — DEXTROSE 50 % IN WATER 50 %
15 SYRINGE (ML) INTRAVENOUS ONCE
Refills: 0 | Status: DISCONTINUED | OUTPATIENT
Start: 2022-09-07 | End: 2022-09-09

## 2022-09-07 RX ORDER — GLUCAGON INJECTION, SOLUTION 0.5 MG/.1ML
1 INJECTION, SOLUTION SUBCUTANEOUS ONCE
Refills: 0 | Status: DISCONTINUED | OUTPATIENT
Start: 2022-09-07 | End: 2022-09-09

## 2022-09-07 RX ORDER — ENOXAPARIN SODIUM 100 MG/ML
40 INJECTION SUBCUTANEOUS EVERY 24 HOURS
Refills: 0 | Status: DISCONTINUED | OUTPATIENT
Start: 2022-09-07 | End: 2022-09-12

## 2022-09-07 RX ORDER — SODIUM CHLORIDE 9 MG/ML
1000 INJECTION, SOLUTION INTRAVENOUS
Refills: 0 | Status: DISCONTINUED | OUTPATIENT
Start: 2022-09-07 | End: 2022-09-09

## 2022-09-07 RX ORDER — SIMETHICONE 80 MG/1
80 TABLET, CHEWABLE ORAL THREE TIMES A DAY
Refills: 0 | Status: DISCONTINUED | OUTPATIENT
Start: 2022-09-07 | End: 2022-09-12

## 2022-09-07 RX ORDER — INSULIN LISPRO 100/ML
VIAL (ML) SUBCUTANEOUS AT BEDTIME
Refills: 0 | Status: DISCONTINUED | OUTPATIENT
Start: 2022-09-07 | End: 2022-09-09

## 2022-09-07 RX ORDER — DEXTROSE 50 % IN WATER 50 %
12.5 SYRINGE (ML) INTRAVENOUS ONCE
Refills: 0 | Status: DISCONTINUED | OUTPATIENT
Start: 2022-09-07 | End: 2022-09-09

## 2022-09-07 RX ADMIN — ENOXAPARIN SODIUM 40 MILLIGRAM(S): 100 INJECTION SUBCUTANEOUS at 05:50

## 2022-09-07 RX ADMIN — MIRTAZAPINE 30 MILLIGRAM(S): 45 TABLET, ORALLY DISINTEGRATING ORAL at 21:35

## 2022-09-07 NOTE — H&P ADULT - PROBLEM SELECTOR PLAN 1
-last admitted from 7/22-8/19 at [facility] for suicidal ideation  -1:1 observation  -pending placement to [facility]  -c/w mirtazapine -last admitted from 7/22-8/19 at [facility] for suicidal ideation  -1:1 observation  -pending placement to [facility]  -c/w mirtazapine  -zyprexa 2.5 mg  - -last admitted from 7/22-8/19 at [facility] for suicidal ideation  -SI appears to fluctuate throughout the night with multiple interviewers  -currently 1:1 observation  -pending placement to [facility]  -c/w mirtazapine  -zyprexa 2.5 mg  -

## 2022-09-07 NOTE — H&P ADULT - NSHPLABSRESULTS_GEN_ALL_CORE
LABS:                          12.7   4.62  )-----------( 210      ( 06 Sep 2022 20:52 )             37.4     09-06    137  |  103  |  14  ----------------------------<  99  4.0   |  25  |  0.80    Ca    9.4      06 Sep 2022 20:52    TPro  6.5  /  Alb  4.2  /  TBili  0.4  /  DBili  x   /  AST  12  /  ALT  8   /  AlkPhos  38<L>  09-06    LIVER FUNCTIONS - ( 06 Sep 2022 20:52 )  Alb: 4.2 g/dL / Pro: 6.5 g/dL / ALK PHOS: 38 U/L / ALT: 8 U/L / AST: 12 U/L / GGT: x LABS:                          12.7   4.62  )-----------( 210      ( 06 Sep 2022 20:52 )             37.4     09-06    137  |  103  |  14  ----------------------------<  99  4.0   |  25  |  0.80    Ca    9.4      06 Sep 2022 20:52    TPro  6.5  /  Alb  4.2  /  TBili  0.4  /  DBili  x   /  AST  12  /  ALT  8   /  AlkPhos  38<L>  09-06    LIVER FUNCTIONS - ( 06 Sep 2022 20:52 )  Alb: 4.2 g/dL / Pro: 6.5 g/dL / ALK PHOS: 38 U/L / ALT: 8 U/L / AST: 12 U/L / GGT: x    EKG: sinus ariana, HR 48, QTc 400

## 2022-09-07 NOTE — BH CONSULTATION LIAISON PROGRESS NOTE - NSBHFUPINTERVALHXFT_PSY_A_CORE
Chart reviewed, pt. is compliant with standing medications. Care was coordinated with medical team. Pt. is in NAD, calm and cooperative. Pt. states he is feeling "okay" and his mood is a little sad. He states he sleeps well, eats well, and drinks well. Pt. is compliant with medication. Says he will stop eating to die and has felt like this for the past 3-4 months. Pt. is unable to explain why he wants to die. Pt. has anxiety for the last 3-4 months. Oriented to time and place. Denies paranoia, VH, AH, hurting others, access to weapons. Interview was limited d/t stutter.     Contacted PCP, Dr. Rio Danielson ([phone]). Dr. Danielson recommended pt. to go to ED d/t suicidal ideation and depression. Says pt. has no SA and is compliant with his psychiatry medications.   Contacted Nurse Tesha ([phone]). Unable to reach.   Contacted Psychiatrist, Dr. Rocky Moeller ([phone]). Unable to reach.   Sister, Elyse Olivia ([phone]); Pt. does not wish to contact family.  Chart reviewed, pt. is compliant with standing medications. Care was coordinated with medical team. Pt. is in NAD, calm and cooperative. Pt. states he is feeling "okay" and his mood is a little sad. He states he sleeps well, eats well, and drinks well. Pt. is compliant with medication. Says he will stop eating 'to die' and has felt like this for the past 3-4 months. Pt. is unable to explain why he wants to die. Pt. has anxiety for the last 3-4 months. Oriented to time and place. Denies paranoia, VH, AH, hurting others, access to weapons. Interview was limited d/t stutter.     Contacted PCP, Dr. Rio Danielson ([phone]). Dr. Danielson recommended pt. to go to ED d/t suicidal ideation and depression. Says pt. has no SA and is compliant with his psychiatry medications.   Contacted Nurse Tesha ([phone]). Unable to reach.   Contacted Psychiatrist, Dr. Rocky Moeller ([phone]). Unable to reach.   Sister, Elyse Olivia ([phone]); Pt. does not wish to contact family.   Reviewed records from NH and also reviewed records from [facility] last inpatient psych admission

## 2022-09-07 NOTE — H&P ADULT - PROBLEM SELECTOR PLAN 2
-no chest pain currently  -no SOB  -troponin wnl  -EKG unchanged from prior -no chest pain currently  -no SOB  -troponin wnl  -EKG unchanged from prior  -low suspicion for ACS at this point

## 2022-09-07 NOTE — H&P ADULT - HISTORY OF PRESENT ILLNESS
The patient is an 82 year-old right-handed man, PPHx of dementia, depression schizophrenia, and suicidal ideation and PMH of CHF, COPD, DM, R eye cataract surgery (10 years ago) and HTN, here for suicidal ideation and intermittent chest pain. Patient was admitted to [facility] earlier this year on 7/22/2022-8/19/22. The patient is an 82 year-old right-handed man, PPHx of dementia, depression schizophrenia, and suicidal ideation and PMH of CHF, COPD, DM, R eye cataract surgery (10 years ago) and HTN, here for suicidal ideation and intermittent chest pain. Patient was admitted to [facility] earlier this year on 7/22/2022-8/19/22. Patient reports having suicidal ideation for the past year and a half. He has a sister (84 yo) living in [facility] who he last saw 3 or 4 months ago. Patient reports that he tried comitting suicide by drowning himself 3-4 months ago. Patient came from nursing home and according to chart, pt was scheduled to have praliperidone IM on 9/3. Patient was agreeable to the interview at times but would also refuse to answer certain questions and report, "I want to die", "I am ready to die" multiple times. Patient denies visual or auditory hallucinations.  The patient is an 82 year-old right-handed man, PPHx of dementia, depression schizophrenia, and suicidal ideation and PMH of CHF, COPD, DM, R eye cataract surgery (10 years ago) and HTN, here for suicidal ideation and intermittent chest pain. Patient was admitted to [facility] earlier this year on 7/22/2022-8/19/22. Patient reports having suicidal ideation for the past year and a half. He has a sister (82 yo) living in [facility] who he last saw 3 or 4 months ago. Patient reports that he tried comitting suicide by drowning himself 3-4 months ago. Patient came from nursing home and according to chart, pt was scheduled to have praliperidone IM on 9/3. Patient was agreeable to the interview at times but would also refuse to answer certain questions and report, "I want to die", "I am ready to die" multiple times. Patient denies visual or auditory hallucinations. Patient also denied chest pain and shortnes of breath. Of note, patient's suicidal ideation and /or reported plans/ hx of plans have fluctuated over the course of the night.       Collateral information was obtained via chart review (Behavioral Note assessment note from 9/6/22) where patient was discussed with PCP Dr. Rio Camilo ([phone]).    Today pt stated to one of the nursing supervisors that he intended to kill himself. Nurses were unable to reach his psychiatrist so reached out to Dr. Camilo who recommended coming to the ED. Dr. Camilo reports patient has a hx of making suicidal statements but is unsure of attempts. Dr. Camilo was unsure of any current plan or intention but believes the patient mentioned something about jumping out of the window. Dr. Camilo reports patient did not appear unkempt and believes he is seen walking around the unit. He reports at baseline, patient is ambulatory, a little bit of depressed affect but has never seen him tearful or crying. Patient has not reported any AVh. Dr. Camilo reports patient is not violent or aggressive. Dr. Camilo reports patient is able to elaborate on symptoms. Dr. Camilo advised writer to keep nursing home staff updated.    Staff also contacted [facility] ([phone]) and spoke w/ nurse Tesha. She reports patient had a plan of hanging himself. Since being recently discharged from [facility], patient has been withdrawn, introverted, isolating himself and not social. She reports the patient’s sleep is poor, hygiene is poor and appetite is poor. NO AVH or anxiety reports. She reports patient is compliant w/ medication and got his Invega injection on 9/03. Patient’s psychiatrist is Dr. Rocky Moeller ([phone]). Patient is not violent or aggressive as per the nurse. The patient is an 82 year-old right-handed man, PPHx of dementia, depression schizophrenia, and suicidal ideation and PMH of CHF, COPD, DM, R eye cataract surgery (10 years ago) and HTN, here for suicidal ideation and intermittent chest pain. Patient was admitted to [facility] earlier this year on 7/22/2022-8/19/22. Patient reports having suicidal ideation for the past year and a half. He has a sister (82 yo) living in [facility] who he last saw 3 or 4 months ago. Patient reports that he tried comitting suicide by drowning himself 3-4 months ago. Patient came from nursing home and according to chart, pt was scheduled to have praliperidone IM on 9/3. Patient was agreeable to the interview at times but would also refuse to answer certain questions and report, "I want to die", "I am ready to die" multiple times. Patient denies visual or auditory hallucinations. Patient also denied chest pain and shortnes of breath. Of note, patient's suicidal ideation and reported plans/ hx of plans have fluctuated over the course of the night with multiple interviewers.     Collateral information was obtained via chart review (Behavioral Note note from 9/6/22) where patient was discussed with PCP Dr. Rio Camilo ([phone]) as well as nurse from [facility] ([phone])    Today pt stated to one of the nursing supervisors that he intended to kill himself. Nurses were unable to reach his psychiatrist so reached out to Dr. Camilo who recommended coming to the ED. Dr. Camilo reports patient has a hx of making suicidal statements but is unsure of attempts. Dr. Camilo was unsure of any current plan or intention but believes the patient mentioned something about jumping out of the window. Dr. Camilo reports patient did not appear unkempt and believes he is seen walking around the unit. He reports at baseline, patient is ambulatory, a little bit of depressed affect but has never seen him tearful or crying. Patient has not reported any AVh. Dr. Camilo reports patient is not violent or aggressive. Dr. Camilo reports patient is able to elaborate on symptoms.     Medical staff also contacted [facility] ([phone]) and spoke w/ nurse Tesha. She reports patient had a plan of hanging himself. Since being recently discharged from [facility], patient has been withdrawn, introverted, isolating himself and not social. She reports the patient’s sleep is poor, hygiene is poor and appetite is poor. NO AVH or anxiety reports. She reports patient is compliant w/ medication and got his Invega injection on 9/03. Patient’s psychiatrist is Dr. Rocky Moeller ([phone]). Patient is not violent or aggressive as per the nurse.

## 2022-09-07 NOTE — BH CONSULTATION LIAISON PROGRESS NOTE - NSBHASSESSMENTFT_PSY_ALL_CORE
Patient is an 82M currently resides in [facility]. PPH Schizophrenia & Dementia. + history of inpaitent admissions last 7/22-8/19 at [facility] post admission to [facility] for dehydration/SI/FTT 7/6-7/22I. Admitted at [facility] and seen by CL. Prior to last admission stopped eating/drinking in attempt to kill self. Unknown violence/aggression or substance use.  no legal issues PMH- HTN, CHF, COPD & DM. BIBA referred by NH fro suicidal ideation.     Patient presents to the ED in the context of suicidal ideation patient endorses continued thought to die and stated he is going to stop eating and drinking at NH because he want to be dead. Patient unable to contract for safety. Patient presents at imminent risk to self requiring inpatient admission for safety and stabilization. No beds available psychiatrically- patient to be admitted medically.    Plan:  -- 1:1 Observation for 24 hours -- d/t suicidal ideation   -- Monitor CBC, CMP, and other labs if needed.   -- Continue Mirtazapine 30mg HS  -- PRN: Zyprexa 2.5mg IM PRN (defer if qTC >/= 500)  -- Admit to [facility] -- No jenni beds available  -- Dispo: possible [facility] admission upon available bed    Patient is an 82M currently resides in [facility]. PPH Schizophrenia & Dementia. + history of inpatient admissions last 7/22-8/19 at [facility] post admission to [facility] for dehydration/SI/FTT 7/6-7/22I. Admitted at [facility] and seen by CL. Prior to last admission stopped eating/drinking in attempt to kill self. Unknown violence/aggression or substance use.  no legal issues PMH- HTN, CHF, COPD & DM. BIBA referred by NH fro suicidal ideation.     Patient presents to the ED in the context of suicidal ideation patient endorses continued thought to die and stated he is going to stop eating and drinking at NH because he want to be dead. Patient unable to contract for safety. Patient presents at imminent risk to self requiring inpatient admission for safety and stabilization. No beds available psychiatrically- patient to be admitted medically.    Plan:  -- 1:1 Observation for 24 hours -- d/t suicidal ideation   -- Monitor CBC, CMP, and other labs if needed.   -- Continue Mirtazapine 30mg HS  -- PRN: Zyprexa 2.5mg IM PRN (defer if qTC >/= 500)    -- Dispo: [facility] admission upon available bed. Dr Cid discussed with Dr Santana. Will call YEMI Biswas tomorrow to initiate [facility] bed search

## 2022-09-07 NOTE — H&P ADULT - ASSESSMENT
The patient is an 82 year-old right-handed man, PPHx of dementia, depression schizophrenia, and suicidal ideation and PMH of CHF, COPD, DM, R eye cataract surgery (10 years ago) and HTN, here for suicidal ideation and intermittent chest pain. Patient was admitted to [facility] earlier this year on 7/22/2022-8/19/22.   The patient is an 82 year-old right-handed man, PPHx of dementia, depression schizophrenia, and suicidal ideation and PMH of CHF, COPD, DM, R eye cataract surgery (10 years ago) and HTN, here for suicidal ideation and intermittent chest pain. Patient was admitted to [facility] earlier this year on 7/22/2022-8/19/22 for suicidal ideation.   The patient is an 82 year-old right-handed man, PPHx of dementia, depression schizophrenia, and suicidal ideation and PMH of CHF, COPD, DM, R eye cataract surgery (10 years ago) and HTN, here for suicidal ideation and intermittent chest pain. Patient was admitted to [facility] earlier this year on 7/22/2022-8/19/22 for fluctuating suicidal ideation.

## 2022-09-07 NOTE — BH CONSULTATION LIAISON PROGRESS NOTE - NSBHADMITCOUNSELOTHER_PSY_A_CORE FT
more than 50% of the time was spent in extensive chart review, coordinating with medicine team, coordinating with nh MD. See above

## 2022-09-07 NOTE — H&P ADULT - PROBLEM SELECTOR PLAN 5
Diet: pureed  DVT: lovenox 40  Dispo: inpt pending medical optimization then [facility]      #medrec  -medrec unable to be obtained because pt's poor compliance with interview. [facility]. Diet: minced and moist  DVT: lovenox 40  Dispo: inpt pending medical optimization then [facility]RE[facility] Diet: pending dysphagia screen  DVT: lovenox 40  Dispo: inpt pending medical optimization then [facility]

## 2022-09-07 NOTE — H&P ADULT - NSHPPHYSICALEXAM_GEN_ALL_CORE
Vital Signs Last 24 Hrs  T(C): 36.5 (07 Sep 2022 00:19), Max: 36.6 (06 Sep 2022 19:29)  T(F): 97.7 (07 Sep 2022 00:19), Max: 97.8 (06 Sep 2022 19:29)  HR: 52 (07 Sep 2022 00:19) (52 - 55)  BP: 141/95 (07 Sep 2022 00:19) (122/63 - 141/95)  BP(mean): --  RR: 16 (07 Sep 2022 00:19) (16 - 18)  SpO2: 100% (07 Sep 2022 00:19) (97% - 100%)    Parameters below as of 07 Sep 2022 00:19  Patient On (Oxygen Delivery Method): room air        CONSTITUTIONAL: Well-groomed, in no apparent distress  EYES: No conjunctival or scleral injection, non-icteric;   ENMT: No external nasal lesions; MMM  NECK: Trachea midline without palpable neck mass; thyroid not enlarged and non-tender  RESPIRATORY: Breathing comfortably; no dullness to percussion; lungs CTA without wheeze/rhonchi/rales  CARDIOVASCULAR: +S1S2, RRR, no M/G/R; pedal pulses full and symmetric; no lower extremity edema  GASTROINTESTINAL: No palpable masses or tenderness, +BS throughout, no rebound/guarding; no hepatosplenomegaly; no hernia palpated  LYMPHATIC: No cervical LAD or tenderness  SKIN: No rashes or ulcers noted  NEUROLOGIC: CN II-XII intact; sensation intact in LEs b/l to light touch  PSYCHIATRIC: A+O x 3; mood and affect appropriate; appropriate insight and judgment Vital Signs Last 24 Hrs  T(C): 36.5 (07 Sep 2022 00:19), Max: 36.6 (06 Sep 2022 19:29)  T(F): 97.7 (07 Sep 2022 00:19), Max: 97.8 (06 Sep 2022 19:29)  HR: 52 (07 Sep 2022 00:19) (52 - 55)  BP: 141/95 (07 Sep 2022 00:19) (122/63 - 141/95)  BP(mean): --  RR: 16 (07 Sep 2022 00:19) (16 - 18)  SpO2: 100% (07 Sep 2022 00:19) (97% - 100%)    Parameters below as of 07 Sep 2022 00:19  Patient On (Oxygen Delivery Method): room air        CONSTITUTIONAL: elderly gentleman in no apparent distress  EYES: No conjunctival or scleral injection  ENMT: MMM, no dentures, lip smacking  NECK: Trachea midline without palpable neck mass; thyroid not enlarged and non-tender  RESPIRATORY: Breathing comfortably; no dullness to percussion; lungs CTA without wheeze/rhonchi/rales  CARDIOVASCULAR: +S1S2, RRR, no M/G/R; no lower extremity edema  GASTROINTESTINAL: No palpable masses or tenderness, +BS throughout, no rebound/guarding  LYMPHATIC: No cervical LAD or tenderness  SKIN: No rashes or ulcers noted  NEUROLOGIC: no focal neurologic deficits, sensation intact in LEs b/l to light touch  PSYCHIATRIC: A+O x 3 however, pt refuses further questioning at times, suicidal ideation present

## 2022-09-07 NOTE — ED ADULT NURSE REASSESSMENT NOTE - NS ED NURSE REASSESS COMMENT FT1
report given to [facility] 2 RN Rocío, pt calm cooperative at this time. PCA at bedside, safety meaures in place. appears stable upon exit.

## 2022-09-07 NOTE — H&P ADULT - NSHPSOCIALHISTORY_GEN_ALL_CORE
Patient lives in nursing home.  Has older sister who lives in [facility].  Patient is retired and used to work as a .

## 2022-09-08 LAB
GLUCOSE BLDC GLUCOMTR-MCNC: 117 MG/DL — HIGH (ref 70–99)
GLUCOSE BLDC GLUCOMTR-MCNC: 124 MG/DL — HIGH (ref 70–99)
GLUCOSE BLDC GLUCOMTR-MCNC: 79 MG/DL — SIGNIFICANT CHANGE UP (ref 70–99)

## 2022-09-08 PROCEDURE — 99232 SBSQ HOSP IP/OBS MODERATE 35: CPT

## 2022-09-08 PROCEDURE — 99233 SBSQ HOSP IP/OBS HIGH 50: CPT

## 2022-09-08 RX ADMIN — MIRTAZAPINE 30 MILLIGRAM(S): 45 TABLET, ORALLY DISINTEGRATING ORAL at 23:42

## 2022-09-08 RX ADMIN — ENOXAPARIN SODIUM 40 MILLIGRAM(S): 100 INJECTION SUBCUTANEOUS at 05:26

## 2022-09-08 NOTE — BH CONSULTATION LIAISON PROGRESS NOTE - NSBHFUPINTERVALHXFT_PSY_A_CORE
Pt. is laying in bed in ED, eyes closed. NAD and AxO x3. Chart reviewed and discussed with medical team. Pt. is calm and cooperative. Pt. states he is feeling "okay". Pt. mood is a little sad and he wishes to go back to nursing home. He wishes to walk. Pt. is a little anxious. Pt. states he doesn't want to hurt himself because the " voice told him to 'rethink' killing himself". Pt. denies AH, VH, SI, SA.  Pt. is laying in bed in ED, eyes closed. NAD and AxO x3. Chart reviewed and discussed with medical team. Pt. is calm and cooperative. Pt. states he is feeling "okay". Pt. mood is a little sad and he wishes to go back to nursing home. He wishes to walk. Pt. is a little anxious. Pt. states he doesn't want to hurt himself because the " voice told him to 'rethink' killing himself".--- refuses to elaborate on the statement. Pt. denies AH, VH, SI----unreliable at this time

## 2022-09-08 NOTE — BH CONSULTATION LIAISON PROGRESS NOTE - NSBHCHARTREVIEWLAB_PSY_A_CORE FT
CBC Full  -  ( 06 Sep 2022 20:52 )  WBC Count : 4.62 K/uL  RBC Count : 3.94 M/uL  Hemoglobin : 12.7 g/dL  Hematocrit : 37.4 %  Platelet Count - Automated : 210 K/uL  Mean Cell Volume : 94.9 fL  Mean Cell Hemoglobin : 32.2 pg  Mean Cell Hemoglobin Concentration : 34.0 gm/dL  Auto Neutrophil # : 2.62 K/uL  Auto Lymphocyte # : 1.19 K/uL  Auto Monocyte # : 0.50 K/uL  Auto Eosinophil # : 0.21 K/uL  Auto Basophil # : 0.09 K/uL  Auto Neutrophil % : 56.8 %  Auto Lymphocyte % : 25.8 %  Auto Monocyte % : 10.8 %  Auto Eosinophil % : 4.5 %  Auto Basophil % : 1.9 %                        12.7   4.62  )-----------( 210      ( 06 Sep 2022 20:52 )             37.4   
CBC Full  -  ( 06 Sep 2022 20:52 )  WBC Count : 4.62 K/uL  RBC Count : 3.94 M/uL  Hemoglobin : 12.7 g/dL  Hematocrit : 37.4 %  Platelet Count - Automated : 210 K/uL  Mean Cell Volume : 94.9 fL  Mean Cell Hemoglobin : 32.2 pg  Mean Cell Hemoglobin Concentration : 34.0 gm/dL  Auto Neutrophil # : 2.62 K/uL  Auto Lymphocyte # : 1.19 K/uL  Auto Monocyte # : 0.50 K/uL  Auto Eosinophil # : 0.21 K/uL  Auto Basophil # : 0.09 K/uL  Auto Neutrophil % : 56.8 %  Auto Lymphocyte % : 25.8 %  Auto Monocyte % : 10.8 %  Auto Eosinophil % : 4.5 %  Auto Basophil % : 1.9 %

## 2022-09-08 NOTE — BH CONSULTATION LIAISON PROGRESS NOTE - NSBHASSESSMENTFT_PSY_ALL_CORE
Patient is an 82M currently resides in [facility]. PPH Schizophrenia & Dementia. + history of inpatient admissions last 7/22-8/19 at [facility] post admission to [facility] for dehydration/SI/FTT 7/6-7/22I. Admitted at [facility] and seen by CL. Prior to last admission stopped eating/drinking in attempt to kill self. Unknown violence/aggression or substance use.  no legal issues PMH- HTN, CHF, COPD & DM. BIBA referred by NH fro suicidal ideation.     Patient presents to the ED in the context of suicidal ideation patient endorses continued thought to die and stated he is going to stop eating and drinking at NH because he want to be dead. Patient unable to contract for safety. Patient presents at imminent risk to self requiring inpatient admission for safety and stabilization. No beds available psychiatrically- patient to be admitted medically.    Plan:  -- 1:1 Observation for 24 hours -- d/t suicidal ideation   -- Monitor CBC, CMP, and other labs if needed.   -- Continue Mirtazapine 30mg HS  -- PRN: Zyprexa 2.5mg IM PRN (defer if qTC >/= 500)    -- Dispo: [facility] admission upon available bed. Dr Cid discussed with Dr Santana. Awaiting bed in medical floor. Will call YEMI Biswas tomorrow to initiate [facility] bed search Patient is an 82M currently resides in [facility]. PPH Schizophrenia & Dementia. + history of inpatient admissions last 7/22-8/19 at [facility] post admission to [facility] for dehydration/SI/FTT 7/6-7/22I. Admitted at [facility] and seen by CL. Prior to last admission stopped eating/drinking in attempt to kill self. Unknown violence/aggression or substance use.  no legal issues PMH- HTN, CHF, COPD & DM. BIBA referred by NH fro suicidal ideation.     Patient presents to the ED in the context of suicidal ideation patient endorses continued thought to die and stated he is going to stop eating and drinking at NH because he want to be dead. Patient unable to contract for safety. Patient presents at imminent risk to self requiring inpatient admission for safety and stabilization. No beds available psychiatrically- patient to be admitted medically.    Plan:  -- 1:1 Observation for 24 hours -- d/t suicidal ideation   -- Monitor CBC, CMP, and other labs if needed.   -- Continue Mirtazapine 30mg HS  -- PRN: Zyprexa 2.5mg IM PRN (defer if qTC >/= 500)    -- Dispo: [facility] admission upon available bed. Dr Cid discussed with Dr Santana 9/8. Medically cleared.

## 2022-09-09 LAB
GLUCOSE BLDC GLUCOMTR-MCNC: 112 MG/DL — HIGH (ref 70–99)
GLUCOSE BLDC GLUCOMTR-MCNC: 85 MG/DL — SIGNIFICANT CHANGE UP (ref 70–99)
GLUCOSE BLDC GLUCOMTR-MCNC: 89 MG/DL — SIGNIFICANT CHANGE UP (ref 70–99)
GLUCOSE BLDC GLUCOMTR-MCNC: 94 MG/DL — SIGNIFICANT CHANGE UP (ref 70–99)
GLUCOSE BLDC GLUCOMTR-MCNC: 97 MG/DL — SIGNIFICANT CHANGE UP (ref 70–99)

## 2022-09-09 PROCEDURE — 99232 SBSQ HOSP IP/OBS MODERATE 35: CPT

## 2022-09-09 PROCEDURE — 99233 SBSQ HOSP IP/OBS HIGH 50: CPT

## 2022-09-09 RX ADMIN — ENOXAPARIN SODIUM 40 MILLIGRAM(S): 100 INJECTION SUBCUTANEOUS at 06:15

## 2022-09-09 RX ADMIN — MIRTAZAPINE 30 MILLIGRAM(S): 45 TABLET, ORALLY DISINTEGRATING ORAL at 21:11

## 2022-09-09 NOTE — BH CONSULTATION LIAISON PROGRESS NOTE - NSBHFUPINTERVALHXFT_PSY_A_CORE
Pt. laying in bed, attempting to sleep, NAD, AxO x3, calm and cooperative, describes mood as 'ok'. Pt. states he wants to end his life because life is too much for him. Pt. shows writer IV and says "I want to end my life, take it out." He sees images in his head of random people. Pt. says the images do not tell him anything or is unaware that they do. He is not eating well, coughing after every bite, but he drinks well. Denies anxiety, paranoia, AH.  Pt. laying in bed, attempting to sleep, NAD, Bharath x3, calm and cooperative, describes mood as 'ok'. Pt. states he wants to end his life because life is too much for him. Pt. shows writer IV and says "I want to end my life, take it out." He sees images in his head of random people. Pt. says the images do not tell him anything or is unaware that they do. Pt. circles back throughout interview, repeating that he wants to die. He is not eating well, coughing after every bite, but he is able to drink, had coffee. Denies anxiety, paranoia, auditory hallucinations. Interview limited d/t stutter.     Pt. informed of plan and agrees to plan.

## 2022-09-09 NOTE — BH CONSULTATION LIAISON PROGRESS NOTE - NSBHFUPINTERVALCCFT_PSY_A_CORE
No acute overnight events, did not sleep well, no PRNs given.    No acute overnight events, did not sleep well, no PRNs given. Chart reviewed and care coordinated with medical team.

## 2022-09-09 NOTE — BH CONSULTATION LIAISON PROGRESS NOTE - NSBHATTESTCOMMENTATTENDFT_PSY_A_CORE
met with the patient in the ed. case discussed with pa-s impression and plan discussed and agreed upon. Patient is superficial, vague and minimizes symptoms. Denies any si, and when asked about it states- ' the voice told me to rethink this'. Does not elaborate about AH.     Continue plan as above    2 unstable diagnosis  risk- high- on 1:1, suicide watch, elevated risk for suicide.
met with the patient. case discussed with hiral impression and plan discussed and agreed upon. Patient rather anxious, distressed today, perseverative about ' I want to die' and expresses guilt ' I am sorry I am telling you all this, but I want to die. I am sorry I am doing this to you'.   Agree with plan above.     2pc completed and in chart    2 unstable diagnosis  risk: high--high risk for suicide, on 1:1
met with the patient. case discussed with pa student, impression and plan discussed and agreed upon. Patient is rather vague in his reporting, but perseverative about ' I want to die. I don't want to face the reality of life'. Feels hopeless, and did allude to issues at NH- 'food? other patients?'. Endorses ongoing si, and his description of plan can change between multiple staff interviews today  Continue plan as above    two unstable diagnosis.  risk: high (on 1:1 for si, needs inpatient psych admission, elevated risk for self harm)

## 2022-09-09 NOTE — BH CONSULTATION LIAISON PROGRESS NOTE - NSBHASSESSMENTFT_PSY_ALL_CORE
Patient is an 82M currently resides in [facility]. PPH Schizophrenia & Dementia. + history of inpatient admissions last 7/22-8/19 at [facility] post admission to [facility] for dehydration/SI/FTT 7/6-7/22I. Admitted at [facility] and seen by CL. Prior to last admission stopped eating/drinking in attempt to kill self. Unknown violence/aggression or substance use.  no legal issues PMH- HTN, CHF, COPD & DM. BIBA referred by NH fro suicidal ideation.     Patient presents to the ED in the context of suicidal ideation patient endorses continued thought to die and stated he is going to stop eating and drinking at NH because he want to be dead. Patient unable to contract for safety. Patient presents at imminent risk to self requiring inpatient admission for safety and stabilization. No beds available psychiatrically- patient to be admitted medically.    Plan:  -- 1:1 Constant Observation -- d/t suicidal ideation   -- Monitor CBC, CMP, and other labs if needed.   -- Continue Mirtazapine 30mg HS  -- PRN: Zyprexa 2.5mg IM PRN (defer if qTC >/= 500)    -- Dispo: [facility] admission upon available bed. Dr Cid discussed with Dr Santana 9/8. Medically cleared. Patient is an 82M currently resides in [facility]. PPH Schizophrenia & Dementia. + history of inpatient admissions last 7/22-8/19 at [facility] post admission to [facility] for dehydration/SI/FTT 7/6-7/22I. Admitted at [facility] and seen by CL. Prior to last admission stopped eating/drinking in attempt to kill self. Unknown violence/aggression or substance use.  no legal issues PMH- HTN, CHF, COPD & DM. BIBA referred by NH fro suicidal ideation.     Patient presents to the ED in the context of suicidal ideation patient endorses continued thought to die and stated he is going to stop eating and drinking at NH because he want to be dead. Patient unable to contract for safety. Patient presents at imminent risk to self requiring inpatient admission for safety and stabilization. No beds available psychiatrically- patient to be admitted medically.    9/9: Pt. is cooperative, compliant. Continues to have suicidal ideation.     Plan:  -- 1:1 Constant Observation -- d/t suicidal ideation   -- Monitor CBC, CMP, and other labs if needed.   -- Continue Mirtazapine 30mg HS  -- PRN: Zyprexa 2.5mg IM PRN (defer if qTC >/= 500)    -- Dispo: [facility] admission upon available bed. Dr Cid discussed with Dr Santana 9/8. Medically cleared. Patient is an 82M currently resides in [facility]. PPH Schizophrenia & Dementia. + history of inpatient admissions last 7/22-8/19 at [facility] post admission to [facility] for dehydration/SI/FTT 7/6-7/22I. Admitted at [facility] and seen by CL. Prior to last admission stopped eating/drinking in attempt to kill self. Unknown violence/aggression or substance use.  no legal issues PMH- HTN, CHF, COPD & DM. BIBA referred by NH fro suicidal ideation.     Patient presents to the ED in the context of suicidal ideation patient endorses continued thought to die and stated he is going to stop eating and drinking at NH because he want to be dead. Patient unable to contract for safety. Patient presents at imminent risk to self requiring inpatient admission for safety and stabilization. No beds available psychiatrically- patient to be admitted medically.    9/9: Pt. is cooperative, compliant. Continues to have suicidal ideation.     Plan:  -- 1:1 Constant Observation -- d/t suicidal ideation   -- Monitor CBC, CMP, and other labs if needed.   -- Continue Mirtazapine 30mg HS  -- PRN: Zyprexa 2.5mg IM PRN (defer if qTC >/= 500)    -- Dispo: [facility] admission upon available bed. Dr Cid discussed with Dr Santana.

## 2022-09-10 PROCEDURE — 99232 SBSQ HOSP IP/OBS MODERATE 35: CPT

## 2022-09-10 RX ADMIN — MIRTAZAPINE 30 MILLIGRAM(S): 45 TABLET, ORALLY DISINTEGRATING ORAL at 22:31

## 2022-09-10 RX ADMIN — ENOXAPARIN SODIUM 40 MILLIGRAM(S): 100 INJECTION SUBCUTANEOUS at 05:03

## 2022-09-10 NOTE — BH CONSULTATION LIAISON PROGRESS NOTE - NSBHFUPINTERVALCCFT_PSY_A_CORE
No acute overnight events, poor sleep, no PRNs given. Chart reviewed and care coordinated with medical team.

## 2022-09-10 NOTE — BH CONSULTATION LIAISON PROGRESS NOTE - NSBHASSESSMENTFT_PSY_ALL_CORE
Patient is an 82M currently resides in [facility]. PPH Schizophrenia & Dementia. + history of inpatient admissions last 7/22-8/19 at [facility] post admission to [facility] for dehydration/SI/FTT 7/6-7/22I. Admitted at [facility] and seen by CL. Prior to last admission stopped eating/drinking in attempt to kill self. Unknown violence/aggression or substance use.  no legal issues PMH- HTN, CHF, COPD & DM. BIBA referred by NH fro suicidal ideation.     Patient presents to the ED in the context of suicidal ideation patient endorses continued thought to die and stated he is going to stop eating and drinking at NH because he want to be dead. Patient unable to contract for safety. Patient presents at imminent risk to self requiring inpatient admission for safety and stabilization. No beds available psychiatrically- patient to be admitted medically.    9/9: Pt. is cooperative, compliant. Continues to have suicidal ideation.   9/10: Remains cooperative, compliant. Continues with SI. Reports subjective appetite improvement.     Plan:  -- 1:1 Constant Observation -- d/t suicidal ideation   -- Monitor CBC, CMP, and other labs if needed.   -- Continue Mirtazapine 30mg HS  -- PRN: Zyprexa 2.5mg IM PRN (defer if qTC >/= 500)    -- Dispo: [facility] admission upon available bed. Dr Cid discussed with Dr Santana.  Patient is an 82M currently resides in [facility]. PPH Schizophrenia & Dementia. + history of inpatient admissions last 7/22-8/19 at [facility] post admission to [facility] for dehydration/SI/FTT 7/6-7/22I. Admitted at [facility] and seen by CL. Prior to last admission stopped eating/drinking in attempt to kill self. Unknown violence/aggression or substance use.  no legal issues PMH- HTN, CHF, COPD & DM. BIBA referred by NH fro suicidal ideation.     Patient presents to the ED in the context of suicidal ideation patient endorses continued thought to die and stated he is going to stop eating and drinking at NH because he want to be dead. Patient unable to contract for safety. Patient presents at imminent risk to self requiring inpatient admission for safety and stabilization. No beds available psychiatrically- patient to be admitted medically.    9/9: Pt. is cooperative, compliant. Continues to have suicidal ideation.   9/10: Remains cooperative, compliant. Continues with SI. Reports subjective appetite improvement.     Plan:  -- 1:1 Constant Observation -- d/t suicidal ideation   -- Monitor CBC, CMP, and other labs if needed.   -- Continue Mirtazapine 30mg HS  -- PRN: Zyprexa 2.5mg IM PRN (HOLD if qtc> 500)    -- Dispo: [facility] admission upon available bed.

## 2022-09-10 NOTE — BH CONSULTATION LIAISON PROGRESS NOTE - NSBHMSESPABN_PSY_A_CORE
Soft volume/Pressured rate/Impaired articulation
Soft volume/Slowed rate/Impaired articulation
Soft volume/Impaired articulation
Soft volume/Impaired articulation

## 2022-09-10 NOTE — BH CONSULTATION LIAISON PROGRESS NOTE - NSBHFUPINTERVALHXFT_PSY_A_CORE
Pt sitting at window with chair. Remains feeling like he wants to die. Denies methods/intent/plan around SI. States sleep was "not so good". Is tearful when asked about living or not. States his appetite is "good".

## 2022-09-11 ENCOUNTER — TRANSCRIPTION ENCOUNTER (OUTPATIENT)
Age: 82
End: 2022-09-11

## 2022-09-11 PROCEDURE — 99232 SBSQ HOSP IP/OBS MODERATE 35: CPT

## 2022-09-11 RX ORDER — POLYETHYLENE GLYCOL 3350 17 G/17G
17 POWDER, FOR SOLUTION ORAL DAILY
Refills: 0 | Status: DISCONTINUED | OUTPATIENT
Start: 2022-09-11 | End: 2022-09-12

## 2022-09-11 RX ORDER — BUDESONIDE AND FORMOTEROL FUMARATE DIHYDRATE 160; 4.5 UG/1; UG/1
2 AEROSOL RESPIRATORY (INHALATION)
Refills: 0 | Status: DISCONTINUED | OUTPATIENT
Start: 2022-09-11 | End: 2022-09-12

## 2022-09-11 RX ADMIN — ENOXAPARIN SODIUM 40 MILLIGRAM(S): 100 INJECTION SUBCUTANEOUS at 05:31

## 2022-09-11 RX ADMIN — BUDESONIDE AND FORMOTEROL FUMARATE DIHYDRATE 2 PUFF(S): 160; 4.5 AEROSOL RESPIRATORY (INHALATION) at 21:39

## 2022-09-11 RX ADMIN — MIRTAZAPINE 30 MILLIGRAM(S): 45 TABLET, ORALLY DISINTEGRATING ORAL at 21:39

## 2022-09-11 RX ADMIN — BUDESONIDE AND FORMOTEROL FUMARATE DIHYDRATE 2 PUFF(S): 160; 4.5 AEROSOL RESPIRATORY (INHALATION) at 11:34

## 2022-09-11 NOTE — PROGRESS NOTE ADULT - NSPROGADDITIONALINFOA_GEN_ALL_CORE
Tolerating adequate PO: [x] yes  [ ] no    PEG tube on bolus feeds: [ ] yes  [x] no  --Must be on bolus feeds as TPN not accepted    Ambulates independently (can transfer to chair with assist): [ x] yes  [ ] no    Hospital bed required: [ ] yes  [x ] no    Home O2: [ ] yes  [x ] no  --Only if patient was on home O2 prior to hospital admission    CPAP use at home confirmed with patient (accepted at [facility]): [ ] yes  [x ] no    BIPAP use at home (not accepted at [facility]): [ ] yes  [x ] no    Presence/Need of any IVs or PICCs (not accepted at [facility]): [ ] yes  [x ] no    Miramontes catheter: [ ] yes  [x ] no  --Include date of placement and TOV in discharge note: [ ] done    Wound care instructions included in discharge note: [ ] yes  [x ] no  --Send enough supplies for special wound care services until the next business day: [ ] done    Sutures/staples: [ ] yes  [ x] no    --Include expected removal date in discharge note: [ ] done    Presence of any implanted devices: [ ] yes  [x] no  --Insulin pumps must be removed before transfer by endocrine team    Receiving chemotherapy: [ ] yes  [x ] no  --If yes, can it be delayed by [facility] admission?: [ ] yes  [ ] no    Date ranges for any subspecialist follow-ups needed:  --Include in discharge note: [ ] done
Tolerating adequate PO: [x ] yes  [ ] no    PEG tube on bolus feeds: [ ] yes  [x ] no  --Must be on bolus feeds as TPN not accepted    Ambulates independently (can transfer to chair with assist): [ x] yes  [ ] no    Hospital bed required: [ ] yes  [x ] no    Home O2: [ ] yes  [x ] no  --Only if patient was on home O2 prior to hospital admission    CPAP use at home confirmed with patient (accepted at [facility]): [ ] yes  [x ] no    BIPAP use at home (not accepted at [facility]): [ ] yes  [x ] no    Presence/Need of any IVs or PICCs (not accepted at [facility]): [ ] yes  [ x] no    Miramontes catheter: [ ] yes  [ x] no  --Include date of placement and TOV in discharge note: [ ] done    Wound care instructions included in discharge note: [ ] yes  [x ] no  --Send enough supplies for special wound care services until the next business day: [ ] done    Sutures/staples: [ ] yes  [x ] no    --Include expected removal date in discharge note: [ ] done    Presence of any implanted devices: [ ] yes  [ x] no  --Insulin pumps must be removed before transfer by endocrine team    Receiving chemotherapy: [ ] yes  [ x] no  --If yes, can it be delayed by [facility] admission?: [ ] yes  [ ] no    Date ranges for any subspecialist follow-ups needed:  --Include in discharge note: [ ] done

## 2022-09-11 NOTE — PROGRESS NOTE ADULT - PROBLEM SELECTOR PLAN 3
-paliperidone 156 mg/ml IM given on 9/3

## 2022-09-11 NOTE — DISCHARGE NOTE PROVIDER - ATTENDING DISCHARGE PHYSICAL EXAMINATION:
CONSTITUTIONAL: NAD, elderly, laying in bed   HEENT: EOMI, MMM  RESPIRATORY: Normal respiratory effort; lungs are clear to auscultation bilaterally  CARDIOVASCULAR: Regular rate and rhythm, normal S1 and S2  ABDOMEN: Nontender to palpation, normoactive bowel sounds  MUSCULOSKELETAL:  no clubbing or cyanosis; no edema noted  PSYCH: calm, cooperative  NEUROLOGY: no focal deficits grossly noted, answering questions, following commands  SKIN: No rashes; no palpable lesions

## 2022-09-11 NOTE — DISCHARGE NOTE PROVIDER - NSDCDCMDCOMP_GEN_ALL_CORE
Next appointment: 8/16/17    Medication refilled to last until then.   This document is complete and the patient is ready for discharge.

## 2022-09-11 NOTE — DISCHARGE NOTE PROVIDER - NSDCMRMEDTOKEN_GEN_ALL_CORE_FT
acetaminophen 325 mg oral tablet: 2 tab(s) orally every 6 hours, As needed, Moderate Pain (4 - 6), Severe Pain (7 - 10)  budesonide-formoterol 80 mcg-4.5 mcg/inh inhalation aerosol: 2 puff(s) inhaled 2 times a day  metFORMIN 500 mg oral tablet: 1 tab(s) orally 2 times a day  mirtazapine 30 mg oral tablet: 1 tab(s) orally once a day (at bedtime)  paliperidone 156 mg/mL intramuscular suspension, extended release:  intramuscular once a month next 9/3  polyethylene glycol 3350 oral powder for reconstitution: 17 gram(s) orally every other day   budesonide-formoterol 80 mcg-4.5 mcg/inh inhalation aerosol: 2 puff(s) inhaled once a day  enoxaparin: 40 milligram(s) subcutaneous every 24 hours  mirtazapine 7.5 mg oral tablet: 5 tab(s) orally once a day (at bedtime)  polyethylene glycol 3350 oral powder for reconstitution: 17 gram(s) orally once a day  simethicone 80 mg oral tablet, chewable: 1 tab(s) orally 3 times a day, As needed, Upset Stomach

## 2022-09-11 NOTE — DISCHARGE NOTE PROVIDER - HOSPITAL COURSE
82 year-old Pmhx of dementia, depression schizophrenia, and suicidal ideation and PMH of CHF, COPD, DM, R eye cataract surgery (10 years ago) and HTN, here for suicidal ideation and intermittent chest pain.     Suicidal ideation.   -last admitted from 7/22-8/19 at [facility] for suicidal ideation  -continues to have SI, on 1:1 observation  -pending placement to [facility] -- no geriatric beds available  -c/w mirtazapine  -zyprexa 2.5 mg    Intermittent chest pain.   -no chest pain currently  -troponin wnl  -EKG unchanged from prior  -low suspicion for ACS  -Does not need telemetry.    Schizophrenia.   - paliperidone 156 mg/ml IM given on 9/3.    Tardive dyskinesia.   -pt exhibited persistent lip smacking during PE.    On _____, case was discussed with ______, patient is medically cleared and optimized for discharge today. All medications were reviewed with attending, and sent to mutually agreed upon pharmacy. 82 year-old Pmhx of dementia, depression schizophrenia, and suicidal ideation and PMH of CHF, COPD, DM, R eye cataract surgery (10 years ago) and HTN, here for suicidal ideation and intermittent chest pain.     Suicidal ideation.   -last admitted from 7/22-8/19 at [facility] for suicidal ideation  -continues to have SI, on 1:1 observation  -pending placement to [facility] -- no geriatric beds available  -c/w mirtazapine  -zyprexa 2.5 mg    Intermittent chest pain.   -no chest pain currently  -troponin wnl  -EKG unchanged from prior  -low suspicion for ACS  -Does not need telemetry.    Schizophrenia.   - paliperidone 156 mg/ml IM given on 9/3.    Tardive dyskinesia.   -pt exhibited persistent lip smacking during PE.    On 09/12/2022, case was discussed with , patient is medically cleared and optimized for discharge to [facility] today. All medications were reviewed with attending, and sent to mutually agreed upon pharmacy. 82 year-old Pmhx of dementia, depression schizophrenia, and suicidal ideation and PMH of CHF, COPD, DM, R eye cataract surgery (10 years ago) and HTN, here for suicidal ideation and intermittent chest pain.     He was last admitted from 7/22-8/19 at [facility] for suicidal ideation and on admission continued to have SI and placed on 1:1 observation  -pending placement to [facility] and was continued on mirtazapine and zyprexa 2.5 mg  Psych consulted and recommended continue constant Observation, monitor labs and to continue Mirtazapine 30mg HS with PRN: Zyprexa 2.5mg IM PRN (HOLD if qtc> 500). Recommended [facility] admission upon available bed.    He was also treated for Intermittent chest pain and during hospitalization he had no chest pain. Troponin negative and EKG unchanged from prior. Low suspicion for ACS. Remained chest free during hospitalization.     He was also treated for Schizophrenia with Paliperidone 156 mg/ml IM given on 9/3.    Noted to have Tardive dyskinesia with persistent lip smacking during PE.    On 09/12/2022, patient was seen and continued to express that he does not want to live anymore. Patient is hemodynamically stable for discharge to [facility] today. All medications were reviewed with attending, and sent to mutual agreed upon pharmacy. Patient should follow up with PCP and cardiologist after discharge.

## 2022-09-11 NOTE — PROGRESS NOTE ADULT - ASSESSMENT
The patient is an 82 year-old right-handed man, PPHx of dementia, depression schizophrenia, and suicidal ideation and PMH of CHF, COPD, DM, R eye cataract surgery (10 years ago) and HTN, here for suicidal ideation and intermittent chest pain. Patient was admitted to [facility] earlier this year on 7/22/2022-8/19/22 for fluctuating suicidal ideation.  
The patient is an 82 year-old right-handed man, PPHx of dementia, depression schizophrenia, and suicidal ideation and PMH of CHF, COPD, DM, R eye cataract surgery (10 years ago) and HTN, here for suicidal ideation and intermittent chest pain. Patient was admitted to [facility] earlier this year on 7/22/2022-8/19/22 for fluctuating suicidal ideation.  
The patient is an 82 year-old right-handed man, PPHx of dementia, depression schizophrenia, and suicidal ideation and PMH of CHF, COPD, DM, R eye cataract surgery (10 years ago) and HTN, here for suicidal ideation and intermittent chest pain. Patient was admitted to [facility] earlier this year on 7/22/2022-8/19/22 for fluctuating suicidal ideation.  
The patient is an 82 year-old right-handed man, PPHx of dementia, depression schizophrenia, and suicidal ideation and PMH of CHF, COPD, DM, R eye cataract surgery (10 years ago) and HTN, here for suicidal ideation and intermittent chest pain. Patient was admitted to [facility] earlier this year on 7/22/2022-8/19/22 for fluctuating suicidal ideation.  
The patient is an 82 year-old right-handed man, PPHx of dementia, depression schizophrenia, and suicidal ideation and PMH of CHF, COPD, DM, R eye cataract surgery (10 years ago) and HTN, here for suicidal ideation and intermittent chest pain. Patient was admitted to [facility] earlier this year on 7/22/2022-8/19/22 for fluctuating suicidal ideation.

## 2022-09-11 NOTE — PROGRESS NOTE ADULT - PROBLEM SELECTOR PLAN 2
-no chest pain currently  -no SOB  -troponin wnl  -EKG unchanged from prior  -low suspicion for ACS at this point  -Does not need telemetry
-no chest pain currently  -no SOB  -troponin wnl  -EKG unchanged from prior  -low suspicion for ACS at this point  -Does not need telemetry
-no chest pain currently  -troponin wnl  -EKG unchanged from prior  -low suspicion for ACS  -Does not need telemetry

## 2022-09-11 NOTE — PROGRESS NOTE ADULT - PROBLEM SELECTOR PLAN 5
DVT: lovenox 40  Dispo: DC to [facility], pending bed     [facility]OL[facility] in chart, DNR/DNI    VM left for sister, Elyse [phone] on 9/11.
DVT: lovenox 40  Dispo: DC to [facility] in chart, DNR/DNI
DVT: lovenox 40  Dispo: DC to [facility], pending bed     [facility]OL[facility] in chart, DNR/DNI
DVT: lovenox 40  Dispo: DC to [facility] in chart, DNR/DNI
DVT: lovenox 40  Dispo: DC to [facility], pending bed     [facility] in chart, DNR/DNI

## 2022-09-11 NOTE — PROGRESS NOTE ADULT - PROBLEM SELECTOR PLAN 1
PROCEDURE NOTE: Avastin 1.25mg OD. Diagnosis: Neovascular AMD with Active CNV. Anesthesia: Lidocaine 4%. Prep: Betadine Drops. Prior to injection, risks/benefits/alternatives discussed including infection, loss of vision, hemorrhage, cataract, glaucoma, retinal tears or detachment. The off-label status of Intravitreal Avastin also was reviewed. The patient wished to proceed with treatment. Topical anesthesia was induced with Alcaine. Additional anesthesia was achieved using drop(s) or injection checked above. A drop of Povidone-iodine 5% ophthalmic solution was instilled over the injection site and in the inferior fornix. Betadine prep was performed. Using the syringe provided, Avastin 1.25 mg in 0.05 cc was injected into the vitreous cavity. The needle was passed 3.0 mm posterior to the limbus in pseudophakic patients, and 3.5 mm posterior to the limbus in phakic patients. Patient tolerated procedure well. There were no complications. Injection time: *. Lot #: [id number]. Expiration Date: 11/13/2022. Post-op instructions given. Inventory Id: null. The patient was instructed to return for re-evaluation in approximately 4-12 weeks depending on his/her condition and was told to call immediately if vision decreases and/or if his/her eye becomes red, painful, and/or light sensitive. The patient was instructed to go to the emergency room or call 911 if unable to reach the doctor within an hour or two of trying or calling. The patient was instructed to use Artificial Tears q.i.d. p.r.n for comfort. Robby Moody PROCEDURE NOTE: Avastin 1.25mg OS. Diagnosis: Neovascular AMD with Active CNV. Anesthesia: Lidocaine 4%. Prep: Betadine Drops. Prior to injection, risks/benefits/alternatives discussed including infection, loss of vision, hemorrhage, cataract, glaucoma, retinal tears or detachment. The off-label status of Intravitreal Avastin also was reviewed. The patient wished to proceed with treatment. Topical anesthesia was induced with Alcaine. Additional anesthesia was achieved using drop(s) or injection checked above. A drop of Povidone-iodine 5% ophthalmic solution was instilled over the injection site and in the inferior fornix. Betadine prep was performed. Using the syringe provided, Avastin 1.25 mg in 0.05 cc was injected into the vitreous cavity. The needle was passed 3.0 mm posterior to the limbus in pseudophakic patients, and 3.5 mm posterior to the limbus in phakic patients. Patient tolerated procedure well. There were no complications. Injection time: *. Lot #: [id number]. Expiration Date: 11/13/2022. Post-op instructions given. Inventory Id: null. The patient was instructed to return for re-evaluation in approximately 4-12 weeks depending on his/her condition and was told to call immediately if vision decreases and/or if his/her eye becomes red, painful, and/or light sensitive. The patient was instructed to go to the emergency room or call 911 if unable to reach the doctor within an hour or two of trying or calling. The patient was instructed to use Artificial Tears q.i.d. p.r.n for comfort. Rudyard Bar
-last admitted from 7/22-8/19 at [facility] for suicidal ideation  -continues to have SI, on 1:1 observation  -pending placement to [facility] -- no geriatric beds available, f/u in AM   -c/w mirtazapine  -zyprexa 2.5 mg  -
-last admitted from 7/22-8/19 at [facility] for suicidal ideation  -continues to have SI, on 1:1 observation  -pending placement to [facility] -- no geriatric beds available, f/u today   -c/w mirtazapine  -zyprexa 2.5 mg  -
-last admitted from 7/22-8/19 at [facility] for suicidal ideation  -continues to have SI, on 1:1 observation  -pending placement to [facility] -- f/u SW in AM   -c/w mirtazapine  -zyprexa 2.5 mg  -
-last admitted from 7/22-8/19 at [facility] for suicidal ideation  -continues to have SI, on 1:1 observation  -pending placement to [facility] -- no geriatric beds available  -c/w mirtazapine  -zyprexa 2.5 mg  -
-last admitted from 7/22-8/19 at [facility] for suicidal ideation  -continues to have SI, on 1:1 observation  -pending placement to [facility] -- no geriatric beds available, f/u Monday   -c/w mirtazapine  -zyprexa 2.5 mg  -

## 2022-09-11 NOTE — DISCHARGE NOTE PROVIDER - NSFOLLOWUPCLINICS_GEN_ALL_ED_FT
[facility]  Psychiatry  [address] [place] [address]  Phone: [phone]  Fax:   Follow Up Time: 1 week     [facility]  Psychiatry  [address] [place] [address]  Phone: [phone]  Fax:   Follow Up Time: 1 week    [facility]  Cardiology  [address] [place] [id number]  Phone: [phone]  Fax:   Follow Up Time: 1 week

## 2022-09-11 NOTE — PROGRESS NOTE ADULT - PROBLEM SELECTOR PROBLEM 2
Intermittent chest pain

## 2022-09-11 NOTE — PROGRESS NOTE ADULT - PROBLEM SELECTOR PLAN 4
-pt exhibited persistent lip smacking during PE

## 2022-09-12 ENCOUNTER — TRANSCRIPTION ENCOUNTER (OUTPATIENT)
Age: 82
End: 2022-09-12

## 2022-09-12 ENCOUNTER — INPATIENT (INPATIENT)
Facility: HOSPITAL | Age: 82
LOS: 28 days | Discharge: HOME | End: 2022-10-11
Attending: PSYCHIATRY & NEUROLOGY | Admitting: STUDENT IN AN ORGANIZED HEALTH CARE EDUCATION/TRAINING PROGRAM

## 2022-09-12 VITALS — TEMPERATURE: 98 F | HEIGHT: 64 IN | OXYGEN SATURATION: 98 % | WEIGHT: 115.08 LBS | RESPIRATION RATE: 16 BRPM

## 2022-09-12 VITALS
OXYGEN SATURATION: 96 % | TEMPERATURE: 97 F | DIASTOLIC BLOOD PRESSURE: 72 MMHG | HEART RATE: 55 BPM | RESPIRATION RATE: 18 BRPM | SYSTOLIC BLOOD PRESSURE: 138 MMHG

## 2022-09-12 DIAGNOSIS — F31.9 BIPOLAR DISORDER, UNSPECIFIED: ICD-10-CM

## 2022-09-12 LAB — SARS-COV-2 RNA SPEC QL NAA+PROBE: SIGNIFICANT CHANGE UP

## 2022-09-12 PROCEDURE — 99232 SBSQ HOSP IP/OBS MODERATE 35: CPT | Mod: FS

## 2022-09-12 PROCEDURE — 99233 SBSQ HOSP IP/OBS HIGH 50: CPT

## 2022-09-12 RX ORDER — ENOXAPARIN SODIUM 100 MG/ML
40 INJECTION SUBCUTANEOUS
Qty: 0 | Refills: 0 | DISCHARGE
Start: 2022-09-12

## 2022-09-12 RX ORDER — POLYETHYLENE GLYCOL 3350 17 G/17G
17 POWDER, FOR SOLUTION ORAL DAILY
Refills: 0 | Status: DISCONTINUED | OUTPATIENT
Start: 2022-09-13 | End: 2022-09-27

## 2022-09-12 RX ORDER — OLANZAPINE 15 MG/1
2.5 TABLET, FILM COATED ORAL ONCE
Refills: 0 | Status: DISCONTINUED | OUTPATIENT
Start: 2022-09-12 | End: 2022-10-11

## 2022-09-12 RX ORDER — POLYETHYLENE GLYCOL 3350 17 G/17G
17 POWDER, FOR SOLUTION ORAL
Qty: 0 | Refills: 0 | DISCHARGE
Start: 2022-09-12

## 2022-09-12 RX ORDER — SIMETHICONE 80 MG/1
1 TABLET, CHEWABLE ORAL
Qty: 0 | Refills: 0 | DISCHARGE
Start: 2022-09-12

## 2022-09-12 RX ORDER — MIRTAZAPINE 45 MG/1
37.5 TABLET, ORALLY DISINTEGRATING ORAL AT BEDTIME
Refills: 0 | Status: DISCONTINUED | OUTPATIENT
Start: 2022-09-13 | End: 2022-09-20

## 2022-09-12 RX ORDER — BUDESONIDE AND FORMOTEROL FUMARATE DIHYDRATE 160; 4.5 UG/1; UG/1
2 AEROSOL RESPIRATORY (INHALATION)
Qty: 0 | Refills: 0 | DISCHARGE
Start: 2022-09-12

## 2022-09-12 RX ORDER — MIRTAZAPINE 45 MG/1
5 TABLET, ORALLY DISINTEGRATING ORAL
Qty: 0 | Refills: 0 | DISCHARGE
Start: 2022-09-12

## 2022-09-12 RX ORDER — ENOXAPARIN SODIUM 100 MG/ML
40 INJECTION SUBCUTANEOUS EVERY 24 HOURS
Refills: 0 | Status: DISCONTINUED | OUTPATIENT
Start: 2022-09-13 | End: 2022-09-13

## 2022-09-12 RX ORDER — SIMETHICONE 80 MG/1
80 TABLET, CHEWABLE ORAL THREE TIMES A DAY
Refills: 0 | Status: DISCONTINUED | OUTPATIENT
Start: 2022-09-12 | End: 2022-10-11

## 2022-09-12 RX ORDER — BUDESONIDE AND FORMOTEROL FUMARATE DIHYDRATE 160; 4.5 UG/1; UG/1
2 AEROSOL RESPIRATORY (INHALATION)
Refills: 0 | Status: DISCONTINUED | OUTPATIENT
Start: 2022-09-13 | End: 2022-09-28

## 2022-09-12 RX ORDER — MIRTAZAPINE 45 MG/1
37.5 TABLET, ORALLY DISINTEGRATING ORAL AT BEDTIME
Refills: 0 | Status: DISCONTINUED | OUTPATIENT
Start: 2022-09-12 | End: 2022-09-12

## 2022-09-12 RX ADMIN — MIRTAZAPINE 37.5 MILLIGRAM(S): 45 TABLET, ORALLY DISINTEGRATING ORAL at 21:25

## 2022-09-12 RX ADMIN — BUDESONIDE AND FORMOTEROL FUMARATE DIHYDRATE 2 PUFF(S): 160; 4.5 AEROSOL RESPIRATORY (INHALATION) at 21:21

## 2022-09-12 RX ADMIN — ENOXAPARIN SODIUM 40 MILLIGRAM(S): 100 INJECTION SUBCUTANEOUS at 05:34

## 2022-09-12 RX ADMIN — BUDESONIDE AND FORMOTEROL FUMARATE DIHYDRATE 2 PUFF(S): 160; 4.5 AEROSOL RESPIRATORY (INHALATION) at 08:58

## 2022-09-12 NOTE — BH CONSULTATION LIAISON PROGRESS NOTE - NSICDXBHSECONDARYDX_PSY_ALL_CORE
Suicidal ideation   A24.977  
Suicidal ideation   U63.077  
Suicidal ideation   K19.238  
Suicidal ideation   B07.365  
Suicidal ideation   A82.912

## 2022-09-12 NOTE — CHART NOTE - NSCHARTNOTEFT_GEN_A_CORE
Met with patient. Elaborate note to follow.     Remains depressed and expresses hopelessness and SI    Reccs:  - INCREASE dose of Remeron 37.5mg HS PO  - Needs [facility] admission--- call YEMI Siegel [phone] to initiate bed search
COVID PCR negative.  Informed nurse manager Wayne at [facility].   Transportation called. Will come to  patient and transport to [facility].  VSS. RN made aware.
Report provided to Dr. Satinder Frost [address]

## 2022-09-12 NOTE — BH CONSULTATION LIAISON PROGRESS NOTE - NSBHFUPINTERVALHXFT_PSY_A_CORE
Met with patient. Lying in bed. Dysphoric, sullen looking. Admits to worsening symptoms of depression- sadness, hopelessness, and today still endorses SI. When asked about plan, he does not answer. States- ' I just want to go'. Denies any ah or vh or delusions when asked.   Does not wish his sister be contacted.

## 2022-09-12 NOTE — DISCHARGE NOTE NURSING/CASE MANAGEMENT/SOCIAL WORK - PATIENT PORTAL LINK FT
You can access the FollowMyHealth Patient Portal offered by [facility] by registering at the following website: [email or web address]. By joining [employer]’s FollowMyHealth portal, you will also be able to view your health information using other applications (apps) compatible with our system.

## 2022-09-12 NOTE — BH CONSULTATION LIAISON PROGRESS NOTE - NSBHASSESSMENTFT_PSY_ALL_CORE
Patient is an 82M currently resides in [facility]. PPH Schizophrenia & Dementia. + history of inpatient admissions last 7/22-8/19 at [facility] post admission to [facility] for dehydration/SI/FTT 7/6-7/22I. Admitted at [facility] and seen by CL. Prior to last admission stopped eating/drinking in attempt to kill self. Unknown violence/aggression or substance use.  no legal issues PMH- HTN, CHF, COPD & DM. BIBA referred by NH fro suicidal ideation.     Patient presents to the ED in the context of suicidal ideation patient endorses continued thought to die and stated he is going to stop eating and drinking at NH because he want to be dead. Patient unable to contract for safety. Patient presents at imminent risk to self requiring inpatient admission for safety and stabilization. No beds available psychiatrically- patient to be admitted medically.    9/9: Pt. is cooperative, compliant. Continues to have suicidal ideation.   9/10: Remains cooperative, compliant. Continues with SI. Reports subjective appetite improvement.   9/12-- is depressed, still endorsing si with intent, unsure of plan    2 unstable diagnosis  risk- high, suicidal, on 1:1, needs [facility] admission.     Plan:  -- 1:1 Constant Observation -- d/t suicidal ideation. Would continue 1:1 at [facility] as well for now.  -- Monitor CBC, CMP, and other labs if needed.   -- INCREASE dose of Mirtazapine to 37.5mg HS  --- Next Invega Sustenna in 10/2022---- need to confirm exact date  -- PRN: Zyprexa 2.5mg IM PRN (HOLD if qtc> 500)    -- Dispo: [facility] admission. handoff given to [facility]. (see np note)   Patient is an 82M currently resides in [facility]. PPH Schizophrenia & Dementia. + history of inpatient admissions last 7/22-8/19 at [facility] post admission to [facility] for dehydration/SI/FTT 7/6-7/22I. Admitted at [facility] and seen by CL. Prior to last admission stopped eating/drinking in attempt to kill self. Unknown violence/aggression or substance use.  no legal issues PMH- HTN, CHF, COPD & DM. BIBA referred by NH fro suicidal ideation.     Patient presents to the ED in the context of suicidal ideation patient endorses continued thought to die and stated he is going to stop eating and drinking at NH because he want to be dead. Patient unable to contract for safety. Patient presents at imminent risk to self requiring inpatient admission for safety and stabilization. No beds available psychiatrically- patient to be admitted medically.    9/9: Pt. is cooperative, compliant. Continues to have suicidal ideation.   9/10: Remains cooperative, compliant. Continues with SI. Reports subjective appetite improvement.   9/12-- is depressed, still endorsing si with intent, unsure of plan    2 unstable diagnosis  risk- high, suicidal, on 1:1, needs [facility] admission.     Plan:  -- 1:1 Constant Observation -- d/t suicidal ideation. Would continue 1:1 at [facility] as well for now.  -- Monitor CBC, CMP, and other labs if needed.   -- INCREASE dose of Mirtazapine to 37.5mg HS  --- Next Invega Sustenna in 10/3/2022 (received 156mg IM on 9/3/2022)  -- PRN: Zyprexa 2.5mg IM PRN (HOLD if qtc> 500)    -- Dispo: [facility] admission. handoff given to [facility]. (see np note)

## 2022-09-12 NOTE — BH CONSULTATION LIAISON PROGRESS NOTE - NSBHMSETHTCONTENT_PSY_A_CORE
Hopelessness/Guilt/Suicidality
Hopelessness/Suicidality
Hopelessness/Suicidality
Suicidality
Suicidality

## 2022-09-12 NOTE — BH CONSULTATION LIAISON PROGRESS NOTE - NSBHFUPINTERVALCCFT_PSY_A_CORE
Safety maintained on 1:1. Appetite fair. Allows care. Compliant  Care coordinated with primary team, navid --- below plan discussed.   chart reviewed

## 2022-09-12 NOTE — BH CONSULTATION LIAISON PROGRESS NOTE - CURRENT MEDICATION
MEDICATIONS  (STANDING):  dextrose 5%. 1000 milliLiter(s) (50 mL/Hr) IV Continuous <Continuous>  dextrose 5%. 1000 milliLiter(s) (100 mL/Hr) IV Continuous <Continuous>  dextrose 50% Injectable 25 Gram(s) IV Push once  dextrose 50% Injectable 12.5 Gram(s) IV Push once  dextrose 50% Injectable 25 Gram(s) IV Push once  enoxaparin Injectable 40 milliGRAM(s) SubCutaneous every 24 hours  glucagon  Injectable 1 milliGRAM(s) IntraMuscular once  insulin lispro (ADMELOG) corrective regimen sliding scale   SubCutaneous three times a day before meals  insulin lispro (ADMELOG) corrective regimen sliding scale   SubCutaneous at bedtime  mirtazapine 30 milliGRAM(s) Oral at bedtime    MEDICATIONS  (PRN):  dextrose Oral Gel 15 Gram(s) Oral once PRN Blood Glucose LESS THAN 70 milliGRAM(s)/deciliter  simethicone 80 milliGRAM(s) Chew three times a day PRN Upset Stomach  
MEDICATIONS  (STANDING):  dextrose 5%. 1000 milliLiter(s) (100 mL/Hr) IV Continuous <Continuous>  dextrose 5%. 1000 milliLiter(s) (50 mL/Hr) IV Continuous <Continuous>  dextrose 50% Injectable 25 Gram(s) IV Push once  dextrose 50% Injectable 12.5 Gram(s) IV Push once  dextrose 50% Injectable 25 Gram(s) IV Push once  enoxaparin Injectable 40 milliGRAM(s) SubCutaneous every 24 hours  glucagon  Injectable 1 milliGRAM(s) IntraMuscular once  insulin lispro (ADMELOG) corrective regimen sliding scale   SubCutaneous three times a day before meals  insulin lispro (ADMELOG) corrective regimen sliding scale   SubCutaneous at bedtime  mirtazapine 30 milliGRAM(s) Oral at bedtime    MEDICATIONS  (PRN):  dextrose Oral Gel 15 Gram(s) Oral once PRN Blood Glucose LESS THAN 70 milliGRAM(s)/deciliter  simethicone 80 milliGRAM(s) Chew three times a day PRN Upset Stomach  
MEDICATIONS  (STANDING):  budesonide  80 MICROgram(s)/formoterol 4.5 MICROgram(s) Inhaler 2 Puff(s) Inhalation two times a day  enoxaparin Injectable 40 milliGRAM(s) SubCutaneous every 24 hours  mirtazapine 37.5 milliGRAM(s) Oral at bedtime  polyethylene glycol 3350 17 Gram(s) Oral daily    MEDICATIONS  (PRN):  simethicone 80 milliGRAM(s) Chew three times a day PRN Upset Stomach  
MEDICATIONS  (STANDING):  enoxaparin Injectable 40 milliGRAM(s) SubCutaneous every 24 hours  mirtazapine 30 milliGRAM(s) Oral at bedtime    MEDICATIONS  (PRN):  simethicone 80 milliGRAM(s) Chew three times a day PRN Upset Stomach  
MEDICATIONS  (STANDING):  enoxaparin Injectable 40 milliGRAM(s) SubCutaneous every 24 hours  mirtazapine 30 milliGRAM(s) Oral at bedtime    MEDICATIONS  (PRN):  simethicone 80 milliGRAM(s) Chew three times a day PRN Upset Stomach

## 2022-09-12 NOTE — BH CONSULTATION LIAISON PROGRESS NOTE - NSBHATTESTBILLINGAW_PSY_A_CORE
[id number]-Vderrqxwio Inpatient care - high complexity - 35 minutes
Non-billable
[id number]-Mquztxyiza Inpatient care - high complexity - 35 minutes
[id number]-Xevvwxogva Inpatient care - high complexity - 35 minutes
[id number]-Udpkplinfd Inpatient care - high complexity - 35 minutes

## 2022-09-12 NOTE — BH CONSULTATION LIAISON PROGRESS NOTE - NSICDXBHPRIMARYDX_PSY_ALL_CORE
Schizophrenia   F20.9  

## 2022-09-12 NOTE — BH CONSULTATION LIAISON PROGRESS NOTE - NSBHATTESTTYPEVISIT_PSY_A_CORE
Attending Only
Resident/Fellow with telephonic supervision
Attending with Resident/Fellow/Student

## 2022-09-12 NOTE — BH CHART NOTE - NSEVENTNOTEFT_PSY_ALL_CORE
HPI: per  assessment "Patient is an 82M currently resides in [facility]. PPH Schizophrenia & Dementia. + history of inpaitent admissions last 7/22-8/19 at [facility] post admission to [facility] for dehydration/SI/FTT 7/6-7/22I. Admitted at [facility] and seen by CL. Prior to last admission stopped eating/drinking in attempt to kill self. Unknown violence/aggression or substance use. no legal issues PMH- HTN, CHF, COPD & DM. BIBA referred by NH fro suicidal ideation.     Interview limited by stammering and laconic speech- r/o TD?  Patient reports he came to the ED because he is suicidal. He repeated multiple times "I want to die." He stated he plans to stop eating and drinking because he wants to kill himself. He reports "I just want to be dead." Patient stated he has lived at NH x 3 years and denies any specific issues there. He stated there is no point in living and he wants to die. Patient denies psychotic or manic symptoms. He stated he sleeps. He denies any HI, violent thoughts. He denied feeling depressed or sad stating "I want to be dead." He denied psychotic or manic symptoms. Patient noted to have involuntary movements of jaw- unclear if this is chronic issue vs. possible TD?"    Patient evaluated by PHI, confirms the above findings. On assessment, patient is…    PPH: Hx of schizophrenia & dementia. Recent [facility] admission 7/22-8/19 2022. Reportedly prior to last admission stopped eating/drinking in suicide attempt. Sees Dr. Moeller    PMH: HTN, CHF, COPD & DM.    Medications: Budesonide 80mcg/formoterol 4.5mcg inhaler 2puffs BID, mirtazapine 37.5mg QHs,  Invega Sustenna in 10/3/2022 (received 156mg IM on 9/3/2022)    Allergies:     Substance: none known    Family Hx:    Social Hx:    Access to weapons/guns: No    Vitals:  T(F): 97.4 (09-12-22 @ 19:56), Max: 97.4 (09-12-22 @ 14:05)  HR: 55 (09-12-22 @ 19:56) (55 - 62)  BP: 138/72 (09-12-22 @ 19:56) (138/72 - 150/93)  RR: 18 (09-12-22 @ 19:56) (17 - 18)  SpO2: 96% (09-12-22 @ 19:56) (96% - 97%)    MSE:  Appearance: appears stated age, dressed casually, well groomed. Behavior: cooperative, appropriate eye contact, in good behavioral control. Motor: no PMR/PMA. No abnormal movements including tremor. Speech: no increased latency, normal rate, tone, volume. TP: linear, goal-directed. TC: future-oriented. Denies SI/HI/I/P, no urges for self-harm. No apparent delusions. Denies AH/VH. Mood: "Fine." Affect: Dysphoric, reactive, mood congruent, appropriate. Cognition: alert, oriented to person, place, month and year. Fund of knowledge: intact. Attention/Concentration: intact. Insight: fair. Judgment: fair. Impulse control: fair.    Labs:            COVID-19 PCR: NotDetec (09-12-22 @ 15:18)      Risk Assessment: Immediate risk is minimized by inpatient admission to safe environment with appropriate supervision and limited access to lethal means. Futue risk to be minimized by treament of acuted depressive symptoms, maximizing outpatient supports, providing patient education, discussing emergency procedures, and ensuring close follow-up.    risk: recent suicidal behavior, recent inpatient admission, unable to contract for safety, continued SI  Protective: in treatment, at baseline compliant with medication, on GARCIA, has a psychiatrist    Based on risk assessment evaluation, patient IS/IS NOT at acute risk of harm to self or others at this time, DOES/DOES NOT require 1:1 CO.      Assessment/Plan:    Patient is an 82M currently resides in [facility]. PPH Schizophrenia & Dementia. + history of inpatient admissions last 7/22-8/19 at [facility] post admission to [facility] for dehydration/SI/FTT 7/6-7/22I. Admitted at [facility] and seen by CL. Prior to last admission stopped eating/drinking in attempt to kill self. Unknown violence/aggression or substance use.  no legal issues PMH- HTN, CHF, COPD & DM. BIBA referred by NH for suicidal ideation here for treatment of depression and SI.      Plan:  1.	Legals: admit to [facility] on 2PC  2.	Safety: routine observation, denies SI/HI/I/P on the unit. PRNs: Zyprexa 2.5mg IM for agitation (as was used in last admission)  3.	Psychiatry: Continue Remeron 37.5mg QHs; next dose Invega Sustenna in 10/3/2022 (received 156mg IM on 9/3/2022)  4.	Group, milieu, individual therapy as appropriate.  5.	Medical: continue home medications. Ordered DVT prophylaxis with enoxaparin injectable daily.  Admission labs WNL.  6.	Dispo: pending clinical improvement.  Patient continues to require inpatient hospitalization for stabilization and safety.    Patient requires inpatient admission for stabilization. HPI: per  assessment "Patient is an 82M currently resides in [facility]. PPH Schizophrenia & Dementia. + history of inpaitent admissions last 7/22-8/19 at [facility] post admission to [facility] for dehydration/SI/FTT 7/6-7/22I. Admitted at [facility] and seen by CL. Prior to last admission stopped eating/drinking in attempt to kill self. Unknown violence/aggression or substance use. no legal issues PMH- HTN, CHF, COPD & DM. BIBA referred by NH fro suicidal ideation.     Interview limited by stammering and laconic speech- r/o TD?  Patient reports he came to the ED because he is suicidal. He repeated multiple times "I want to die." He stated he plans to stop eating and drinking because he wants to kill himself. He reports "I just want to be dead." Patient stated he has lived at NH x 3 years and denies any specific issues there. He stated there is no point in living and he wants to die. Patient denies psychotic or manic symptoms. He stated he sleeps. He denies any HI, violent thoughts. He denied feeling depressed or sad stating "I want to be dead." He denied psychotic or manic symptoms. Patient noted to have involuntary movements of jaw- unclear if this is chronic issue vs. possible TD?"    Patient evaluated by PHI, confirms the above findings. On assessment, patient is limited historian with laconic speech. Pt says that he is depressed but denies current SI and says that he will be safe on the unit. Pt does not elaborate on why he is here, saying he is in the hospital. Pt admits to feeling depressed with low mood and appetite. Pt denies AVH. Pt denies HI or violent thoughts. Pt denies allergies. Pt denies any substance use.     PPH: Hx of schizophrenia & dementia. Recent [facility] admission 7/22-8/19 2022. Reportedly prior to last admission stopped eating/drinking in suicide attempt. Sees Dr. Moeller    PMH: HTN, CHF, COPD & DM.    Medications: Budesonide 80mcg/formoterol 4.5mcg inhaler 2puffs BID, mirtazapine 37.5mg QHs,  Invega Sustenna in 10/3/2022 (received 156mg IM on 9/3/2022)    Allergies: denies    Substance: none known    Family Hx: unable to assess    Social Hx: unable to assess    Access to weapons/guns: No    Vitals:  T(F): 97.4 (09-12-22 @ 19:56), Max: 97.4 (09-12-22 @ 14:05)  HR: 55 (09-12-22 @ 19:56) (55 - 62)  BP: 138/72 (09-12-22 @ 19:56) (138/72 - 150/93)  RR: 18 (09-12-22 @ 19:56) (17 - 18)  SpO2: 96% (09-12-22 @ 19:56) (96% - 97%)    MSE:  Appearance: appears stated age, dressed casually, fair grooming. Behavior: cooperative, appropriate eye contact, in good behavioral control. Motor: jaw movements, possible TD. Speech: laconic, difficult to under undestad, low volume, increased latency. TP: concrete. TC: Denies SI/HI/I/P, no urges for self-harm. No apparent delusions. Denies AH/VH. Mood: "depressed." Affect: Dysphoric, reactive, mood congruent, appropriate. Cognition: alert, oriented to person, place Fund of knowledge: intact. Attention/Concentration: intact. Insight: poor. Judgment: poor. Impulse control: fair.    Labs:            COVID-19 PCR: NotDetec (09-12-22 @ 15:18)      Risk Assessment: Immediate risk is minimized by inpatient admission to safe environment with appropriate supervision and limited access to lethal means. Futue risk to be minimized by treament of acuted depressive symptoms, maximizing outpatient supports, providing patient education, discussing emergency procedures, and ensuring close follow-up.    risk: recent suicidal behavior, recent inpatient admission, unable to contract for safety, continued SI  Protective: in treatment, at baseline compliant with medication, on GARCIA, has a psychiatrist    Based on risk assessment evaluation, patient IS NOT at acute risk of harm to self or others at this time, DOES NOT require 1:1 CO.      Assessment/Plan:    Patient is an 82M currently resides in [facility]. PPH Schizophrenia & Dementia. + history of inpatient admissions last 7/22-8/19 at [facility] post admission to [facility] for dehydration/SI/FTT 7/6-7/22I. Admitted at [facility] and seen by CL. Prior to last admission stopped eating/drinking in attempt to kill self. Unknown violence/aggression or substance use.  no legal issues PMH- HTN, CHF, COPD & DM. BIBA referred by NH for suicidal ideation here for treatment of depression and SI.      Plan:  1.	Legals: admit to [facility] on 2PC  2.	Safety: routine observation, denies SI/HI/I/P on the unit. PRNs: Zyprexa 2.5mg PO/IM for agitation   3.	Psychiatry: Continue Remeron 37.5mg QHs; next dose Invega Sustenna in 10/3/2022 (received 156mg IM on 9/3/2022)  4.	Group, milieu, individual therapy as appropriate.  5.	Medical: continue home medications. Admission labs WNL.  6.	Dispo: pending clinical improvement.  Patient continues to require inpatient hospitalization for stabilization and safety.    Patient requires inpatient admission for stabilization.

## 2022-09-12 NOTE — BH CONSULTATION LIAISON PROGRESS NOTE - NSBHCHARTREVIEWVS_PSY_A_CORE FT
Vital Signs Last 24 Hrs  T(C): 36.3 (10 Sep 2022 10:00), Max: 36.6 (10 Sep 2022 05:00)  T(F): 97.3 (10 Sep 2022 10:00), Max: 97.8 (10 Sep 2022 05:00)  HR: 70 (10 Sep 2022 10:00) (52 - 70)  BP: 129/74 (10 Sep 2022 10:00) (129/74 - 155/79)  BP(mean): --  RR: 18 (10 Sep 2022 10:00) (16 - 18)  SpO2: 98% (10 Sep 2022 05:00) (97% - 100%)    Parameters below as of 10 Sep 2022 10:00    O2 Flow (L/min): 99  
Vital Signs Last 24 Hrs  T(C): 36.4 (09 Sep 2022 10:00), Max: 36.6 (08 Sep 2022 12:13)  T(F): 97.6 (09 Sep 2022 10:00), Max: 97.8 (08 Sep 2022 12:13)  HR: 53 (09 Sep 2022 10:00) (53 - 63)  BP: 145/77 (09 Sep 2022 10:00) (111/70 - 145/77)  BP(mean): --  RR: 18 (09 Sep 2022 10:00) (17 - 20)  SpO2: 96% (08 Sep 2022 21:06) (96% - 99%)    Parameters below as of 09 Sep 2022 10:00    O2 Flow (L/min): 99  
Vital Signs Last 24 Hrs  T(C): 36.3 (12 Sep 2022 14:05), Max: 36.3 (12 Sep 2022 14:05)  T(F): 97.4 (12 Sep 2022 14:05), Max: 97.4 (12 Sep 2022 14:05)  HR: 62 (12 Sep 2022 14:05) (57 - 62)  BP: 150/93 (12 Sep 2022 14:05) (141/73 - 150/93)  BP(mean): --  RR: 18 (12 Sep 2022 14:05) (17 - 18)  SpO2: 97% (12 Sep 2022 14:05) (97% - 97%)    Parameters below as of 12 Sep 2022 14:05  Patient On (Oxygen Delivery Method): room air    
Vital Signs Last 24 Hrs  T(C): 36.6 (07 Sep 2022 09:00), Max: 37 (07 Sep 2022 04:02)  T(F): 97.8 (07 Sep 2022 09:00), Max: 98.6 (07 Sep 2022 04:02)  HR: 60 (07 Sep 2022 11:45) (47 - 60)  BP: 147/92 (07 Sep 2022 11:45) (122/63 - 157/79)  BP(mean): --  RR: 20 (07 Sep 2022 11:45) (16 - 20)  SpO2: 100% (07 Sep 2022 11:45) (97% - 100%)    Parameters below as of 07 Sep 2022 11:45  Patient On (Oxygen Delivery Method): room air    
Vital Signs Last 24 Hrs  T(C): 36.6 (08 Sep 2022 12:13), Max: 36.7 (07 Sep 2022 16:30)  T(F): 97.8 (08 Sep 2022 12:13), Max: 98 (07 Sep 2022 16:30)  HR: 61 (08 Sep 2022 12:13) (60 - 61)  BP: 144/88 (08 Sep 2022 12:13) (141/80 - 147/85)  BP(mean): --  RR: 20 (08 Sep 2022 12:13) (18 - 20)  SpO2: 97% (08 Sep 2022 12:13) (97% - 100%)    Parameters below as of 08 Sep 2022 12:13  Patient On (Oxygen Delivery Method): room air

## 2022-09-13 DIAGNOSIS — I10 ESSENTIAL (PRIMARY) HYPERTENSION: ICD-10-CM

## 2022-09-13 DIAGNOSIS — G24.01 DRUG INDUCED SUBACUTE DYSKINESIA: ICD-10-CM

## 2022-09-13 DIAGNOSIS — R45.851 SUICIDAL IDEATIONS: ICD-10-CM

## 2022-09-13 DIAGNOSIS — E11.9 TYPE 2 DIABETES MELLITUS WITHOUT COMPLICATIONS: ICD-10-CM

## 2022-09-13 DIAGNOSIS — Z86.59 PERSONAL HISTORY OF OTHER MENTAL AND BEHAVIORAL DISORDERS: ICD-10-CM

## 2022-09-13 LAB
ALBUMIN SERPL ELPH-MCNC: 4.5 G/DL — SIGNIFICANT CHANGE UP (ref 3.3–5)
ALP SERPL-CCNC: 38 U/L — LOW (ref 40–120)
ALT FLD-CCNC: 13 U/L — SIGNIFICANT CHANGE UP (ref 4–41)
ANION GAP SERPL CALC-SCNC: 13 MMOL/L — SIGNIFICANT CHANGE UP (ref 7–14)
AST SERPL-CCNC: 16 U/L — SIGNIFICANT CHANGE UP (ref 4–40)
BASOPHILS # BLD AUTO: 0.05 K/UL — SIGNIFICANT CHANGE UP (ref 0–0.2)
BASOPHILS NFR BLD AUTO: 1.1 % — SIGNIFICANT CHANGE UP (ref 0–2)
BILIRUB SERPL-MCNC: 0.5 MG/DL — SIGNIFICANT CHANGE UP (ref 0.2–1.2)
BUN SERPL-MCNC: 9 MG/DL — SIGNIFICANT CHANGE UP (ref 7–23)
CALCIUM SERPL-MCNC: 9.7 MG/DL — SIGNIFICANT CHANGE UP (ref 8.4–10.5)
CHLORIDE SERPL-SCNC: 97 MMOL/L — LOW (ref 98–107)
CO2 SERPL-SCNC: 27 MMOL/L — SIGNIFICANT CHANGE UP (ref 22–31)
CREAT SERPL-MCNC: 0.93 MG/DL — SIGNIFICANT CHANGE UP (ref 0.5–1.3)
EGFR: 82 ML/MIN/1.73M2 — SIGNIFICANT CHANGE UP
EOSINOPHIL # BLD AUTO: 0.14 K/UL — SIGNIFICANT CHANGE UP (ref 0–0.5)
EOSINOPHIL NFR BLD AUTO: 3.2 % — SIGNIFICANT CHANGE UP (ref 0–6)
GLUCOSE SERPL-MCNC: 104 MG/DL — HIGH (ref 70–99)
HCT VFR BLD CALC: 42.8 % — SIGNIFICANT CHANGE UP (ref 39–50)
HGB BLD-MCNC: 14.2 G/DL — SIGNIFICANT CHANGE UP (ref 13–17)
IANC: 2.8 K/UL — SIGNIFICANT CHANGE UP (ref 1.8–7.4)
IMM GRANULOCYTES NFR BLD AUTO: 0.2 % — SIGNIFICANT CHANGE UP (ref 0–1.5)
LYMPHOCYTES # BLD AUTO: 0.94 K/UL — LOW (ref 1–3.3)
LYMPHOCYTES # BLD AUTO: 21.4 % — SIGNIFICANT CHANGE UP (ref 13–44)
MCHC RBC-ENTMCNC: 32.1 PG — SIGNIFICANT CHANGE UP (ref 27–34)
MCHC RBC-ENTMCNC: 33.2 GM/DL — SIGNIFICANT CHANGE UP (ref 32–36)
MCV RBC AUTO: 96.6 FL — SIGNIFICANT CHANGE UP (ref 80–100)
MONOCYTES # BLD AUTO: 0.45 K/UL — SIGNIFICANT CHANGE UP (ref 0–0.9)
MONOCYTES NFR BLD AUTO: 10.3 % — SIGNIFICANT CHANGE UP (ref 2–14)
NEUTROPHILS # BLD AUTO: 2.8 K/UL — SIGNIFICANT CHANGE UP (ref 1.8–7.4)
NEUTROPHILS NFR BLD AUTO: 63.8 % — SIGNIFICANT CHANGE UP (ref 43–77)
NRBC # BLD: 0 /100 WBCS — SIGNIFICANT CHANGE UP (ref 0–0)
NRBC # FLD: 0 K/UL — SIGNIFICANT CHANGE UP (ref 0–0)
PLATELET # BLD AUTO: 227 K/UL — SIGNIFICANT CHANGE UP (ref 150–400)
POTASSIUM SERPL-MCNC: 4 MMOL/L — SIGNIFICANT CHANGE UP (ref 3.5–5.3)
POTASSIUM SERPL-SCNC: 4 MMOL/L — SIGNIFICANT CHANGE UP (ref 3.5–5.3)
PROT SERPL-MCNC: 7.6 G/DL — SIGNIFICANT CHANGE UP (ref 6–8.3)
RBC # BLD: 4.43 M/UL — SIGNIFICANT CHANGE UP (ref 4.2–5.8)
RBC # FLD: 13.6 % — SIGNIFICANT CHANGE UP (ref 10.3–14.5)
SODIUM SERPL-SCNC: 137 MMOL/L — SIGNIFICANT CHANGE UP (ref 135–145)
TSH SERPL-MCNC: 1.23 UIU/ML — SIGNIFICANT CHANGE UP (ref 0.27–4.2)
WBC # BLD: 4.39 K/UL — SIGNIFICANT CHANGE UP (ref 3.8–10.5)
WBC # FLD AUTO: 4.39 K/UL — SIGNIFICANT CHANGE UP (ref 3.8–10.5)

## 2022-09-13 PROCEDURE — 99223 1ST HOSP IP/OBS HIGH 75: CPT

## 2022-09-13 PROCEDURE — 93010 ELECTROCARDIOGRAM REPORT: CPT

## 2022-09-13 PROCEDURE — 99222 1ST HOSP IP/OBS MODERATE 55: CPT | Mod: GC

## 2022-09-13 RX ORDER — METFORMIN HYDROCHLORIDE 850 MG/1
500 TABLET ORAL
Refills: 0 | Status: DISCONTINUED | OUTPATIENT
Start: 2022-09-13 | End: 2022-10-11

## 2022-09-13 RX ORDER — OLANZAPINE 15 MG/1
2.5 TABLET, FILM COATED ORAL
Refills: 0 | Status: DISCONTINUED | OUTPATIENT
Start: 2022-09-13 | End: 2022-10-11

## 2022-09-13 RX ADMIN — METFORMIN HYDROCHLORIDE 500 MILLIGRAM(S): 850 TABLET ORAL at 08:08

## 2022-09-13 RX ADMIN — METFORMIN HYDROCHLORIDE 500 MILLIGRAM(S): 850 TABLET ORAL at 20:23

## 2022-09-13 RX ADMIN — MIRTAZAPINE 37.5 MILLIGRAM(S): 45 TABLET, ORALLY DISINTEGRATING ORAL at 20:23

## 2022-09-13 RX ADMIN — BUDESONIDE AND FORMOTEROL FUMARATE DIHYDRATE 2 PUFF(S): 160; 4.5 AEROSOL RESPIRATORY (INHALATION) at 20:25

## 2022-09-13 NOTE — BH SOCIAL WORK INITIAL PSYCHOSOCIAL EVALUATION - NSHIGHRISKBEHFT_PSY_ALL_CORE
Current admission: S/I with plan to jump from window  Prior admission :pt currently with SI, with plan to stop po intake.  As per sister, pt has a history of throwing objects when angry.

## 2022-09-13 NOTE — BH PATIENT PROFILE - HOME MEDICATIONS
simethicone 80 mg oral tablet, chewable , 1 tab(s) orally 3 times a day, As needed, Upset Stomach  polyethylene glycol 3350 oral powder for reconstitution , 17 gram(s) orally once a day  budesonide-formoterol 80 mcg-4.5 mcg/inh inhalation aerosol , 2 puff(s) inhaled once a day  mirtazapine 7.5 mg oral tablet , 5 tab(s) orally once a day (at bedtime)  enoxaparin , 40 milligram(s) subcutaneous every 24 hours

## 2022-09-13 NOTE — BH INPATIENT PSYCHIATRY ASSESSMENT NOTE - OTHER
Interrupted eye to eye contact Quantitative and qualitative poverty of speech  Lips smacking mostly tics with no perioral movements appreciated during the interview

## 2022-09-13 NOTE — BH INPATIENT PSYCHIATRY ASSESSMENT NOTE - OTHER PAST PSYCHIATRIC HISTORY (INCLUDE DETAILS REGARDING ONSET, COURSE OF ILLNESS, INPATIENT/OUTPATIENT TREATMENT)
Schizophrenia by Hx  Neurocognitive disorder  Schizophrenia by Hx  Neurocognitive disorder (Dementia)   MDD  Suicidal Ideations

## 2022-09-13 NOTE — DIETITIAN INITIAL EVALUATION ADULT - PERTINENT MEDS FT
MEDICATIONS  (STANDING):  budesonide  80 MICROgram(s)/formoterol 4.5 MICROgram(s) Inhaler 2 Puff(s) Inhalation two times a day  metFORMIN 500 milliGRAM(s) Oral two times a day  mirtazapine 37.5 milliGRAM(s) Oral at bedtime  polyethylene glycol 3350 17 Gram(s) Oral daily

## 2022-09-13 NOTE — CONSULT NOTE ADULT - ASSESSMENT
82 year-old right-handed man, PPHx of dementia, depression schizophrenia, and suicidal ideation and PMH of CHF, COPD, DM, R eye cataract surgery (10 years ago) and HTN, here for suicidal ideation and intermittent chest pain. Patient was admitted to [facility] earlier this year on 7/22/2022-8/19/22 for fluctuating suicidal ideation. 82 year-old right-handed man, PPHx of dementia, depression schizophrenia, and suicidal ideation and PMH of COPD, DM, R eye cataract surgery (10 years ago) and HTN, here for suicidal ideation and intermittent chest pain. Patient was admitted to [facility] earlier this year on 7/22/2022-8/19/22 for fluctuating suicidal ideation.      #Chest pain  -resolved when patient was at [facility]  -negative trop and grossly unchanged EKG, low suspicion for ACS   -pt denies hx of CHF. Currently evoulemic on exam and remains chest pain free    #DM  -FS wnl in hospital  -last A1C 5.8 in july  -okay to continue home metformin     #COPD  -respiratory status stable  -c/w home inhaler    #HTN  -per chart review, patient was on amlodipine in the hospital in july  -continue to monitor BP and defer starting antihypertensive for now given patient has poor PO intake. If patient's PO intake improves and SBP consistently >140, consider adding a small dose losartan at that time     Please call us back if any questions.  82 year-old right-handed man, PPHx of dementia, depression schizophrenia, and suicidal ideation and PMH of COPD, DM, R eye cataract surgery (10 years ago) and HTN, here for suicidal ideation and intermittent chest pain. Patient was admitted to [facility] earlier this year on 7/22/2022-8/19/22 for fluctuating suicidal ideation.      #Chest pain  -resolved when patient was at [facility]  -negative trop and grossly unchanged EKG, low suspicion for ACS   -no prior echo in our system. pt denies hx of CHF. Currently evoulemic on exam and remains chest pain free    #DM  -FS wnl in hospital  -last A1C 5.8 in july  -okay to continue home metformin     #COPD  -respiratory status stable  -c/w home inhaler    #HTN  -per chart review, patient was on amlodipine in the hospital in july  -continue to monitor BP and defer starting antihypertensive for now given patient has poor PO intake. If patient's PO intake improves and SBP consistently >140, consider adding a small dose losartan at that time     Please call us back if any questions.

## 2022-09-13 NOTE — BH INPATIENT PSYCHIATRY ASSESSMENT NOTE - NSBHASSESSSUMMFT_PSY_ALL_CORE
Patient is an 82M currently resides in [facility]. PPH Schizophrenia & Dementia. + history of inpatient admissions last 7/22-8/19 at [facility] post admission to [facility] for dehydration/SI/FTT 7/6-7/22I. Admitted at [facility] and seen by CL. Patient was admitted initially to medicine (for the complaints of chest pain) & for bed non availability at [facility]. Then transferred to psych after medical clearance. Prior to last admission stopped eating/drinking in attempt to kill self. Unknown violence/aggression or substance use. no legal issues PMH- HTN, CHF, COPD & DM. BIBA referred by NH fro suicidal ideation.   Patient presents to the ED in the context of suicidal ideation patient endorses continued thought to die and stated he is going to stop eating and drinking at NH because he want to be dead. Patient presents at imminent risk to self requiring inpatient admission for safety and stabilization. Patient is maintained on  Remeron 37.5 mg qhs for depression (Standing order) & Zyprexa 2.5 mg BID PO PRN for agitation & Zyprexa 2.5 mg IM PRN for severe agitation. Patient is on Polyethylene glycol (Miralax) 17 gm standing for constipation and Simethicone PRN for distension. Patient is on Pureed diet and complianPatient is on Invega 156 mg Injection (last dose was on 9/3), needs confirmation from NH.  Patient is an 82M currently resides in [facility]. PPH Schizophrenia & Dementia. + history of inpatient admissions last 7/22-8/19 at [facility] post admission to [facility] for dehydration/SI/FTT 7/6-7/22I. Admitted at [facility] and seen by CL. Patient was admitted initially to medicine (for the complaints of chest pain) & for bed non availability at [facility]. Then transferred to psych after medical clearance. Prior to last admission stopped eating/drinking in attempt to kill self. Unknown violence/aggression or substance use. no legal issues PMH- HTN, CHF, COPD & DM. BIBA referred by NH fro suicidal ideation.   Patient presents to the ED in the context of suicidal ideation patient endorses continued thought to die and stated he is going to stop eating and drinking at NH because he want to be dead. Patient presents at imminent risk to harm self requiring inpatient admission for safety and stabilization. Patient is maintained on  Remeron 37.5 mg qhs for depression (Standing order) & Zyprexa 2.5 mg BID PO PRN for agitation & Zyprexa 2.5 mg IM PRN for severe agitation. Patient is on Polyethylene glycol (Miralax) 17 gm standing for constipation and Simethicone PRN for distension. Patient is on Pureed diet and complianPatient is on Invega 156 mg Injection (last dose was on 9/3), needs confirmation from NH.     Hospital course in [facility]:  9/9: Pt. is cooperative, compliant. Continues to have suicidal ideation.   9/10: Remains cooperative, compliant. Continues with SI. Reports subjective appetite improvement.   9/12-- is depressed, still endorsing si with intent, unsure of plan Patient is an 82M currently resides in [facility]. PPH Schizophrenia & Dementia. + history of inpatient admissions last 7/22-8/19 at [facility] post admission to [facility] for dehydration/SI/FTT 7/6-7/22I. Admitted at [facility] and seen by CL. Patient was admitted initially to medicine (for the complaints of chest pain) & for bed non availability at [facility]. Then transferred to psych after medical clearance. Prior to last admission stopped eating/drinking in attempt to kill self. Unknown violence/aggression or substance use. no legal issues PMH- HTN, CHF, COPD & DM. BIBA referred by NH fro suicidal ideation.   Patient presents to the ED in the context of suicidal ideation patient endorses continued thought to die and stated he is going to stop eating and drinking at NH because he want to be dead. Patient presents at imminent risk to harm self requiring inpatient admission for safety and stabilization. Patient is maintained on  Remeron 37.5 mg qhs for depression (Standing order) & Zyprexa 2.5 mg BID PO PRN for agitation & Zyprexa 2.5 mg IM PRN for severe agitation. Patient is on Polyethylene glycol (Miralax) 17 gm standing for constipation and Simethicone PRN for distension. Patient is on Pureed diet and compliant. Patient is on Invega 156 mg Injection (last dose was on 9/3), needs confirmation from NH.     Hospital course in [facility]:  9/9: Pt. is cooperative, compliant. Continues to have suicidal ideation.   9/10: Remains cooperative, compliant. Continues with SI. Reports subjective appetite improvement.   9/12-- is depressed, still endorsing si with intent, unsure of plan Patient is an 82-year-old male currently residing at [facility]. PPH of schizophrenia and dementia. + history of inpatient admissions last 7/22-8/19 at [facility] post admission to [facility] for dehydration/SI/FTT 7/6-7/22I. Admitted at [facility] and seen by CL. Prior to last admission stopped eating/drinking in attempt to kill self. Unknown violence/aggression or substance use. no legal issues PMH- HTN, CHF, COPD & DM. BIBA referred by NH for suicidal ideation.   Patient presented to the ED in the context of suicidal ideation. Patient endorsed continued thought to die and stated he was going to stop eating and drinking at NH because he want to be dead. Patient unable to contract for safety. As patient presented imminent risk of self-harm, he was admitted to inpatient psych for safety and stabilization. Patient was at [facility] from 09/07-09/12 due to unavailability of psych bed.     Hospital course in [facility]:  9/9: Pt. is cooperative, compliant. Continues to have suicidal ideation.   9/10: Remains cooperative, compliant. Continues with SI. Reports subjective appetite improvement.   9/12-- is depressed, still endorsing si with intent, unsure of plan    09/13: On initial exam on the inpatient unit patient presents as depressed and is still endorsing SI with no immediate intent or plan.    Plan:  -Legals: admit to [facility] on 2PC  -Safety: routine observation, denies SI/HI/I/P on the unit. PRNs: Zyprexa 2.5mg PO/IM for agitation   -Psychiatry: Continue Remeron 37.5mg qhs; next dose Invega Sustenna in 10/3/2022 (received 156mg IM on 9/3/2022)  -Group, milieu, individual therapy as appropriate.  -Medical: continue home medications for medical comorbidities. Admission labs WNL.  -Dispo: pending clinical improvement.  Patient continues to require inpatient hospitalization for stabilization and safety.

## 2022-09-13 NOTE — DIETITIAN INITIAL EVALUATION ADULT - OTHER INFO
Pt resides at [facility] Home. H/O food/beverage refusal in attempt to kill self. Admitted to [facility] for SI. Pt noted to have involuntary movements of jaw. Medical history includes Type 2 diabetes, CHF, HTN, COPD, Schizophrenia. Saw Pt in day room. Pt reports ate some breakfast.  No GI distress noted. Per [facility], Pt with poor po intake. Pt noted to be edentulous. Current diet: Pureed. Pt had Bedside Swallow at [facility] on 7/12/22 with recommendations for Soft and bite sized with mildly thick liquids. Will recommend Speech and Swallow Evaluation. Obtained food preferences. Will implement them. Encouraged po intake. Pt verbalized understanding. 
Dyspnea

## 2022-09-13 NOTE — BH INPATIENT PSYCHIATRY ASSESSMENT NOTE - NSBHCHARTREVIEWVS_PSY_A_CORE FT
Vital Signs Last 24 Hrs  T(C): 36.6 (09-13-22 @ 08:41), Max: 36.7 (09-12-22 @ 23:38)  T(F): 97.8 (09-13-22 @ 08:41), Max: 98 (09-12-22 @ 23:38)  HR: 55 (09-12-22 @ 19:56) (55 - 62)  BP: 138/72 (09-12-22 @ 19:56) (138/72 - 150/93)  BP(mean): --  RR: 18 (09-13-22 @ 08:41) (16 - 18)  SpO2: 100% (09-13-22 @ 08:41) (96% - 100%)    Orthostatic VS  09-13-22 @ 08:41  Lying BP: --/-- HR: --  Sitting BP: 155/68 HR: 63  Standing BP: 140/81 HR: 75  Site: upper left arm  Mode: electronic  Orthostatic VS  09-12-22 @ 23:38  Lying BP: --/-- HR: --  Sitting BP: 140/79 HR: 64  Standing BP: 125/81 HR: 75  Site: --  Mode: --   Vital Signs Last 24 Hrs  T(C): 36.6 (09-13-22 @ 08:41), Max: 36.7 (09-12-22 @ 23:38)  T(F): 97.8 (09-13-22 @ 08:41), Max: 98 (09-12-22 @ 23:38)  HR: 55 (09-12-22 @ 19:56) (55 - 55)  BP: 138/72 (09-12-22 @ 19:56) (138/72 - 138/72)  BP(mean): --  RR: 18 (09-13-22 @ 08:41) (16 - 18)  SpO2: 100% (09-13-22 @ 08:41) (96% - 100%)    Orthostatic VS  09-13-22 @ 08:41  Lying BP: --/-- HR: --  Sitting BP: 155/68 HR: 63  Standing BP: 140/81 HR: 75  Site: upper left arm  Mode: electronic  Orthostatic VS  09-12-22 @ 23:38  Lying BP: --/-- HR: --  Sitting BP: 140/79 HR: 64  Standing BP: 125/81 HR: 75  Site: --  Mode: --   Vital Signs Last 24 Hrs  T(C): 36.8 (09-13-22 @ 15:57), Max: 36.8 (09-13-22 @ 15:57)  T(F): 98.2 (09-13-22 @ 15:57), Max: 98.2 (09-13-22 @ 15:57)  HR: 55 (09-12-22 @ 19:56) (55 - 55)  BP: 138/72 (09-12-22 @ 19:56) (138/72 - 138/72)  BP(mean): --  RR: 18 (09-13-22 @ 08:41) (16 - 18)  SpO2: 100% (09-13-22 @ 08:41) (96% - 100%)    Orthostatic VS  09-13-22 @ 08:41  Lying BP: --/-- HR: --  Sitting BP: 155/68 HR: 63  Standing BP: 140/81 HR: 75  Site: upper left arm  Mode: electronic  Orthostatic VS  09-12-22 @ 23:38  Lying BP: --/-- HR: --  Sitting BP: 140/79 HR: 64  Standing BP: 125/81 HR: 75  Site: --  Mode: --

## 2022-09-13 NOTE — PSYCHIATRIC REHAB INITIAL EVALUATION - NSBHPRRECOMMEND_PSY_ALL_CORE
Patient is a 82 year old single  male, retired, domiciled at [facility], with a hx of Schizophrenia and Dementia, with a long hx of depression and prior hospitalizations. Patient was transferred to [facility] after making suicidal statements. Patient stated that he wanted to stop eating and drinking. Patient's SI has been fluctuating, and patient did report feeling depressed and stated that he has felt this way for some time. Patient denied HI as well as psychotic symptoms. Patient and writer were unable to establish a collaborative rehabilitation goal, therefore an appropriate goal will be selected for patient. Psychiatric rehabilitation staff will continue to provide ongoing support and encouragement.

## 2022-09-13 NOTE — BH INPATIENT PSYCHIATRY ASSESSMENT NOTE - NSDCCRITERIA_PSY_ALL_CORE
Patient will be discharged once he does not express any suicidal ideations, improvement of mood and affect.

## 2022-09-13 NOTE — BH PATIENT PROFILE - FALL HARM RISK - HARM RISK INTERVENTIONS
Assistance with ambulation/Assistance OOB with selected safe patient handling equipment/Communicate Risk of Fall with Harm to all staff/Discuss with provider need for PT consult/Monitor gait and stability/Reinforce activity limits and safety measures with patient and family/Tailored Fall Risk Interventions/Visual Cue: Yellow wristband and red socks/Bed in lowest position, wheels locked, appropriate side rails in place/Call bell, personal items and telephone in reach/Instruct patient to call for assistance before getting out of bed or chair/Non-slip footwear when patient is out of bed/[place] to call system/Physically safe environment - no spills, clutter or unnecessary equipment/Purposeful Proactive Rounding/Room/bathroom lighting operational, light cord in reach

## 2022-09-13 NOTE — CONSULT NOTE ADULT - SUBJECTIVE AND OBJECTIVE BOX
RENATA SAUCEDA  82y  Male      Patient is a 82y old  Male who presents with a chief complaint of depression/SI.     Patient initially admitted to [facility] for intermittent CP which has resolved in the hospital. Per chart review, negative troponin and no acute EKG changes. Patient transferred to [facility] for ongoing psychiatric management.       PAST MEDICAL/SURGICAL HISTORY  PAST MEDICAL & SURGICAL HISTORY:  HTN (hypertension)      COPD, severity to be determined      DM (diabetes mellitus)      Schizophrenia      CHF (congestive heart failure)      No significant past surgical history      REVIEW OF SYSTEMS:    CONSTITUTIONAL: No weakness, fevers or chills  EYES: No visual change  ENT: No vertigo or throat pain   NECK: No pain or stiffness  RESPIRATORY: No cough, wheezing, hemoptysis; No shortness of breath  CARDIOVASCULAR: No chest pain or palpitations  GASTROINTESTINAL: No abdominal or epigastric pain. No nausea, vomiting, or hematemesis; No diarrhea or constipation. No melena or hematochezia.  GENITOURINARY: No dysuria, frequency or hematuria  NEUROLOGICAL: No numbness or weakness  SKIN: No itching, rashes  MSK: no joint pain, full ROM  PSYCH: no anxiety no depression        T(C): 36.6 (09-13-22 @ 08:41), Max: 36.7 (09-12-22 @ 23:38)  HR: 55 (09-12-22 @ 19:56) (55 - 62)  BP: 138/72 (09-12-22 @ 19:56) (138/72 - 150/93)  RR: 18 (09-13-22 @ 08:41) (16 - 18)  SpO2: 100% (09-13-22 @ 08:41) (96% - 100%)  Wt(kg): --Vital Signs Last 24 Hrs  T(C): 36.6 (13 Sep 2022 08:41), Max: 36.7 (12 Sep 2022 23:38)  T(F): 97.8 (13 Sep 2022 08:41), Max: 98 (12 Sep 2022 23:38)  HR: 55 (12 Sep 2022 19:56) (55 - 62)  BP: 138/72 (12 Sep 2022 19:56) (138/72 - 150/93)  BP(mean): --  RR: 18 (13 Sep 2022 08:41) (16 - 18)  SpO2: 100% (13 Sep 2022 08:41) (96% - 100%)      PHYSICAL EXAM:  GENERAL: NAD, well-groomed, well-developed  HEAD:  Atraumatic, Normocephalic  EYES: EOMI, PERRLA, conjunctiva and sclera clear  ENMT: No tonsillar erythema, exudates, or enlargement; Moist mucous membranes, Good dentition, No lesions  NECK: Supple, No JVD, Normal thyroid  NERVOUS SYSTEM:  Alert & Oriented X3, Good concentration; Motor Strength 5/5 B/L upper and lower extremities; DTRs 2+ intact and symmetric  CHEST/LUNG: Clear to percussion bilaterally; No rales, rhonchi, wheezing, or rubs  HEART: Regular rate and rhythm; No murmurs, rubs, or gallops  ABDOMEN: Soft, Nontender, Nondistended; Bowel sounds present  EXTREMITIES:  2+ Peripheral Pulses, No clubbing, cyanosis, or edema  PSYCH:  AAOx3, normal behavior, normal affect  SKIN: No rashes or lesions    Consultant(s) Notes Reviewed:  [x ] YES  [ ] NO  Care Discussed with Consultants/Other Providers [ x] YES  [ ] NO    LABS:  CBC       BMP      CMP      PT/INR      Amylase/Lipase            RADIOLOGY & ADDITIONAL TESTS:    Imaging Personally Reviewed:  [ ] YES  [ ] NO RENATA SAUCEDA  82y  Male      Patient is a 82y old  Male who presents with a chief complaint of depression/SI.     Patient initially admitted to [facility] for intermittent CP which has resolved in the hospital. Per chart review, negative troponin and no acute EKG changes. Patient transferred to [facility] for ongoing psychiatric management. Patient is seen and examined in the common area. Denies cp, sob, abd pain, n/v, dizziness, leg swelling. Reports taking metformin twice a day at home. Patient denies hx of CHF or cardiac problem.       PAST MEDICAL/SURGICAL HISTORY  PAST MEDICAL & SURGICAL HISTORY:  HTN (hypertension)      COPD, severity to be determined      DM (diabetes mellitus)      Schizophrenia      CHF (congestive heart failure)      No significant past surgical history    Family hx of DM.      REVIEW OF SYSTEMS:    CONSTITUTIONAL: No weakness, fevers or chills  EYES: No visual change  ENT: No vertigo or throat pain   NECK: No pain or stiffness  RESPIRATORY: No cough, wheezing, hemoptysis; No shortness of breath  CARDIOVASCULAR: No chest pain or palpitations  GASTROINTESTINAL: No abdominal or epigastric pain. No nausea, vomiting, or hematemesis; No diarrhea or constipation.  GENITOURINARY: No dysuria, frequency or hematuria  NEUROLOGICAL: No numbness or weakness  SKIN: No itching, rashes  MSK: no joint pain, no joint swelling  PSYCH: + depression        T(C): 36.6 (09-13-22 @ 08:41), Max: 36.7 (09-12-22 @ 23:38)  HR: 55 (09-12-22 @ 19:56) (55 - 62)  BP: 138/72 (09-12-22 @ 19:56) (138/72 - 150/93)  RR: 18 (09-13-22 @ 08:41) (16 - 18)  SpO2: 100% (09-13-22 @ 08:41) (96% - 100%)  Wt(kg): --Vital Signs Last 24 Hrs  T(C): 36.6 (13 Sep 2022 08:41), Max: 36.7 (12 Sep 2022 23:38)  T(F): 97.8 (13 Sep 2022 08:41), Max: 98 (12 Sep 2022 23:38)  HR: 55 (12 Sep 2022 19:56) (55 - 62)  BP: 138/72 (12 Sep 2022 19:56) (138/72 - 150/93)  BP(mean): --  RR: 18 (13 Sep 2022 08:41) (16 - 18)  SpO2: 100% (13 Sep 2022 08:41) (96% - 100%)      PHYSICAL EXAM:  GENERAL: NAD, well-groomed, elderly man sitting in chair  HEAD:  Atraumatic, Normocephalic  ENT: mouth smacking, moist mucosa  EYES: EOMI, conjunctiva and sclera clear. L eye lid droop (chronic)  NECK: Supple, No JVD,  NERVOUS SYSTEM:  Alert & Oriented X3, Good concentration  LUNG: Clear to percussion bilaterally; No rales, rhonchi, wheezing, or rubs  CARDIAC: Regular rate and rhythm; No murmurs, rubs, or gallops  ABDOMEN: Soft, Nontender, Nondistended; Bowel sounds present  EXTREMITIES:  2+ Peripheral Pulses, No clubbing, cyanosis, or edema  PSYCH:  AAOx3, normal behavior, flat affect  SKIN: No rashes or lesions    Consultant(s) Notes Reviewed:  [x ] YES  [ ] NO    LABS:  CBC       BMP      CMP      PT/INR      Amylase/Lipase            RADIOLOGY & ADDITIONAL TESTS:  EKG personally reviewed: TWI in V1--V3, grossly unchanged from prior.  No acute ischemic changes.

## 2022-09-13 NOTE — BH INPATIENT PSYCHIATRY ASSESSMENT NOTE - NSBHATTESTCOMMENTATTENDFT_PSY_A_CORE
Patient with ongoing depression, hopelessness, and suicidal ideation with no plan. No 1:1 needed at present as patient is agreeable to contact staff if he has any thoughts of hurting self on the unit. Will continue current management but will consider ECT as patient has shown limited progress in spite of being treatment adherent. Rest of the history, exam, assessment and plan as documented in Dr. Ernandez's note.

## 2022-09-13 NOTE — BH INPATIENT PSYCHIATRY ASSESSMENT NOTE - NSBHMETABOLIC_PSY_ALL_CORE_FT
BMI: BMI (kg/m2): 19.7 (09-12-22 @ 23:38)  HbA1c: A1C with Estimated Average Glucose Result: 5.8 % (07-26-22 @ 09:35)    Glucose: POCT Blood Glucose.: 112 mg/dL (09-09-22 @ 07:16)    BP: --  Lipid Panel: Date/Time: 08-16-22 @ 09:10  Cholesterol, Serum: 142  Direct LDL: --  HDL Cholesterol, Serum: 57  Total Cholesterol/HDL Ration Measurement: --  Triglycerides, Serum: 88

## 2022-09-13 NOTE — BH INPATIENT PSYCHIATRY ASSESSMENT NOTE - NSICDXBHSECONDARYDX_PSY_ALL_CORE
Suicide ideation   R45.851  Hypertension   I10  Diabetes mellitus   E11.9   Suicide ideation   S47.861   Suicide ideation   R45.851  H/O schizophrenia   Z86.59   Hypertension   I10  Diabetes mellitus   E11.9  Tardive dyskinesia   G24.01

## 2022-09-13 NOTE — BH INPATIENT PSYCHIATRY ASSESSMENT NOTE - RISK ASSESSMENT
Patient is endorsing Suicidal Ideations. In depressed mood and congruent depressed affect.   Non modifiable risk factors: recent suicidal behavior, recent inpatient admission, unable to contract for safety, continued SI  Protective factors: in treatment, at baseline compliant with medication, on GARCIA, has a psychiatrist

## 2022-09-13 NOTE — BH INPATIENT PSYCHIATRY ASSESSMENT NOTE - HPI (INCLUDE ILLNESS QUALITY, SEVERITY, DURATION, TIMING, CONTEXT, MODIFYING FACTORS, ASSOCIATED SIGNS AND SYMPTOMS)
Patient is an 82 M currently resides in [facility]. PPH Schizophrenia & Dementia. + history of inpatient admissions last 7/22-8/19 at [facility] post admission to [facility] for dehydration/SI/FTT 7/6-7/22I. Admitted at [facility] and seen by CL. Prior to last admission stopped eating/drinking in attempt to kill self. Unknown violence/aggression or substance use. no legal issues PMH- HTN, CHF, COPD & DM. BIBA referred by NH fro suicidal ideation.   Patient presents to the ED in the context of suicidal ideation patient endorses continued thought to die and stated he is going to stop eating and drinking at NH because he want to be dead. Patient unable to contract for safety. Patient presents at imminent risk to self requiring inpatient admission for safety and stabilization. No beds available psychiatrically- patient to be admitted medically.  risk- high, suicidal, on 1:1, needs [facility] admission.     Hospital course in [facility]:  9/9: Pt. is cooperative, compliant. Continues to have suicidal ideation.   9/10: Remains cooperative, compliant. Continues with SI. Reports subjective appetite improvement.   9/12-- is depressed, still endorsing si with intent, unsure of plan    On Initial Evaluation at [facility]:  Patient was seen and evaluated by the resident and the attending physician. Patient looks depressed, speaks in monotonous pattern, with stuttering. Patient had lip smacking tics, no perioral movements were noted during the interview. Patient is AAXO3 stating he was admitted initially at [facility] then just came yesterday to [facility]. Patient knows the year and the exact date, and knows what time of the day was when writer conducted the interview Patient endorsed suicidal ideations for more than 2 years, being depressed, and had struggles with depression for long time.        Patient is an 82 M currently resides in [facility]. PPH Schizophrenia & Dementia. + history of inpatient admissions last 7/22-8/19 at [facility] post admission to [facility] for dehydration/SI/FTT 7/6-7/22I. Admitted at [facility] and seen by CL. Prior to last admission stopped eating/drinking in attempt to kill self. Unknown violence/aggression or substance use. no legal issues PMH- HTN, CHF, COPD & DM. BIBA referred by NH fro suicidal ideation.   Patient presents to the ED in the context of suicidal ideation patient endorses continued thought to die and stated he is going to stop eating and drinking at NH because he want to be dead. Patient unable to contract for safety. Patient presents at imminent risk to self requiring inpatient admission for safety and stabilization. No beds available psychiatrically- patient to be admitted medically.  risk- high, suicidal, on 1:1, needs [facility] admission.       On Initial Evaluation at [facility]:  Patient was seen and evaluated by the resident and the attending physician. Patient looks depressed, speaks in monotonous pattern, with stuttering. Patient had lip smacking tics, no perioral movements were noted during the interview. Patient is AAXO3 stating he was admitted initially at [facility] then just came yesterday to [facility]. Patient knows the year and the exact date, and knows what time of the day was when writer conducted the interview. Patient stated he has been living in the NH for 3 and half years now. Patient endorsed suicidal ideations for more than 2 years, being depressed, and had struggles with depression for long time. He added that he had intentions to throw him self off the bridge in 1970s, but he could not. Patient denied any Auditory/visual hallucinations. Denied any homicidal ideations. Pt does not express any manic symptoms or paranoid ideations at the mean time.     Mental Status Examination:  81 YO  Male stated to his age, average built, normal gait, in no acute distress. Patient is calm, behaviorally controlled and superficially cooperative. Patient has motor tics "Lips smacking". No manifestations of perioral TD. Patient expressed depressed mood with congruent affect, and poverty of speech, both quantitative and qualitative. Patient endorsed passive suicidal ideations, by willing to be de dead. He does not have serious intention, any active plans. Patient denied any VH/AH, Homicidal ideations.  Patient has poor insight and judgement, with fair impulse control during Interview.        Patient is an 82 M currently resides in [facility]. PPH Schizophrenia & Dementia. + history of inpatient admissions last 7/22-8/19 at [facility] post admission to [facility] for dehydration/SI/FTT 7/6-7/22I. Admitted at [facility] and seen by CL. Prior to last admission stopped eating/drinking in attempt to kill self. Unknown violence/aggression or substance use. no legal issues PMH- HTN, CHF, COPD & DM. BIBA referred by NH fro suicidal ideation.   Patient presents to the ED in the context of suicidal ideation patient endorses continued thought to die and stated he is going to stop eating and drinking at NH because he want to be dead. Patient unable to contract for safety. Patient presents at imminent risk to self requiring inpatient admission for safety and stabilization. No beds available psychiatrically- patient to be admitted medically.  risk- high, suicidal, on 1:1, needs [facility] admission.       On Initial Evaluation at [facility]:  Patient was seen and evaluated by the resident and the attending physician. Patient looks depressed, speaks in monotonous pattern, with stuttering. Patient had lip smacking tics, no perioral movements were noted during the interview. Patient is AAXO3 stating he was admitted initially at [facility] then just came yesterday to [facility]. Patient knows the year and the exact date, and knows what time of the day was when writer conducted the interview. Patient stated he has been living in the NH for 3 and half years now. Patient endorsed suicidal ideations for more than 2 years, being depressed, and had struggles with depression for long time. He added that he had intentions to throw him self off the bridge in 1970s, but he could not. Patient denied any Auditory/visual hallucinations. Denied any homicidal ideations. Pt does not express any manic symptoms or paranoid ideations at the mean time.     Mental Status Examination:  81 YO  Male stated to his age, average built, normal gait, in no acute distress. Patient is calm, behaviorally controlled and superficially cooperative. Patient has motor tics "Lips smacking". No manifestations of perioral TD. Patient expressed depressed mood with congruent affect, and poverty of speech, both quantitative and qualitative. Patient endorsed passive suicidal ideations, by willing to be de dead. He does not have serious intention, any active plans. Patient denied any VH/AH, Homicidal ideations/ paranoid ideations. Patient has poor insight and judgement, with fair impulse control during Interview.        Patient is an 82-year-old male currently residing at [facility]. PPH of schizophrenia and dementia. + history of inpatient admissions last 7/22-8/19 at [facility] post admission to [facility] for dehydration/SI/FTT 7/6-7/22I. Admitted at [facility] and seen by CL. Prior to last admission stopped eating/drinking in attempt to kill self. Unknown violence/aggression or substance use. no legal issues PMH- HTN, CHF, COPD & DM. BIBA referred by NH for suicidal ideation.   Patient presented to the ED in the context of suicidal ideation. Patient endorsed continued thought to die and stated he was going to stop eating and drinking at NH because he want to be dead. Patient unable to contract for safety. As patient presented imminent risk of self-harm, he was admitted to inpatient psych for safety and stabilization.     On Initial Evaluation at [facility]:  Patient was seen and evaluated by the resident and the attending physician. Patient looks depressed, speaks in monotonous pattern, with stuttering. Patient had lip smacking tics, no perioral movements were noted during the interview. Patient is AAXO3 stating he was admitted initially at [facility] then just came yesterday to [facility]. Patient knows the year and the exact date, and knows what time of the day was when writer conducted the interview. Patient stated he has been living in the NH for 3 and half years now. Patient endorsed suicidal ideations for more than 2 years, being depressed, and had struggles with depression for long time. He added that he had intentions to throw him self off the bridge in 1970s, but he could not. Patient denied any Auditory/visual hallucinations. Denied any homicidal ideations. Pt does not express any manic symptoms or paranoid ideations at the mean time.     Mental Status Examination:  83 YO  Male stated to his age, average built, normal gait, in no acute distress. Patient is calm, behaviorally controlled and superficially cooperative. Patient has motor tics "Lips smacking". No manifestations of perioral TD. Patient expressed depressed mood with congruent affect, and poverty of speech, both quantitative and qualitative. Patient endorsed passive suicidal ideations, by willing to be de dead. He does not have serious intention, any active plans. Patient denied any VH/AH, Homicidal ideations/ paranoid ideations. Patient has poor insight and judgement, with fair impulse control during Interview.

## 2022-09-13 NOTE — BH INPATIENT PSYCHIATRY ASSESSMENT NOTE - CURRENT MEDICATION
MEDICATIONS  (STANDING):  budesonide  80 MICROgram(s)/formoterol 4.5 MICROgram(s) Inhaler 2 Puff(s) Inhalation two times a day  metFORMIN 500 milliGRAM(s) Oral two times a day  mirtazapine 37.5 milliGRAM(s) Oral at bedtime  polyethylene glycol 3350 17 Gram(s) Oral daily    MEDICATIONS  (PRN):  OLANZapine 2.5 milliGRAM(s) Oral two times a day PRN agitation  OLANZapine Injectable 2.5 milliGRAM(s) IntraMuscular once PRN severe agitation  simethicone 80 milliGRAM(s) Chew three times a day PRN Upset Stomach

## 2022-09-13 NOTE — BH SOCIAL WORK INITIAL PSYCHOSOCIAL EVALUATION - OTHER PAST PSYCHIATRIC HISTORY (INCLUDE DETAILS REGARDING ONSET, COURSE OF ILLNESS, INPATIENT/OUTPATIENT TREATMENT)
Reported past psychiatric admissions and outpatient treatment for diagnosis of schizophrenia.  Pt and sister are unable to provide exact details.  Pt has lived in [facility] since 2019, some type of group home setting prior to that as per sister.  Pt currently followed by Dr. Moeller onsite at [facility] [phone]

## 2022-09-13 NOTE — DIETITIAN INITIAL EVALUATION ADULT - NS FNS DIET ORDER
Diet, Pureed:   Consistent Carbohydrate {No Snacks} (CSTCHO)  DASH/TLC {Sodium & Cholesterol Restricted} (DASH) (09-13-22 @ 00:30)

## 2022-09-13 NOTE — BH PATIENT PROFILE - STATED REASON FOR ADMISSION
Patient admitted to [facility] with Dx of  Schizophrenia & Dementia. The reason for current admission as per record ,pt expressed S/I by starving. pt had  Prior to  admission to [facility] , stopped eating/drinking in attempt to kill self. Patient denies psychotic or manic symptoms. Pt denied about S/I but stated /feeling depressed. Medical Dx  PMH- HTN, CHF, COPD & DM .No c/o of pain or other discomfort noted. Co operative to admission process.

## 2022-09-14 PROCEDURE — 74230 X-RAY XM SWLNG FUNCJ C+: CPT | Mod: 26

## 2022-09-14 PROCEDURE — 99232 SBSQ HOSP IP/OBS MODERATE 35: CPT | Mod: GC

## 2022-09-14 RX ADMIN — METFORMIN HYDROCHLORIDE 500 MILLIGRAM(S): 850 TABLET ORAL at 10:12

## 2022-09-14 RX ADMIN — POLYETHYLENE GLYCOL 3350 17 GRAM(S): 17 POWDER, FOR SOLUTION ORAL at 10:13

## 2022-09-14 RX ADMIN — BUDESONIDE AND FORMOTEROL FUMARATE DIHYDRATE 2 PUFF(S): 160; 4.5 AEROSOL RESPIRATORY (INHALATION) at 20:50

## 2022-09-14 RX ADMIN — MIRTAZAPINE 37.5 MILLIGRAM(S): 45 TABLET, ORALLY DISINTEGRATING ORAL at 20:48

## 2022-09-14 RX ADMIN — METFORMIN HYDROCHLORIDE 500 MILLIGRAM(S): 850 TABLET ORAL at 20:49

## 2022-09-14 NOTE — BH INPATIENT PSYCHIATRY PROGRESS NOTE - PRN MEDS
MEDICATIONS  (PRN):  OLANZapine 2.5 milliGRAM(s) Oral two times a day PRN agitation  OLANZapine Injectable 2.5 milliGRAM(s) IntraMuscular once PRN severe agitation  simethicone 80 milliGRAM(s) Chew three times a day PRN Upset Stomach

## 2022-09-14 NOTE — ECT CONSULT NOTE - OTHER PAST PSYCHIATRIC HISTORY (INCLUDE DETAILS REGARDING ONSET, COURSE OF ILLNESS, INPATIENT/OUTPATIENT TREATMENT)
Patient is an 82M domiciled in [facility]. PPH Schizophrenia & Dementia, h/o inpatient admissions last 7/22-8/19 at [facility] post admission to [facility] for dehydration/SI/FTT 7/6-7/22I - admitted at [facility] and seen by CL. Prior to last admission stopped eating/drinking in attempt to kill self. Unknown violence/aggression or substance use.  no legal issues BIBA referred by nursing home for suicidal ideation 9/6/22.  Patient presented to the ED in the context of suicidal ideation -- patient endorsed continued thought to die and stated he is going to stop eating and drinking because he want to be dead. He was admitted to [facility] while awaiting in psychiatric bed and followed by CL service again - since admission he continues to voice suicidal ideation, on interview today he was lying in bed, disengaged from unit activities, in a darkened room. He perseveratively repeated the phrase, "I wanna die!" and had no other speech. Per today's  physician PN, "When he was asked about mood, he stated he is feeling ok. He stated he feels suicidal sometimes. The patient reported adequate sleep and appetite. MMSE was conducted and it was 29 (He could not repeat the phrase "No ifs, ands or Buts"). Patient could write short sentence containing a subject and verb. He did substraction using 5 instead of 7. He managed to do 100-7= 93, but could not go further. However, he could recall the letters for WORLD backwards correctly"  Pt's baseline: he was a ." As per the medical record and interview with the patient on the psychiatric [place] today, patient is an 82M domiciled in [facility]. PPH Schizophrenia & Dementia, h/o inpatient admissions last 7/22-8/19 at [facility] post admission to [facility] for dehydration/SI/FTT 7/6-7/22I - admitted at [facility] and seen by CL. Prior to last admission stopped eating/drinking in attempt to kill self. Unknown violence/aggression or substance use.  no legal issues BIBA referred by nursing home for suicidal ideation 9/6/22.  Patient presented to the ED in the context of suicidal ideation -- patient endorsed continued thought to die and stated he is going to stop eating and drinking because he want to be dead. He was admitted to [facility] while awaiting inpt psychiatric bed and followed by CL service again - since admission he continues to voice suicidal ideation, on interview today he was lying in bed, disengaged from unit activities, in a darkened room. He perseveratively repeated the phrase, "I wanna die!" and had no other speech. Per today's  physician PN, "When he was asked about mood, he stated he is feeling ok. He stated he feels suicidal sometimes. The patient reported adequate sleep and appetite. MMSE was conducted and it was 29 (He could not repeat the phrase "No ifs, ands or Buts"). Patient could write short sentence containing a subject and verb. He did substraction using 5 instead of 7. He managed to do 100-7= 93, but could not go further. However, he could recall the letters for WORLD backwards correctly"  Pt's baseline: he was a ."

## 2022-09-14 NOTE — BH SCALES AND SCREENS - NSMMSELANG_PSY_ALL_CORE
Named object #1 (e.g.Pen)/Named object #2 (e.g.Watch)/Completed command (Stage #1)/Completed command (Stage #2)/Completed command (Stage #3)/Patient able to read and obey a written command/Able to write a sentence that is sensible with a subject and a verb

## 2022-09-14 NOTE — ECT CONSULT NOTE - NSECTMENTALSTATUSEXAM_PSY_ALL_CORE
The patient appears stated age, fair hygiene and dressed appropriately in hospital clothes.  The patient was mildly agitated, uncooperative with the interview and maintained very poor eye contact.  Psychomotor retardation noted.  He remained prone in bed perseveratively repeating "I wanna die!" for several minutes in response to all queries.  The patient's speech was fluent, normal in tone, rate, and volume.  The patient's mood was not ascertained.  Affect is constricted, irritable.  The patient's thought form is perseverative.  Insight is nil.  Judgment is nil.  Impulse control has been fair on the unit.

## 2022-09-14 NOTE — PHYSICAL THERAPY INITIAL EVALUATION ADULT - LEVEL OF CONSCIOUSNESS, REHAB EVAL
Blood glucose taken via glucometer for patient before bedtime and showed blood glucose of 39. Given 3 cups of apple juice and crackers. Sugar rechecked at 2230 and it was 122mg/dl.
FS at 05:41 = 78
FS = 544, then immediate FS = 564
alert

## 2022-09-14 NOTE — PHYSICAL THERAPY INITIAL EVALUATION ADULT - PERTINENT HX OF CURRENT PROBLEM, REHAB EVAL
Patient is an 82-year-old male currently residing at [facility]. PPH of schizophrenia and dementia. + history of inpatient admissions last 7/22-8/19 at [facility] post admission to [facility] for dehydration/SI/FTT 7/6-7/22. Admitted at [facility] and seen by CL. Prior to last admission stopped eating/drinking in attempt to kill self. Unknown violence/aggression or substance use. no legal issues PMH- HTN, CHF, COPD & DM. BIBA referred by NH for suicidal ideation. Patient referred to PT for evaluation.

## 2022-09-14 NOTE — ECT CONSULT NOTE - NSECTASSESSRECOMM_PSY_ALL_CORE
A course of ECT is a reasonable choice.    The bulk of consult time was spent in counseling and/or coordination of care, including but not limited to discussing with patient the risks and benefits of ECT, the usual trajectory of response with acute course of ECT, obtaining informed consent for ECT, and reviewing ECT fasting guidelines. Due to his mental status and ongoing illness, at the time of consultation the patient appeared to lack capacity to appropriately process information re. ECT treatment. Patient is at present refusing to allow team to contact his family for proxy decision-making purposes. Referring psychiatrist was contacted. Re-consult as needed if patient regains capacity or permits collateral contacts for decision-making. Treatment over objection, after obtaining court order to do so, may be a consideration as well.

## 2022-09-14 NOTE — BH INPATIENT PSYCHIATRY PROGRESS NOTE - NSBHASSESSSUMMFT_PSY_ALL_CORE
Patient is an 82-year-old male currently residing at [facility]. PPH of schizophrenia and dementia. + history of inpatient admissions last 7/22-8/19 at [facility] post admission to [facility] for dehydration/SI/FTT 7/6-7/22I. Admitted at [facility] and seen by CL. Prior to last admission stopped eating/drinking in attempt to kill self. Unknown violence/aggression or substance use. no legal issues PMH- HTN, CHF, COPD & DM. BIBA referred by NH for suicidal ideation.   Patient presented to the ED in the context of suicidal ideation. Patient endorsed continued thought to die and stated he was going to stop eating and drinking at NH because he want to be dead. Patient unable to contract for safety. As patient presented imminent risk of self-harm, he was admitted to inpatient psych for safety and stabilization. Patient was at [facility] from 09/07-09/12 due to unavailability of psych bed.     Hospital course in [facility]:  9/9: Pt. is cooperative, compliant. Continues to have suicidal ideation.   9/10: Remains cooperative, compliant. Continues with SI. Reports subjective appetite improvement.   9/12-- is depressed, still endorsing si with intent, unsure of plan    Hospital course in [facility]  9/14:  On subsequent evaluation. Patient is still complaining of being depressed but interrupted and not consistnet. Patient enodrsed having SI which are passive, not active. Patient denied any intentions or plans. MMSE was conducted and patient has a score of 29 (normal cognitive functioning) making dementia less likely.     09/13: On initial exam on the inpatient unit patient presents as depressed and is still endorsing SI with no immediate intent or plan.    Plan:  -Safety: routine observation, denies SI/HI/I/P on the unit. PRNs: Zyprexa 2.5mg PO/IM for agitation   -Psychiatry: Continue Remeron 37.5mg qhs; next dose Invega Sustenna in 10/3/2022 (received 156mg IM on 9/3/2022)  -Group, milieu, individual therapy as appropriate.  -Medical: continue home medications for medical comorbidities. Admission labs WNL.  -Dispo: pending stabilization.  Patient continues to require inpatient hospitalization for stabilization and safety.   Patient is an 82-year-old male currently residing at [facility]. PPH of depression and psychosis. + history of inpatient admissions last 7/22-8/19 at [facility] post admission to [facility] for dehydration/SI/FTT 7/6-7/22I. Admitted at [facility] and seen by CL. Prior to last admission stopped eating/drinking in attempt to kill self. Unknown violence/aggression or substance use. no legal issues PMH- HTN, CHF, COPD & DM. BIBA referred by NH for suicidal ideation.   Patient presented to the ED in the context of suicidal ideation. Patient endorsed continued thought to die and stated he was going to stop eating and drinking at NH because he wants to be dead. Patient unable to contract for safety. As patient presented imminent risk of self-harm, he was admitted to inpatient psych for safety and stabilization. Patient was at [facility] from 09/07-09/12 due to unavailability of psych bed.     Hospital course in [facility]:  9/9: Pt. is cooperative, compliant. Continues to have suicidal ideation.   9/10: Remains cooperative, compliant. Continues with SI. Reports subjective appetite improvement.   9/12-- is depressed, still endorsing si with intent, unsure of plan    Hospital course in [facility]  9/14:  On subsequent evaluation. Patient is still complaining of being depressed but interrupted and not consistent. Patient endorsed having SI which are passive, not active. Patient denied any intentions or plans. MMSE was conducted and patient has a score of 29 (normal cognitive functioning) making dementia less likely.     09/13: On initial exam on the inpatient unit patient presents as depressed and is still endorsing SI with no immediate intent or plan.    Plan:  -Safety: routine observation, denies SI/HI/I/P on the unit. PRNs: Zyprexa 2.5mg PO/IM for agitation   -Psychiatry: Continue Remeron 37.5mg qhs; next dose Invega Sustenna in 10/3/2022 (received 156mg IM on 9/3/2022)  -Group, milieu, individual therapy as appropriate.  -Medical: continue home medications for medical comorbidities. Admission labs WNL.  -Dispo: pending stabilization.  Patient continues to require inpatient hospitalization for stabilization and safety.

## 2022-09-14 NOTE — BH INPATIENT PSYCHIATRY PROGRESS NOTE - NSBHCHARTREVIEWVS_PSY_A_CORE FT
Vital Signs Last 24 Hrs  T(C): 36.1 (09-14-22 @ 08:27), Max: 36.8 (09-13-22 @ 15:57)  T(F): 97 (09-14-22 @ 08:27), Max: 98.2 (09-13-22 @ 15:57)  HR: --  BP: --  BP(mean): --  RR: 18 (09-14-22 @ 08:27) (18 - 18)  SpO2: 97% (09-14-22 @ 08:27) (97% - 97%)    Orthostatic VS  09-14-22 @ 08:27  Lying BP: --/-- HR: --  Sitting BP: 144/87 HR: 70  Standing BP: 130/77 HR: 78  Site: --  Mode: --  Orthostatic VS  09-13-22 @ 08:41  Lying BP: --/-- HR: --  Sitting BP: 155/68 HR: 63  Standing BP: 140/81 HR: 75  Site: upper left arm  Mode: electronic  Orthostatic VS  09-12-22 @ 23:38  Lying BP: --/-- HR: --  Sitting BP: 140/79 HR: 64  Standing BP: 125/81 HR: 75  Site: --  Mode: --   Vital Signs Last 24 Hrs  T(C): 36.1 (09-14-22 @ 15:26), Max: 36.8 (09-13-22 @ 15:57)  T(F): 97 (09-14-22 @ 15:26), Max: 98.2 (09-13-22 @ 15:57)  RR: 18 (09-14-22 @ 08:27) (18 - 18)  SpO2: 97% (09-14-22 @ 08:27) (97% - 97%)    Orthostatic VS  09-14-22 @ 08:27  Sitting BP: 144/87 HR: 70  Standing BP: 130/77 HR: 78

## 2022-09-14 NOTE — BH INPATIENT PSYCHIATRY PROGRESS NOTE - NSBHFUPINTERVALHXFT_PSY_A_CORE
Patient was seen and evaluated by the resident and the attending psychiatrist. Patient went earlier to speech & Swallow eval. He stated he could swallow cookies without difficulties. When he was asked about mood, he stated he is feeling ok. He stated he feels suicidal sometimes. The patient reported adequate sleep and appetite. MMSE was conducted and it was 29 (He could not repeat the phrase "No ifs, ands or Buts"). Patient could write short sentence containing a subject and verb. He did substraction using 5 instead of 7. he managed to do 100-7= 93, but could not go further. However, he could recall the letters for WORLD backwards correctly"  Pt's baseline: he was a .  Patient was seen and evaluated by the resident and the attending psychiatrist. Patient went earlier to speech & Swallow eval. He stated he could swallow cookies without difficulties. When he was asked about mood, he stated he is feeling ok. He stated he feels suicidal sometimes. The patient reported adequate sleep and appetite. MMSE was conducted and it was 29 (He could not repeat the phrase "No ifs, ands or Buts"). Patient could write short sentence containing a subject and verb. He did substraction using 5 instead of 7. He managed to do 100-7= 93, but could not go further. However, he could recall the letters for WORLD backwards correctly"  Pt's baseline: he was a .

## 2022-09-14 NOTE — ECT CONSULT NOTE - NSECTREASONCONS_PSY_ALL_CORE
Rapid resolution of symptoms is necessary/Severe depression/Suicidality/Treatment resistant depression

## 2022-09-14 NOTE — BH INPATIENT PSYCHIATRY PROGRESS NOTE - NSBHMETABOLIC_PSY_ALL_CORE_FT
BMI: BMI (kg/m2): 19.7 (09-12-22 @ 23:38)  HbA1c: A1C with Estimated Average Glucose Result: 5.8 % (07-26-22 @ 09:35)    Glucose: POCT Blood Glucose.: 112 mg/dL (09-09-22 @ 07:16)    BP: --  Lipid Panel: Date/Time: 08-16-22 @ 09:10  Cholesterol, Serum: 142  Direct LDL: --  HDL Cholesterol, Serum: 57  Total Cholesterol/HDL Ration Measurement: --  Triglycerides, Serum: 88   BMI: BMI (kg/m2): 19.7 (09-12-22 @ 23:38)  HbA1c: A1C with Estimated Average Glucose Result: 5.8 % (07-26-22 @ 09:35)  Glucose: POCT Blood Glucose.: 112 mg/dL (09-09-22 @ 07:16)  Lipid Panel: Date/Time: 08-16-22 @ 09:10  Cholesterol, Serum: 142  HDL Cholesterol, Serum: 57  Triglycerides, Serum: 88

## 2022-09-14 NOTE — ECT CONSULT NOTE - NSICDXBHSECONDARYDX_PSY_ALL_CORE
Suicide ideation   R45.851  Hypertension   I10  Diabetes mellitus   E11.9  H/O schizophrenia   Z86.59  Tardive dyskinesia   G24.01

## 2022-09-15 LAB — SARS-COV-2 RNA SPEC QL NAA+PROBE: SIGNIFICANT CHANGE UP

## 2022-09-15 PROCEDURE — 99232 SBSQ HOSP IP/OBS MODERATE 35: CPT | Mod: GC

## 2022-09-15 PROCEDURE — 99232 SBSQ HOSP IP/OBS MODERATE 35: CPT

## 2022-09-15 PROCEDURE — 93010 ELECTROCARDIOGRAM REPORT: CPT

## 2022-09-15 RX ADMIN — METFORMIN HYDROCHLORIDE 500 MILLIGRAM(S): 850 TABLET ORAL at 09:49

## 2022-09-15 RX ADMIN — METFORMIN HYDROCHLORIDE 500 MILLIGRAM(S): 850 TABLET ORAL at 20:44

## 2022-09-15 RX ADMIN — MIRTAZAPINE 37.5 MILLIGRAM(S): 45 TABLET, ORALLY DISINTEGRATING ORAL at 20:44

## 2022-09-15 RX ADMIN — POLYETHYLENE GLYCOL 3350 17 GRAM(S): 17 POWDER, FOR SOLUTION ORAL at 09:50

## 2022-09-15 NOTE — BH INPATIENT PSYCHIATRY PROGRESS NOTE - NSBHASSESSSUMMFT_PSY_ALL_CORE
Patient is an 82-year-old male currently residing at [facility]. PPH of depression and psychosis. + history of inpatient admissions last 7/22-8/19 at [facility] post admission to [facility] for dehydration/SI/FTT 7/6-7/22I. Admitted at [facility] and seen by CL. Prior to last admission stopped eating/drinking in attempt to kill self. Unknown violence/aggression or substance use. no legal issues PMH- HTN, CHF, COPD & DM. BIBA referred by NH for suicidal ideation.   Patient presented to the ED in the context of suicidal ideation. Patient endorsed continued thought to die and stated he was going to stop eating and drinking at NH because he wants to be dead. Patient unable to contract for safety. As patient presented imminent risk of self-harm, he was admitted to inpatient psych for safety and stabilization. Patient was at [facility] from 09/07-09/12 due to unavailability of psych bed.     Hospital course in [facility]:  9/9: Pt. is cooperative, compliant. Continues to have suicidal ideation.   9/10: Remains cooperative, compliant. Continues with SI. Reports subjective appetite improvement.   9/12-- is depressed, still endorsing si with intent, unsure of plan    09/15 Clinical Update  Patient still isolative and depressed, but not endorsing SI today. Patient remains in his room most of the day and his appetite is sub-optimal as per staff. He is currently awaiting clearance for ECT. Neurologist Dr. Hurd consulted this afternoon regarding a small subdural hematoma on CT head from 08/22. As per Dr. Hurd, given the small size of the hematoma (0.3mm), lack of mass effect as well as lack of focal neurological deficits, there is no current contraindication for ECT.    Plan:  -Safety: routine observation, denies SI/HI/I/P on the unit. PRNs: Zyprexa 2.5mg PO/IM for agitation   -Psychiatry: Continue Remeron 37.5mg qhs; next dose Invega Sustenna in 10/3/2022 (received 156mg IM on 9/3/2022). 1st ECT scheduled for tomorrow 09/16  -Group, milieu, individual therapy as appropriate.  -Medical: continue home medications for medical comorbidities. Admission labs WNL.  -Dispo: pending stabilization. Patient continues to require inpatient hospitalization for stabilization and safety.

## 2022-09-15 NOTE — CHART NOTE - NSCHARTNOTEFT_GEN_A_CORE
MEDICAL DIRECTOR OF PSYCHIATRY NOTE    The patient was seen today to assess the appropriateness of a course of ECT treatments.      Briefly, the Patient is an 82-year-old male currently residing at [facility]. PPH of depression and psychosis. + history of inpatient admissions last 7/22-8/19 at [facility] post admission to [facility] for dehydration/SI/FTT 7/6-7/22I. Admitted at [facility] and seen by CL. Prior to last admission stopped eating/drinking in attempt to kill self. Unknown violence/aggression or substance use. no legal issues PMH- HTN, CHF, COPD & DM. BIBA referred by NH for suicidal ideation.  Patient presented to the ED in the context of suicidal ideation. Patient endorsed continued thought to die and stated he was going to stop eating and drinking at NH because he wants to be dead. Patient unable to contract for safety. As patient presented imminent risk of self-harm, he was admitted to inpatient psych for safety and stabilization.   Patient has had trials of paxil, mirtazapine, olanzapine, invega with little improvement in depressive symptoms which include suicidal ideation but no plan or intent or current time.    The treatment team feels the patient’s symptoms would benefit from ECT, with little risk.      The patient has been medically cleared for ECT.  The treatment was discussed with the patient and his sister who is his health care proxy.  The patient was unable to verbalize assent or dissent to the treatment due to her psychiatric condition as he kept repeating "I don't care, I am okay with whatever the doctor says".  The patient’s sister did agree to the treatment, and signed the consent on his behalf.       It is my view that the patient does not have capacity at this time to decide on a course of ECT treatments, but has not dissented to the treatment.  In this case, the benefits of the treatment outweigh the risks, and, as the family, as health care proxy, is also in agreement, that the treatment team can move forward with a course of ECT. MEDICAL DIRECTOR OF PSYCHIATRY NOTE    The patient was seen today to assess the appropriateness of a course of ECT treatments.      Briefly, the Patient is an 82-year-old male currently residing at [facility]. PPH of depression and psychosis. + history of inpatient admissions last 7/22-8/19 at [facility] post admission to [facility] for dehydration/SI/FTT 7/6-7/22I. Admitted at [facility] and seen by CL. Prior to last admission stopped eating/drinking in attempt to kill self. Unknown violence/aggression or substance use. no legal issues PMH- HTN, CHF, COPD & DM. BIBA referred by NH for suicidal ideation.  Patient presented to the ED in the context of suicidal ideation. Patient endorsed continued thought to die and stated he was going to stop eating and drinking at NH because he wants to be dead. Patient unable to contract for safety. As patient presented imminent risk of self-harm, he was admitted to inpatient psych for safety and stabilization.   Patient has had trials of paxil, mirtazapine, olanzapine, invega with little improvement in depressive symptoms which include suicidal ideation but no plan or intent or current time.    The treatment team feels the patient’s symptoms would benefit from ECT, with little risk.      The patient has been medically cleared for ECT.  The treatment was discussed with the patient and his 2 sisters (Elyse and Isadora) who is his health care proxy.  The patient was unable to verbalize assent or dissent to the treatment due to her psychiatric condition as he kept repeating "I don't care, I am okay with whatever the doctor says".  The patient’s sisters both said that they would like to talk to their brother prior to signing the consent for treatment and would contact the team when their decision was made final.       It is my view that the patient does not have capacity at this time to decide on a course of ECT treatments, but has not dissented to the treatment.  In this case, the benefits of the treatment outweigh the risks, and if the family makes a decision that they would like to pursue treatment with ECT than the treatment team can move forward with treatment.

## 2022-09-15 NOTE — BH INPATIENT PSYCHIATRY PROGRESS NOTE - NSBHCHARTREVIEWVS_PSY_A_CORE FT
Vital Signs Last 24 Hrs  T(C): 36.8 (09-15-22 @ 05:43), Max: 36.8 (09-15-22 @ 05:43)  T(F): 98.3 (09-15-22 @ 05:43), Max: 98.3 (09-15-22 @ 05:43)  HR: --  BP: --  BP(mean): --  RR: --  SpO2: --    Orthostatic VS  09-15-22 @ 05:43  Lying BP: --/-- HR: --  Sitting BP: 126/86 HR: 85  Standing BP: 134/73 HR: 71  Site: --  Mode: --  Orthostatic VS  09-14-22 @ 21:01  Lying BP: --/-- HR: --  Sitting BP: 155/74 HR: 62  Standing BP: 157/80 HR: 71  Site: --  Mode: --  Orthostatic VS  09-14-22 @ 08:27  Lying BP: --/-- HR: --  Sitting BP: 144/87 HR: 70  Standing BP: 130/77 HR: 78  Site: --  Mode: --   Vital Signs Last 24 Hrs  T(C): 36.8 (09-15-22 @ 05:43), Max: 36.8 (09-15-22 @ 05:43)  T(F): 98.3 (09-15-22 @ 05:43), Max: 98.3 (09-15-22 @ 05:43)    Orthostatic VS  09-15-22 @ 05:43  Sitting BP: 126/86 HR: 85  Standing BP: 134/73 HR: 71

## 2022-09-15 NOTE — PROGRESS NOTE ADULT - ASSESSMENT
82 year-old right-handed man, PPHx of dementia, depression schizophrenia, and suicidal ideation and PMH of COPD, DM, R eye cataract surgery (10 years ago) and HTN, here for suicidal ideation and intermittent chest pain. Patient was admitted to [facility] earlier this year on 7/22/2022-8/19/22 for fluctuating suicidal ideation.      #Chest pain  -resolved when patient was at [facility]  -negative trop and grossly unchanged EKG, low suspicion for ACS   -no prior echo in our system. pt denies hx of CHF. Spoke with Dr Camilo (PCP), patient has a mild form of HF (Dr. Camilo doesn't have the information on exact EF at this time) and doesn't require any standing medications at home. Currently euvolemic on exam and remains chest pain free    #DM  -FS wnl in hospital  -last A1C 5.8 in july  -okay to continue home metformin     #COPD  -respiratory status stable  -c/w home inhaler    #HTN  -per chart review, patient was on amlodipine in the hospital in july  -currently BP acceptable 126/86 today    #ECT medical evaluation: Patient's RCRI score is 1 and has 6% 30-day risk of death, MI, or cardiac arrest. Currently patient doesn't have any respiratory or cardiac symptoms, euvolemic on exam. EKG reviewed: sinus rhythm with PACs. HR 60-80s during his stay at [facility]. BP acceptable. It was noted that patient has a 0.3cm acute subdural hematoma on CTH in 08/2022, recommend neurology clearance for ECT. Given that ECT can be potentially life saving for him, patient is otherwise medically optimized to proceed with ECT pending neurology clearance.            82 year-old right-handed man, PPHx of dementia, depression schizophrenia, and suicidal ideation and PMH of COPD, DM, R eye cataract surgery (10 years ago) and HTN, here for suicidal ideation and intermittent chest pain. Patient was admitted to [facility] earlier this year on 7/22/2022-8/19/22 for fluctuating suicidal ideation.      #Chest pain  -resolved when patient was at [facility]  -negative trop and grossly unchanged EKG, low suspicion for ACS   -no prior echo in our system. pt denies hx of CHF. Spoke with Dr Camilo (PCP), patient has a mild form of HF (Dr. Camilo doesn't have the information on exact EF at this time) and doesn't require any standing medications at home. Currently euvolemic on exam and remains chest pain free    #DM  -FS wnl in hospital  -last A1C 5.8 in july  -okay to continue home metformin     #COPD  -respiratory status stable  -c/w home inhaler    #HTN  -per chart review, patient was on amlodipine in the hospital in july  -currently BP acceptable 126/86 today    #ECT medical evaluation: Patient's RCRI score is 1 and has 6% 30-day risk of death, MI, or cardiac arrest. Currently patient doesn't have any respiratory or cardiac symptoms, euvolemic on exam. EKG reviewed: sinus rhythm with PACs. HR 60-80s during his stay at [facility]. BP acceptable.       Anesthesia specific concerns : none   History of adverse reaction to anesthesia previously: none   CKD: none    It was noted that patient has a 0.3cm acute subdural hematoma on CTH in 08/2022, recommend neurology clearance for ECT. Given that ECT is a low risk procedure and can be potentially life saving for him, patient is otherwise medically optimized to proceed with ECT pending neurology clearance.      82 year-old right-handed man, PPHx of dementia, depression schizophrenia, and suicidal ideation and PMH of COPD, DM, R eye cataract surgery (10 years ago) and HTN, here for suicidal ideation and intermittent chest pain. Patient was admitted to [facility] earlier this year on 7/22/2022-8/19/22 for fluctuating suicidal ideation.      #Chest pain  -resolved when patient was at [facility]  -negative trop and grossly unchanged EKG, low suspicion for ACS   -no prior echo in our system. pt denies hx of CHF. Spoke with Dr Camilo (PCP), patient has a mild form of HF (Dr. Camilo doesn't have the information on exact EF at this time) and doesn't require any standing medications at home. Currently euvolemic on exam and remains chest pain free    #DM  -FS wnl in hospital  -last A1C 5.8 in july  -okay to continue home metformin     #COPD  -respiratory status stable  -c/w home inhaler    #HTN  -per chart review, patient was on amlodipine in the hospital in july  -currently BP acceptable 126/86 today    #hx of acute subdural hematoma  -seen on CTH in 8/2022  -neuro recs on 8/8/2022 appreciated: f/u with neuro sx post discharge    #ECT medical evaluation: Patient's RCRI score is 1 and has 6% 30-day risk of death, MI, or cardiac arrest. Currently patient doesn't have any respiratory or cardiac symptoms, euvolemic on exam. EKG reviewed: sinus rhythm with PACs. HR 60-80s during his stay at [facility]. BP acceptable.       Anesthesia specific concerns : none   History of adverse reaction to anesthesia previously: none   CKD: none    It was noted that patient has a 0.3cm acute subdural hematoma on CTH in 08/2022, recommend neurology clearance for ECT. Given that ECT is a low risk procedure and can be potentially life saving for him, patient is otherwise medically optimized to proceed with ECT pending neurology clearance.

## 2022-09-15 NOTE — PROGRESS NOTE ADULT - SUBJECTIVE AND OBJECTIVE BOX
PROGRESS NOTE:     Patient is a 82y old  Male who presents with a chief complaint of Bipolar disorder     (13 Sep 2022 12:47)      SUBJECTIVE / OVERNIGHT EVENTS: reports feeling fine.     ADDITIONAL REVIEW OF SYSTEMS:    MEDICATIONS  (STANDING):  budesonide  80 MICROgram(s)/formoterol 4.5 MICROgram(s) Inhaler 2 Puff(s) Inhalation two times a day  metFORMIN 500 milliGRAM(s) Oral two times a day  mirtazapine 37.5 milliGRAM(s) Oral at bedtime  polyethylene glycol 3350 17 Gram(s) Oral daily    MEDICATIONS  (PRN):  OLANZapine 2.5 milliGRAM(s) Oral two times a day PRN agitation  OLANZapine Injectable 2.5 milliGRAM(s) IntraMuscular once PRN severe agitation  simethicone 80 milliGRAM(s) Chew three times a day PRN Upset Stomach      CAPILLARY BLOOD GLUCOSE        I&O's Summary      PHYSICAL EXAM:  Vital Signs Last 24 Hrs  T(C): 36.8 (15 Sep 2022 05:43), Max: 36.8 (15 Sep 2022 05:43)  T(F): 98.3 (15 Sep 2022 05:43), Max: 98.3 (15 Sep 2022 05:43)  HR: --  BP: --  BP(mean): --  RR: --  SpO2: --        CONSTITUTIONAL: NAD, well-developed  RESPIRATORY: Normal respiratory effort; lungs are clear to auscultation bilaterally  CARDIOVASCULAR: Regular rate and rhythm, normal S1 and S2, no murmur/rub/gallop; No lower extremity edema; Peripheral pulses are 2+ bilaterally  ABDOMEN: Nontender to palpation, normoactive bowel sounds, no rebound/guarding; No hepatosplenomegaly  MUSCLOSKELETAL: no clubbing or cyanosis of digits; no joint swelling or tenderness to palpation  SKIN: no rash, erythema, lesion  PSYCH: A+O to person, place, and time; affect appropriate    LABS:                        14.2   4.39  )-----------( 227      ( 13 Sep 2022 10:42 )             42.8     09-13    137  |  97<L>  |  9   ----------------------------<  104<H>  4.0   |  27  |  0.93    Ca    9.7      13 Sep 2022 10:42    TPro  7.6  /  Alb  4.5  /  TBili  0.5  /  DBili  x   /  AST  16  /  ALT  13  /  AlkPhos  38<L>  09-13                RADIOLOGY & ADDITIONAL TESTS:  Results Reviewed:   Imaging Personally Reviewed:  Electrocardiogram Personally Reviewed:    COORDINATION OF CARE:  Care Discussed with Consultants/Other Providers [Y/N]:  Prior or Outpatient Records Reviewed [Y/N]:   PROGRESS NOTE:     Patient is a 82y old  Male who presents with a chief complaint of Bipolar disorder     (13 Sep 2022 12:47)      SUBJECTIVE / OVERNIGHT EVENTS: reports feeling fine. Denies sob, chest pain, bleeding/clotting issue, cardiac history. Ambulates on flat ground without issues. Had liver procedures (unable to elaborate on what procedure it was) and tonsil surgery in the past. No issue with anesthesia. Denies joint/neck pains.    ADDITIONAL REVIEW OF SYSTEMS:    MEDICATIONS  (STANDING):  budesonide  80 MICROgram(s)/formoterol 4.5 MICROgram(s) Inhaler 2 Puff(s) Inhalation two times a day  metFORMIN 500 milliGRAM(s) Oral two times a day  mirtazapine 37.5 milliGRAM(s) Oral at bedtime  polyethylene glycol 3350 17 Gram(s) Oral daily    MEDICATIONS  (PRN):  OLANZapine 2.5 milliGRAM(s) Oral two times a day PRN agitation  OLANZapine Injectable 2.5 milliGRAM(s) IntraMuscular once PRN severe agitation  simethicone 80 milliGRAM(s) Chew three times a day PRN Upset Stomach      CAPILLARY BLOOD GLUCOSE        I&O's Summary      PHYSICAL EXAM:  Vital Signs Last 24 Hrs  T(C): 36.8 (15 Sep 2022 05:43), Max: 36.8 (15 Sep 2022 05:43)  T(F): 98.3 (15 Sep 2022 05:43), Max: 98.3 (15 Sep 2022 05:43)  HR: --  BP: --  BP(mean): --  RR: --  SpO2: --        CONSTITUTIONAL: NAD, well-developed  RESPIRATORY: Normal respiratory effort; lungs are clear to auscultation bilaterally  CARDIOVASCULAR: Regular rate and rhythm, normal S1 and S2, no murmur/rub/gallop; No lower extremity edema; Peripheral pulses are 2+ bilaterally  ABDOMEN: Nontender to palpation, normoactive bowel sounds, no rebound/guarding  MUSCLOSKELETAL: no clubbing or cyanosis of digits; no joint swelling or tenderness to palpation  SKIN: no rash, erythema, lesion  PSYCH: conversant, cooperative; affect appropriate    LABS:                        14.2   4.39  )-----------( 227      ( 13 Sep 2022 10:42 )             42.8     09-13    137  |  97<L>  |  9   ----------------------------<  104<H>  4.0   |  27  |  0.93    Ca    9.7      13 Sep 2022 10:42    TPro  7.6  /  Alb  4.5  /  TBili  0.5  /  DBili  x   /  AST  16  /  ALT  13  /  AlkPhos  38<L>  09-13                RADIOLOGY & ADDITIONAL TESTS:  Results Reviewed: CT head showed 0.3cm acute subdural hematoma in 08/2022  Electrocardiogram Personally Reviewed: sinus rhythm with PACs, no acute ischemic changes.

## 2022-09-15 NOTE — BH INPATIENT PSYCHIATRY PROGRESS NOTE - NSBHFUPINTERVALHXFT_PSY_A_CORE
Patient was seen and evaluated by the attending psychiatrist and the rotating resident. Patient was evaluated by speech and swallow yesterday and recommended Minced and mashed Diet. Patient was anxious with consistent fidgeting. Patient reported having a bowel movement yesterday. Patient was still stuttering and exhibiting lip smacking tics during the interview. Pt reported adequate sleep and appetite. Patient described his mood as "ok". When asked about Suicidal ideations, he replied "hope not"   Patient was evaluated by ECT Psychiatrist yesterday and decided he is a good candidate for ECT, but he lacks capacity as he kept saying during the interview, I want to die. Brother was called and the procedure was  Patient was seen and evaluated by the attending psychiatrist and the rotating resident. Patient was evaluated by speech and swallow yesterday and recommended Minced and mashed Diet. Patient was anxious with consistent fidgeting. Patient reported having a bowel movement yesterday. Patient was still stuttering and exhibiting lip smacking tics during the interview. Pt reported adequate sleep and appetite. Patient described his mood as "not ok". When asked about Suicidal ideations, he replied "hope not"   Patient was evaluated by ECT Psychiatrist yesterday and decided he is a good candidate for ECT, but he lacks capacity as he kept saying during the interview, I want to die. Patient's next of kin was contacted and second opinion requested to facilitate ECT.

## 2022-09-15 NOTE — BH INPATIENT PSYCHIATRY PROGRESS NOTE - NSICDXBHSECONDARYDX_PSY_ALL_CORE
Suicide ideation   R45.851  Hypertension   I10  Diabetes mellitus   E11.9  H/O schizophrenia   Z86.59  Tardive dyskinesia   G24.01   Hypertension   I10  Diabetes mellitus   E11.9  Tardive dyskinesia   G24.01

## 2022-09-16 LAB — GLUCOSE BLDC GLUCOMTR-MCNC: 102 MG/DL — HIGH (ref 70–99)

## 2022-09-16 PROCEDURE — 99232 SBSQ HOSP IP/OBS MODERATE 35: CPT | Mod: GC

## 2022-09-16 RX ADMIN — MIRTAZAPINE 37.5 MILLIGRAM(S): 45 TABLET, ORALLY DISINTEGRATING ORAL at 21:17

## 2022-09-16 RX ADMIN — METFORMIN HYDROCHLORIDE 500 MILLIGRAM(S): 850 TABLET ORAL at 10:35

## 2022-09-16 RX ADMIN — METFORMIN HYDROCHLORIDE 500 MILLIGRAM(S): 850 TABLET ORAL at 21:17

## 2022-09-16 NOTE — BH CHART NOTE - NSPSYPRGNOTEFT_PSY_ALL_CORE
Psychologist attempted to round on patient today. Patient was laying in his bed. He declined to meet with psychology today or engage in group therapy stating "I want to sleep." When asked, he stated he was open to psychology f/u in the future. Psychology will attempt to round on patient as appropriate.   In outside communication, psychologist provided updates to treatment team.

## 2022-09-16 NOTE — BH INPATIENT PSYCHIATRY PROGRESS NOTE - MSE UNSTRUCTURED FT
83 YO  Male stated to his age, average built, laying in bed, feeling fatigued due to initial NPO, in no acute distress. Patient is calm, behaviorally controlled and superficially cooperative. Patient has motor tics "Lips smacking". Patient expressed depressed mood with congruent affect, and poverty of speech, both quantitative and qualitative. Patient is not elaborative much regarding Suicidal ideations. He does not have serious intention, any active plans. Patient denied any VH/AH, Homicidal ideations/ paranoid ideations. Patient has poor insight and judgement, with fair impulse control during Interview.

## 2022-09-16 NOTE — BH INPATIENT PSYCHIATRY PROGRESS NOTE - NSBHMETABOLIC_PSY_ALL_CORE_FT
BMI: BMI (kg/m2): 19.7 (09-12-22 @ 23:38)  HbA1c: A1C with Estimated Average Glucose Result: 5.8 % (07-26-22 @ 09:35)    Glucose: POCT Blood Glucose.: 102 mg/dL (09-16-22 @ 08:20)    BP: --  Lipid Panel: Date/Time: 08-16-22 @ 09:10  Cholesterol, Serum: 142  Direct LDL: --  HDL Cholesterol, Serum: 57  Total Cholesterol/HDL Ration Measurement: --  Triglycerides, Serum: 88   BMI: BMI (kg/m2): 19.7 (09-12-22 @ 23:38)  HbA1c: A1C with Estimated Average Glucose Result: 5.8 % (07-26-22 @ 09:35)  Glucose: POCT Blood Glucose.: 102 mg/dL (09-16-22 @ 08:20)  Lipid Panel: Date/Time: 08-16-22 @ 09:10  Cholesterol, Serum: 142  HDL Cholesterol, Serum: 57  Triglycerides, Serum: 88

## 2022-09-16 NOTE — BH INPATIENT PSYCHIATRY PROGRESS NOTE - NSBHFUPINTERVALHXFT_PSY_A_CORE
Patient was evaluated by the treating team including the attending psychiatrist and the resident. Patient was laying on his bed, feeling tired as he was initially NPO for the ECT. He was getting ready for the treatment. However, his sister wanted to have a thought before final approval of ECT.  Consequently, ECT was cancelled. Patient was initially anxious while awaiting the ET. This was reflected on his way of speaking, he was stuttering almost every single word, which was more than usual for him since his admission. Patient endorsed having a BM this morning.   According to the nursing staff, patient sleep improved over night and his appetite as well.

## 2022-09-16 NOTE — BH INPATIENT PSYCHIATRY PROGRESS NOTE - NSBHASSESSSUMMFT_PSY_ALL_CORE
Patient is an 82-year-old male currently residing at [facility]. PPH of depression and psychosis. + history of inpatient admissions last 7/22-8/19 at [facility] post admission to [facility] for dehydration/SI/FTT 7/6-7/22I. Admitted at [facility] and seen by CL. Prior to last admission stopped eating/drinking in attempt to kill self. Unknown violence/aggression or substance use. no legal issues PMH- HTN, CHF, COPD & DM. BIBA referred by NH for suicidal ideation.   Patient presented to the ED in the context of suicidal ideation. Patient endorsed continued thought to die and stated he was going to stop eating and drinking at NH because he wants to be dead. Patient unable to contract for safety. As patient presented imminent risk of self-harm, he was admitted to inpatient psych for safety and stabilization. Patient was at [facility] from 09/07-09/12 due to unavailability of psych bed.     MMSE: 29 (during this hospital admission)    Hospital course in [facility]:  9/9: Pt. is cooperative, compliant. Continues to have suicidal ideation.   9/10: Remains cooperative, compliant. Continues with SI. Reports subjective appetite improvement.   9/12-- is depressed, still endorsing si with intent, unsure of plan    09/16 Clinical Update  Patient still depressed. Not forthcoming regarding his Suicidal ideations. Patient was tired lying in bed awaiting for the ECT initially.  ECT was canceled  because of the lack of final approval by the sister. Patient was initially anxious while awaiting the ET. He was stuttering much more frequent.  Patient endorsed having a BM this morning. According to the nursing staff, patient sleep improved over night and his appetite as well.     Plan:  -Safety: routine observation, denies SI/HI/I/P on the unit. PRNs: Zyprexa 2.5mg PO/IM for agitation   -Psychiatry: Continue Remeron 37.5mg qhs; next dose Invega Sustenna in 10/3/2022 (received 156mg IM on 9/3/2022).   - Patient is scheduled for ECT on Next Monday (9/19/22) after getting final approval from the sister  - COVID testing on Sunday 09/18/22 morning   -Group, milieu, individual therapy as appropriate.  -Medical: continue home medications for medical comorbidities:  Symbicort 80 mcg 2 times / day (Standing order) for COPD   Admission labs WNL.  - Health care Maintenance:   Diet: Minced and Moist  DVT PPx: Patient is freely ambulating. No need for pharmacologic PPx   Dispo: pending stabilization. Patient continues to require inpatient hospitalization for stabilization and safety.   Patient is an 82-year-old male currently residing at [facility]. PPH of depression and psychosis. + history of inpatient admissions last 7/22-8/19 at [facility] post admission to [facility] for dehydration/SI/FTT 7/6-7/22I. Admitted at [facility] and seen by CL. Prior to last admission stopped eating/drinking in attempt to kill self. Unknown violence/aggression or substance use. no legal issues PMH- HTN, CHF, COPD & DM. BIBA referred by NH for suicidal ideation.   Patient presented to the ED in the context of suicidal ideation. Patient endorsed continued thought to die and stated he was going to stop eating and drinking at NH because he wants to be dead. Patient unable to contract for safety. As patient presented imminent risk of self-harm, he was admitted to inpatient psych for safety and stabilization. Patient was at [facility] from 09/07-09/12 due to unavailability of psych bed.     MMSE: 29 (during this hospital admission)    Hospital course in [facility]:  9/9: Pt. is cooperative, compliant. Continues to have suicidal ideation.   9/10: Remains cooperative, compliant. Continues with SI. Reports subjective appetite improvement.   9/12-- is depressed, still endorsing si with intent, unsure of plan    09/16 Clinical Update  Patient still depressed. Not forthcoming regarding his Suicidal ideations. Patient was tired lying in bed awaiting for the ECT initially.  ECT was canceled  because of the lack of final approval by the sister. Patient was initially anxious while awaiting the ET. He was stuttering much more frequent.  Patient endorsed having a BM this morning. According to the nursing staff, patient sleep improved over night and his appetite as well.     Plan:  -Safety: routine observation, denies SI/HI/I/P on the unit. PRNs: Zyprexa 2.5mg PO/IM for agitation   -Psychiatry: Continue Remeron 37.5mg qhs  - Patient is scheduled for ECT on Next Monday (9/19/22) after getting final approval from the sister  - COVID testing on Sunday 09/18/22 morning   -Group, milieu, individual therapy as appropriate.  -Medical: continue home medications for medical comorbidities:  Symbicort 80 mcg 2 times / day (Standing order) for COPD   Admission labs WNL.  - Health care Maintenance:   Diet: Minced and Moist  DVT PPx: Patient is freely ambulating. No need for pharmacologic PPx   Dispo: pending stabilization. Patient continues to require inpatient hospitalization for stabilization and safety.

## 2022-09-16 NOTE — BH INPATIENT PSYCHIATRY PROGRESS NOTE - NSBHCHARTREVIEWVS_PSY_A_CORE FT
Vital Signs Last 24 Hrs  T(C): 36.8 (09-16-22 @ 15:44), Max: 36.8 (09-16-22 @ 15:44)  T(F): 98.3 (09-16-22 @ 15:44), Max: 98.3 (09-16-22 @ 15:44)  HR: --  BP: --  BP(mean): --  RR: 17 (09-16-22 @ 05:18) (17 - 17)  SpO2: --    Orthostatic VS  09-16-22 @ 05:18  Lying BP: 127/70 HR: 67  Sitting BP: 131/76 HR: 78  Standing BP: --/-- HR: --  Site: upper right arm  Mode: electronic  Orthostatic VS  09-15-22 @ 05:43  Lying BP: --/-- HR: --  Sitting BP: 126/86 HR: 85  Standing BP: 134/73 HR: 71  Site: --  Mode: --  Orthostatic VS  09-14-22 @ 21:01  Lying BP: --/-- HR: --  Sitting BP: 155/74 HR: 62  Standing BP: 157/80 HR: 71  Site: --  Mode: --   Vital Signs Last 24 Hrs  T(C): 36.8 (09-16-22 @ 15:44), Max: 36.8 (09-16-22 @ 15:44)  T(F): 98.3 (09-16-22 @ 15:44), Max: 98.3 (09-16-22 @ 15:44)  RR: 17 (09-16-22 @ 05:18) (17 - 17)  Orthostatic VS  09-16-22 @ 05:18  Lying BP: 127/70 HR: 67  Sitting BP: 131/76 HR: 78

## 2022-09-17 PROCEDURE — 99231 SBSQ HOSP IP/OBS SF/LOW 25: CPT

## 2022-09-17 RX ADMIN — MIRTAZAPINE 37.5 MILLIGRAM(S): 45 TABLET, ORALLY DISINTEGRATING ORAL at 20:50

## 2022-09-17 RX ADMIN — METFORMIN HYDROCHLORIDE 500 MILLIGRAM(S): 850 TABLET ORAL at 20:49

## 2022-09-17 RX ADMIN — METFORMIN HYDROCHLORIDE 500 MILLIGRAM(S): 850 TABLET ORAL at 09:05

## 2022-09-17 NOTE — BH INPATIENT PSYCHIATRY PROGRESS NOTE - NSBHMETABOLIC_PSY_ALL_CORE_FT
BMI: BMI (kg/m2): 19.7 (09-12-22 @ 23:38)  HbA1c: A1C with Estimated Average Glucose Result: 5.8 % (07-26-22 @ 09:35)    Glucose: POCT Blood Glucose.: 102 mg/dL (09-16-22 @ 08:20)    BP: --  Lipid Panel: Date/Time: 08-16-22 @ 09:10  Cholesterol, Serum: 142  Direct LDL: --  HDL Cholesterol, Serum: 57  Total Cholesterol/HDL Ration Measurement: --  Triglycerides, Serum: 88

## 2022-09-17 NOTE — BH INPATIENT PSYCHIATRY PROGRESS NOTE - NSBHASSESSSUMMFT_PSY_ALL_CORE
Patient is an 82-year-old male currently residing at [facility]. PPH of depression and psychosis. + history of inpatient admissions last 7/22-8/19 at [facility] post admission to [facility] for dehydration/SI/FTT 7/6-7/22I. Admitted at [facility] and seen by CL. Prior to last admission stopped eating/drinking in attempt to kill self. Unknown violence/aggression or substance use. no legal issues PMH- HTN, CHF, COPD & DM. BIBA referred by NH for suicidal ideation.   Patient presented to the ED in the context of suicidal ideation. Patient endorsed continued thought to die and stated he was going to stop eating and drinking at NH because he wants to be dead. Patient unable to contract for safety. As patient presented imminent risk of self-harm, he was admitted to inpatient psych for safety and stabilization. Patient was at [facility] from 09/07-09/12 due to unavailability of psych bed.     MMSE: 29 (during this hospital admission)    Hospital course in [facility]:  9/9: Pt. is cooperative, compliant. Continues to have suicidal ideation.   9/10: Remains cooperative, compliant. Continues with SI. Reports subjective appetite improvement.   9/12-- is depressed, still endorsing si with intent, unsure of plan    09/16 Clinical Update  Patient still depressed. Not forthcoming regarding his Suicidal ideations. Patient was tired lying in bed awaiting for the ECT initially.  ECT was canceled  because of the lack of final approval by the sister. Patient was initially anxious while awaiting the ET. He was stuttering much more frequent.  Patient endorsed having a BM this morning. According to the nursing staff, patient sleep improved over night and his appetite as well.   9/17: dysphoric, superficial, no SI/HI.    Plan:  -Safety: routine observation, denies SI/HI/I/P on the unit. PRNs: Zyprexa 2.5mg PO/IM for agitation   -Psychiatry: Continue Remeron 37.5mg qhs  - Patient is scheduled for ECT on Next Monday (9/19/22) after getting final approval from the sister  - COVID testing on Sunday 09/18/22 morning   -Group, milieu, individual therapy as appropriate.  -Medical: continue home medications for medical comorbidities:  Symbicort 80 mcg 2 times / day (Standing order) for COPD   Admission labs WNL.  - Health care Maintenance:   Diet: Minced and Moist  DVT PPx: Patient is freely ambulating. No need for pharmacologic PPx   Dispo: pending stabilization. Patient continues to require inpatient hospitalization for stabilization and safety.

## 2022-09-17 NOTE — BH INPATIENT PSYCHIATRY PROGRESS NOTE - NSBHCHARTREVIEWVS_PSY_A_CORE FT
Vital Signs Last 24 Hrs  T(C): 36.4 (09-17-22 @ 08:40), Max: 36.8 (09-16-22 @ 15:44)  T(F): 97.5 (09-17-22 @ 08:40), Max: 98.3 (09-16-22 @ 15:44)  HR: --  BP: --  BP(mean): --  RR: 18 (09-17-22 @ 08:40) (18 - 18)  SpO2: 100% (09-17-22 @ 08:40) (100% - 100%)    Orthostatic VS  09-17-22 @ 08:40  Lying BP: --/-- HR: --  Sitting BP: 154/79 HR: 71  Standing BP: 138/84 HR: 79  Site: upper right arm  Mode: electronic  Orthostatic VS  09-16-22 @ 05:18  Lying BP: 127/70 HR: 67  Sitting BP: 131/76 HR: 78  Standing BP: --/-- HR: --  Site: upper right arm  Mode: electronic

## 2022-09-17 NOTE — BH INPATIENT PSYCHIATRY PROGRESS NOTE - NSBHFUPINTERVALHXFT_PSY_A_CORE
Pt seen for depression, chart reviewed and discussed with nursing staff. Pt is compliant with psych meds, refused some medical meds,  no acute events overnight. On exam, Pt is sitting in day area, ambulating stable, in no distress. Reports feeling ok, sleep and appetite was alright. Endorses sadness and anxiety. Denies any SI/HI, pk, paranoia or hallucination.

## 2022-09-17 NOTE — BH INPATIENT PSYCHIATRY PROGRESS NOTE - MSE UNSTRUCTURED FT
82 year old  Male stated to his age, average built, dressed in hospital gown, in no acute distress. Patient is calm, behaviorally controlled and superficially cooperative. Patient has motor tics "Lips smacking". Patient expressed sad mood with congruent affect. Speech is relevant short answers, normal in volume, rate, not spontaneous. Thoughts are linear, no gross delusions, no SI/HI. Patient denies any hallucination. Patient has poor insight and judgement, with fair impulse control during Interview.

## 2022-09-18 LAB — SARS-COV-2 RNA SPEC QL NAA+PROBE: SIGNIFICANT CHANGE UP

## 2022-09-18 PROCEDURE — 99232 SBSQ HOSP IP/OBS MODERATE 35: CPT

## 2022-09-18 RX ADMIN — MIRTAZAPINE 37.5 MILLIGRAM(S): 45 TABLET, ORALLY DISINTEGRATING ORAL at 21:04

## 2022-09-18 RX ADMIN — METFORMIN HYDROCHLORIDE 500 MILLIGRAM(S): 850 TABLET ORAL at 09:41

## 2022-09-18 RX ADMIN — POLYETHYLENE GLYCOL 3350 17 GRAM(S): 17 POWDER, FOR SOLUTION ORAL at 09:41

## 2022-09-18 RX ADMIN — METFORMIN HYDROCHLORIDE 500 MILLIGRAM(S): 850 TABLET ORAL at 21:04

## 2022-09-18 NOTE — BH INPATIENT PSYCHIATRY PROGRESS NOTE - NSBHASSESSSUMMFT_PSY_ALL_CORE
Patient is an 82-year-old male currently residing at [facility]. PPH of depression and psychosis. + history of inpatient admissions last 7/22-8/19 at [facility] post admission to [facility] for dehydration/SI/FTT 7/6-7/22I. Admitted at [facility] and seen by CL. Prior to last admission stopped eating/drinking in attempt to kill self. Unknown violence/aggression or substance use. no legal issues PMH- HTN, CHF, COPD & DM. BIBA referred by NH for suicidal ideation.   Patient presented to the ED in the context of suicidal ideation. Patient endorsed continued thought to die and stated he was going to stop eating and drinking at NH because he wants to be dead. Patient unable to contract for safety. As patient presented imminent risk of self-harm, he was admitted to inpatient psych for safety and stabilization. Patient was at [facility] from 09/07-09/12 due to unavailability of psych bed.     MMSE: 29 (during this hospital admission)    Hospital course in [facility]:  9/9: Pt. is cooperative, compliant. Continues to have suicidal ideation.   9/10: Remains cooperative, compliant. Continues with SI. Reports subjective appetite improvement.   9/12-- is depressed, still endorsing si with intent, unsure of plan    09/16 Clinical Update  Patient still depressed. Not forthcoming regarding his Suicidal ideations. Patient was tired lying in bed awaiting for the ECT initially.  ECT was canceled  because of the lack of final approval by the sister. Patient was initially anxious while awaiting the ET. He was stuttering much more frequent.  Patient endorsed having a BM this morning. According to the nursing staff, patient sleep improved over night and his appetite as well.   9/17: dysphoric, superficial, no SI/HI.  9/18: continues to be depressed with passive SI. Patient remains agreeable to ECT tomorrow. Covid -ve    Plan:  -Safety: routine observation, denies SI/HI/I/P on the unit. PRNs: Zyprexa 2.5mg PO/IM for agitation   -Psychiatry: Continue Remeron 37.5mg qhs  - Patient is scheduled for ECT on Monday (9/19/22) after getting final approval from the sister  -Group, milieu, individual therapy as appropriate.  -Medical: continue home medications for medical comorbidities:  Symbicort 80 mcg 2 times / day (Standing order) for COPD   Admission labs WNL.  - Health care Maintenance:   Diet: Minced and Moist  DVT PPx: Patient is freely ambulating. No need for pharmacologic PPx   Dispo: pending stabilization. Patient continues to require inpatient hospitalization for stabilization and safety.

## 2022-09-18 NOTE — BH INPATIENT PSYCHIATRY PROGRESS NOTE - MSE UNSTRUCTURED FT
82 year old  Male stated to his age, average built, dressed in hospital gown, in no acute distress. Patient is calm, behaviorally controlled and superficially cooperative. Patient has motor tics "Lips smacking". Patient expressed sad mood with congruent affect. Speech is relevant short answers, normal in volume, rate, not spontaneous. Thoughts are linear, no gross delusions, no SI/HI. Patient denies any hallucination. Patient has poor insight and judgement, with fair impulse control during Interview. Appears younger than stated age. Edentulous. Thin habitus. Limited grooming. No abnormal involuntary movements noted. Gait stable. Attentive. Speech limited in spontaneity but otherwise wnl. Mood "sad" and affect congruent, stable, constricted. Thought process largely linear. No delusions elicited. Reports passive SI. Denies AH. Adequately oriented. Partial insight, limited judgment.

## 2022-09-18 NOTE — BH INPATIENT PSYCHIATRY PROGRESS NOTE - NSBHFUPINTERVALHXFT_PSY_A_CORE
Chart reviewed and case discussed with treatment team. Staff report    Chart reviewed and case discussed with treatment team. Staff report patient has been calm and cooperative. Patient reports ongoing depressed mood and endorses suicidal thoughts though without intent or plan.

## 2022-09-18 NOTE — BH INPATIENT PSYCHIATRY PROGRESS NOTE - NSBHCHARTREVIEWVS_PSY_A_CORE FT
Vital Signs Last 24 Hrs  T(C): 36.4 (09-18-22 @ 08:16), Max: 36.4 (09-17-22 @ 15:53)  T(F): 97.6 (09-18-22 @ 08:16), Max: 97.6 (09-18-22 @ 08:16)  HR: --  BP: --  BP(mean): --  RR: 18 (09-18-22 @ 08:16) (18 - 18)  SpO2: 99% (09-18-22 @ 08:16) (99% - 99%)    Orthostatic VS  09-18-22 @ 08:16  Lying BP: --/-- HR: --  Sitting BP: 110/62 HR: 69  Standing BP: 105/68 HR: 79  Site: --  Mode: --  Orthostatic VS  09-17-22 @ 08:40  Lying BP: --/-- HR: --  Sitting BP: 154/79 HR: 71  Standing BP: 138/84 HR: 79  Site: upper right arm  Mode: electronic   Vital Signs Last 24 Hrs  T(C): 36.8 (09-18-22 @ 15:59), Max: 36.8 (09-18-22 @ 15:59)  T(F): 98.3 (09-18-22 @ 15:59), Max: 98.3 (09-18-22 @ 15:59)  HR: --  BP: --  BP(mean): --  RR: 18 (09-18-22 @ 08:16) (18 - 18)  SpO2: 99% (09-18-22 @ 08:16) (99% - 99%)    Orthostatic VS  09-18-22 @ 08:16  Lying BP: --/-- HR: --  Sitting BP: 110/62 HR: 69  Standing BP: 105/68 HR: 79  Site: --  Mode: --  Orthostatic VS  09-17-22 @ 08:40  Lying BP: --/-- HR: --  Sitting BP: 154/79 HR: 71  Standing BP: 138/84 HR: 79  Site: upper right arm  Mode: electronic

## 2022-09-19 PROCEDURE — 90870 ELECTROCONVULSIVE THERAPY: CPT

## 2022-09-19 PROCEDURE — 90832 PSYTX W PT 30 MINUTES: CPT

## 2022-09-19 PROCEDURE — 99231 SBSQ HOSP IP/OBS SF/LOW 25: CPT | Mod: 25,GC

## 2022-09-19 RX ORDER — MIDAZOLAM HYDROCHLORIDE 1 MG/ML
2 INJECTION, SOLUTION INTRAMUSCULAR; INTRAVENOUS ONCE
Refills: 0 | Status: DISCONTINUED | OUTPATIENT
Start: 2022-09-19 | End: 2022-10-03

## 2022-09-19 RX ADMIN — METFORMIN HYDROCHLORIDE 500 MILLIGRAM(S): 850 TABLET ORAL at 20:36

## 2022-09-19 RX ADMIN — MIRTAZAPINE 37.5 MILLIGRAM(S): 45 TABLET, ORALLY DISINTEGRATING ORAL at 20:36

## 2022-09-19 NOTE — BH PSYCHOLOGY - CLINICIAN PSYCHOTHERAPY NOTE - NSBHPSYCHOLNARRATIVE_PSY_A_CORE FT
Eusebia was seen individually at the request of the team and with his consent. He was laying in bed when approached and agreed to accompany the writer to the group room to talk. Patient appeared distant, had poor eye contact and struggled to respond as he was edentulous and often stuttered. He showed long response latencies and minimal output, often responding in one-word answers. He confirmed feeling "very depressed" but wanting to "stay alive" and said he had not thought of suicide today or very frequently in the past week. He could not explain his reasons for living but acknowledged that he enjoys walking and looks forward to his niece's visits. When asked whether he hears voices he said "I'm not sure". He reported having eaten breakfast today even though he hadn't due to ECT (which he did not recall receiving). When asked what matters most to him, he responded "staying alive".    In light of the patient's poor capacity to initiate or sustain verbal interactions, verbal psychotherapy is not deemed beneficial at this point. If patient's status changes this will be revisited.

## 2022-09-19 NOTE — ECT PRE-PROCEDURE CHECKLIST - NSPROPTRIGHTCAREGIVER_GEN_A_NUR
CHIEF COMPLAINT:   Patient presents with:  Cough: fever this morning 101, mortin 2 hrs ago, chest pain when cough, sore throat, dry cough, stomach pain, x 1 wk      HPI:   Army Mcqueen is a non-toxic, well appearing 10year old female who presents with co
declines
BP 96/60 (BP Location: Left arm, Patient Position: Sitting, Cuff Size: child)   Pulse 78   Temp 99.1 °F (37.3 °C) (Oral)   Resp 20   Ht 48\"   Wt 52 lb 3.2 oz   SpO2 97%   BMI 15.93 kg/m²   GENERAL: well developed, well nourished, in no apparent distress.
Your child has a viral upper respiratory illness (URI), which is another term for the common cold. The virus is contagious during the first few days.  It is spread through the air by coughing, sneezing, or by direct contact (touching your sick child then to
· Cough. Coughing is a normal part of this illness. A cool mist humidifier at the bedside may be helpful. Be sure to clean the humidifier every day to prevent mold.  Over-the-counter cough and cold medicines have not proved to be any more helpful than a kathleen
? Your child is 1 months old or younger and has a fever of 100.4°F (38°C) or higher. Get medical care right away. Fever in a young baby can be a sign of a dangerous infection. ? Your child is of any age and has repeated fevers above 104°F (40°C). ?  Your
declines

## 2022-09-19 NOTE — BH INPATIENT PSYCHIATRY PROGRESS NOTE - NSBHCHARTREVIEWVS_PSY_A_CORE FT
Vital Signs Last 24 Hrs  T(C): 36.4 (09-19-22 @ 05:57), Max: 36.8 (09-18-22 @ 15:59)  T(F): 97.5 (09-19-22 @ 05:57), Max: 98.3 (09-18-22 @ 15:59)  HR: --  BP: --  BP(mean): --  RR: 22 (09-19-22 @ 05:57) (22 - 22)  SpO2: 97% (09-19-22 @ 05:57) (97% - 97%)    Orthostatic VS  09-19-22 @ 05:57  Lying BP: 165/51 HR: 60  Sitting BP: 148/70 HR: 76  Standing BP: --/-- HR: --  Site: --  Mode: --  Orthostatic VS  09-18-22 @ 08:16  Lying BP: --/-- HR: --  Sitting BP: 110/62 HR: 69  Standing BP: 105/68 HR: 79  Site: --  Mode: --   Vital Signs Last 24 Hrs  T(C): 36.4 (09-19-22 @ 13:15), Max: 36.8 (09-18-22 @ 15:59)  T(F): 97.5 (09-19-22 @ 13:15), Max: 98.3 (09-18-22 @ 15:59)  HR: 91 (09-19-22 @ 13:15) (58 - 91)  BP: 145/81 (09-19-22 @ 13:15) (145/81 - 165/100)  BP(mean): --  RR: 20 (09-19-22 @ 13:15) (15 - 25)  SpO2: 99% (09-19-22 @ 13:15) (96% - 100%)    Orthostatic VS  09-19-22 @ 05:57  Lying BP: 165/51 HR: 60  Sitting BP: 148/70 HR: 76  Standing BP: --/-- HR: --  Site: --  Mode: --  Orthostatic VS  09-18-22 @ 08:16  Lying BP: --/-- HR: --  Sitting BP: 110/62 HR: 69  Standing BP: 105/68 HR: 79  Site: --  Mode: --

## 2022-09-19 NOTE — ECT PRE-PROCEDURE CHECKLIST - NS PREOP CHK TEST_COVID_DT_GEN_ALL_CORE
An order was received for a Colonoscopy Screening. This appears to be a recall, last performed  10.17.17  by Dr. Pal Hopper. Last prep for case done on 5.6.20, needs to be re-reviewed. Will route to Recall Nurse to review. Ernie Haque / [id number] [employer] Chart Review Dept.   [facility] 18-Sep-2022 12:02

## 2022-09-19 NOTE — ECT TREATMENT NOTE - NSECTPOSTTXSUMMARY_PSY_ALL_CORE
The patient had a well modified grand mal seizure under general anesthesia and muscle relaxant. The patient is alert, responsive, in no acute distress.  Recovery uneventful.

## 2022-09-19 NOTE — BH INPATIENT PSYCHIATRY PROGRESS NOTE - NSBHASSESSSUMMFT_PSY_ALL_CORE
Patient is an 82-year-old male currently residing at [facility]. PPH of depression and psychosis. + history of inpatient admissions last 7/22-8/19 at [facility] post admission to [facility] for dehydration/SI/FTT 7/6-7/22I. Admitted at [facility] and seen by CL. Prior to last admission stopped eating/drinking in attempt to kill self. Unknown violence/aggression or substance use. no legal issues PMH- HTN, CHF, COPD & DM. BIBA referred by NH for suicidal ideation.   Patient presented to the ED in the context of suicidal ideation. Patient endorsed continued thought to die and stated he was going to stop eating and drinking at NH because he wants to be dead. Patient unable to contract for safety. As patient presented imminent risk of self-harm, he was admitted to inpatient psych for safety and stabilization. Patient was at [facility] from 09/07-09/12 due to unavailability of psych bed.     MMSE: 29 (during this hospital admission)    Clinical Update  09/16 Patient still depressed. Not forthcoming regarding his Suicidal ideations. Patient was tired lying in bed awaiting for the ECT initially.  ECT was canceled  because of the lack of final approval by the sister. Patient was initially anxious while awaiting the ET. He was stuttering much more frequent.  Patient endorsed having a BM this morning. According to the nursing staff, patient sleep improved over night and his appetite as well.   9/17: dysphoric, superficial, no SI/HI.  9/18: continues to be depressed with passive SI. Patient remains agreeable to ECT tomorrow. Covid -ve  9/19: Patient denied active and passive SIs. Patient was looking forward to ECT. He was little anxious about ECT as this is his first time experience     Plan:  - Will receive an ECT treatment today (9/19)   - Will continue with ECT if well tolerated. Next ECT treatment will be on Next Friday (9/23)  -Safety: routine observation, denies SI/HI/I/P on the unit. PRNs: Zyprexa 2.5mg PO/IM for agitation   -Psychiatry: Continue Remeron 37.5mg qhs  -Group, milieu, individual therapy as appropriate.  -Medical: continue home medications for medical comorbidities:  Symbicort 80 mcg 2 times / day (Standing order) for COPD   Admission labs WNL.  - Health care Maintenance:   Diet: Minced and Moist  DVT PPx: Patient is freely ambulating. No need for pharmacologic Prophylaxis   Dispo: pending stabilization. Patient continues to require inpatient hospitalization for stabilization and safety. Patient is an 82-year-old male currently residing at [facility]. PPH of depression and psychosis. + history of inpatient admissions last 7/22-8/19 at [facility] post admission to [facility] for dehydration/SI/FTT 7/6-7/22I. Admitted at [facility] and seen by CL. Prior to last admission stopped eating/drinking in attempt to kill self. Unknown violence/aggression or substance use. no legal issues PMH- HTN, CHF, COPD & DM. BIBA referred by NH for suicidal ideation.   Patient presented to the ED in the context of suicidal ideation. Patient endorsed continued thought to die and stated he was going to stop eating and drinking at NH because he wants to be dead. Patient unable to contract for safety. As patient presented imminent risk of self-harm, he was admitted to inpatient psych for safety and stabilization. Patient was at [facility] from 09/07-09/12 due to unavailability of psych bed.     MMSE: 29 (during this hospital admission)    Clinical Update  09/16 Patient still depressed. Not forthcoming regarding his Suicidal ideations. Patient was tired lying in bed awaiting for the ECT initially.  ECT was canceled  because of the lack of final approval by the sister. Patient was initially anxious while awaiting the ET. He was stuttering much more frequent.  Patient endorsed having a BM this morning. According to the nursing staff, patient sleep improved over night and his appetite as well.   9/17: dysphoric, superficial, no SI/HI.  9/18: continues to be depressed with passive SI. Patient remains agreeable to ECT tomorrow. Covid -ve  9/19: Continues to be depressed and anxious.  Patient denied active and passive SIs. Patient was looking forward to ECT. He was little anxious about ECT as this is his first time experience     Plan:  - Will receive an ECT treatment today (9/19)   - Will continue with ECT if well tolerated. Next ECT treatment will be on Next Friday (9/23)  -Safety: routine observation, denies SI/HI/I/P on the unit. PRNs: Zyprexa 2.5mg PO/IM for agitation   -Psychiatry: Continue Remeron 37.5mg qhs  -Group, milieu, individual therapy as appropriate.  -Medical: continue home medications for medical comorbidities:  Symbicort 80 mcg 2 times / day (Standing order) for COPD   Admission labs WNL.  - Health care Maintenance:   Diet: Minced and Moist  DVT PPx: Patient is freely ambulating. No need for pharmacologic Prophylaxis   Dispo: pending stabilization. Patient continues to require inpatient hospitalization for stabilization and safety.

## 2022-09-19 NOTE — BH INPATIENT PSYCHIATRY PROGRESS NOTE - CURRENT MEDICATION
MEDICATIONS  (STANDING):  budesonide  80 MICROgram(s)/formoterol 4.5 MICROgram(s) Inhaler 2 Puff(s) Inhalation two times a day  metFORMIN 500 milliGRAM(s) Oral two times a day  mirtazapine 37.5 milliGRAM(s) Oral at bedtime  polyethylene glycol 3350 17 Gram(s) Oral daily    MEDICATIONS  (PRN):  OLANZapine 2.5 milliGRAM(s) Oral two times a day PRN agitation  OLANZapine Injectable 2.5 milliGRAM(s) IntraMuscular once PRN severe agitation  simethicone 80 milliGRAM(s) Chew three times a day PRN Upset Stomach   MEDICATIONS  (STANDING):  budesonide  80 MICROgram(s)/formoterol 4.5 MICROgram(s) Inhaler 2 Puff(s) Inhalation two times a day  metFORMIN 500 milliGRAM(s) Oral two times a day  midazolam Injectable 2 milliGRAM(s) IV Push once  mirtazapine 37.5 milliGRAM(s) Oral at bedtime  polyethylene glycol 3350 17 Gram(s) Oral daily    MEDICATIONS  (PRN):  OLANZapine 2.5 milliGRAM(s) Oral two times a day PRN agitation  OLANZapine Injectable 2.5 milliGRAM(s) IntraMuscular once PRN severe agitation  simethicone 80 milliGRAM(s) Chew three times a day PRN Upset Stomach

## 2022-09-19 NOTE — BH INPATIENT PSYCHIATRY PROGRESS NOTE - MSE UNSTRUCTURED FT
Patient appears stated to his age. Edentulous. Thin habitus. Limited grooming. No abnormal involuntary movements noted. Gait stable. Attentive. Speech limited in spontaneity but otherwise wnl. Mood "sad" and affect congruent, stable, constricted. Thought process largely linear. No delusions elicited. Denied passive and active Suicidal ideations. Denies AH. Adequately oriented. Partially insightful, with limited judgment.

## 2022-09-19 NOTE — BH PSYCHOLOGY - CLINICIAN PSYCHOTHERAPY NOTE - TOKEN PULL-DIAGNOSIS
Primary Diagnosis:  Major depressive episode [F32.9]        Problem Dx:   Tardive dyskinesia [G24.01]      H/O schizophrenia [Z86.59]      Diabetes mellitus [E11.9]      Hypertension [I10]      Suicide ideation [R45.851]

## 2022-09-19 NOTE — BH INPATIENT PSYCHIATRY PROGRESS NOTE - NSBHFUPINTERVALHXFT_PSY_A_CORE
Patient was seen and evaluated his morning by the treating team. Patient looked anxious. He was noted that he was persistently fidgety with his both legs. When he was asked about that he reported feeling anxious, but he was also looking forward to it as well. Patient denied any suicidal ideations when asked. Pt described his mood as fine. No acute issues overnight.  Patient was seen and evaluated this morning by the treating team. Patient looked anxious. He was noted to be persistently fidgety with both his legs. When he was asked about that he reported feeling anxious about ECT, but he was also looking forward to it as well. Patient denied any suicidal ideations when asked. Pt described his mood as fine, but still depressed. No acute issues overnight.

## 2022-09-20 PROCEDURE — 99231 SBSQ HOSP IP/OBS SF/LOW 25: CPT | Mod: GC

## 2022-09-20 RX ORDER — MIRTAZAPINE 45 MG/1
45 TABLET, ORALLY DISINTEGRATING ORAL AT BEDTIME
Refills: 0 | Status: DISCONTINUED | OUTPATIENT
Start: 2022-09-20 | End: 2022-10-11

## 2022-09-20 RX ADMIN — METFORMIN HYDROCHLORIDE 500 MILLIGRAM(S): 850 TABLET ORAL at 20:14

## 2022-09-20 RX ADMIN — METFORMIN HYDROCHLORIDE 500 MILLIGRAM(S): 850 TABLET ORAL at 09:27

## 2022-09-20 RX ADMIN — MIRTAZAPINE 45 MILLIGRAM(S): 45 TABLET, ORALLY DISINTEGRATING ORAL at 20:13

## 2022-09-20 NOTE — BH INPATIENT PSYCHIATRY PROGRESS NOTE - NSBHCHARTREVIEWVS_PSY_A_CORE FT
Vital Signs Last 24 Hrs  T(C): 36.2 (09-20-22 @ 05:46), Max: 36.4 (09-19-22 @ 12:06)  T(F): 97.2 (09-20-22 @ 05:46), Max: 97.6 (09-19-22 @ 12:06)  HR: 91 (09-19-22 @ 13:15) (58 - 91)  BP: 145/81 (09-19-22 @ 13:15) (145/81 - 165/100)  BP(mean): --  RR: 14 (09-20-22 @ 05:46) (14 - 25)  SpO2: 99% (09-19-22 @ 13:15) (96% - 100%)    Orthostatic VS  09-20-22 @ 05:46  Lying BP: 117/76 HR: 74  Sitting BP: 122/71 HR: 76  Standing BP: --/-- HR: --  Site: --  Mode: --  Orthostatic VS  09-19-22 @ 05:57  Lying BP: 165/51 HR: 60  Sitting BP: 148/70 HR: 76  Standing BP: --/-- HR: --  Site: --  Mode: --   Vital Signs Last 24 Hrs  T(C): 36.2 (09-20-22 @ 05:46), Max: 36.4 (09-19-22 @ 15:48)  T(F): 97.2 (09-20-22 @ 05:46), Max: 97.5 (09-19-22 @ 15:48)  HR: --  BP: --  BP(mean): --  RR: 14 (09-20-22 @ 05:46) (14 - 14)  SpO2: --    Orthostatic VS  09-20-22 @ 05:46  Lying BP: 117/76 HR: 74  Sitting BP: 122/71 HR: 76  Standing BP: --/-- HR: --  Site: --  Mode: --  Orthostatic VS  09-19-22 @ 05:57  Lying BP: 165/51 HR: 60  Sitting BP: 148/70 HR: 76  Standing BP: --/-- HR: --  Site: --  Mode: --

## 2022-09-20 NOTE — BH INPATIENT PSYCHIATRY PROGRESS NOTE - NSBHASSESSSUMMFT_PSY_ALL_CORE
Patient is an 82-year-old male currently residing at [facility]. PPH of depression and psychosis. + history of inpatient admissions last 7/22-8/19 at [facility] post admission to [facility] for dehydration/SI/FTT 7/6-7/22I. Admitted at [facility] and seen by CL. Prior to last admission stopped eating/drinking in attempt to kill self. Unknown violence/aggression or substance use. no legal issues PMH- HTN, CHF, COPD & DM. BIBA referred by NH for suicidal ideation.   Patient presented to the ED in the context of suicidal ideation. Patient endorsed continued thought to die and stated he was going to stop eating and drinking at NH because he wants to be dead. Patient unable to contract for safety. As patient presented imminent risk of self-harm, he was admitted to inpatient psych for safety and stabilization. Patient was at [facility] from 09/07-09/12 due to unavailability of psych bed.     MMSE: 29 (during this hospital admission)  Patient is status post one ECT treatment on 9/19    Clinical Update   09/16 Patient still depressed. Not forthcoming regarding his Suicidal ideations. Patient was tired lying in bed awaiting for the ECT initially.  ECT was canceled  because of the lack of final approval by the sister. Patient was initially anxious while awaiting the ET. He was stuttering much more frequent.  Patient endorsed having a BM this morning. According to the nursing staff, patient sleep improved over night and his appetite as well.   9/17: dysphoric, superficial, no SI/HI.  9/18: continues to be depressed with passive SI. Patient remains agreeable to ECT tomorrow. Covid -ve  9/19: Continues to be depressed and anxious.  Patient denied active and passive SIs. Patient was looking forward to ECT. He was little anxious about ECT as this is his first time experience   9/20: Patient is not willing to pursue ECT. C/o insomnia     Plan:  - Hold ECT due to tolerance concerns reported by the patient   - Will Discuss the benefits and hazards of ECT again with the patient to improve his awareness about the procedure and try to encourage compliance   -Safety: routine observation, denies SI/HI/I/P on the unit. PRNs: Zyprexa 2.5mg PO/IM for agitation   -Psychiatry: Increase Remeron from 37.5 to 45 mg qhs  -Group, milieu, individual therapy as appropriate.  -Medical: continue home medications for medical comorbidities:  Symbicort 80 mcg 2 times / day (Standing order) for COPD   Admission labs WNL.  - Health care Maintenance:   Diet: Minced and Moist  DVT PPx: Patient is freely ambulating. No need for pharmacologic Prophylaxis   Dispo: pending stabilization. Patient continues to require inpatient hospitalization for stabilization and safety

## 2022-09-20 NOTE — BH INPATIENT PSYCHIATRY PROGRESS NOTE - NSBHMETABOLIC_PSY_ALL_CORE_FT
BMI: BMI (kg/m2): 19.7 (09-12-22 @ 23:38)  HbA1c: A1C with Estimated Average Glucose Result: 5.8 % (07-26-22 @ 09:35)    Glucose: POCT Blood Glucose.: 102 mg/dL (09-16-22 @ 08:20)    BP: 145/81 (09-19-22 @ 13:15) (145/81 - 165/100)  Lipid Panel: Date/Time: 08-16-22 @ 09:10  Cholesterol, Serum: 142  Direct LDL: --  HDL Cholesterol, Serum: 57  Total Cholesterol/HDL Ration Measurement: --  Triglycerides, Serum: 88

## 2022-09-20 NOTE — BH INPATIENT PSYCHIATRY PROGRESS NOTE - CURRENT MEDICATION
MEDICATIONS  (STANDING):  budesonide  80 MICROgram(s)/formoterol 4.5 MICROgram(s) Inhaler 2 Puff(s) Inhalation two times a day  metFORMIN 500 milliGRAM(s) Oral two times a day  midazolam Injectable 2 milliGRAM(s) IV Push once  mirtazapine 37.5 milliGRAM(s) Oral at bedtime  polyethylene glycol 3350 17 Gram(s) Oral daily    MEDICATIONS  (PRN):  OLANZapine 2.5 milliGRAM(s) Oral two times a day PRN agitation  OLANZapine Injectable 2.5 milliGRAM(s) IntraMuscular once PRN severe agitation  simethicone 80 milliGRAM(s) Chew three times a day PRN Upset Stomach   MEDICATIONS  (STANDING):  budesonide  80 MICROgram(s)/formoterol 4.5 MICROgram(s) Inhaler 2 Puff(s) Inhalation two times a day  metFORMIN 500 milliGRAM(s) Oral two times a day  midazolam Injectable 2 milliGRAM(s) IV Push once  mirtazapine 45 milliGRAM(s) Oral at bedtime  polyethylene glycol 3350 17 Gram(s) Oral daily    MEDICATIONS  (PRN):  OLANZapine 2.5 milliGRAM(s) Oral two times a day PRN agitation  OLANZapine Injectable 2.5 milliGRAM(s) IntraMuscular once PRN severe agitation  simethicone 80 milliGRAM(s) Chew three times a day PRN Upset Stomach
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
## 2022-09-20 NOTE — BH INPATIENT PSYCHIATRY PROGRESS NOTE - NSICDXBHSECONDARYDX_PSY_ALL_CORE
Suicide ideation   R45.851  Hypertension   I10  Diabetes mellitus   E11.9  H/O schizophrenia   Z86.59  Tardive dyskinesia   G24.01   Suicide ideation   R45.851  Hypertension   I10  Diabetes mellitus   E11.9

## 2022-09-21 ENCOUNTER — NON-APPOINTMENT (OUTPATIENT)
Age: 82
End: 2022-09-21

## 2022-09-21 DIAGNOSIS — S06.5X9A TRAUMATIC SUBDURAL HEMORRHAGE WITH LOSS OF CONSCIOUSNESS OF UNSPECIFIED DURATION, INITIAL ENCOUNTER: ICD-10-CM

## 2022-09-21 PROBLEM — Z00.00 ENCOUNTER FOR PREVENTIVE HEALTH EXAMINATION: Status: ACTIVE | Noted: 2022-09-21

## 2022-09-21 PROCEDURE — 99231 SBSQ HOSP IP/OBS SF/LOW 25: CPT

## 2022-09-21 RX ADMIN — METFORMIN HYDROCHLORIDE 500 MILLIGRAM(S): 850 TABLET ORAL at 20:27

## 2022-09-21 RX ADMIN — METFORMIN HYDROCHLORIDE 500 MILLIGRAM(S): 850 TABLET ORAL at 08:15

## 2022-09-21 RX ADMIN — MIRTAZAPINE 45 MILLIGRAM(S): 45 TABLET, ORALLY DISINTEGRATING ORAL at 20:27

## 2022-09-21 NOTE — BH INPATIENT PSYCHIATRY PROGRESS NOTE - MSE UNSTRUCTURED FT
Patient appears stated to his age. Edentulous. Thin habitus. Limited grooming. Patient is repetitive oral movements.  Gait stable. Patient is cooperative with interview, but somewhat guarded, appears to be minimizing his symptoms.  Speech limited in spontaneity but otherwise wnl. Mood "better" and affect constricted, but stable, appropriate. Thought process linear, but impoverished. No delusions or hallucinations elicited. Continues to have suicidal ideation, no active SI on the unit.  No HI.  Partially insightful, with limited judgment.  Good impulse control.

## 2022-09-21 NOTE — BH INPATIENT PSYCHIATRY PROGRESS NOTE - NSBHASSESSSUMMFT_PSY_ALL_CORE
Patient is an 82-year-old male currently residing at [facility]. PPH of depression and psychosis. + history of inpatient admissions last 7/22-8/19 at [facility] post admission to [facility] for dehydration/SI/FTT 7/6-7/22I. Admitted at [facility] and seen by CL. Prior to last admission stopped eating/drinking in attempt to kill self. Unknown violence/aggression or substance use. no legal issues PMH- HTN, CHF, COPD & DM. BIBA referred by NH for suicidal ideation.   Patient presented to the ED in the context of suicidal ideation. Patient endorsed continued thought to die and stated he was going to stop eating and drinking at NH because he wants to be dead. Patient unable to contract for safety. As patient presented imminent risk of self-harm, he was admitted to inpatient psych for safety and stabilization. Patient was at [facility] from 09/07-09/12 due to unavailability of psych bed.     MMSE: 29 (during this hospital admission)  Patient is status post one ECT treatment on 9/19    Clinical Update   09/16 Patient still depressed. Not forthcoming regarding his Suicidal ideations. Patient was tired lying in bed awaiting for the ECT initially.  ECT was canceled  because of the lack of final approval by the sister. Patient was initially anxious while awaiting the ET. He was stuttering much more frequent.  Patient endorsed having a BM this morning. According to the nursing staff, patient sleep improved over night and his appetite as well.   9/17: dysphoric, superficial, no SI/HI.  9/18: continues to be depressed with passive SI. Patient remains agreeable to ECT tomorrow. Covid -ve  9/19: Continues to be depressed and anxious.  Patient denied active and passive SIs. Patient was looking forward to ECT. He was little anxious about ECT as this is his first time experience   9/20: Patient is not willing to pursue ECT. C/o insomnia  9/21: Patient continues to be depressed, with SI - making attempts to minimize symptoms, possibly to avoid further discussion of ECT, which he is refusing.  Continue remeron, which was increased to 45mg.    Plan:  - Hold ECT due to tolerance concerns reported by the patient   - Will Discuss the benefits and hazards of ECT again with the patient to improve his awareness about the procedure and try to encourage compliance   -Safety: routine observation, denies SI/HI/I/P on the unit. PRNs: Zyprexa 2.5mg PO/IM for agitation   -Psychiatry: Increase Remeron from 37.5 to 45 mg qhs  -Group, milieu, individual therapy as appropriate.  -Medical: continue home medications for medical comorbidities:  Symbicort 80 mcg 2 times / day (Standing order) for COPD   Admission labs WNL.  - Health care Maintenance:   Diet: Minced and Moist  DVT PPx: Patient is freely ambulating. No need for pharmacologic Prophylaxis   Dispo: pending stabilization. Patient continues to require inpatient hospitalization for stabilization and safety

## 2022-09-21 NOTE — BH INPATIENT PSYCHIATRY PROGRESS NOTE - NSBHCHARTREVIEWVS_PSY_A_CORE FT
Vital Signs Last 24 Hrs  T(C): 36.1 (09-21-22 @ 05:53), Max: 36.4 (09-20-22 @ 15:52)  T(F): 96.9 (09-21-22 @ 05:53), Max: 97.5 (09-20-22 @ 15:52)  HR: --  BP: --  BP(mean): --  RR: --  SpO2: --    Orthostatic VS  09-21-22 @ 05:53  Lying BP: --/-- HR: --  Sitting BP: 152/98 HR: 58  Standing BP: 159/64 HR: 64  Site: --  Mode: --  Orthostatic VS  09-20-22 @ 05:46  Lying BP: 117/76 HR: 74  Sitting BP: 122/71 HR: 76  Standing BP: --/-- HR: --  Site: --  Mode: --

## 2022-09-21 NOTE — BH INPATIENT PSYCHIATRY PROGRESS NOTE - NSBHFUPINTERVALHXFT_PSY_A_CORE
Patient seen for follow up for depression, SI, chart reviewed, and case discussed with treatment team.  No events reported overnight.  This morning, patient states he feels better overall, and denies SI.  However, staff report he continues to express SI, stating he would jump off a bridge if he had the opportunity, and continues to be depressed.  He has been eating well, and sleeping well.  Remains rather isolative, spending a lot of time in his room.  Encouraged him to come out more and participate in the milieu.  He has been compliant with medications, tolerating increase in Remeron well.

## 2022-09-21 NOTE — BH INPATIENT PSYCHIATRY PROGRESS NOTE - CURRENT MEDICATION
MEDICATIONS  (STANDING):  budesonide  80 MICROgram(s)/formoterol 4.5 MICROgram(s) Inhaler 2 Puff(s) Inhalation two times a day  metFORMIN 500 milliGRAM(s) Oral two times a day  midazolam Injectable 2 milliGRAM(s) IV Push once  mirtazapine 45 milliGRAM(s) Oral at bedtime  polyethylene glycol 3350 17 Gram(s) Oral daily    MEDICATIONS  (PRN):  OLANZapine 2.5 milliGRAM(s) Oral two times a day PRN agitation  OLANZapine Injectable 2.5 milliGRAM(s) IntraMuscular once PRN severe agitation  simethicone 80 milliGRAM(s) Chew three times a day PRN Upset Stomach

## 2022-09-22 ENCOUNTER — NON-APPOINTMENT (OUTPATIENT)
Age: 82
End: 2022-09-22

## 2022-09-22 PROCEDURE — 70450 CT HEAD/BRAIN W/O DYE: CPT | Mod: 26

## 2022-09-22 PROCEDURE — 99231 SBSQ HOSP IP/OBS SF/LOW 25: CPT | Mod: GC

## 2022-09-22 RX ADMIN — METFORMIN HYDROCHLORIDE 500 MILLIGRAM(S): 850 TABLET ORAL at 08:09

## 2022-09-22 RX ADMIN — MIRTAZAPINE 45 MILLIGRAM(S): 45 TABLET, ORALLY DISINTEGRATING ORAL at 20:17

## 2022-09-22 RX ADMIN — METFORMIN HYDROCHLORIDE 500 MILLIGRAM(S): 850 TABLET ORAL at 20:18

## 2022-09-22 NOTE — BH INPATIENT PSYCHIATRY PROGRESS NOTE - MSE UNSTRUCTURED FT
Patient looks older than stated age, with lean body habitus, low energy. Patient is less fidgety, but more stuttering while talking. Still expressing motor tics by lip smacking, more frequently observed when he is challenged with questions or talking about guilty feelings. Patient endorsed only passive suicidal ideations. Denied any homicidal ideations, paranoid ideations. Denied any AH. Partially insightful, with limited judgment.  Good impulse control on the unit and during the interview.

## 2022-09-22 NOTE — BH INPATIENT PSYCHIATRY PROGRESS NOTE - NSBHCHARTREVIEWVS_PSY_A_CORE FT
Vital Signs Last 24 Hrs  T(C): 36.1 (09-22-22 @ 05:29), Max: 36.7 (09-21-22 @ 15:31)  T(F): 97 (09-22-22 @ 05:29), Max: 98.1 (09-21-22 @ 15:31)  HR: --  BP: --  BP(mean): --  RR: 18 (09-22-22 @ 05:29) (18 - 18)  SpO2: --    Orthostatic VS  09-22-22 @ 05:29  Lying BP: --/-- HR: --  Sitting BP: 154/74 HR: 67  Standing BP: 152/79 HR: 79  Site: upper right arm  Mode: electronic  Orthostatic VS  09-21-22 @ 05:53  Lying BP: --/-- HR: --  Sitting BP: 152/98 HR: 58  Standing BP: 159/64 HR: 64  Site: --  Mode: --   Vital Signs Last 24 Hrs  T(C): 36.1 (09-22-22 @ 05:29), Max: 36.1 (09-22-22 @ 05:29)  T(F): 97 (09-22-22 @ 05:29), Max: 97 (09-22-22 @ 05:29)  HR: --  BP: --  BP(mean): --  RR: 18 (09-22-22 @ 05:29) (18 - 18)  SpO2: --    Orthostatic VS  09-22-22 @ 05:29  Lying BP: --/-- HR: --  Sitting BP: 154/74 HR: 67  Standing BP: 152/79 HR: 79  Site: upper right arm  Mode: electronic  Orthostatic VS  09-21-22 @ 05:53  Lying BP: --/-- HR: --  Sitting BP: 152/98 HR: 58  Standing BP: 159/64 HR: 64  Site: --  Mode: --

## 2022-09-22 NOTE — BH INPATIENT PSYCHIATRY PROGRESS NOTE - NSBHASSESSSUMMFT_PSY_ALL_CORE
Patient is an 82-year-old male currently residing at [facility]. PPH of depression and psychosis. + history of inpatient admissions last 7/22-8/19 at [facility] post admission to [facility] for dehydration/SI/FTT 7/6-7/22I. Admitted at [facility] and seen by CL. Prior to last admission stopped eating/drinking in attempt to kill self. Unknown violence/aggression or substance use. no legal issues PMH- HTN, CHF, COPD & DM. BIBA referred by NH for suicidal ideation.   Patient presented to the ED in the context of suicidal ideation. Patient endorsed continued thought to die and stated he was going to stop eating and drinking at NH because he wants to be dead. Patient unable to contract for safety. As patient presented imminent risk of self-harm, he was admitted to inpatient psych for safety and stabilization. Patient was at [facility] from 09/07-09/12 due to unavailability of psych bed.     MMSE: 29 (during this hospital admission)  Patient is status post one ECT treatment on 9/19    Clinical Update   9/20: Patient is not willing to pursue ECT. C/o insomnia  9/21: Patient continues to be depressed, with SI - making attempts to minimize symptoms, possibly to avoid further discussion of ECT, which he is refusing.  Continue Remeron, which was increased to 45mg.  9/22: Patient continues to be depressed. Patient expressed other aspects of guilt feeling, with endorsing passive suicidal ideations. Patient expressed decreased psychomotor activity. Patient is still refusing ECT again.     Plan:  - Hold ECT due to tolerance concerns reported by the patient   - Will Discuss the benefits and hazards of ECT again with the patient to improve his awareness about the procedure and try to encourage compliance   -Safety: routine observation, denies SI/HI/I/P on the unit. PRNs: Zyprexa 2.5mg PO/IM for agitation   -Psychiatry: Will continue with Remeron 45 mg qhs  -Group, milieu, individual therapy as appropriate.  -Medical: continue home medications for medical comorbidities:  Symbicort 80 mcg 2 times / day (Standing order) for COPD   Admission labs WNL.  - Health care Maintenance:   Diet: Minced and Moist  DVT PPx: Patient is freely ambulating. No need for pharmacologic Prophylaxis   Dispo: pending stabilization. Patient continues to require inpatient hospitalization for stabilization and safety
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
## 2022-09-23 PROCEDURE — 99231 SBSQ HOSP IP/OBS SF/LOW 25: CPT | Mod: GC

## 2022-09-23 RX ORDER — AMLODIPINE BESYLATE 2.5 MG/1
5 TABLET ORAL DAILY
Refills: 0 | Status: DISCONTINUED | OUTPATIENT
Start: 2022-09-23 | End: 2022-10-11

## 2022-09-23 RX ADMIN — METFORMIN HYDROCHLORIDE 500 MILLIGRAM(S): 850 TABLET ORAL at 08:54

## 2022-09-23 RX ADMIN — MIRTAZAPINE 45 MILLIGRAM(S): 45 TABLET, ORALLY DISINTEGRATING ORAL at 20:14

## 2022-09-23 RX ADMIN — METFORMIN HYDROCHLORIDE 500 MILLIGRAM(S): 850 TABLET ORAL at 20:14

## 2022-09-23 NOTE — CHART NOTE - NSCHARTNOTEFT_GEN_A_CORE
Discussed hypertension management with team, pt had been taking felodipine 10 at home, advised start amlodipine 5 and we will continue to monitor BP.

## 2022-09-23 NOTE — BH INPATIENT PSYCHIATRY PROGRESS NOTE - CURRENT MEDICATION
MEDICATIONS  (STANDING):  budesonide  80 MICROgram(s)/formoterol 4.5 MICROgram(s) Inhaler 2 Puff(s) Inhalation two times a day  metFORMIN 500 milliGRAM(s) Oral two times a day  midazolam Injectable 2 milliGRAM(s) IV Push once  mirtazapine 45 milliGRAM(s) Oral at bedtime  polyethylene glycol 3350 17 Gram(s) Oral daily    MEDICATIONS  (PRN):  OLANZapine 2.5 milliGRAM(s) Oral two times a day PRN agitation  OLANZapine Injectable 2.5 milliGRAM(s) IntraMuscular once PRN severe agitation  simethicone 80 milliGRAM(s) Chew three times a day PRN Upset Stomach   MEDICATIONS  (STANDING):  amLODIPine   Tablet 5 milliGRAM(s) Oral daily  budesonide  80 MICROgram(s)/formoterol 4.5 MICROgram(s) Inhaler 2 Puff(s) Inhalation two times a day  metFORMIN 500 milliGRAM(s) Oral two times a day  midazolam Injectable 2 milliGRAM(s) IV Push once  mirtazapine 45 milliGRAM(s) Oral at bedtime  polyethylene glycol 3350 17 Gram(s) Oral daily    MEDICATIONS  (PRN):  OLANZapine 2.5 milliGRAM(s) Oral two times a day PRN agitation  OLANZapine Injectable 2.5 milliGRAM(s) IntraMuscular once PRN severe agitation  simethicone 80 milliGRAM(s) Chew three times a day PRN Upset Stomach

## 2022-09-23 NOTE — BH INPATIENT PSYCHIATRY PROGRESS NOTE - NSBHCHARTREVIEWVS_PSY_A_CORE FT
Vital Signs Last 24 Hrs  T(C): 36.3 (09-23-22 @ 08:01), Max: 36.4 (09-22-22 @ 15:38)  T(F): 97.3 (09-23-22 @ 08:01), Max: 97.6 (09-22-22 @ 15:38)  HR: --  BP: --  BP(mean): --  RR: --  SpO2: --    Orthostatic VS  09-23-22 @ 08:01  Lying BP: --/-- HR: --  Sitting BP: 169/83 HR: 73  Standing BP: 160/70 HR: 93  Site: --  Mode: --  Orthostatic VS  09-22-22 @ 05:29  Lying BP: --/-- HR: --  Sitting BP: 154/74 HR: 67  Standing BP: 152/79 HR: 79  Site: upper right arm  Mode: electronic
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
## 2022-09-23 NOTE — BH INPATIENT PSYCHIATRY PROGRESS NOTE - NSBHASSESSSUMMFT_PSY_ALL_CORE
Patient is an 82-year-old male currently residing at [facility]. PPH of depression and psychosis. + history of inpatient admissions last 7/22-8/19 at [facility] post admission to [facility] for dehydration/SI/FTT 7/6-7/22I. Admitted at [facility] and seen by CL. Prior to last admission stopped eating/drinking in attempt to kill self. Unknown violence/aggression or substance use. no legal issues PMH- HTN, CHF, COPD & DM. BIBA referred by NH for suicidal ideation.   Patient presented to the ED in the context of suicidal ideation. Patient endorsed continued thought to die and stated he was going to stop eating and drinking at NH because he wants to be dead. Patient unable to contract for safety. As patient presented imminent risk of self-harm, he was admitted to inpatient psych for safety and stabilization. Patient was at [facility] from 09/07-09/12 due to unavailability of psych bed.   Patient had long standing Hx of Alcohol Abuse, gambling, which motivated patient to do impulsive, disorganized behaviors were misinterpreted as schizophrenia.     MMSE: 29 (during this hospital admission): Patient is status post one ECT treatment on 9/19    Clinical Update   09/16 Patient still depressed. Not forthcoming regarding his Suicidal ideations. Patient was tired lying in bed awaiting for the ECT initially.  ECT was canceled  because of the lack of final approval by the sister. Patient was initially anxious while awaiting the ET. He was stuttering much more frequent.  Patient endorsed having a BM this morning. According to the nursing staff, patient sleep improved over night and his appetite as well.   9/17: dysphoric, superficial, Denies SI/HI.  9/19: Continues to be depressed and anxious.  Patient denied active and passive SIs. Patient was looking forward to ECT. He was little anxious about ECT as this is his first time experience   9/20: Patient is not willing to pursue ECT. C/o insomnia  9/21: Patient continues to be depressed, with SI - making attempts to minimize symptoms, possibly to avoid further discussion of ECT, which he is refusing.  Continue Remeron 45mg qhs  9/22 & 9/23: Extreme sense of guilt (Introjected), Expressing Passive SIs. Exhibiting Depressed pessimistic mood. Refusing ECT. Patient on Max dose of Remeron 45 mg qhs.   Blood pressure is trending up during the last few days up to 165/83 this morning. Talked with medicine for the concern of Patient's blood pressure. Patient was mjaintaine at some time on Felodipine 10 mg. Medicine Attending recommneded to start Amlodpine 5 mg qds and montior BP closely.     Plan:  - Hold ECT due to tolerance concerns reported by the patient   - Will Discuss the benefits and hazards of ECT again with the patient to improve his awareness about the procedure and try to encourage compliance   -Safety: routine observation, denies SI/HI/I/P on the unit. PRNs: Zyprexa 2.5mg PO/IM for agitation   -Psychiatry: Continue with Remeron 45 mg qhs  -Group, milieu, individual therapy as appropriate.  -Medical: continue home medications for medical comorbidities:  For HTN, Start Amlodipine 5 mg qd (Discussed with medicine attending) , recs are appreciated  Symbicort 80 mcg 2 times / day (Standing order) for COPD   Admission labs WNL.  - Health care Maintenance:   Diet: Minced and Moist  DVT PPx: Patient is freely ambulating. No need for pharmacologic Prophylaxis   Dispo: Pending stabilization. Patient continues to require inpatient hospitalization for stabilization and safety            Sister requesting dispo to different NH (Requesting NH in [place] close to her and her sister as well).

## 2022-09-24 RX ADMIN — METFORMIN HYDROCHLORIDE 500 MILLIGRAM(S): 850 TABLET ORAL at 20:09

## 2022-09-24 RX ADMIN — BUDESONIDE AND FORMOTEROL FUMARATE DIHYDRATE 2 PUFF(S): 160; 4.5 AEROSOL RESPIRATORY (INHALATION) at 20:09

## 2022-09-24 RX ADMIN — AMLODIPINE BESYLATE 5 MILLIGRAM(S): 2.5 TABLET ORAL at 09:43

## 2022-09-24 RX ADMIN — METFORMIN HYDROCHLORIDE 500 MILLIGRAM(S): 850 TABLET ORAL at 09:43

## 2022-09-24 RX ADMIN — MIRTAZAPINE 45 MILLIGRAM(S): 45 TABLET, ORALLY DISINTEGRATING ORAL at 20:08

## 2022-09-25 RX ADMIN — METFORMIN HYDROCHLORIDE 500 MILLIGRAM(S): 850 TABLET ORAL at 08:45

## 2022-09-25 RX ADMIN — AMLODIPINE BESYLATE 5 MILLIGRAM(S): 2.5 TABLET ORAL at 08:45

## 2022-09-25 RX ADMIN — MIRTAZAPINE 45 MILLIGRAM(S): 45 TABLET, ORALLY DISINTEGRATING ORAL at 20:13

## 2022-09-25 RX ADMIN — METFORMIN HYDROCHLORIDE 500 MILLIGRAM(S): 850 TABLET ORAL at 20:13

## 2022-09-26 ENCOUNTER — APPOINTMENT (OUTPATIENT)
Dept: NEUROSURGERY | Facility: CLINIC | Age: 82
End: 2022-09-26

## 2022-09-26 VITALS
SYSTOLIC BLOOD PRESSURE: 130 MMHG | WEIGHT: 120 LBS | HEART RATE: 67 BPM | OXYGEN SATURATION: 98 % | DIASTOLIC BLOOD PRESSURE: 75 MMHG

## 2022-09-26 DIAGNOSIS — F32.A DEPRESSION, UNSPECIFIED: ICD-10-CM

## 2022-09-26 DIAGNOSIS — I62.03 NONTRAUMATIC CHRONIC SUBDURAL HEMORRHAGE: ICD-10-CM

## 2022-09-26 PROCEDURE — 99204 OFFICE O/P NEW MOD 45 MIN: CPT

## 2022-09-26 PROCEDURE — 99232 SBSQ HOSP IP/OBS MODERATE 35: CPT | Mod: GC

## 2022-09-26 RX ORDER — VENLAFAXINE HCL 75 MG
37.5 CAPSULE, EXT RELEASE 24 HR ORAL DAILY
Refills: 0 | Status: DISCONTINUED | OUTPATIENT
Start: 2022-09-26 | End: 2022-09-26

## 2022-09-26 RX ORDER — VENLAFAXINE HCL 75 MG
37.5 CAPSULE, EXT RELEASE 24 HR ORAL DAILY
Refills: 0 | Status: DISCONTINUED | OUTPATIENT
Start: 2022-09-26 | End: 2022-09-29

## 2022-09-26 RX ADMIN — METFORMIN HYDROCHLORIDE 500 MILLIGRAM(S): 850 TABLET ORAL at 20:45

## 2022-09-26 RX ADMIN — AMLODIPINE BESYLATE 5 MILLIGRAM(S): 2.5 TABLET ORAL at 08:15

## 2022-09-26 RX ADMIN — METFORMIN HYDROCHLORIDE 500 MILLIGRAM(S): 850 TABLET ORAL at 08:15

## 2022-09-26 RX ADMIN — MIRTAZAPINE 45 MILLIGRAM(S): 45 TABLET, ORALLY DISINTEGRATING ORAL at 20:45

## 2022-09-26 NOTE — BH INPATIENT PSYCHIATRY PROGRESS NOTE - MSE UNSTRUCTURED FT
Patient looks older than stated age, with lean body habitus, low energy. Patient is more fidgety, but less stuttering while talking. Still expressing motor tics by lip smacking, more frequently observed when he is challenged with questions or talking about guilty feelings. Patient endorsed only passive suicidal ideations. Denied any homicidal ideations, paranoid ideations. Denied any AH. Partially insightful, with limited judgment.  Good impulse control on the unit and during the interview. Patient looks older than stated age, with lean body habitus, low energy. Patient is more fidgety, but less stuttering while talking. Still expressing motor tics by lip smacking, more frequently observed when he is challenged with questions or talking about guilty feelings. Patient endorsed only passive suicidal ideations. Denied any homicidal ideations, paranoid ideations. Denied any AH. Partially insightful, with limited judgment. Good impulse control on the unit and during the interview.

## 2022-09-26 NOTE — HISTORY OF PRESENT ILLNESS
[FreeTextEntry1] : He obtained a CTH 9/22/2022 which showed interval resolution of the posterior parafalcine SDH. Today, He reports that he has not had any headaches recently, nor has he been experiencing any symptoms of post concussion syndrome that could indicate that he had a trauma with lingering sequelae.

## 2022-09-26 NOTE — PHYSICAL EXAM
[General Appearance - Alert] : alert [General Appearance - In No Acute Distress] : in no acute distress [Person] : oriented to person [Place] : oriented to place [Short Term Intact] : short term memory intact [Span Intact] : the attention span was normal [Concentration Intact] : normal concentrating ability [Fluency] : fluency intact [Comprehension] : comprehension intact [Cranial Nerves Optic (II)] : visual acuity intact bilaterally,  pupils equal round and reactive to light [Cranial Nerves Oculomotor (III)] : extraocular motion intact [Cranial Nerves Trigeminal (V)] : facial sensation intact symmetrically [Cranial Nerves Facial (VII)] : face symmetrical [Cranial Nerves Vestibulocochlear (VIII)] : hearing was intact bilaterally [Cranial Nerves Accessory (XI - Cranial And Spinal)] : head turning and shoulder shrug symmetric [Motor Strength] : muscle strength was normal in all four extremities [Motor Handedness Right-Handed] : the patient is right hand dominant [FreeTextEntry1] : fidgetting during interview [Remote Intact] : remote memory impaired [FreeTextEntry6] : No pronator drift, GARCIAS

## 2022-09-26 NOTE — BH INPATIENT PSYCHIATRY PROGRESS NOTE - NSBHCHARTREVIEWVS_PSY_A_CORE FT
Vital Signs Last 24 Hrs  T(C): 36.3 (09-26-22 @ 15:40), Max: 36.6 (09-26-22 @ 08:25)  T(F): 97.4 (09-26-22 @ 15:40), Max: 97.9 (09-26-22 @ 08:25)  HR: --  BP: --  BP(mean): --  RR: 17 (09-26-22 @ 08:25) (17 - 17)  SpO2: 99% (09-26-22 @ 08:25) (99% - 99%)    Orthostatic VS  09-26-22 @ 08:25  Lying BP: --/-- HR: --  Sitting BP: 133/77 HR: 68  Standing BP: 144/71 HR: 73  Site: --  Mode: --  Orthostatic VS  09-25-22 @ 08:00  Lying BP: --/-- HR: --  Sitting BP: 154/84 HR: 67  Standing BP: 140/88 HR: 71  Site: upper left arm  Mode: electronic   Vital Signs Last 24 Hrs  T(C): 36.3 (09-26-22 @ 15:40), Max: 36.6 (09-26-22 @ 08:25)  T(F): 97.4 (09-26-22 @ 15:40), Max: 97.9 (09-26-22 @ 08:25)  RR: 17 (09-26-22 @ 08:25) (17 - 17)  SpO2: 99% (09-26-22 @ 08:25) (99% - 99%)    Orthostatic VS  09-26-22 @ 08:25  Sitting BP: 133/77 HR: 68  Standing BP: 144/71 HR: 73

## 2022-09-26 NOTE — BH INPATIENT PSYCHIATRY PROGRESS NOTE - NSBHASSESSSUMMFT_PSY_ALL_CORE
Patient is an 82-year-old male currently residing at [facility]. PPH of depression and psychosis. + history of inpatient admissions last 7/22-8/19 at [facility] post admission to [facility] for dehydration/SI/FTT 7/6-7/22I. Admitted at [facility] and seen by CL. Prior to last admission stopped eating/drinking in attempt to kill self. Unknown violence/aggression or substance use. no legal issues PMH- HTN, CHF, COPD & DM. BIBA referred by NH for suicidal ideation.   Patient presented to the ED in the context of suicidal ideation. Patient endorsed continued thought to die and stated he was going to stop eating and drinking at NH because he wants to be dead. Patient unable to contract for safety. As patient presented imminent risk of self-harm, he was admitted to inpatient psych for safety and stabilization. Patient was at [facility] from 09/07-09/12 due to unavailability of psych bed.   Patient had long standing Hx of Alcohol Abuse, gambling, which motivated patient to do impulsive, disorganized behaviors were misinterpreted as schizophrenia    MMSE: 29 (during this hospital admission): Patient is status post one ECT treatment on 9/19    Clinical Update   9/22 & 9/23: Extreme sense of guilt (Introjected), Expressing Passive SIs. Exhibiting Depressed pessimistic mood. Refusing ECT. Patient on Max dose of Remeron 45 mg qhs.   Blood pressure is trending up during the last few days up to 165/83 this morning. Talked with medicine for the concern of Patient's blood pressure. Patient was mjaintaine at some time on Felodipine 10 mg. Medicine Attending recommneded to start Amlodpine 5 mg qds and montior BP closely.     9/26: Patient continues to be in depressed mood. Patient coninues to report guilt feeling. Patient is still sleeping at times durig the daytime.     Plan:  -Safety: routine observation, denies SI/HI/I/P on the unit. PRNs: Zyprexa 2.5mg PO/IM for agitation   -Psychiatry: Continue with Remeron 45 mg qhs                     Start Effexor 37.5 mg qd and tirate medication as tolerated  -Group, milieu, individual therapy as appropriate.  - Will Keep ECT on hold due to tolerance concerns reported by the patient   - Will Discuss the benefits and hazards of ECT again with the patient to improve his awareness about the procedure and try to encourage compliance   - Medical: continue home medications for medical comorbidities:  For HTN, Start Amlodipine 5 mg qd (Discussed with medicine attending) , recs are appreciated  Symbicort 80 mcg 2 times / day (Standing order) for COPD   Admission labs WNL.  - Health care Maintenance:   Diet: Minced and Moist  DVT PPx: Patient is freely ambulating. No need for pharmacologic Prophylaxis  Patient is an 82-year-old male currently residing at [facility]. PPH of depression and psychosis. + history of inpatient admissions last 7/22-8/19 at [facility] post admission to [facility] for dehydration/SI/FTT 7/6-7/22I. Admitted at [facility] and seen by CL. Prior to last admission stopped eating/drinking in attempt to kill self. Unknown violence/aggression or substance use. no legal issues PMH- HTN, CHF, COPD & DM. BIBA referred by NH for suicidal ideation.   Patient presented to the ED in the context of suicidal ideation. Patient endorsed continued thought to die and stated he was going to stop eating and drinking at NH because he wants to be dead. Patient unable to contract for safety. As patient presented imminent risk of self-harm, he was admitted to inpatient psych for safety and stabilization. Patient was at [facility] from 09/07-09/12 due to unavailability of psych bed.   Patient had long standing Hx of Alcohol Abuse, gambling, which motivated patient to do impulsive, disorganized behaviors were misinterpreted as schizophrenia    MMSE: 29 (during this hospital admission): Patient is status post one ECT treatment on 9/19    Clinical Update   9/22 & 9/23: Extreme sense of guilt (Introjected), Expressing Passive SIs. Exhibiting Depressed pessimistic mood. Refusing ECT. Patient on Max dose of Remeron 45 mg qhs.   Blood pressure is trending up during the last few days up to 165/83 this morning. Talked with medicine for the concern of Patient's blood pressure. Patient was maintained at some time on Felodipine 10 mg. Medicine Attending recommended to start Amlodipine 5 mg qds and monitor BP closely.     9/26: Patient continues to be in depressed mood. Patient continues to report guilt feeling. Patient is still sleeping at times during the daytime.     Plan:  -Safety: routine observation, denies SI/HI/I/P on the unit. PRNs: Zyprexa 2.5mg PO/IM for agitation   -Psychiatry: Continue with Remeron 45 mg qhs                     Start Effexor 37.5 mg XR qd and titrate medication as tolerated  -Group, milieu, individual therapy as appropriate.  - Will Keep ECT on hold due to tolerance concerns reported by the patient   - Will Discuss the benefits and hazards of ECT again with the patient to improve his awareness about the procedure and try to encourage compliance   - Medical: continue home medications for medical comorbidities:  For HTN, Start Amlodipine 5 mg qd (Discussed with medicine attending) , recs are appreciated  Symbicort 80 mcg 2 times / day (Standing order) for COPD   Admission labs WNL.  - Health care Maintenance:   Diet: Minced and Moist  DVT PPx: Patient is freely ambulating. No need for pharmacologic Prophylaxis

## 2022-09-26 NOTE — ASSESSMENT
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 He had a scan form 9.22.2022 which showed interval resolution of the SDH with a reassuring exam without neurodeficits. \par \par Given the lack of symptoms reported, stability of his exam and resolution of imaging findings there are no further changes or interventions that are recommended at this time. The patient can follow up with Dr. Jesusita HUGHES.

## 2022-09-26 NOTE — RESULTS/DATA
[FreeTextEntry1] : \par ACC: [id number] EXAM: CT BRAIN\par \par PROCEDURE DATE: 09/22/2022\par \par \par \par INTERPRETATION: CLINICAL INFORMATION: Follow-up recent subdural hematoma.\par \par TECHNIQUE: Noncontrast axial CT images were acquired through the head. Two-dimensional sagittal and coronal reformats were generated.\par \par COMPARISON STUDY: CT head 8/4/2022\par \par FINDINGS:\par \par Previously seen subdural hematoma along the tentorial regions appears to have resolved since prior CT scan on 8/4/2022.\par \par There is no CT evidence of acute intracranial hemorrhage, extra-axial collection, vasogenic edema, mass effect, midline shift, central herniation, or hydrocephalus.\par \par There is mild cerebral volume loss and patchy white matter hypoattenuation which is nonspecific in etiology but likely related to chronic microvascular-type changes.\par \par The visualized paranasal sinuses are clear. The mastoid air cells and middle ear cavities are clear. Chronic nasal bone deformity.\par \par The soft tissues of the scalp are unremarkable. The calvarium is intact. Right lens replacement.\par \par IMPRESSION:\par \par Resolution of previously seen subdural hematoma. No CT evidence of acute intracranial hemorrhage, mass effect, or midline shift.\par \par

## 2022-09-26 NOTE — REASON FOR VISIT
[Post Hospitalization] : a post hospitalization visit [Formal Caregiver] : formal caregiver [FreeTextEntry1] : The patient is here for follow up regarding his SDH seen on the 8/4/2022. \par Hospital Course: \par Discharge Date	12-Sep-2022 \par Admission Date	07-Sep-2022 00:01 \par Reason for Admission	Suicidal Ideation \par Hospital Course	 \par 82 year-old Pmhx of dementia, depression schizophrenia, and suicidal ideation \par and PMH of CHF, COPD, DM, R eye cataract surgery (10 years ago) and HTN, here \par for suicidal ideation and intermittent chest pain. \par \par He was last admitted from 7/22-8/19 at [facility] for suicidal ideation and on \par admission continued to have SI and placed on 1:1 observation \par -pending placement to [facility] and was continued on mirtazapine and zyprexa 2.5 mg \par Psych consulted and recommended continue constant Observation, monitor labs and \par to continue Mirtazapine 30mg HS with PRN: Zyprexa 2.5mg IM PRN (HOLD if qtc> \par 500). Recommended [facility] admission upon available bed. \par \par He was also treated for Intermittent chest pain and during hospitalization he \par had no chest pain. Troponin negative and EKG unchanged from prior. Low \par suspicion for ACS. Remained chest free during hospitalization. \par \par He was also treated for Schizophrenia with Paliperidone 156 mg/ml IM given on \par 9/3. \par \par Noted to have Tardive dyskinesia with persistent lip smacking during PE. \par \par On 09/12/2022, patient was seen and continued to express that he does not want \[facility] to live anymore. Patient is hemodynamically stable for discharge to [facility] today. \par All medications were reviewed with attending, and sent to [facility] agreed upon \[facility] pharmacy. Patient should follow up with PCP and cardiologist after discharge. \par

## 2022-09-26 NOTE — BH INPATIENT PSYCHIATRY PROGRESS NOTE - CURRENT MEDICATION
MEDICATIONS  (STANDING):  amLODIPine   Tablet 5 milliGRAM(s) Oral daily  budesonide  80 MICROgram(s)/formoterol 4.5 MICROgram(s) Inhaler 2 Puff(s) Inhalation two times a day  metFORMIN 500 milliGRAM(s) Oral two times a day  midazolam Injectable 2 milliGRAM(s) IV Push once  mirtazapine 45 milliGRAM(s) Oral at bedtime  polyethylene glycol 3350 17 Gram(s) Oral daily  venlafaxine 37.5 milliGRAM(s) Oral daily    MEDICATIONS  (PRN):  OLANZapine 2.5 milliGRAM(s) Oral two times a day PRN agitation  OLANZapine Injectable 2.5 milliGRAM(s) IntraMuscular once PRN severe agitation  simethicone 80 milliGRAM(s) Chew three times a day PRN Upset Stomach   MEDICATIONS  (STANDING):  amLODIPine   Tablet 5 milliGRAM(s) Oral daily  budesonide  80 MICROgram(s)/formoterol 4.5 MICROgram(s) Inhaler 2 Puff(s) Inhalation two times a day  metFORMIN 500 milliGRAM(s) Oral two times a day  midazolam Injectable 2 milliGRAM(s) IV Push once  mirtazapine 45 milliGRAM(s) Oral at bedtime  polyethylene glycol 3350 17 Gram(s) Oral daily  venlafaxine XR 37.5 milliGRAM(s) Oral daily    MEDICATIONS  (PRN):  OLANZapine 2.5 milliGRAM(s) Oral two times a day PRN agitation  OLANZapine Injectable 2.5 milliGRAM(s) IntraMuscular once PRN severe agitation  simethicone 80 milliGRAM(s) Chew three times a day PRN Upset Stomach

## 2022-09-27 PROCEDURE — 99232 SBSQ HOSP IP/OBS MODERATE 35: CPT | Mod: GC

## 2022-09-27 RX ADMIN — METFORMIN HYDROCHLORIDE 500 MILLIGRAM(S): 850 TABLET ORAL at 20:22

## 2022-09-27 RX ADMIN — Medication 37.5 MILLIGRAM(S): at 09:49

## 2022-09-27 RX ADMIN — MIRTAZAPINE 45 MILLIGRAM(S): 45 TABLET, ORALLY DISINTEGRATING ORAL at 20:22

## 2022-09-27 RX ADMIN — OLANZAPINE 2.5 MILLIGRAM(S): 15 TABLET, FILM COATED ORAL at 02:13

## 2022-09-27 RX ADMIN — METFORMIN HYDROCHLORIDE 500 MILLIGRAM(S): 850 TABLET ORAL at 09:49

## 2022-09-27 RX ADMIN — AMLODIPINE BESYLATE 5 MILLIGRAM(S): 2.5 TABLET ORAL at 09:50

## 2022-09-27 NOTE — BH INPATIENT PSYCHIATRY PROGRESS NOTE - CURRENT MEDICATION
MEDICATIONS  (STANDING):  amLODIPine   Tablet 5 milliGRAM(s) Oral daily  budesonide  80 MICROgram(s)/formoterol 4.5 MICROgram(s) Inhaler 2 Puff(s) Inhalation two times a day  metFORMIN 500 milliGRAM(s) Oral two times a day  midazolam Injectable 2 milliGRAM(s) IV Push once  mirtazapine 45 milliGRAM(s) Oral at bedtime  polyethylene glycol 3350 17 Gram(s) Oral daily  venlafaxine XR 37.5 milliGRAM(s) Oral daily    MEDICATIONS  (PRN):  OLANZapine 2.5 milliGRAM(s) Oral two times a day PRN agitation  OLANZapine Injectable 2.5 milliGRAM(s) IntraMuscular once PRN severe agitation  simethicone 80 milliGRAM(s) Chew three times a day PRN Upset Stomach   MEDICATIONS  (STANDING):  amLODIPine   Tablet 5 milliGRAM(s) Oral daily  budesonide  80 MICROgram(s)/formoterol 4.5 MICROgram(s) Inhaler 2 Puff(s) Inhalation two times a day  metFORMIN 500 milliGRAM(s) Oral two times a day  midazolam Injectable 2 milliGRAM(s) IV Push once  mirtazapine 45 milliGRAM(s) Oral at bedtime  venlafaxine XR 37.5 milliGRAM(s) Oral daily    MEDICATIONS  (PRN):  OLANZapine 2.5 milliGRAM(s) Oral two times a day PRN agitation  OLANZapine Injectable 2.5 milliGRAM(s) IntraMuscular once PRN severe agitation  simethicone 80 milliGRAM(s) Chew three times a day PRN Upset Stomach

## 2022-09-27 NOTE — BH INPATIENT PSYCHIATRY PROGRESS NOTE - NSBHFUPINTERVALHXFT_PSY_A_CORE
Patient continues to be in depressed mood. He complains about being sleepy most of the day. He described himself as drowsy. He stated he felt he wants to go to sleep at 6 PM and he woke up at 6 AM. Patient stated he still feels guilty but not all the time. When asked if your sisters forgive you, does this make you feel better? he said no. Patient refused his PRN laxatives as he already had BM this morning at 6 AM.

## 2022-09-27 NOTE — BH INPATIENT PSYCHIATRY PROGRESS NOTE - NSBHASSESSSUMMFT_PSY_ALL_CORE
Patient is an 82-year-old male currently residing at [facility]. PPH of depression and psychosis. + history of inpatient admissions last 7/22-8/19 at [facility] post admission to [facility] for dehydration/SI/FTT 7/6-7/22I. Admitted at [facility] and seen by CL. Prior to last admission stopped eating/drinking in attempt to kill self. Unknown violence/aggression or substance use. no legal issues PMH- HTN, CHF, COPD & DM. BIBA referred by NH for suicidal ideation.   Patient presented to the ED in the context of suicidal ideation. Patient endorsed continued thought to die and stated he was going to stop eating and drinking at NH because he wants to be dead. Patient unable to contract for safety. As patient presented imminent risk of self-harm, he was admitted to inpatient psych for safety and stabilization. Patient was at [facility] from 09/07-09/12 due to unavailability of psych bed.   Patient had long standing Hx of Alcohol Abuse, gambling, which motivated patient to do impulsive, disorganized behaviors were misinterpreted as schizophrenia    MMSE: 29 (during this hospital admission): Patient is status post one ECT treatment on 9/19    Clinical Update   9/22 & 9/23: Extreme sense of guilt (Introjected), Expressing Passive SIs. Exhibiting Depressed pessimistic mood. Refusing ECT. Patient on Max dose of Remeron 45 mg qhs.   Blood pressure is trending up during the last few days up to 165/83 this morning. Patient was maintained at some time on Felodipine 10 mg. Medicine Attending recommended to start Amlodipine 5 mg qds and monitor BP closely.     9/26: Patient continues to be in depressed mood. Patient continues to report guilt feeling. Patient is still sleeping at times during the daytime.     9/27:  Patient continues to be in depressed mood. He complains about being sleepy most of the day. He described himself as drowsy. He stated he felt he wants to go to sleep at 6 PM and he woke up at 6 AM. Patient stated he still feels guilty but not all the time. When asked if your sisters forgive you, does this make you feel better? he said no. Patient refused his PRN laxatives as he already had BM this morning at 6 AM.     Plan:  -Safety: routine observation, denies SI/HI/I/P on the unit. PRNs: Zyprexa 2.5mg PO/IM for agitation   -Psychiatry: Continue with Remeron 45 mg qhs                     Will continue with Effexor 37.5 mg XR qd and titrate medication as tolerated  -Group, milieu, individual therapy as appropriate.  - Will Keep ECT on hold due to tolerance concerns reported by the patient   - Will Discuss the benefits and hazards of ECT again with the patient to improve his awareness about the procedure and try to encourage compliance   - Medical: continue home medications for medical comorbidities:  For HTN, Start Amlodipine 5 mg qd (Discussed with medicine attending) , recommendations are appreciated  Symbicort 80 mcg 2 times / day (Standing order) for COPD  - Will Discontinue PRN laxatives as patient is reporting regular BM

## 2022-09-27 NOTE — BH INPATIENT PSYCHIATRY PROGRESS NOTE - NSBHCHARTREVIEWVS_PSY_A_CORE FT
Vital Signs Last 24 Hrs  T(C): 36.6 (09-27-22 @ 08:33), Max: 36.6 (09-27-22 @ 08:33)  T(F): 97.8 (09-27-22 @ 08:33), Max: 97.8 (09-27-22 @ 08:33)  HR: --  BP: --  BP(mean): --  RR: 17 (09-27-22 @ 08:33) (17 - 17)  SpO2: 100% (09-27-22 @ 08:33) (100% - 100%)    Orthostatic VS  09-27-22 @ 08:33  Lying BP: --/-- HR: --  Sitting BP: 119/85 HR: 88  Standing BP: 135/60 HR: 77  Site: upper left arm  Mode: electronic  Orthostatic VS  09-26-22 @ 08:25  Lying BP: --/-- HR: --  Sitting BP: 133/77 HR: 68  Standing BP: 144/71 HR: 73  Site: --  Mode: --   Vital Signs Last 24 Hrs  T(C): 36.4 (09-27-22 @ 15:45), Max: 36.6 (09-27-22 @ 08:33)  T(F): 97.5 (09-27-22 @ 15:45), Max: 97.8 (09-27-22 @ 08:33)  RR: 17 (09-27-22 @ 08:33) (17 - 17)  SpO2: 100% (09-27-22 @ 08:33) (100% - 100%)    Orthostatic VS  09-27-22 @ 08:33  Sitting BP: 119/85 HR: 88  Standing BP: 135/60 HR: 77  Site: upper left arm  Mode: electronic  Orthostatic VS

## 2022-09-27 NOTE — BH INPATIENT PSYCHIATRY PROGRESS NOTE - MSE UNSTRUCTURED FT
Patient looks older than stated age, with lean body habitus, patient is more visible on the unit. Patient is less fidgety, but more stuttering while talking. Still expressing motor tics by lip smacking, more frequently observed when he is challenged with questions or talking about guilty feelings. Patient denied any passive or active suicidal ideations. Denied any homicidal ideations, paranoid ideations. Denied any AH. Partially insightful, with limited judgment. Good impulse control on the unit and during the interview.

## 2022-09-28 PROCEDURE — 99232 SBSQ HOSP IP/OBS MODERATE 35: CPT | Mod: GC

## 2022-09-28 RX ORDER — ALBUTEROL 90 UG/1
2 AEROSOL, METERED ORAL EVERY 6 HOURS
Refills: 0 | Status: DISCONTINUED | OUTPATIENT
Start: 2022-09-28 | End: 2022-10-11

## 2022-09-28 RX ADMIN — AMLODIPINE BESYLATE 5 MILLIGRAM(S): 2.5 TABLET ORAL at 09:40

## 2022-09-28 RX ADMIN — METFORMIN HYDROCHLORIDE 500 MILLIGRAM(S): 850 TABLET ORAL at 20:06

## 2022-09-28 RX ADMIN — MIRTAZAPINE 45 MILLIGRAM(S): 45 TABLET, ORALLY DISINTEGRATING ORAL at 20:07

## 2022-09-28 RX ADMIN — METFORMIN HYDROCHLORIDE 500 MILLIGRAM(S): 850 TABLET ORAL at 09:39

## 2022-09-28 RX ADMIN — Medication 37.5 MILLIGRAM(S): at 09:39

## 2022-09-28 NOTE — BH TREATMENT PLAN - NSTXDEPRESINTERMD_PSY_ALL_CORE
Med titration with antidepressants Med titration with antidepressants; recently started on venlafaxine. Patient had one ECT and is refusing further treatment.

## 2022-09-28 NOTE — BH INPATIENT PSYCHIATRY PROGRESS NOTE - NSBHFUPINTERVALHXFT_PSY_A_CORE
Patient is still expressing passive suicidal ideations when he is asked directly about them. He stated "I wish to be dead". He still in depressed mood, low energy today. Denied any interrupted sleep. Pt reported adequate appetite & Bowel movement this morning. He is still ining of too much sleep during the day  Patient is still expressing passive suicidal ideations when he is asked directly about them. He stated "I wish to be dead". He still in depressed mood, low energy today. Denied any interrupted sleep. Pt reported adequate appetite & Bowel movement this morning. He is still complaining excess somnolence during the day.

## 2022-09-28 NOTE — BH TREATMENT PLAN - NSTXHYPERINTERMD_PSY_ALL_CORE
Patient will be kept on anti-HTN medications. Will be titrated  Patient will be kept on anti-HTN medications. Will titrate as needed

## 2022-09-28 NOTE — BH INPATIENT PSYCHIATRY PROGRESS NOTE - NSBHCHARTREVIEWVS_PSY_A_CORE FT
Vital Signs Last 24 Hrs  T(C): 35.8 (09-28-22 @ 08:13), Max: 36.4 (09-27-22 @ 15:45)  T(F): 96.4 (09-28-22 @ 08:13), Max: 97.5 (09-27-22 @ 15:45)  HR: --  BP: --  BP(mean): --  RR: --  SpO2: 99% (09-28-22 @ 08:13) (99% - 99%)    Orthostatic VS  09-28-22 @ 08:13  Lying BP: --/-- HR: --  Sitting BP: 143/94 HR: 62  Standing BP: 132/81 HR: 64  Site: --  Mode: --  Orthostatic VS  09-27-22 @ 08:33  Lying BP: --/-- HR: --  Sitting BP: 119/85 HR: 88  Standing BP: 135/60 HR: 77  Site: upper left arm  Mode: electronic   Vital Signs Last 24 Hrs  T(C): 36.5 (09-28-22 @ 15:43), Max: 36.5 (09-28-22 @ 15:43)  T(F): 97.7 (09-28-22 @ 15:43), Max: 97.7 (09-28-22 @ 15:43)  HR: --  BP: --  BP(mean): --  RR: --  SpO2: 99% (09-28-22 @ 08:13) (99% - 99%)    Orthostatic VS  09-28-22 @ 08:13  Lying BP: --/-- HR: --  Sitting BP: 143/94 HR: 62  Standing BP: 132/81 HR: 64  Site: --  Mode: --  Orthostatic VS  09-27-22 @ 08:33  Lying BP: --/-- HR: --  Sitting BP: 119/85 HR: 88  Standing BP: 135/60 HR: 77  Site: upper left arm  Mode: electronic   Vital Signs Last 24 Hrs  T(C): 36.5 (09-28-22 @ 15:43), Max: 36.5 (09-28-22 @ 15:43)  T(F): 97.7 (09-28-22 @ 15:43), Max: 97.7 (09-28-22 @ 15:43)  SpO2: 99% (09-28-22 @ 08:13) (99% - 99%)    Orthostatic VS  09-28-22 @ 08:13  Sitting BP: 143/94 HR: 62  Standing BP: 132/81 HR: 64

## 2022-09-28 NOTE — BH INPATIENT PSYCHIATRY PROGRESS NOTE - PRN MEDS
MEDICATIONS  (PRN):  ALBUTerol    90 MICROgram(s) HFA Inhaler 2 Puff(s) Inhalation every 6 hours PRN Wheezing  OLANZapine 2.5 milliGRAM(s) Oral two times a day PRN agitation  OLANZapine Injectable 2.5 milliGRAM(s) IntraMuscular once PRN severe agitation  simethicone 80 milliGRAM(s) Chew three times a day PRN Upset Stomach

## 2022-09-28 NOTE — BH INPATIENT PSYCHIATRY PROGRESS NOTE - CURRENT MEDICATION
MEDICATIONS  (STANDING):  amLODIPine   Tablet 5 milliGRAM(s) Oral daily  metFORMIN 500 milliGRAM(s) Oral two times a day  midazolam Injectable 2 milliGRAM(s) IV Push once  mirtazapine 45 milliGRAM(s) Oral at bedtime  venlafaxine XR 37.5 milliGRAM(s) Oral daily    MEDICATIONS  (PRN):  ALBUTerol    90 MICROgram(s) HFA Inhaler 2 Puff(s) Inhalation every 6 hours PRN Wheezing  OLANZapine 2.5 milliGRAM(s) Oral two times a day PRN agitation  OLANZapine Injectable 2.5 milliGRAM(s) IntraMuscular once PRN severe agitation  simethicone 80 milliGRAM(s) Chew three times a day PRN Upset Stomach

## 2022-09-28 NOTE — BH INPATIENT PSYCHIATRY PROGRESS NOTE - NSBHASSESSSUMMFT_PSY_ALL_CORE
Patient is an 82-year-old male currently residing at [facility]. PPH of depression and psychosis. + history of inpatient admissions last 7/22-8/19 at [facility] post admission to [facility] for dehydration/SI/FTT 7/6-7/22I. Admitted at [facility] and seen by CL. Prior to last admission stopped eating/drinking in attempt to kill self. Unknown violence/aggression or substance use. no legal issues PMH- HTN, CHF, COPD & DM. BIBA referred by NH for suicidal ideation.   Patient presented to the ED in the context of suicidal ideation. Patient endorsed continued thought to die and stated he was going to stop eating and drinking at NH because he wants to be dead. Patient unable to contract for safety. As patient presented imminent risk of self-harm, he was admitted to inpatient psych for safety and stabilization. Patient was at [facility] from 09/07-09/12 due to unavailability of psych bed.   Patient had long standing Hx of Alcohol Abuse, gambling, which motivated patient to do impulsive, disorganized behaviors were misinterpreted as schizophrenia    MMSE: 29 (during this hospital admission): Patient is status post one ECT treatment on 9/19    Clinical Update   9/22 & 9/23: Extreme sense of guilt (Introjected), Expressing Passive SIs. Exhibiting Depressed pessimistic mood. Refusing ECT. Patient on Max dose of Remeron 45 mg qhs.   Blood pressure is trending up during the last few days up to 165/83 this morning. Patient was maintained at some time on Felodipine 10 mg. Medicine Attending recommended to start Amlodipine 5 mg qds and monitor BP closely.     9/26: Patient continues to be in depressed mood. Patient continues to report guilt feeling. Patient is still sleeping at times during the daytime.     9/28:  Patient is still expressing passive suicidal ideations when he is asked directly about them. He stated "I wish to be dead". He still in depressed mood, low energy today. Denied any interrupted sleep. Pt reported adequate appetite & Bowel movement this morning. He is still complaining excess somnolence during the day.     Called Neurosurgery. Patient was seen by Dr. Aldrich on Monday and there is a note on [employer] which is not available on the EMR at [facility]. It did mention that patient does not have any concerning neurosurgical issue that necessitates interference of further work up.     Plan:  -Safety: routine observation, denies SI/HI/I/P on the unit. PRNs: Zyprexa 2.5mg PO/IM for agitation   -Psychiatry: Continue with Remeron 45 mg qhs                     Will continue with Effexor 37.5 mg XR qd and titrate medication as tolerated  -Group, milieu, individual therapy as appropriate.  - Will Keep ECT on hold due to tolerance concerns reported by the patient   - Will Discuss the benefits and hazards of ECT again with the patient to improve his awareness about the procedure and try to encourage compliance   - Medical: continue home medications for medical comorbidities:  For HTN, Start Amlodipine 5 mg qd (Discussed with medicine attending) , recommendations are appreciated Symbicort 80 mcg 2 times / day (Standing order) for COPD  - Will Discontinue PRN laxatives as patient is reporting regular BM Patient is an 82-year-old male currently residing at [facility]. PPH of depression and psychosis. + history of inpatient admissions last 7/22-8/19 at [facility] post admission to [facility] for dehydration/SI/FTT 7/6-7/22I. Admitted at [facility] and seen by CL. Prior to last admission stopped eating/drinking in attempt to kill self. Unknown violence/aggression or substance use. no legal issues PMH- HTN, CHF, COPD & DM. BIBA referred by NH for suicidal ideation.   Patient presented to the ED in the context of suicidal ideation. Patient endorsed continued thought to die and stated he was going to stop eating and drinking at NH because he wants to be dead. Patient unable to contract for safety. As patient presented imminent risk of self-harm, he was admitted to inpatient psych for safety and stabilization. Patient was at [facility] from 09/07-09/12 due to unavailability of psych bed.   Patient had long standing Hx of Alcohol Abuse, gambling, which motivated patient to do impulsive, disorganized behaviors were misinterpreted as schizophrenia    MMSE: 29 (during this hospital admission): Patient is status post one ECT treatment on 9/19    Clinical Update   9/22 & 9/23: Extreme sense of guilt (Introjected), Expressing Passive SIs. Exhibiting Depressed pessimistic mood. Refusing ECT. Patient on Max dose of Remeron 45 mg qhs.   Blood pressure is trending up during the last few days up to 165/83 this morning. Patient was maintained at some time on Felodipine 10 mg. Medicine Attending recommended to start Amlodipine 5 mg qds and monitor BP closely.     9/26: Patient continues to be in depressed mood. Patient continues to report guilt feeling. Patient is still sleeping at times during the daytime.     9/28:  Patient is still expressing passive suicidal ideations when he is asked directly about them. He stated "I wish to be dead". He still in depressed mood, low energy today. Denied any interrupted sleep. Pt reported adequate appetite & Bowel movement this morning. He is still complaining excess somnolence during the day.     Called Neurosurgery. Patient was seen by Dr. Aldrich on Monday and there is a note on [employer] which is not available on the EMR at [facility]. It did mention that patient does not have any concerning neurosurgical issue that necessitates interference of further work up.     Neurosurgery: Pt was seen by Dr. Livingston on 9/26 as an AMB visit. According to the note by Dr. Livingston: Given the lack of symptoms reported, stability of his exam and resolution of imaging findings there are no further changes or interventions that are recommended at this time. The patient can follow up with Dr. Livingston PRN.     Plan:  -Safety: routine observation, denies SI/HI/I/P on the unit. PRNs: Zyprexa 2.5mg PO/IM for agitation   -Psychiatry: Continue with Remeron 45 mg qhs                     Will continue with Effexor 37.5 mg XR qd and titrate medication as tolerated  -Group, milieu, individual therapy as appropriate.  - Will Keep ECT on hold due to tolerance concerns reported by the patient   - Will Discuss the benefits and hazards of ECT again with the patient to improve his awareness about the procedure and try to encourage compliance   - Medical: continue home medications for medical comorbidities:  For HTN, Start Amlodipine 5 mg qd (Discussed with medicine attending) , recommendations are appreciated Symbicort 80 mcg 2 times / day (Standing order) for COPD  - Will Discontinue PRN laxatives as patient is reporting regular BM

## 2022-09-29 PROCEDURE — 99232 SBSQ HOSP IP/OBS MODERATE 35: CPT | Mod: GC

## 2022-09-29 RX ORDER — VENLAFAXINE HCL 75 MG
75 CAPSULE, EXT RELEASE 24 HR ORAL DAILY
Refills: 0 | Status: DISCONTINUED | OUTPATIENT
Start: 2022-09-30 | End: 2022-10-03

## 2022-09-29 RX ADMIN — METFORMIN HYDROCHLORIDE 500 MILLIGRAM(S): 850 TABLET ORAL at 09:32

## 2022-09-29 RX ADMIN — MIRTAZAPINE 45 MILLIGRAM(S): 45 TABLET, ORALLY DISINTEGRATING ORAL at 20:20

## 2022-09-29 RX ADMIN — Medication 37.5 MILLIGRAM(S): at 09:32

## 2022-09-29 RX ADMIN — METFORMIN HYDROCHLORIDE 500 MILLIGRAM(S): 850 TABLET ORAL at 20:19

## 2022-09-29 RX ADMIN — AMLODIPINE BESYLATE 5 MILLIGRAM(S): 2.5 TABLET ORAL at 09:32

## 2022-09-29 NOTE — BH INPATIENT PSYCHIATRY PROGRESS NOTE - NSBHASSESSSUMMFT_PSY_ALL_CORE
Patient is an 82-year-old male currently residing at [facility]. PPH of depression and psychosis. + history of inpatient admissions last 7/22-8/19 at [facility] post admission to [facility] for dehydration/SI/FTT 7/6-7/22I. Admitted at [facility] and seen by CL. Prior to last admission stopped eating/drinking in attempt to kill self. Unknown violence/aggression or substance use. no legal issues PMH- HTN, CHF, COPD & DM. BIBA referred by NH for suicidal ideation.   Patient presented to the ED in the context of suicidal ideation. Patient endorsed continued thought to die and stated he was going to stop eating and drinking at NH because he wants to be dead. Patient unable to contract for safety. As patient presented imminent risk of self-harm, he was admitted to inpatient psych for safety and stabilization. Patient was at [facility] from 09/07-09/12 due to unavailability of psych bed.   Patient had long standing Hx of Alcohol Abuse, gambling, which motivated patient to do impulsive, disorganized behaviors were misinterpreted as schizophrenia    MMSE: 29 (during this hospital admission): Patient is status post one ECT treatment on 9/19    Clinical Update   9/22 & 9/23: Extreme sense of guilt (Introjected), Expressing Passive SIs. Exhibiting Depressed pessimistic mood. Refusing ECT. Patient on Max dose of Remeron 45 mg qhs.   Blood pressure is trending up during the last few days up to 165/83 this morning. Patient was maintained at some time on Felodipine 10 mg. Medicine Attending recommended to start Amlodipine 5 mg qds and monitor BP closely.     9/26: Patient continues to be in depressed mood. Patient continues to report guilt feeling. Patient is still sleeping at times during the daytime.     9/28:  Patient is still expressing passive suicidal ideations when he is asked directly about them. He stated "I wish to be dead". He still in depressed mood, low energy today. Denied any interrupted sleep. Pt reported adequate appetite & Bowel movement this morning. He is still complaining excess somnolence during the day.     Called Neurosurgery. Patient was seen by Dr. Aldrich on Monday and there is a note on [place] which is not available on the EMR at [facility]. It did mention that patient does not have any concerning neurosurgical issue that necessitates interference of further work up.     Neurosurgery: Pt was seen by Dr. Livingston on 9/26 as an AMB visit. According to the note by Dr. Livingston: Given the lack of symptoms reported, stability of his exam and resolution of imaging findings there are no further changes or interventions that are recommended at this time. The patient can follow up with Dr. Livingston PRN.     Plan:  -Safety: routine observation, denies SI/HI/I/P on the unit. PRNs: Zyprexa 2.5mg PO/IM for agitation   -Psychiatry: Continue with Remeron 45 mg qhs                     Will Increase the dose of Effexor from 37.5 mg to 75 mg XR qd starting 9/30                      Will titrate Effexor as indicated and as tolerated  -Group, milieu, individual therapy as appropriate.  - Will Keep ECT on hold due to tolerance concerns reported by the patient Patient is an 82-year-old male currently residing at [facility]. PPH of depression and psychosis. + history of inpatient admissions last 7/22-8/19 at [facility] post admission to [facility] for dehydration/SI/FTT 7/6-7/22I. Admitted at [facility] and seen by CL. Prior to last admission stopped eating/drinking in attempt to kill self. Unknown violence/aggression or substance use. no legal issues PMH- HTN, CHF, COPD & DM. BIBA referred by NH for suicidal ideation.   Patient presented to the ED in the context of suicidal ideation. Patient endorsed continued thought to die and stated he was going to stop eating and drinking at NH because he wants to be dead. Patient unable to contract for safety. As patient presented imminent risk of self-harm, he was admitted to inpatient psych for safety and stabilization. Patient was at [facility] from 09/07-09/12 due to unavailability of psych bed.   Patient had long standing Hx of Alcohol Abuse, gambling, which motivated patient to do impulsive, disorganized behaviors were misinterpreted as schizophrenia    MMSE: 29 (during this hospital admission): Patient is status post one ECT treatment on 9/19    Clinical Update   9/22 & 9/23: Extreme sense of guilt (Introjected), Expressing Passive SIs. Exhibiting Depressed pessimistic mood. Refusing ECT. Patient on Max dose of Remeron 45 mg qhs.   Blood pressure is trending up during the last few days up to 165/83 this morning. Patient was maintained at some time on Felodipine 10 mg. Medicine Attending recommended to start Amlodipine 5 mg qds and monitor BP closely.     9/26: Patient continues to be in depressed mood. Patient continues to report guilt feeling. Patient is still sleeping at times during the daytime.     9/28:  Patient is still expressing passive suicidal ideations when he is asked directly about them. He stated "I wish to be dead". He still in depressed mood, low energy today. Denied any interrupted sleep. Pt reported adequate appetite & Bowel movement this morning. He is still complaining excess somnolence during the day.     Called Neurosurgery. Patient was seen by Dr. Aldrich on Monday and there is a note on [place] which is not available on the EMR at [facility]. It did mention that patient does not have any concerning neurosurgical issue that necessitates interference of further work up.     Neurosurgery: Pt was seen by Dr. Livingston on 9/26 as an AMB visit. According to the note by Dr. Livingston: Given the lack of symptoms reported, stability of his exam and resolution of imaging findings there are no further changes or interventions that are recommended at this time. The patient can follow up with Dr. Livingston PRN.     Plan:  -Safety: routine observation, denies SI/HI/I/P on the unit. PRNs: Zyprexa 2.5mg PO/IM for agitation   -Psychiatry: Continue with Remeron 45 mg qhs                     Will Increase the dose of Effexor from 37.5 mg to 75 mg XR qd starting 9/30                      Will consider further up-titration of Effexor as indicated and as tolerated  -Group, milieu, individual therapy as appropriate.  - Will Keep ECT on hold due to tolerance concerns reported by the patient Patient is an 82-year-old male currently residing at [facility]. PPH of depression and psychosis. + history of inpatient admissions last 7/22-8/19 at [facility] post admission to [facility] for dehydration/SI/FTT 7/6-7/22I. Admitted at [facility] and seen by CL. Prior to last admission stopped eating/drinking in attempt to kill self. Unknown violence/aggression or substance use. no legal issues PMH- HTN, CHF, COPD & DM. BIBA referred by NH for suicidal ideation.   Patient presented to the ED in the context of suicidal ideation. Patient endorsed continued thought to die and stated he was going to stop eating and drinking at NH because he wants to be dead. Patient unable to contract for safety. As patient presented imminent risk of self-harm, he was admitted to inpatient psych for safety and stabilization. Patient was at [facility] from 09/07-09/12 due to unavailability of psych bed.   Patient had long standing Hx of Alcohol Abuse, gambling, which motivated patient to do impulsive, disorganized behaviors were misinterpreted as schizophrenia    MMSE: 29 (during this hospital admission): Patient received one ECT treatment on 9/19    Clinical Update   9/22 & 9/23: Extreme sense of guilt (Introjected), Expressing Passive SIs. Exhibiting Depressed pessimistic mood. Refusing ECT. Patient on Max dose of Remeron 45 mg qhs. Blood pressure noted to be trending up during the last few days; hospitalist recommended starting Amlodipine 5 mg qds and monitoring BP closely.   9/26-9/27: Patient continues to be in depressed mood and report guilt feeling. Patient is still sleeping at times during the daytime.   9/28:  Patient is still expressing passive suicidal ideations when he is asked directly about them. He stated "I wish to be dead". He still in depressed mood, low energy today. Denied any interrupted sleep. Pt reported adequate appetite & Bowel movement this morning. He is still complaining excess somnolence during the day.   Called Neurosurgery. Patient was seen by Dr. Aldrich on Monday and there is a note on [place] which is not available on the EMR at [facility]. It did mention that patient does not have any concerning neurosurgical issue that necessitates interference of further work up.   Neurosurgery: Pt was seen by Dr. Livingston on 9/26 as an AMB visit. According to the note by Dr. Livingston: Given the lack of symptoms reported, stability of his exam and resolution of imaging findings there are no further changes or interventions that are recommended at this time. The patient can follow up with Dr. Livingston PRN.     09/29  Patient a little more visible on unit. Still refusing ECT. On exam, he is mostly linear and appropriate. No incidents of self-harm since admission. At present, patient carries a diagnosis of MDD and is unable to care for self on his own for his ADLs and medical needs and requires return placement to a skilled nursing care facility and an assisted living facility is not enough. He has been observed participating in groups and will benefit form group therapy and activities after discharge.    Plan:  -Safety: routine observation, denies SI/HI/I/P on the unit. PRNs: Zyprexa 2.5mg PO/IM for agitation   -Psychiatry: Continue with Remeron 45 mg qhs                     Will Increase the dose of Effexor from 37.5 mg to 75 mg XR qd starting 9/30                      Will consider further up-titration of Effexor as indicated and as tolerated  -Group, milieu, individual therapy as appropriate.  - Will Keep ECT on hold due to tolerance concerns reported by the patient

## 2022-09-29 NOTE — BH INPATIENT PSYCHIATRY PROGRESS NOTE - CURRENT MEDICATION
MEDICATIONS  (STANDING):  amLODIPine   Tablet 5 milliGRAM(s) Oral daily  metFORMIN 500 milliGRAM(s) Oral two times a day  midazolam Injectable 2 milliGRAM(s) IV Push once  mirtazapine 45 milliGRAM(s) Oral at bedtime  venlafaxine XR 37.5 milliGRAM(s) Oral daily    MEDICATIONS  (PRN):  ALBUTerol    90 MICROgram(s) HFA Inhaler 2 Puff(s) Inhalation every 6 hours PRN Wheezing  OLANZapine 2.5 milliGRAM(s) Oral two times a day PRN agitation  OLANZapine Injectable 2.5 milliGRAM(s) IntraMuscular once PRN severe agitation  simethicone 80 milliGRAM(s) Chew three times a day PRN Upset Stomach   MEDICATIONS  (STANDING):  amLODIPine   Tablet 5 milliGRAM(s) Oral daily  metFORMIN 500 milliGRAM(s) Oral two times a day  midazolam Injectable 2 milliGRAM(s) IV Push once  mirtazapine 45 milliGRAM(s) Oral at bedtime    MEDICATIONS  (PRN):  ALBUTerol    90 MICROgram(s) HFA Inhaler 2 Puff(s) Inhalation every 6 hours PRN Wheezing  OLANZapine 2.5 milliGRAM(s) Oral two times a day PRN agitation  OLANZapine Injectable 2.5 milliGRAM(s) IntraMuscular once PRN severe agitation  simethicone 80 milliGRAM(s) Chew three times a day PRN Upset Stomach

## 2022-09-29 NOTE — BH INPATIENT PSYCHIATRY PROGRESS NOTE - NSBHFUPINTERVALHXFT_PSY_A_CORE
Patient was seen and evaluated by the attending psychiatrist and the resident . Case was discussed with the team. Patient went to his room just after having breakfast. He prefers to stay more in his room. Patient states that his mood is fluctuating from 6 (for the best mood he is experiencing now) to Less than one (Describing moments he has with bad mood). Currently, he is in a good mood. Patient stated that he had a bowel movement yesterday. He still does not want ECT. When asked about family, he does not want to talk to any body.

## 2022-09-29 NOTE — BH INPATIENT PSYCHIATRY PROGRESS NOTE - NSBHCHARTREVIEWVS_PSY_A_CORE FT
Vital Signs Last 24 Hrs  T(C): 37.1 (09-29-22 @ 07:57), Max: 37.1 (09-29-22 @ 07:57)  T(F): 98.7 (09-29-22 @ 07:57), Max: 98.7 (09-29-22 @ 07:57)  HR: --  BP: --  BP(mean): --  RR: --  SpO2: 98% (09-29-22 @ 07:57) (98% - 98%)    Orthostatic VS  09-29-22 @ 07:57  Lying BP: --/-- HR: --  Sitting BP: 137/84 HR: 70  Standing BP: 138/77 HR: 76  Site: --  Mode: --  Orthostatic VS  09-28-22 @ 08:13  Lying BP: --/-- HR: --  Sitting BP: 143/94 HR: 62  Standing BP: 132/81 HR: 64  Site: --  Mode: --   Vital Signs Last 24 Hrs  T(C): 37.1 (09-29-22 @ 07:57), Max: 37.1 (09-29-22 @ 07:57)  T(F): 98.7 (09-29-22 @ 07:57), Max: 98.7 (09-29-22 @ 07:57)  SpO2: 98% (09-29-22 @ 07:57) (98% - 98%)    Orthostatic VS  09-29-22 @ 07:57  Sitting BP: 137/84 HR: 70  Standing BP: 138/77 HR: 76

## 2022-09-30 PROCEDURE — 99231 SBSQ HOSP IP/OBS SF/LOW 25: CPT | Mod: GC

## 2022-09-30 RX ADMIN — Medication 75 MILLIGRAM(S): at 09:18

## 2022-09-30 RX ADMIN — METFORMIN HYDROCHLORIDE 500 MILLIGRAM(S): 850 TABLET ORAL at 20:09

## 2022-09-30 RX ADMIN — AMLODIPINE BESYLATE 5 MILLIGRAM(S): 2.5 TABLET ORAL at 09:19

## 2022-09-30 RX ADMIN — MIRTAZAPINE 45 MILLIGRAM(S): 45 TABLET, ORALLY DISINTEGRATING ORAL at 20:09

## 2022-09-30 RX ADMIN — METFORMIN HYDROCHLORIDE 500 MILLIGRAM(S): 850 TABLET ORAL at 09:18

## 2022-09-30 NOTE — BH INPATIENT PSYCHIATRY PROGRESS NOTE - NSBHCHARTREVIEWVS_PSY_A_CORE FT
Vital Signs Last 24 Hrs  T(C): 36.7 (09-30-22 @ 08:04), Max: 36.7 (09-30-22 @ 08:04)  T(F): 98.1 (09-30-22 @ 08:04), Max: 98.1 (09-30-22 @ 08:04)  HR: --  BP: --  BP(mean): --  RR: 19 (09-30-22 @ 08:04) (19 - 19)  SpO2: 99% (09-30-22 @ 08:04) (99% - 99%)    Orthostatic VS  09-30-22 @ 08:04  Lying BP: 143/80 HR: 69  Sitting BP: 143/79 HR: 78  Standing BP: --/-- HR: --  Site: upper left arm  Mode: electronic  Orthostatic VS  09-29-22 @ 07:57  Lying BP: --/-- HR: --  Sitting BP: 137/84 HR: 70  Standing BP: 138/77 HR: 76  Site: --  Mode: --   Vital Signs Last 24 Hrs  T(C): 36.7 (09-30-22 @ 08:04), Max: 36.7 (09-30-22 @ 08:04)  T(F): 98.1 (09-30-22 @ 08:04), Max: 98.1 (09-30-22 @ 08:04)  RR: 19 (09-30-22 @ 08:04) (19 - 19)  SpO2: 99% (09-30-22 @ 08:04) (99% - 99%)    Orthostatic VS  09-30-22 @ 08:04  Lying BP: 143/80 HR: 69  Sitting BP: 143/79 HR: 78  Site: upper left arm  Mode: electronic

## 2022-09-30 NOTE — BH INPATIENT PSYCHIATRY PROGRESS NOTE - NSBHFUPINTERVALHXFT_PSY_A_CORE
Patient was seen and evaluated by the attending psychiatrist and the resident . Case was discussed with the team. Patient is expressing anxiety about transition from NH to another. Patient stated he is worried about his money in the former nursing home, and was wandering about a way to get his money transferred from the old NH to the new NH.  Patient was seen and evaluated by the attending psychiatrist and the resident. Case was discussed with the team. Patient is expressing anxiety about transition from NH to another. Patient stated he is worried about his money in the former nursing home, and was wandering about a way to get his money transferred from the old NH to the new NH.
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
## 2022-09-30 NOTE — BH INPATIENT PSYCHIATRY PROGRESS NOTE - CURRENT MEDICATION
MEDICATIONS  (STANDING):  amLODIPine   Tablet 5 milliGRAM(s) Oral daily  metFORMIN 500 milliGRAM(s) Oral two times a day  midazolam Injectable 2 milliGRAM(s) IV Push once  mirtazapine 45 milliGRAM(s) Oral at bedtime  venlafaxine XR 75 milliGRAM(s) Oral daily    MEDICATIONS  (PRN):  ALBUTerol    90 MICROgram(s) HFA Inhaler 2 Puff(s) Inhalation every 6 hours PRN Wheezing  OLANZapine 2.5 milliGRAM(s) Oral two times a day PRN agitation  OLANZapine Injectable 2.5 milliGRAM(s) IntraMuscular once PRN severe agitation  simethicone 80 milliGRAM(s) Chew three times a day PRN Upset Stomach

## 2022-09-30 NOTE — BH INPATIENT PSYCHIATRY PROGRESS NOTE - MSE UNSTRUCTURED FT
Patient looks older than stated age, with lean body habitus, patient is more visible on the unit. Patient is less fidgety, but more stuttering while talking. Still expressing motor tics by lip smacking, more frequently observed when he is challenged with questions or talking about guilty feelings. Patient denied any passive or active suicidal ideations. Denied any homicidal ideations, paranoid ideations. Denied any AH. Partially insightful, with limited judgment. Good impulse control on the unit and during the interview. Patient looks older than stated age, with lean body habitus, patient is more visible on the unit. Patient is less fidgety, but more stuttering while talking. Patient is in less anxious distress, with less accompanying motor tics. Patient's mood is improving. Patient denied any passive or active suicidal ideations. Denied any homicidal ideations, paranoid ideations. Denied any AH. Partially insightful, with limited judgment. Good impulse control on the unit and during the interview.

## 2022-10-01 RX ADMIN — Medication 75 MILLIGRAM(S): at 08:50

## 2022-10-01 RX ADMIN — AMLODIPINE BESYLATE 5 MILLIGRAM(S): 2.5 TABLET ORAL at 08:49

## 2022-10-01 RX ADMIN — METFORMIN HYDROCHLORIDE 500 MILLIGRAM(S): 850 TABLET ORAL at 20:18

## 2022-10-01 RX ADMIN — MIRTAZAPINE 45 MILLIGRAM(S): 45 TABLET, ORALLY DISINTEGRATING ORAL at 20:18

## 2022-10-01 RX ADMIN — METFORMIN HYDROCHLORIDE 500 MILLIGRAM(S): 850 TABLET ORAL at 08:49

## 2022-10-02 RX ADMIN — METFORMIN HYDROCHLORIDE 500 MILLIGRAM(S): 850 TABLET ORAL at 09:03

## 2022-10-02 RX ADMIN — AMLODIPINE BESYLATE 5 MILLIGRAM(S): 2.5 TABLET ORAL at 09:03

## 2022-10-02 RX ADMIN — Medication 75 MILLIGRAM(S): at 09:02

## 2022-10-02 RX ADMIN — MIRTAZAPINE 45 MILLIGRAM(S): 45 TABLET, ORALLY DISINTEGRATING ORAL at 20:16

## 2022-10-02 RX ADMIN — METFORMIN HYDROCHLORIDE 500 MILLIGRAM(S): 850 TABLET ORAL at 20:15

## 2022-10-03 PROCEDURE — 99231 SBSQ HOSP IP/OBS SF/LOW 25: CPT | Mod: GC

## 2022-10-03 RX ORDER — SENNA PLUS 8.6 MG/1
2 TABLET ORAL AT BEDTIME
Refills: 0 | Status: DISCONTINUED | OUTPATIENT
Start: 2022-10-03 | End: 2022-10-11

## 2022-10-03 RX ORDER — VENLAFAXINE HCL 75 MG
112.5 CAPSULE, EXT RELEASE 24 HR ORAL DAILY
Refills: 0 | Status: DISCONTINUED | OUTPATIENT
Start: 2022-10-04 | End: 2022-10-05

## 2022-10-03 RX ORDER — SENNA PLUS 8.6 MG/1
2 TABLET ORAL ONCE
Refills: 0 | Status: COMPLETED | OUTPATIENT
Start: 2022-10-03 | End: 2022-10-03

## 2022-10-03 RX ORDER — POLYETHYLENE GLYCOL 3350 17 G/17G
17 POWDER, FOR SOLUTION ORAL AT BEDTIME
Refills: 0 | Status: DISCONTINUED | OUTPATIENT
Start: 2022-10-03 | End: 2022-10-11

## 2022-10-03 RX ADMIN — POLYETHYLENE GLYCOL 3350 17 GRAM(S): 17 POWDER, FOR SOLUTION ORAL at 17:36

## 2022-10-03 RX ADMIN — MIRTAZAPINE 45 MILLIGRAM(S): 45 TABLET, ORALLY DISINTEGRATING ORAL at 20:17

## 2022-10-03 RX ADMIN — METFORMIN HYDROCHLORIDE 500 MILLIGRAM(S): 850 TABLET ORAL at 08:39

## 2022-10-03 RX ADMIN — Medication 75 MILLIGRAM(S): at 08:39

## 2022-10-03 RX ADMIN — SENNA PLUS 2 TABLET(S): 8.6 TABLET ORAL at 17:36

## 2022-10-03 RX ADMIN — METFORMIN HYDROCHLORIDE 500 MILLIGRAM(S): 850 TABLET ORAL at 20:16

## 2022-10-03 RX ADMIN — AMLODIPINE BESYLATE 5 MILLIGRAM(S): 2.5 TABLET ORAL at 08:40

## 2022-10-03 NOTE — BH INPATIENT PSYCHIATRY PROGRESS NOTE - NSBHFUPINTERVALHXFT_PSY_A_CORE
Patient was seen and evaluated by the attending psychiatrist and the resident . Case was discussed with the team. Chart was reviewed. Patient is vitally stable.  Patient was seen and evaluated by the attending psychiatrist and the resident . Case was discussed with the team. Chart was reviewed. Patient is vitally stable. Pt reported not having a Bowel Movement for 3 days. Patient's mood as per patient is ok. Patient reported adequate sleep, appetite and energy. Patient denied any SI. Patient stated the date for today right (October 3rd, 2022).  Patient was seen and evaluated by the attending psychiatrist and the resident. Case was discussed with the team. Chart was reviewed. Patient is vitally stable. Pt reported not having a bowel movement for 3 days. Patient's mood as per patient is ok. Patient reported adequate sleep, appetite and energy. Patient denied any SI. Patient stated the date for today right (October 3rd, 2022).

## 2022-10-03 NOTE — BH INPATIENT PSYCHIATRY PROGRESS NOTE - PRN MEDS
MEDICATIONS  (PRN):  ALBUTerol    90 MICROgram(s) HFA Inhaler 2 Puff(s) Inhalation every 6 hours PRN Wheezing  OLANZapine 2.5 milliGRAM(s) Oral two times a day PRN agitation  OLANZapine Injectable 2.5 milliGRAM(s) IntraMuscular once PRN severe agitation  simethicone 80 milliGRAM(s) Chew three times a day PRN Upset Stomach   MEDICATIONS  (PRN):  ALBUTerol    90 MICROgram(s) HFA Inhaler 2 Puff(s) Inhalation every 6 hours PRN Wheezing  OLANZapine 2.5 milliGRAM(s) Oral two times a day PRN agitation  OLANZapine Injectable 2.5 milliGRAM(s) IntraMuscular once PRN severe agitation  polyethylene glycol 3350 17 Gram(s) Oral at bedtime PRN Constipation  senna 2 Tablet(s) Oral at bedtime PRN Constipation  simethicone 80 milliGRAM(s) Chew three times a day PRN Upset Stomach

## 2022-10-03 NOTE — BH INPATIENT PSYCHIATRY PROGRESS NOTE - NSBHCHARTREVIEWVS_PSY_A_CORE FT
Vital Signs Last 24 Hrs  T(C): 35.9 (10-02-22 @ 15:42), Max: 35.9 (10-02-22 @ 15:42)  T(F): 96.6 (10-02-22 @ 15:42), Max: 96.6 (10-02-22 @ 15:42)  HR: --  BP: --  BP(mean): --  RR: --  SpO2: --    Orthostatic VS  10-02-22 @ 08:04  Lying BP: --/-- HR: --  Sitting BP: 126/96 HR: 87  Standing BP: 118/74 HR: 75  Site: --  Mode: --   Vital Signs Last 24 Hrs  T(C): 36.6 (10-03-22 @ 09:37), Max: 36.6 (10-03-22 @ 09:37)  T(F): 97.9 (10-03-22 @ 09:37), Max: 97.9 (10-03-22 @ 09:37)  HR: --  BP: --  BP(mean): --  RR: --  SpO2: --    Orthostatic VS  10-03-22 @ 09:37  Lying BP: --/-- HR: --  Sitting BP: 132/69 HR: 76  Standing BP: --/-- HR: --  Site: --  Mode: --  Orthostatic VS  10-02-22 @ 08:04  Lying BP: --/-- HR: --  Sitting BP: 126/96 HR: 87  Standing BP: 118/74 HR: 75  Site: --  Mode: --   Vital Signs Last 24 Hrs  T(C): 36.4 (10-03-22 @ 16:16), Max: 36.6 (10-03-22 @ 09:37)  T(F): 97.6 (10-03-22 @ 16:16), Max: 97.9 (10-03-22 @ 09:37)  HR: --  BP: --  BP(mean): --  RR: --  SpO2: --    Orthostatic VS  10-03-22 @ 09:37  Lying BP: --/-- HR: --  Sitting BP: 132/69 HR: 76  Standing BP: --/-- HR: --  Site: --  Mode: --  Orthostatic VS  10-02-22 @ 08:04  Lying BP: --/-- HR: --  Sitting BP: 126/96 HR: 87  Standing BP: 118/74 HR: 75  Site: --  Mode: --   Vital Signs Last 24 Hrs  T(C): 36.4 (10-03-22 @ 16:16), Max: 36.6 (10-03-22 @ 09:37)  T(F): 97.6 (10-03-22 @ 16:16), Max: 97.9 (10-03-22 @ 09:37)    10-03-22 @ 09:37  Sitting BP: 132/69 HR: 76

## 2022-10-03 NOTE — BH INPATIENT PSYCHIATRY PROGRESS NOTE - CURRENT MEDICATION
MEDICATIONS  (STANDING):  amLODIPine   Tablet 5 milliGRAM(s) Oral daily  metFORMIN 500 milliGRAM(s) Oral two times a day  midazolam Injectable 2 milliGRAM(s) IV Push once  mirtazapine 45 milliGRAM(s) Oral at bedtime  venlafaxine XR 75 milliGRAM(s) Oral daily    MEDICATIONS  (PRN):  ALBUTerol    90 MICROgram(s) HFA Inhaler 2 Puff(s) Inhalation every 6 hours PRN Wheezing  OLANZapine 2.5 milliGRAM(s) Oral two times a day PRN agitation  OLANZapine Injectable 2.5 milliGRAM(s) IntraMuscular once PRN severe agitation  simethicone 80 milliGRAM(s) Chew three times a day PRN Upset Stomach   MEDICATIONS  (STANDING):  amLODIPine   Tablet 5 milliGRAM(s) Oral daily  metFORMIN 500 milliGRAM(s) Oral two times a day  midazolam Injectable 2 milliGRAM(s) IV Push once  mirtazapine 45 milliGRAM(s) Oral at bedtime    MEDICATIONS  (PRN):  ALBUTerol    90 MICROgram(s) HFA Inhaler 2 Puff(s) Inhalation every 6 hours PRN Wheezing  OLANZapine 2.5 milliGRAM(s) Oral two times a day PRN agitation  OLANZapine Injectable 2.5 milliGRAM(s) IntraMuscular once PRN severe agitation  simethicone 80 milliGRAM(s) Chew three times a day PRN Upset Stomach   MEDICATIONS  (STANDING):  amLODIPine   Tablet 5 milliGRAM(s) Oral daily  metFORMIN 500 milliGRAM(s) Oral two times a day  mirtazapine 45 milliGRAM(s) Oral at bedtime  senna 2 Tablet(s) Oral once    MEDICATIONS  (PRN):  ALBUTerol    90 MICROgram(s) HFA Inhaler 2 Puff(s) Inhalation every 6 hours PRN Wheezing  OLANZapine 2.5 milliGRAM(s) Oral two times a day PRN agitation  OLANZapine Injectable 2.5 milliGRAM(s) IntraMuscular once PRN severe agitation  polyethylene glycol 3350 17 Gram(s) Oral at bedtime PRN Constipation  senna 2 Tablet(s) Oral at bedtime PRN Constipation  simethicone 80 milliGRAM(s) Chew three times a day PRN Upset Stomach   MEDICATIONS  (STANDING):  amLODIPine   Tablet 5 milliGRAM(s) Oral daily  metFORMIN 500 milliGRAM(s) Oral two times a day  mirtazapine 45 milliGRAM(s) Oral at bedtime    MEDICATIONS  (PRN):  ALBUTerol    90 MICROgram(s) HFA Inhaler 2 Puff(s) Inhalation every 6 hours PRN Wheezing  OLANZapine 2.5 milliGRAM(s) Oral two times a day PRN agitation  OLANZapine Injectable 2.5 milliGRAM(s) IntraMuscular once PRN severe agitation  polyethylene glycol 3350 17 Gram(s) Oral at bedtime PRN Constipation  senna 2 Tablet(s) Oral at bedtime PRN Constipation  simethicone 80 milliGRAM(s) Chew three times a day PRN Upset Stomach

## 2022-10-03 NOTE — BH INPATIENT PSYCHIATRY PROGRESS NOTE - NSBHASSESSSUMMFT_PSY_ALL_CORE
Patient is an 82-year-old male currently residing at [facility]. PPH of depression and psychosis. + history of inpatient admissions last 7/22-8/19 at [facility] post admission to [facility] for dehydration/SI/FTT 7/6-7/22I. Admitted at [facility] and seen by CL. Prior to last admission stopped eating/drinking in attempt to kill self. Unknown violence/aggression or substance use. no legal issues PMH- HTN, CHF, COPD & DM. BIBA referred by NH for suicidal ideation.   Patient presented to the ED in the context of suicidal ideation. Patient endorsed continued thought to die and stated he was going to stop eating and drinking at NH because he wants to be dead. Patient unable to contract for safety. As patient presented imminent risk of self-harm, he was admitted to inpatient psych for safety and stabilization. Patient was at [facility] from 09/07-09/12 due to unavailability of psych bed.   Patient had long standing Hx of Alcohol Abuse, gambling, which motivated patient to do impulsive disorganized behaviors were misinterpreted as schizophrenia    Clinical Update   During hospital admission, patient had a diagnosis of Schizophrenia, which was attributed to his disorganized behavior under influence of Alcohol. As reported by his 2 sisters, Patient was admitted in psych care facility for almost 1 year long time ago as he threatened his Sister's BF to kill him.   On the unit, patient expressed extreme sense of guilt, expressing passive SIs. Exhibiting Depressed pessimistic mood. Patient received one ECT treatment, then he has been refusing ECT. Afterwards, with continuing treatment, patient's report of Guilt feeling is more interrupted than he inially reported these ideations. Patient's mood is stable. Effexor was up-titrated and will monitor his response to the new dose. Patient's sleep is reportedly improved during this hospital admission. Patient did CT brain and it indicated resolution of the previous SDH. Pt was seen afterwards by Neurosurgery and concluded that no further changes or interventions that are recommended at this time. MMSE: 29 (during this hospital admission): Patient received one ECT treatment on 9/19. Pt refused to proceed with further ECT treatments afterwards.     09/29  Patient a little more visible on unit. Still refusing ECT. On exam, he is mostly linear and appropriate. No incidents of self-harm since admission. At present, patient carries a diagnosis of MDD and is unable to care for self on his own for his ADLs and medical needs and requires return placement to a skilled nursing care facility and an assisted living facility is not enough. He has been observed participating in groups and will benefit form group therapy and activities after discharge.     10/3:  Patient continues to report better mood. Patient denied SIs. Patient reported adequate sleep and sleep. Vitals and FSG are WNL.     Plan:  - Safety: routine observation, denies SI/HI/I/P on the unit. PRNs: Zyprexa 2.5mg PO/IM for agitation   - Psychiatry: Continue with Remeron 45 mg qhs                     Increase the dose of Effexor from 75 mg to 112.5 mg qd (starting 10/4)                      Will consider further up-titration of Effexor as indicated and as tolerated  - Medicine: Continue with Norvasc 5mg qd for HTN and Metformin 500 mg BID for DM.   - Group, milieu, individual therapy as appropriate. Patient is an 82-year-old male currently residing at [facility]. PPH of depression and psychosis. + history of inpatient admissions last 7/22-8/19 at [facility] post admission to [facility] for dehydration/SI/FTT 7/6-7/22I. Admitted at [facility] and seen by CL. Prior to last admission stopped eating/drinking in attempt to kill self. Unknown violence/aggression or substance use. no legal issues PMH- HTN, CHF, COPD & DM. BIBA referred by NH for suicidal ideation.   Patient presented to the ED in the context of suicidal ideation. Patient endorsed continued thought to die and stated he was going to stop eating and drinking at NH because he wants to be dead. Patient unable to contract for safety. As patient presented imminent risk of self-harm, he was admitted to inpatient psych for safety and stabilization. Patient was at [facility] from 09/07-09/12 due to unavailability of psych bed.   Patient had long standing Hx of Alcohol Abuse, gambling, which motivated patient to do impulsive disorganized behaviors were misinterpreted as schizophrenia    Clinical Update   During hospital admission, patient had a diagnosis of Schizophrenia, which was attributed to his disorganized behavior under influence of Alcohol. As reported by his 2 sisters, Patient was admitted in psych care facility for almost 1 year long time ago as he threatened his Sister's BF to kill him.   On the unit, patient expressed extreme sense of guilt, expressing passive SIs. Exhibiting Depressed pessimistic mood. Patient received one ECT treatment, then he has been refusing ECT. Afterwards, with continuing treatment, patient's report of Guilt feeling is more interrupted than he inially reported these ideations. Patient's mood is stable. Effexor was up-titrated and will monitor his response to the new dose. Patient's sleep is reportedly improved during this hospital admission. Patient did CT brain and it indicated resolution of the previous SDH. Pt was seen afterwards by Neurosurgery and concluded that no further changes or interventions that are recommended at this time. MMSE: 29 (during this hospital admission): Patient received one ECT treatment on 9/19. Pt refused to proceed with further ECT treatments afterwards.     09/29  Patient a little more visible on unit. Still refusing ECT. On exam, he is mostly linear and appropriate. No incidents of self-harm since admission. At present, patient carries a diagnosis of MDD and is unable to care for self on his own for his ADLs and medical needs and requires return placement to a skilled nursing care facility and an assisted living facility is not enough. He has been observed participating in groups and will benefit form group therapy and activities after discharge.     10/3:  Patient continues to report better mood. Patient denied SIs. Patient reported adequate sleep and sleep. Vitals and FSG are WNL.     Plan:  - Safety: routine observation, denies SI/HI/I/P on the unit. PRNs: Zyprexa 2.5mg PO/IM for agitation   - Psychiatry: Continue with Remeron 45 mg qhs                     Increase the dose of Effexor from 75 mg to 112.5 mg qd (starting 10/4)                      Will consider further up-titration of Effexor as indicated and as tolerated  - Medicine: Continue with Norvasc 5mg qd for HTN and Metformin 500 mg BID for DM.     For Constipation: Patient will receive 2 tab of Senna one time during today                              Will start Senna 2 tab 8.6 mg at night (PRN for Constipation)                              Will start Miralax 17 gm PO tab at night (PRN for Constipation)  - Group, milieu, individual therapy as appropriate. Patient is an 82-year-old male currently residing at [facility]. PPH of depression and psychosis. + history of inpatient admissions last 7/22-8/19 at [facility] post admission to [facility] for dehydration/SI/FTT 7/6-7/22I. Admitted at [facility] and seen by CL. Prior to last admission stopped eating/drinking in attempt to kill self. Unknown violence/aggression or substance use. no legal issues PMH- HTN, CHF, COPD & DM. BIBA referred by NH for suicidal ideation.   Patient presented to the ED in the context of suicidal ideation. Patient endorsed continued thought to die and stated he was going to stop eating and drinking at NH because he wants to be dead. Patient unable to contract for safety. As patient presented imminent risk of self-harm, he was admitted to inpatient psych for safety and stabilization. Patient was at [facility] from 09/07-09/12 due to unavailability of psych bed.   Patient had long standing Hx of Alcohol Abuse, gambling, which motivated patient to do impulsive disorganized behaviors were misinterpreted as schizophrenia    Clinical Update   During hospital admission, patient had a diagnosis of Schizophrenia, which was attributed to his disorganized behavior under influence of Alcohol. As reported by his 2 sisters, Patient was admitted in psych care facility for almost 1 year long time ago as he threatened his Sister's BF to kill him.   On the unit, patient expressed extreme sense of guilt, expressing passive SIs. Exhibiting Depressed pessimistic mood. Patient received one ECT treatment, then he has been refusing ECT. Afterwards, with continuing treatment, patient's report of Guilt feeling is more interrupted than he inially reported these ideations. Patient's mood is stable. Effexor was up-titrated and will monitor his response to the new dose. Patient's sleep is reportedly improved during this hospital admission. Patient did CT brain and it indicated resolution of the previous SDH. Pt was seen afterwards by Neurosurgery and concluded that no further changes or interventions that are recommended at this time. MMSE: 29 (during this hospital admission): Patient received one ECT treatment on 9/19. Pt refused to proceed with further ECT treatments afterwards.     10/3:  Patient continues to report better mood. Patient denied SIs. Patient reported adequate sleep and sleep. Vitals and FSG are WNL.     Plan:  - Safety: routine observation, denies SI/HI/I/P on the unit. PRNs: Zyprexa 2.5mg PO/IM for agitation   - Psychiatry: Continue with Remeron 45 mg qhs                     Increase the dose of Effexor from 75 mg to 112.5 mg qd (starting 10/4)                      Will consider further up-titration of Effexor as indicated and as tolerated  - Medicine: Continue with Norvasc 5mg qd for HTN and Metformin 500 mg BID for DM.     For Constipation: Patient will receive 2 tab of Senna one time during today (10/3)                              Will start Senna 2 tab 8.6 mg at night (PRN for Constipation)                              Will start Miralax 17 gm PO tab at night (PRN for Constipation)  - Group, milieu, individual therapy as appropriate.

## 2022-10-03 NOTE — BH INPATIENT PSYCHIATRY PROGRESS NOTE - MSE UNSTRUCTURED FT
Patient looks older than stated age, with lean body habitus, patient is more visible on the unit. Patient is less fidgety, but more stuttering while talking. Patient is in less anxious distress, with less accompanying motor tics. Patient's mood is improving. Patient denied any passive or active suicidal ideations. Denied any homicidal ideations, paranoid ideations. Denied any AH. Partially insightful, with limited judgment. Good impulse control on the unit and during the interview.  Patient looks older than stated age, with lean body habitus, patient is more visible on the unit. Patient is less fidgety, but more stuttering while talking. Patient is in less anxious distress, with less accompanying motor tics. Reported mood is "okay" with constricted affect. Patient denies any passive or active suicidal ideations. Denies any homicidal ideations. Denies delusions. Denies any hallucinations. Partially insightful, with limited judgment. Good impulse control on the unit and during the interview.

## 2022-10-04 PROCEDURE — 99232 SBSQ HOSP IP/OBS MODERATE 35: CPT | Mod: GC

## 2022-10-04 RX ADMIN — Medication 112.5 MILLIGRAM(S): at 08:39

## 2022-10-04 RX ADMIN — AMLODIPINE BESYLATE 5 MILLIGRAM(S): 2.5 TABLET ORAL at 08:39

## 2022-10-04 RX ADMIN — METFORMIN HYDROCHLORIDE 500 MILLIGRAM(S): 850 TABLET ORAL at 20:27

## 2022-10-04 RX ADMIN — MIRTAZAPINE 45 MILLIGRAM(S): 45 TABLET, ORALLY DISINTEGRATING ORAL at 20:26

## 2022-10-04 RX ADMIN — METFORMIN HYDROCHLORIDE 500 MILLIGRAM(S): 850 TABLET ORAL at 08:39

## 2022-10-04 NOTE — BH INPATIENT PSYCHIATRY PROGRESS NOTE - PRN MEDS
MEDICATIONS  (PRN):  ALBUTerol    90 MICROgram(s) HFA Inhaler 2 Puff(s) Inhalation every 6 hours PRN Wheezing  OLANZapine 2.5 milliGRAM(s) Oral two times a day PRN agitation  OLANZapine Injectable 2.5 milliGRAM(s) IntraMuscular once PRN severe agitation  polyethylene glycol 3350 17 Gram(s) Oral at bedtime PRN Constipation  senna 2 Tablet(s) Oral at bedtime PRN Constipation  simethicone 80 milliGRAM(s) Chew three times a day PRN Upset Stomach

## 2022-10-04 NOTE — BH INPATIENT PSYCHIATRY PROGRESS NOTE - NSBHASSESSSUMMFT_PSY_ALL_CORE
Patient is an 82-year-old male currently residing at [facility]. PPH of depression and psychosis. + history of inpatient admissions last 7/22-8/19 at [facility] post admission to [facility] for dehydration/SI/FTT 7/6-7/22I.   Patient presented to the ED in the context of suicidal ideation. Patient endorsed continued thought to die and stated he was going to stop eating and drinking at NH because he wants to be dead. Patient unable to contract for safety. As patient presented imminent risk of self-harm, he was admitted to inpatient psych for safety and stabilization. Patient had long standing Hx of Alcohol Abuse, but stopped drinking years ago.     Course of illness:   During hospital admission, patient had a diagnosis of Schizophrenia, which was attributed to his disorganized behavior under influence of Alcohol. As reported by his 2 sisters, Patient was admitted in psych care facility for almost 1 year long time ago as he threatened his Sister's BF to kill him.   On the unit, patient expressed extreme sense of guilt, expressing passive SIs. Exhibiting Depressed pessimistic mood. Patient received one ECT treatment, then he has been refusing ECT. Afterwards, with continuing treatment, patient's report of Guilt feeling is more interrupted than he inially reported these ideations. Patient's mood is stable. Effexor was up-titrated and will monitor his response to the new dose. Patient's sleep is reportedly improved during this hospital admission. Patient did CT brain and it indicated resolution of the previous SDH. Pt was seen afterwards by Neurosurgery and concluded that no further changes or interventions that are recommended at this time. MMSE: 29 (during this hospital admission): Patient received one ECT treatment on 9/19. Pt refused to proceed with further ECT treatments afterwards.     Clinical update 10/4:  Pt reported feeling confused. Patient experiencing mood swings. He has early feeling of being depressed then reported feeling fine. Patient verbalized death wishes. Patient reported feeling anxious about moving to another NH. He wants to stay at his nursing home.     Plan:  - Safety: routine observation, denies SI/HI/I/P on the unit. PRNs: Zyprexa 2.5mg PO/IM for agitation   - Psychiatry: Continue with Remeron 45 mg qhs                     Will Increase the dose of Effexor from 75 mg to 112.5 mg qd (starting 10/4)                      Will consider further up-titration of Effexor as indicated and as tolerated up to 150 mg qd   - Medicine: Continue with Norvasc 5mg qd for HTN and Metformin 500 mg BID for DM.     For Constipation: Will Continue Senna 2 tab 8.6 mg at night (PRN for Constipation)                              Will Continue Miralax 17 gm PO tab at night (PRN for Constipation)  - Will obtain Neurocognitive (Neuropsychological) Testing for the patient.

## 2022-10-04 NOTE — BH INPATIENT PSYCHIATRY PROGRESS NOTE - NSBHCHARTREVIEWVS_PSY_A_CORE FT
Vital Signs Last 24 Hrs  T(C): 35.9 (10-04-22 @ 06:03), Max: 36.6 (10-03-22 @ 09:37)  T(F): 96.7 (10-04-22 @ 06:03), Max: 97.9 (10-03-22 @ 09:37)  HR: --  BP: --  BP(mean): --  RR: --  SpO2: --    Orthostatic VS  10-04-22 @ 06:03  Lying BP: --/-- HR: --  Sitting BP: 169/86 HR: 68  Standing BP: 160/82 HR: 71  Site: --  Mode: --  Orthostatic VS  10-03-22 @ 09:37  Lying BP: --/-- HR: --  Sitting BP: 132/69 HR: 76  Standing BP: --/-- HR: --  Site: --  Mode: --   Vital Signs Last 24 Hrs  T(C): 35.9 (10-04-22 @ 06:03), Max: 36.4 (10-03-22 @ 16:16)  T(F): 96.7 (10-04-22 @ 06:03), Max: 97.6 (10-03-22 @ 16:16)  HR: --  BP: --  BP(mean): --  RR: --  SpO2: --    Orthostatic VS  10-04-22 @ 06:03  Lying BP: --/-- HR: --  Sitting BP: 169/86 HR: 68  Standing BP: 160/82 HR: 71  Site: --  Mode: --  Orthostatic VS  10-03-22 @ 09:37  Lying BP: --/-- HR: --  Sitting BP: 132/69 HR: 76  Standing BP: --/-- HR: --  Site: --  Mode: --   Vital Signs Last 24 Hrs  T(C): 35.9 (10-04-22 @ 06:03), Max: 36.4 (10-03-22 @ 16:16)  T(F): 96.7 (10-04-22 @ 06:03), Max: 97.6 (10-03-22 @ 16:16)    Orthostatic VS  10-04-22 @ 06:03  Sitting BP: 169/86 HR: 68  Standing BP: 160/82 HR: 71

## 2022-10-04 NOTE — BH INPATIENT PSYCHIATRY PROGRESS NOTE - NSBHFUPINTERVALHXFT_PSY_A_CORE
Patient was seen and evaluated by the attending psychiatrist and the resident. Case was discussed with the team. Chart was reviewed and the patient is vitally stable. Patient reported having a bowel movement yesterday after getting 2 tabs of Senna. This morning, patient was seen in the hallway after having breakfast. Immediately after having breakfast, he went back to his room. Patient feels little tired. Patient had some mood swings. He feels his mood is not so good.  He added he can not face reality and he does not want to live. On subsequent interview, he felt better, but expressed apprehension going to another Nursing home.  Patient was seen and evaluated by the attending psychiatrist and the resident. Case was discussed with the team. Chart was reviewed and the patient is vitally stable. Patient reported having a bowel movement yesterday after getting 2 tabs of Senna. This morning, patient was seen in the hallway after having breakfast. Immediately after having breakfast, he went back to his room. Patient reports that he feels a little tired. Patient had some mood swings. He feels his mood is not so good. He added he cannot face reality and he does not want to live. On subsequent interview, he felt better, but expressed apprehension going to another Nursing home.

## 2022-10-04 NOTE — BH INPATIENT PSYCHIATRY PROGRESS NOTE - CURRENT MEDICATION
MEDICATIONS  (STANDING):  amLODIPine   Tablet 5 milliGRAM(s) Oral daily  metFORMIN 500 milliGRAM(s) Oral two times a day  mirtazapine 45 milliGRAM(s) Oral at bedtime  venlafaxine .5 milliGRAM(s) Oral daily    MEDICATIONS  (PRN):  ALBUTerol    90 MICROgram(s) HFA Inhaler 2 Puff(s) Inhalation every 6 hours PRN Wheezing  OLANZapine 2.5 milliGRAM(s) Oral two times a day PRN agitation  OLANZapine Injectable 2.5 milliGRAM(s) IntraMuscular once PRN severe agitation  polyethylene glycol 3350 17 Gram(s) Oral at bedtime PRN Constipation  senna 2 Tablet(s) Oral at bedtime PRN Constipation  simethicone 80 milliGRAM(s) Chew three times a day PRN Upset Stomach

## 2022-10-04 NOTE — BH INPATIENT PSYCHIATRY PROGRESS NOTE - MSE UNSTRUCTURED FT
Patient looks older than stated age, with lean body habitus, patient is more visible on the unit. Patient is less fidgety, but more stuttering while talking. Patient is in less anxious distress, with less accompanying motor tics. Reported mood is "okay" with constricted affect. Patient denies any passive or active suicidal ideations. Denies any homicidal ideations. Denies delusions. Denies any hallucinations. Partially insightful, with limited judgment. Good impulse control on the unit and during the interview.  Patient looks older than stated age, with lean body habitus. Patient is calm, behaviorally controlled but seems to be superficially cooperative. Patient had low energy and PM retardation in general but still fidgety and anxious, less prominent motor tics. Patient reported mood as not as good. Patient verbalized passive suicidal ideations, denied any guilty feelings. Patient denied any active SI. Denies any homicidal ideations. Denies delusions. Denies any hallucinations. Partially insightful, with limited judgment. Good impulse control on the unit and during the interview.

## 2022-10-05 PROCEDURE — 99232 SBSQ HOSP IP/OBS MODERATE 35: CPT

## 2022-10-05 RX ORDER — VENLAFAXINE HCL 75 MG
150 CAPSULE, EXT RELEASE 24 HR ORAL DAILY
Refills: 0 | Status: DISCONTINUED | OUTPATIENT
Start: 2022-10-05 | End: 2022-10-09

## 2022-10-05 RX ADMIN — MIRTAZAPINE 45 MILLIGRAM(S): 45 TABLET, ORALLY DISINTEGRATING ORAL at 20:19

## 2022-10-05 RX ADMIN — AMLODIPINE BESYLATE 5 MILLIGRAM(S): 2.5 TABLET ORAL at 09:57

## 2022-10-05 RX ADMIN — METFORMIN HYDROCHLORIDE 500 MILLIGRAM(S): 850 TABLET ORAL at 20:19

## 2022-10-05 RX ADMIN — METFORMIN HYDROCHLORIDE 500 MILLIGRAM(S): 850 TABLET ORAL at 09:57

## 2022-10-05 RX ADMIN — Medication 112.5 MILLIGRAM(S): at 09:57

## 2022-10-05 NOTE — BH TREATMENT PLAN - NSDCCRITERIA_PSY_ALL_CORE
Patient will be discharged once he does not express any suicidal ideations, improvement of mood and affect. 
Patient will be discharged once he does not express any suicidal ideations, improvement of mood and affect. Improvement of Guilt feeling
Patient will be discharged once he does not express any suicidal ideations, improvement of mood and affect. Improvement of Guilt feeling
Patient will be discharged once he does not express any suicidal ideations, improvement of mood and affect.

## 2022-10-05 NOTE — BH TREATMENT PLAN - NSTXPATIENTPARTICIPATE_PSY_ALL_CORE
No, patient unwilling to participate
No, patient unwilling to participate
Patient participated in identification of needs/problems/goals for treatment/Patient participated in defining interventions/No, patient unwilling to participate
No, patient unwilling to participate

## 2022-10-05 NOTE — BH TREATMENT PLAN - NSTXDCFAMINTERSW_PSY_ALL_CORE
sw will address protocol for return to prior snf [facility] and interface with family as per HIPAA -  pt requires PASRR
Writer is YEMI cr covering for unit SW on 09/28/2022.  Writer participated in interdisciplinary treatment team meeting to coordinate patient care and discharge plans. Unit SW interfaced and updates patient's family.  Patient requires [facility].
sw interfaced with family . pt defers to family for dc planning.  he has not been speaking to his family when they call him.

## 2022-10-05 NOTE — BH TREATMENT PLAN - NSTXDEPRESINTERRN_PSY_ALL_CORE
Encourage pt to continue participating in group activity.
Encourage pt to attend group activities
Encourage pt to continue participating in group activity.

## 2022-10-05 NOTE — BH TREATMENT PLAN - NSTXSUICIDINTERMD_PSY_ALL_CORE
Appropriate management of depression with medications and psychotherapy. 

## 2022-10-05 NOTE — BH TREATMENT PLAN - NSTXDEPRESGOAL_PSY_ALL_CORE
Will identify 2 coping skills that assist in improving mood
Will identify 2 coping skills that assist in improving mood
Exhibit improvements in self-grooming, hygiene, sleep and appetite
Exhibit improvements in self-grooming, hygiene, sleep and appetite

## 2022-10-05 NOTE — BH TREATMENT PLAN - NSTXDEPRESINTERMD_PSY_ALL_CORE
Med titration with antidepressants; recently started on venlafaxine and is tolerating it well. Patient had one ECT and is refusing further treatment.

## 2022-10-05 NOTE — BH INPATIENT PSYCHIATRY PROGRESS NOTE - NSBHMETABOLIC_PSY_ALL_CORE_FT
BMI: BMI (kg/m2): 19.7 (09-12-22 @ 23:38)  HbA1c: A1C with Estimated Average Glucose Result: 5.8 % (07-26-22 @ 09:35)  Glucose: POCT Blood Glucose.: 102 mg/dL (09-16-22 @ 08:20)  Lipid Panel: Date/Time: 08-16-22 @ 09:10  Cholesterol, Serum: 142  HDL Cholesterol, Serum: 57  Triglycerides, Serum: 88

## 2022-10-05 NOTE — BH INPATIENT PSYCHIATRY PROGRESS NOTE - NSBHASSESSSUMMFT_PSY_ALL_CORE
Patient is an 82-year-old male currently residing at [facility]. PPH of depression and psychosis. + history of inpatient admissions last 7/22-8/19 at [facility] post admission to [facility] for dehydration/SI/FTT 7/6-7/22I.   Patient presented to the ED in the context of suicidal ideation. Patient endorsed continued thought to die and stated he was going to stop eating and drinking at NH because he wants to be dead. Patient unable to contract for safety. As patient presented imminent risk of self-harm, he was admitted to inpatient psych for safety and stabilization. Patient had long standing Hx of Alcohol Abuse, but stopped drinking years ago.     Course of illness:   During hospital admission, patient had a diagnosis of Schizophrenia, which was attributed to his disorganized behavior under influence of Alcohol. As reported by his 2 sisters, Patient was admitted in psych care facility for almost 1 year long time ago as he threatened his Sister's BF to kill him.   On the unit, patient expressed extreme sense of guilt, expressing passive SIs. Exhibiting Depressed pessimistic mood. Patient received one ECT treatment, then he has been refusing ECT. Afterwards, with continuing treatment, patient's report of Guilt feeling is more interrupted than he inially reported these ideations. Patient's mood is stable. Effexor was up-titrated and will monitor his response to the new dose. Patient's sleep is reportedly improved during this hospital admission. Patient did CT brain and it indicated resolution of the previous SDH. Pt was seen afterwards by Neurosurgery and concluded that no further changes or interventions that are recommended at this time. MMSE: 29 (during this hospital admission): Patient received one ECT treatment on 9/19. Pt refused to proceed with further ECT treatments afterwards.     10/05 Clinical Update  Patient with minimal improvement in mood; is more visible on the unit. No SI reported today. Taking meds consistently and tolerating them well.    Plan:  - Safety: routine observation, denies SI/HI/I/P on the unit. PRNs: Zyprexa 2.5mg PO/IM for agitation   - Psychiatric issues: Continue with Remeron 45 mg qhs                     Will Increase venlafaxine XR to 150mg daily  - Medical comorbidities: Continue with Norvasc 5mg qd for HTN and Metformin 500 mg BID for DM.                               For Constipation: Will Continue Senna 2 tab 8.6 mg at night (PRN for Constipation)                              Will Continue Miralax 17 gm PO tab at night (PRN for Constipation)  - Will obtain neuropsychological evaluation to r/o dementia

## 2022-10-05 NOTE — BH TREATMENT PLAN - NSTXDEPRESINTERPR_PSY_ALL_CORE
Psych rehab staff will continue to assist patient in psych rehab goals pertaining to identifying healthy and effective coping skills as well as attending daily psych rehab groups for improved symptom management within seven days.
Patient has demonstrated fair progress towards psychiatric rehabilitation goal of identifying coping skills to assist in improving mood. Patient continues to identify taking a walk as a coping skill. Patient was somewhat receptive to exploring additional coping skills to manage symptoms. Psychiatric rehabilitation recommends patient continue to attend groups, engage in individual sessions, and participate in activities in the milieu to continue exploring coping skills to manage symptoms.
Patient has demonstrated fair progress towards psychiatric rehabilitation goal of identifying coping skills to assist in improving mood. Patient stated that he takes walks and watches TV to help distract him from suicidal thoughts. Writer attempted to explore additional coping skills; patient is not receptive at this time. Psychiatric rehabilitation recommends patient continue to attend groups, engage in individual sessions, and participate in activities in the milieu to continue exploring coping skills to manage symptoms.
Patient has demonstrated fair progress towards psychiatric rehabilitation goal of identifying coping skills to assist in improving mood. Patient identified watching TV and reading the newspaper as coping skills. Writer explained to patient the use of coping skills in managing symptoms and patient was able to identify needing coping skills when he feels depressed. Psychiatric rehabilitation recommends patient continue to attend groups, engage in individual sessions, and participate in activities in the milieu to continue exploring coping skills to manage symptoms.

## 2022-10-05 NOTE — BH TREATMENT PLAN - NSBHPRIMARYDX_PSY_ALL_CORE
Major depressive episode    

## 2022-10-05 NOTE — BH TREATMENT PLAN - NSTXDCFAMINTERMD_PSY_ALL_CORE
MOLECULAR PCR
Will coordinate discharge with family back to SNF
Will coordinate discharge with family back to SNF

## 2022-10-05 NOTE — BH TREATMENT PLAN - NSTXPLANTHERAPYSESSIONSFT_PSY_ALL_CORE
09-20-22  Type of therapy: Coping skills,Creative arts therapy,Health and fitness,Peer advocate,Symptom management  Type of session: Individual  Level of patient participation: Participates  Duration of participation: 15 minutes  Therapy conducted by: Psych rehab  Therapy Summary: Writer met with patient for an individual session in order to review progress towards psychiatric rehabilitation goals. Patient was cooperative and moderately engaged in the session. Patient has demonstrated some improvements in symptoms. Patient reported feeling better than when he was first admitted and stated he was “stressed” when he arrived to the hospital but was unable to elaborate further. Patient endorsed persistent suicidal ideation but denied plan or intention. Patient has been adherent to medication and reported no side effects. Patient received ECT yesterday but expressed anxiety about the treatment. Patient endorsed fair sleep and appetite. Due to the COVID-19 pandemic, unit structure and activities are re-evaluated on a consistent basis in effort to maintain the safety of patients and staff. Patient attends some psychiatric rehabilitation groups, though he stated that they do not help him but he will continue to come. Patient is visible on the unit and at times is observed pacing. Patient minimally interacts with peers and is able to make needs known to staff.    09-22-22  Type of therapy: Other,Physical therapy  Type of session: Individual  Level of patient participation: Participates  --  Therapy conducted by: Other (specify),Physical therapy  Therapy Summary: Patient engaged in ambulation within unit  
  09-22-22  Type of therapy: Other,Physical therapy  Type of session: Individual  Level of patient participation: Participates  --  Therapy conducted by: Other (specify),Physical therapy  Therapy Summary: Patient engaged in ambulation within unit    09-27-22  Type of therapy: Coping skills,Creative arts therapy,Health and fitness,Mindfulness,Peer advocate,Symptom management  Type of session: Individual  Level of patient participation: Engaged  Duration of participation: 15 minutes  Therapy conducted by: Psych rehab  Therapy Summary: Writer met with patient for an individual session in order to review progress towards psychiatric rehabilitation goals. Patient was cooperative and engaged in the session. Patient has demonstrated minimal improvements in symptoms. Patient stated that he feels the same as last week and appears to present with depressed mood. Patient endorsed passive suicidal ideation but stated that he is not experiencing any current distress related to these thoughts. Writer attempted to engage patient in conversation to help identify stressors related to suicidal ideation but he was unable to do so. Patient has been adherent to medication and reported no side effects. Patient endorsed fair sleep and appetite. Due to the COVID-19 pandemic, unit structure and activities are re-evaluated on a consistent basis in effort to maintain the safety of patients and staff. Patient attends some psychiatric rehabilitation groups and participates appropriately during sessions. Patient is increasingly visible on the unit and maintains good behavioral control. Patient is isolative with peers and able to make needs known to staff.  
  10-03-22  Type of therapy: Other,Physical Therapy  Type of session: Individual  Level of patient participation: Participates  --  Therapy conducted by: Other (specify),Physical Therapy  Therapy Summary: Patient participated in gait training and therapeutic activity.    10-03-22  Type of therapy: Coping skills,Creative arts therapy,Health and fitness,Mindfulness,Peer advocate,Symptom management  Type of session: Individual  Level of patient participation: Engaged  Duration of participation: 15 minutes  Therapy conducted by: Psych rehab  Therapy Summary: Writer met with patient for an individual session in order to review progress towards psychiatric rehabilitation goals. Patient was cooperative and engaged in the session. Patient has demonstrated some improvements in symptoms. Patient endorsed intermittently depression but stated that he feels okay today. Patient stated that he feels depressed sometimes thinking about his nursing home. Writer attempted to explore further stressors at nursing facility and patient stated that he did not like the food and had some communication difficulties with staff. Patient did report that he has a friend there who he likes to spend time with. Patient has been adherent to medication and reported no side effects. Patient endorsed fair sleep and appetite. Patient denied current suicidal ideation. When asked about auditory hallucinations, patient reported that sometimes he hears a voice calling his name but stated that it is not distressing to him and denied other hallucinations. Due to the COVID-19 pandemic, unit structure and activities are re-evaluated on a consistent basis in effort to maintain the safety of patients and staff. Patient attends most psychiatric rehabilitation groups and participates appropriately during sessions. Patient is increasingly visible on the unit and maintains good behavioral control. Patient is isolative with peers and able to make needs known to staff.

## 2022-10-05 NOTE — BH INPATIENT PSYCHIATRY PROGRESS NOTE - MSE UNSTRUCTURED FT
The patient appears stated age, thin habitus, fair hygiene and dressed appropriately. The patient is calm, cooperative with the interview and maintained appropriate eye contact. Patient had low energy and PM retardation in general but still fidgety and anxious, less prominent motor tics.. Steady gait observed. The patient's speech is soft but fluent, sometimes hard to understand due to edentulous status. Reported mood is "okay."  Affect is constricted.  The patient's thought process is linear today. Thought content remarkable for feelings of guilt and remorse; he denies any suicidal or homicidal ideation, intent, or plan. He denies any hallucinations. Insight and judgement are improving. Impulse control intact at the time of exam. Oriented X3

## 2022-10-05 NOTE — BH INPATIENT PSYCHIATRY PROGRESS NOTE - NSBHFUPINTERVALHXFT_PSY_A_CORE
Patient seen for follow-up for worsening depression. Case was discussed with the team. Chart was reviewed and case discussed with interdisciplinary team. Per staff, patient remains somewhat isolative even though he's making an attempt to come out of his room more often. On encounter, patient is calm and pleasant and reports his mood as "okay". He denies any suicidal thought today and reports that he's happy that he'll be returning to his previous NH on discharge. Patient is med complaint and tolerating his meds well. His vitals are stable. No dizziness or headache reported.

## 2022-10-05 NOTE — BH TREATMENT PLAN - NSTXDCOTHRINTERSW_PSY_ALL_CORE
sw made referral for PASRR today. 10.3  sw advised family they might be called. pt agrees with plan to return to a SNF.- in [place] near his family  or if not accepted then return to [place].  defers to family for planning

## 2022-10-05 NOTE — BH TREATMENT PLAN - NSTXSUICIDGOAL_PSY_ALL_CORE
Be able to state 3 reasons for living

## 2022-10-05 NOTE — BH INPATIENT PSYCHIATRY PROGRESS NOTE - NSBHCHARTREVIEWVS_PSY_A_CORE FT
Vital Signs Last 24 Hrs  T(C): 36.2 (10-05-22 @ 15:27), Max: 36.8 (10-05-22 @ 08:14)  T(F): 97.2 (10-05-22 @ 15:27), Max: 98.2 (10-05-22 @ 08:14)  RR: 18 (10-05-22 @ 08:14) (18 - 18)    Orthostatic VS  10-05-22 @ 08:14  Sitting BP: 112/71 HR: 82  Standing BP: 109/78 HR: 88

## 2022-10-06 PROCEDURE — 99232 SBSQ HOSP IP/OBS MODERATE 35: CPT

## 2022-10-06 RX ADMIN — Medication 150 MILLIGRAM(S): at 09:07

## 2022-10-06 RX ADMIN — METFORMIN HYDROCHLORIDE 500 MILLIGRAM(S): 850 TABLET ORAL at 09:07

## 2022-10-06 RX ADMIN — METFORMIN HYDROCHLORIDE 500 MILLIGRAM(S): 850 TABLET ORAL at 21:18

## 2022-10-06 RX ADMIN — MIRTAZAPINE 45 MILLIGRAM(S): 45 TABLET, ORALLY DISINTEGRATING ORAL at 21:18

## 2022-10-06 NOTE — BH INPATIENT PSYCHIATRY PROGRESS NOTE - CURRENT MEDICATION
MEDICATIONS  (STANDING):  amLODIPine   Tablet 5 milliGRAM(s) Oral daily  metFORMIN 500 milliGRAM(s) Oral two times a day  mirtazapine 45 milliGRAM(s) Oral at bedtime  venlafaxine  milliGRAM(s) Oral daily    MEDICATIONS  (PRN):  ALBUTerol    90 MICROgram(s) HFA Inhaler 2 Puff(s) Inhalation every 6 hours PRN Wheezing  OLANZapine 2.5 milliGRAM(s) Oral two times a day PRN agitation  OLANZapine Injectable 2.5 milliGRAM(s) IntraMuscular once PRN severe agitation  polyethylene glycol 3350 17 Gram(s) Oral at bedtime PRN Constipation  senna 2 Tablet(s) Oral at bedtime PRN Constipation  simethicone 80 milliGRAM(s) Chew three times a day PRN Upset Stomach

## 2022-10-06 NOTE — BH INPATIENT PSYCHIATRY PROGRESS NOTE - NSBHCHARTREVIEWVS_PSY_A_CORE FT
Vital Signs Last 24 Hrs  T(C): 36.6 (10-06-22 @ 08:30), Max: 36.6 (10-06-22 @ 08:30)  T(F): 97.9 (10-06-22 @ 08:30), Max: 97.9 (10-06-22 @ 08:30)  RR: 17 (10-06-22 @ 08:30) (17 - 17)  SpO2: 98% (10-06-22 @ 08:30) (98% - 98%)    Orthostatic VS    10-06-22 @ 08:30  Sitting BP: 96/59 HR: 70  Standing BP: 90/60 HR: 80   Vital Signs Last 24 Hrs  T(C): 36.6 (10-06-22 @ 08:30), Max: 36.6 (10-06-22 @ 08:30)  T(F): 97.9 (10-06-22 @ 08:30), Max: 97.9 (10-06-22 @ 08:30)  RR: 17 (10-06-22 @ 08:30) (17 - 17)  SpO2: 98% (10-06-22 @ 08:30) (98% - 98%)    Orthostatic VS  10-06-22 @ 08:30  Sitting BP: 96/59 HR: 70  Standing BP: 90/60 HR: 80

## 2022-10-06 NOTE — BH INPATIENT PSYCHIATRY PROGRESS NOTE - NSBHFUPINTERVALHXFT_PSY_A_CORE
Patient seen for follow-up for worsening depression. Chart was reviewed and case discussed with interdisciplinary team. No significant overnight event reported. On encounter, patient is calm and pleasant and reports that he feels "okay" and denies any concerns. He denies any suicidal ideation or intent. Patient is reminded that he'll be meeting with the psychology team regarding neuropsychological evaluation today. Patient remains med complaint and tolerating his meds well. Patient's BP is low today with no orthostasis. No dizziness or headache reported.

## 2022-10-06 NOTE — BH INPATIENT PSYCHIATRY PROGRESS NOTE - MSE UNSTRUCTURED FT
The patient appears stated age, thin habitus, fair hygiene and dressed appropriately. The patient is calm, cooperative with the interview and maintained appropriate eye contact. Patient with minimal psychomotor retardation in general but still fidgety and anxious, less prominent motor tics. Gait is slow and steady. The patient's speech is soft but fluent, sometimes hard to understand due to edentulous status. Reported mood is "I'm okay."  Affect is constricted. The patient's thought process is linear today. Thought content remarkable for feelings of guilt and remorse; he denies any suicidal or homicidal ideation, intent, or plan. He denies any hallucinations. Insight and judgement are improving. Impulse control intact at the time of exam. Oriented X3

## 2022-10-06 NOTE — BH INPATIENT PSYCHIATRY PROGRESS NOTE - NSBHASSESSSUMMFT_PSY_ALL_CORE
Patient is an 82-year-old male currently residing at [facility]. PPH of depression and psychosis. + history of inpatient admissions last 7/22-8/19 at [facility] post admission to [facility] for dehydration/SI/FTT 7/6-7/22I.   Patient presented to the ED in the context of suicidal ideation. Patient endorsed continued thought to die and stated he was going to stop eating and drinking at NH because he wants to be dead. Patient unable to contract for safety. As patient presented imminent risk of self-harm, he was admitted to inpatient psych for safety and stabilization. Patient had long standing Hx of Alcohol Abuse, but stopped drinking years ago.     Course of illness:   During hospital admission, patient had a diagnosis of schizophrenia, which was attributed to his disorganized behavior under influence of Alcohol. As reported by his 2 sisters, Patient was admitted in psych care facility for almost a year long time ago as he threatened to kill his sister's BF.   On the unit, patient expressed extreme sense of guilt, expressing passive SIs. Exhibiting Depressed pessimistic mood. Patient received one ECT on 09/19, but has been refusing ECT since then. Effexor was added to augment treatment for depression. With continuing treatment, patient's report of guilt is more interrupted than he inially reported these ideations. Patient's mood is stable. Effexor was up-titrated and we will monitor his response to the new dose. Patient's sleep is reportedly improved during this hospital admission. Patient has a CT brain and it indicated resolution of the previous SDH. Pt was seen afterwards by Neurosurgery and concluded that no further changes or interventions recommended at this time. MMSE: 29 (during this hospital admission)    10/06 Clinical Update  Patient with consistent improvement in mood; less isolative, is more visible on the unit. No SI reported today. Taking meds consistently and tolerating them well. Patient is scheduled for neuropsychological evaluation today.    Plan:  - Safety: routine observation, denies SI/HI/I/P on the unit. PRNs: Zyprexa 2.5mg PO/IM for agitation   - Psychiatric issues: Continue with Remeron 45 mg qhs                     Will continue venlafaxine XR 150mg daily  - Medical comorbidities: Continue hold Norvasc 5mg qd for HTN and Metformin 500 mg BID for DM.                               For Constipation: Will Continue Senna 2 tab 8.6 mg at night (PRN for Constipation)                              Will Continue Miralax 17 gm PO tab at night (PRN for Constipation)  - Will obtain neuropsychological evaluation to r/o dementia Patient is an 82-year-old male currently residing at [facility]. PPH of depression and psychosis. + history of inpatient admissions last 7/22-8/19 at [facility] post admission to [facility] for dehydration/SI/FTT 7/6-7/22I.   Patient presented to the ED in the context of suicidal ideation. Patient endorsed continued thought to die and stated he was going to stop eating and drinking at NH because he wants to be dead. Patient unable to contract for safety. As patient presented imminent risk of self-harm, he was admitted to inpatient psych for safety and stabilization. Patient had long standing Hx of Alcohol Abuse, but stopped drinking years ago.     Course of illness:   Patient admitted with a diagnosis of schizophrenia, which most likely was due to his disorganized behavior under the influence of alcohol. As reported by his 2 sisters, patient was admitted in psych care facility for almost a year a long time ago after he threatened to kill his sister's BF.   On the unit, patient expressed extreme sense of guilt, and passive SIs. He was exhibiting depressed pessimistic mood. Patient received one ECT on 09/19, but has been refusing ECT since then. Effexor was added to augment treatment for depression. With continuing treatment, patient's report of guilt is more interrupted than initially reported. Effexor was up-titrated. Patient's sleep is reportedly improved during this hospital admission. Patient has a CT brain and it indicated resolution of the previous SDH. Pt was seen afterwards by Neurosurgery and concluded that no further changes or interventions recommended at this time. MMSE: 29 (during this hospital admission)    10/06 Clinical Update  Patient with consistent improvement in mood; is less isolative and more visible on the unit. No SI reported today. Taking meds consistently and tolerating them well. Patient is scheduled for neuropsychological evaluation today. Patient's BP is low today with no orthostasis; no dizziness or headache reported.     Plan:  - Safety: routine observation; PRNs: Zyprexa 2.5mg PO/IM for agitation   - Psychiatric issues: Continue with Remeron 45 mg qhs, and venlafaxine XR 150mg daily  - Medical comorbidities: Hold Norvasc due to low BP today, will encourage patient to consume more fluids; continue metformin 500 mg BID for DM; continue Senna 2 tab 8.6mg at night (PRN for Constipation) and Miralax 17gm PO tab at night (PRN for Constipation)  - Will f/u on neuropsychological evaluation from today

## 2022-10-07 LAB
24R-OH-CALCIDIOL SERPL-MCNC: 31.3 NG/ML — SIGNIFICANT CHANGE UP (ref 30–80)
ALBUMIN SERPL ELPH-MCNC: 4.2 G/DL — SIGNIFICANT CHANGE UP (ref 3.3–5)
ALP SERPL-CCNC: 38 U/L — LOW (ref 40–120)
ALT FLD-CCNC: 13 U/L — SIGNIFICANT CHANGE UP (ref 4–41)
ANION GAP SERPL CALC-SCNC: 12 MMOL/L — SIGNIFICANT CHANGE UP (ref 7–14)
AST SERPL-CCNC: 16 U/L — SIGNIFICANT CHANGE UP (ref 4–40)
BILIRUB SERPL-MCNC: 0.3 MG/DL — SIGNIFICANT CHANGE UP (ref 0.2–1.2)
BUN SERPL-MCNC: 13 MG/DL — SIGNIFICANT CHANGE UP (ref 7–23)
CALCIUM SERPL-MCNC: 9.5 MG/DL — SIGNIFICANT CHANGE UP (ref 8.4–10.5)
CHLORIDE SERPL-SCNC: 90 MMOL/L — LOW (ref 98–107)
CO2 SERPL-SCNC: 25 MMOL/L — SIGNIFICANT CHANGE UP (ref 22–31)
CREAT SERPL-MCNC: 0.83 MG/DL — SIGNIFICANT CHANGE UP (ref 0.5–1.3)
EGFR: 88 ML/MIN/1.73M2 — SIGNIFICANT CHANGE UP
GLUCOSE SERPL-MCNC: 100 MG/DL — HIGH (ref 70–99)
HCT VFR BLD CALC: 37.5 % — LOW (ref 39–50)
HGB BLD-MCNC: 13.1 G/DL — SIGNIFICANT CHANGE UP (ref 13–17)
MCHC RBC-ENTMCNC: 32.7 PG — SIGNIFICANT CHANGE UP (ref 27–34)
MCHC RBC-ENTMCNC: 34.9 GM/DL — SIGNIFICANT CHANGE UP (ref 32–36)
MCV RBC AUTO: 93.5 FL — SIGNIFICANT CHANGE UP (ref 80–100)
NRBC # BLD: 0 /100 WBCS — SIGNIFICANT CHANGE UP (ref 0–0)
NRBC # FLD: 0 K/UL — SIGNIFICANT CHANGE UP (ref 0–0)
PLATELET # BLD AUTO: 225 K/UL — SIGNIFICANT CHANGE UP (ref 150–400)
POTASSIUM SERPL-MCNC: 4.3 MMOL/L — SIGNIFICANT CHANGE UP (ref 3.5–5.3)
POTASSIUM SERPL-SCNC: 4.3 MMOL/L — SIGNIFICANT CHANGE UP (ref 3.5–5.3)
PROT SERPL-MCNC: 6.7 G/DL — SIGNIFICANT CHANGE UP (ref 6–8.3)
RBC # BLD: 4.01 M/UL — LOW (ref 4.2–5.8)
RBC # FLD: 13.1 % — SIGNIFICANT CHANGE UP (ref 10.3–14.5)
SODIUM SERPL-SCNC: 127 MMOL/L — LOW (ref 135–145)
WBC # BLD: 4.63 K/UL — SIGNIFICANT CHANGE UP (ref 3.8–10.5)
WBC # FLD AUTO: 4.63 K/UL — SIGNIFICANT CHANGE UP (ref 3.8–10.5)

## 2022-10-07 PROCEDURE — 99232 SBSQ HOSP IP/OBS MODERATE 35: CPT | Mod: GC

## 2022-10-07 RX ORDER — PALIPERIDONE 1.5 MG/1
117 TABLET, EXTENDED RELEASE ORAL ONCE
Refills: 0 | Status: DISCONTINUED | OUTPATIENT
Start: 2022-10-07 | End: 2022-10-07

## 2022-10-07 RX ORDER — SODIUM CHLORIDE 9 MG/ML
1 INJECTION INTRAMUSCULAR; INTRAVENOUS; SUBCUTANEOUS DAILY
Refills: 0 | Status: DISCONTINUED | OUTPATIENT
Start: 2022-10-07 | End: 2022-10-09

## 2022-10-07 RX ORDER — SODIUM CHLORIDE 9 MG/ML
1 INJECTION INTRAMUSCULAR; INTRAVENOUS; SUBCUTANEOUS ONCE
Refills: 0 | Status: COMPLETED | OUTPATIENT
Start: 2022-10-07 | End: 2022-10-07

## 2022-10-07 RX ADMIN — AMLODIPINE BESYLATE 5 MILLIGRAM(S): 2.5 TABLET ORAL at 08:43

## 2022-10-07 RX ADMIN — Medication 150 MILLIGRAM(S): at 08:43

## 2022-10-07 RX ADMIN — SODIUM CHLORIDE 1 GRAM(S): 9 INJECTION INTRAMUSCULAR; INTRAVENOUS; SUBCUTANEOUS at 16:18

## 2022-10-07 RX ADMIN — METFORMIN HYDROCHLORIDE 500 MILLIGRAM(S): 850 TABLET ORAL at 08:43

## 2022-10-07 RX ADMIN — MIRTAZAPINE 45 MILLIGRAM(S): 45 TABLET, ORALLY DISINTEGRATING ORAL at 21:10

## 2022-10-07 RX ADMIN — METFORMIN HYDROCHLORIDE 500 MILLIGRAM(S): 850 TABLET ORAL at 21:11

## 2022-10-07 NOTE — BH INPATIENT PSYCHIATRY PROGRESS NOTE - MSE UNSTRUCTURED FT
The patient appears stated age, thin habitus, fair hygiene and dressed appropriately. The patient is calm, cooperative with the interview and maintained appropriate eye contact. Patient with minimal psychomotor retardation in general but still fidgety and anxious, less prominent motor tics. Gait is slow and steady. The patient's speech is soft but fluent, sometimes hard to understand due to edentulous status. Reported mood is "I'm okay."  Affect is constricted. The patient's thought process is linear today. Thought content remarkable for feelings of guilt and remorse; he denies any suicidal or homicidal ideation, intent, or plan. He denies any hallucinations. Insight and judgement are improving. Impulse control intact at the time of exam. Oriented X3 The patient appears stated age, thin habitus, fair hygiene and dressed appropriately. The patient is calm, cooperative with the interview and maintained appropriate eye contact. Patient with minimal psychomotor retardation in general but still fidgety and anxious, motor tics almost eliminated. Gait is slow and steady. The patient's speech is soft but fluent, sometimes hard to understand due to edentulous status and because of his stuttering when gets anxious. Reported mood is "I'm okay."  Affect is constricted. The patient's thought process  continues to be linear. Thought content remarkable for feelings of guilt and remorse; he denies any suicidal or homicidal ideation, intent, or plan. He denies any hallucinations. Insight and judgement are improving. Impulse control intact at the time of exam. Oriented X3 Patient appears stated age, thin habitus, fair hygiene and wearing a green Jacket. Patient is more visible on the unit with less PM retardation. Patient is calm, cooperative, behaviorally controlled and was able to maintain appropriate eye to eye contact. Patient's motor tics almost eliminated. The patient's speech is soft but fluent, sometimes hard to understand due to edentulous status and because of his stuttering when gets anxious. Mood is "I'm okay." Affect is constricted. The patient's thought process continues to be linear. Thought content; patient elaborates feelings of guilt at times, but not persistently. Patient denies any suicidal or homicidal ideation, intent, or plan. He denies any hallucinations. Insight and judgement are improving. Impulse control intact at the time of exam. Oriented X3

## 2022-10-07 NOTE — BH INPATIENT PSYCHIATRY PROGRESS NOTE - CURRENT MEDICATION
MEDICATIONS  (STANDING):  amLODIPine   Tablet 5 milliGRAM(s) Oral daily  metFORMIN 500 milliGRAM(s) Oral two times a day  mirtazapine 45 milliGRAM(s) Oral at bedtime  venlafaxine  milliGRAM(s) Oral daily    MEDICATIONS  (PRN):  ALBUTerol    90 MICROgram(s) HFA Inhaler 2 Puff(s) Inhalation every 6 hours PRN Wheezing  OLANZapine 2.5 milliGRAM(s) Oral two times a day PRN agitation  OLANZapine Injectable 2.5 milliGRAM(s) IntraMuscular once PRN severe agitation  polyethylene glycol 3350 17 Gram(s) Oral at bedtime PRN Constipation  senna 2 Tablet(s) Oral at bedtime PRN Constipation  simethicone 80 milliGRAM(s) Chew three times a day PRN Upset Stomach   MEDICATIONS  (STANDING):  amLODIPine   Tablet 5 milliGRAM(s) Oral daily  metFORMIN 500 milliGRAM(s) Oral two times a day  mirtazapine 45 milliGRAM(s) Oral at bedtime  sodium chloride 1 Gram(s) Oral daily  venlafaxine  milliGRAM(s) Oral daily    MEDICATIONS  (PRN):  ALBUTerol    90 MICROgram(s) HFA Inhaler 2 Puff(s) Inhalation every 6 hours PRN Wheezing  OLANZapine 2.5 milliGRAM(s) Oral two times a day PRN agitation  OLANZapine Injectable 2.5 milliGRAM(s) IntraMuscular once PRN severe agitation  polyethylene glycol 3350 17 Gram(s) Oral at bedtime PRN Constipation  senna 2 Tablet(s) Oral at bedtime PRN Constipation  simethicone 80 milliGRAM(s) Chew three times a day PRN Upset Stomach   MEDICATIONS  (STANDING):  amLODIPine   Tablet 5 milliGRAM(s) Oral daily  metFORMIN 500 milliGRAM(s) Oral two times a day  mirtazapine 45 milliGRAM(s) Oral at bedtime  sodium chloride 1 Gram(s) Oral daily  sodium chloride 1 Gram(s) Oral once  venlafaxine  milliGRAM(s) Oral daily    MEDICATIONS  (PRN):  ALBUTerol    90 MICROgram(s) HFA Inhaler 2 Puff(s) Inhalation every 6 hours PRN Wheezing  OLANZapine 2.5 milliGRAM(s) Oral two times a day PRN agitation  OLANZapine Injectable 2.5 milliGRAM(s) IntraMuscular once PRN severe agitation  polyethylene glycol 3350 17 Gram(s) Oral at bedtime PRN Constipation  senna 2 Tablet(s) Oral at bedtime PRN Constipation  simethicone 80 milliGRAM(s) Chew three times a day PRN Upset Stomach

## 2022-10-07 NOTE — BH INPATIENT PSYCHIATRY PROGRESS NOTE - NSBHCHARTREVIEWVS_PSY_A_CORE FT
Vital Signs Last 24 Hrs  T(C): 36.8 (10-07-22 @ 08:08), Max: 36.8 (10-07-22 @ 08:08)  T(F): 98.3 (10-07-22 @ 08:08), Max: 98.3 (10-07-22 @ 08:08)  HR: --  BP: --  BP(mean): --  RR: --  SpO2: --    Orthostatic VS  10-07-22 @ 08:08  Lying BP: 129/70 HR: 65  Sitting BP: 119/64 HR: 70  Standing BP: --/-- HR: --  Site: --  Mode: --  Orthostatic VS  10-06-22 @ 13:32  Lying BP: --/-- HR: --  Sitting BP: 102/65 HR: 78  Standing BP: --/-- HR: --  Site: upper left arm  Mode: auscultated w/ stethoscope  Orthostatic VS  10-06-22 @ 09:00  Lying BP: --/-- HR: --  Sitting BP: --/-- HR: --  Standing BP: 90/56 HR: --  Site: upper right arm  Mode: auscultated w/ stethoscope  Orthostatic VS  10-06-22 @ 08:30  Lying BP: --/-- HR: --  Sitting BP: 96/59 HR: 70  Standing BP: 90/60 HR: 80  Site: --  Mode: --   Vital Signs Last 24 Hrs  T(C): 36.5 (10-07-22 @ 15:50), Max: 36.8 (10-07-22 @ 08:08)  T(F): 97.7 (10-07-22 @ 15:50), Max: 98.3 (10-07-22 @ 08:08)    Orthostatic VS  10-07-22 @ 08:08  Lying BP: 129/70 HR: 65  Sitting BP: 119/64 HR: 70

## 2022-10-07 NOTE — BH INPATIENT PSYCHIATRY PROGRESS NOTE - NSBHFUPINTERVALHXFT_PSY_A_CORE
Patient was seen and evaluated by the attending psychiatrist and the resident. Case was discussed with the team. Chart was reviewed and patient is vitally stable. No acute events overnight. Patient is feeling little anxious about going back home. He stated that he has to face the  Patient was seen and evaluated by the attending psychiatrist and the resident. Case was discussed with the team. Chart was reviewed and patient is vitally stable. No acute events overnight.  Patient was seen and evaluated by the attending psychiatrist and the resident. Case was discussed with the team. Chart was reviewed and patient is vitally stable. No acute events overnight. Patient continues to be less depressed, more visible on the unit, more interactive on the recreational groups. Patient is reporting adequate appetite and sleep. Patient reported improved level of energy. Patient is denying any SI active or passive.  Patient was seen and evaluated by the attending psychiatrist and the resident. Case was discussed with the team. Chart was reviewed and patient is vitally stable. No acute events overnight. Patient continues to have better moo, be less depressed, more visible on the unit, and more interactive on the recreational groups. Patient is reporting adequate appetite and sleep. Patient reported improved level of energy. Patient is denying any SI active or passive.  Patient was seen and evaluated by the attending psychiatrist and the resident. Case was discussed with the team. Chart was reviewed and patient is vitally stable. No acute events overnight. Patient continues to have better moo, be less depressed, more visible on the unit, and more interactive on the recreational groups. Patient is reporting adequate appetite and sleep. Patient reported improved level of energy. Patient is denying any SI active or passive. Lip smacking is almost eliminated. Patient is still having stuttering though at times when he gets anxious.  Patient was seen and evaluated by the attending psychiatrist and the resident. Case was discussed with the team. Chart was reviewed and patient is vitally stable. Patient's vitals today is improved than yesterday 102/65 (10/06) --> (10/07) Sitting 129/70 & standing 119/64, with no evident orthostasis No acute events overnight. Patient continues to have better moo, be less depressed, more visible on the unit, and more interactive on the recreational groups. Patient is reporting adequate appetite and sleep. Patient reported improved level of energy. Patient is denying any SI active or passive. Lip smacking is almost eliminated. Patient is still having stuttering though at times when he gets anxious.
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
## 2022-10-07 NOTE — BH INPATIENT PSYCHIATRY PROGRESS NOTE - NSBHATTESTCOMMENTATTENDFT_PSY_A_CORE
Procedure(s):  COLONOSCOPY with Polypectomies.     MAC    Anesthesia Post Evaluation      Multimodal analgesia: multimodal analgesia used between 6 hours prior to anesthesia start to PACU discharge  Patient location during evaluation: PACU  Patient participation: complete - patient participated  Level of consciousness: awake and alert  Pain management: adequate  Airway patency: patent  Anesthetic complications: no  Cardiovascular status: acceptable  Respiratory status: acceptable  Hydration status: acceptable  Post anesthesia nausea and vomiting:  controlled  Final Post Anesthesia Temperature Assessment:  Normothermia (36.0-37.5 degrees C)      INITIAL Post-op Vital signs:   Vitals Value Taken Time   /64 07/13/22 1205   Temp 36.4 °C (97.5 °F) 07/13/22 1136   Pulse 68 07/13/22 1214   Resp 15 07/13/22 1214   SpO2 100 % 07/13/22 1214
The patient continues to be hopeless, depressed, with thoughts of not wanting to live.  Behavior well controlled on the unit.  Eating well.  Compliant with medications.  Continue Remeron, will consider adding another antipsychotic.
The patient continues to be depressed and anxious, but is hopeful that ECT will be helpful.  He has been tolerating medications well, will continue.  Next ECT Wednesday.
The patient continues to be depressed, with SI.  Isolative to his room, but making some attempt to be more active.  The patient continues to refuse ECT.  Tolerating remeron well.  Will consider adding another agent if symptoms do not improve.  CT-head WNL today.  Has neuro f/u Monday.
The patient is refusing any further ECT treatments at this time.  He was unable to fully articulate why, stating only that it was a very uncomfortable experience.  He states overall his mood is better, and denies any SI today.  He is noted to remain rather isolative, spending much of the day in his room.  Agreeable to increase remeron to 45mg hs.
Patient continues to report depressed mood but is not forthcoming when asked about SI today. He remains isolative. Patient has been evaluated for ECT and his family is now involved given questionable capacity to give an informed consent by the patient. 1st ECT scheduled for 09/19. No med changes made today. Rest of the history, exam, assessment and plan as documented in Dr. Ernandez's note.
Patient more visible on unit and more engaging and responsive during interview. He denies any active intent or plan to hurt self or others.  Plan is to increase dose venlafaxine XR to 75mg starting 09/30. Patient remains hesitant about restarting ECT. Rest of the history, exam, assessment, and plan as documented in Dr. Ernandez's note.
Patient remains isolative and depressed looking; remains ambivalent about SI but denies current intent or plan. Patient is tolerating increase in mirtazapine well. Plan is to begin augmentation with venlafaxine as patient is currently refusing ECT. Rest of the history, exam, assessment, and plan as documented in Dr. Ernandez's note. 
Patient more visible on the unit today. He however remains ambivalent about SI, but denies current intent or plan. Patient continues to refuse ECT. Venlafaxine XR started today to augment treatment for depression after discussing risks/ benefits with patient. Rest of the history, exam, assessment, and plan as documented in Dr. Ernandez's note. 
Patient continues to endorse feelings of guilt and hopelessness as well as wanting to be dead on exam today. He is still refusing any further ECT. Plan is to increase venlafaxine XR to 75mg on 09/30. Rest of the history, exam, assessment, and plan as documented in Dr. Ernandez's note.
Patient with some reported worry about expenses related to transfer to [facility] and writer offered reassurance that nothing would be done without consulting with him first. Otherwise, calm and more visible on the unit. Patient denied SI today. Venlafaxine XR increase to 75mg today. Patient remains hesitant about restarting ECT. Rest of the history, exam, assessment, and plan as documented in Dr. Ernandez's note.
Patient making gradual gains in terms of mood, and is no longer endorsing any suicidal thoughts. He however has some anxiety in context of his discharge, and wants to return to previous NH. Venlafaxine XR increased to 112.5mg daily today onwards. Rest of the history, exam, assessment, and plan as documented in Dr. Ernandez's note.
Patient with improved mood and affect and is no longer endorsing any suicidal ideation. Venlafaxine XR increased to 112.5 mg daily starting tomorrow; will continue to titrate dose as tolerated. Rest of the history, exam, assessment, and plan as documented in Dr. Ernandez's note.
Patient has been making gains in terms of mood, and is no longer endorsing any suicidal thoughts. However Na low at 127 today, hospitalist is aware and patient currently on fluid restriction and NaCl 1g daily, labs ordered for [place] 10/09 and patient signed off to the weekend team. Rest of the history, exam, assessment, and plan as documented in Dr. Ernandez's note
Patient continues to report depressed mood and passive SI. He had a cineesophagogram today and his diet was advanced to minced/ mechanical soft based on eval. Plan is to request an ECT consult and assess if he's a candidate as patient remains symptomatic despite therapeutic doses of psychotropic meds. Rest of the history, exam, assessment and plan as documented in Dr. Holden's note. 
Patient continues to report depressed mood but denies SI today. He is disinterested in groups and remains in his room most of the day. Patient has been evaluated for ECT and his family is now involved given questionable capacity to give an informed consent by the patient. Hospitalist has medically cleared the patient for ECT except concern for small subdural hematoma visible on CTH from 08/22. Neurologist Dr. Hurd consulted this afternoon who looked at the scan and as per his opinion, given the small size of the hematoma (0.3mm), lack of mass effect as well as lack of focal neurological deficits, there is no current contraindication for ECT. 1st ECT scheduled for tomorrow 09/16. No med changes made today. Rest of the history, exam, assessment and plan as documented in Dr. Holden's note.

## 2022-10-08 RX ADMIN — METFORMIN HYDROCHLORIDE 500 MILLIGRAM(S): 850 TABLET ORAL at 08:57

## 2022-10-08 RX ADMIN — AMLODIPINE BESYLATE 5 MILLIGRAM(S): 2.5 TABLET ORAL at 08:57

## 2022-10-08 RX ADMIN — Medication 150 MILLIGRAM(S): at 08:57

## 2022-10-08 RX ADMIN — METFORMIN HYDROCHLORIDE 500 MILLIGRAM(S): 850 TABLET ORAL at 20:52

## 2022-10-08 RX ADMIN — SODIUM CHLORIDE 1 GRAM(S): 9 INJECTION INTRAMUSCULAR; INTRAVENOUS; SUBCUTANEOUS at 08:56

## 2022-10-08 RX ADMIN — MIRTAZAPINE 45 MILLIGRAM(S): 45 TABLET, ORALLY DISINTEGRATING ORAL at 20:52

## 2022-10-09 LAB
ANION GAP SERPL CALC-SCNC: 11 MMOL/L — SIGNIFICANT CHANGE UP (ref 7–14)
BUN SERPL-MCNC: 11 MG/DL — SIGNIFICANT CHANGE UP (ref 7–23)
CALCIUM SERPL-MCNC: 9.4 MG/DL — SIGNIFICANT CHANGE UP (ref 8.4–10.5)
CHLORIDE SERPL-SCNC: 88 MMOL/L — LOW (ref 98–107)
CO2 SERPL-SCNC: 26 MMOL/L — SIGNIFICANT CHANGE UP (ref 22–31)
CREAT SERPL-MCNC: 0.86 MG/DL — SIGNIFICANT CHANGE UP (ref 0.5–1.3)
EGFR: 87 ML/MIN/1.73M2 — SIGNIFICANT CHANGE UP
GLUCOSE SERPL-MCNC: 135 MG/DL — HIGH (ref 70–99)
MAGNESIUM SERPL-MCNC: 1.9 MG/DL — SIGNIFICANT CHANGE UP (ref 1.6–2.6)
PHOSPHATE SERPL-MCNC: 3.3 MG/DL — SIGNIFICANT CHANGE UP (ref 2.5–4.5)
POTASSIUM SERPL-MCNC: 3.7 MMOL/L — SIGNIFICANT CHANGE UP (ref 3.5–5.3)
POTASSIUM SERPL-SCNC: 3.7 MMOL/L — SIGNIFICANT CHANGE UP (ref 3.5–5.3)
SODIUM SERPL-SCNC: 125 MMOL/L — LOW (ref 135–145)

## 2022-10-09 PROCEDURE — 99232 SBSQ HOSP IP/OBS MODERATE 35: CPT

## 2022-10-09 RX ORDER — VENLAFAXINE HCL 75 MG
75 CAPSULE, EXT RELEASE 24 HR ORAL DAILY
Refills: 0 | Status: DISCONTINUED | OUTPATIENT
Start: 2022-10-10 | End: 2022-10-11

## 2022-10-09 RX ORDER — SODIUM CHLORIDE 9 MG/ML
1 INJECTION INTRAMUSCULAR; INTRAVENOUS; SUBCUTANEOUS
Refills: 0 | Status: DISCONTINUED | OUTPATIENT
Start: 2022-10-09 | End: 2022-10-11

## 2022-10-09 RX ADMIN — Medication 150 MILLIGRAM(S): at 08:15

## 2022-10-09 RX ADMIN — SODIUM CHLORIDE 1 GRAM(S): 9 INJECTION INTRAMUSCULAR; INTRAVENOUS; SUBCUTANEOUS at 17:21

## 2022-10-09 RX ADMIN — METFORMIN HYDROCHLORIDE 500 MILLIGRAM(S): 850 TABLET ORAL at 08:14

## 2022-10-09 RX ADMIN — MIRTAZAPINE 45 MILLIGRAM(S): 45 TABLET, ORALLY DISINTEGRATING ORAL at 20:07

## 2022-10-09 RX ADMIN — METFORMIN HYDROCHLORIDE 500 MILLIGRAM(S): 850 TABLET ORAL at 20:07

## 2022-10-09 RX ADMIN — SODIUM CHLORIDE 1 GRAM(S): 9 INJECTION INTRAMUSCULAR; INTRAVENOUS; SUBCUTANEOUS at 08:15

## 2022-10-09 RX ADMIN — AMLODIPINE BESYLATE 5 MILLIGRAM(S): 2.5 TABLET ORAL at 08:15

## 2022-10-09 NOTE — BH INPATIENT PSYCHIATRY PROGRESS NOTE - MSE UNSTRUCTURED FT
Pt appears stated age, alert, awake, oriented x 3, in no acute physical distress. Cooperative, makes eye contact. No agitation/retardation/involuntary movements noted. Mood is ok, better. Affect is congruent. Speech is relevant, normal in rate, volume, productivity. Thoughts are linear, no gross delusions, denies any suicidal/homicidal ideas/plan. No hallucination expressed. Insight and judgment are fair. Memory and cognition are intact. Impulse control is intact.
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
## 2022-10-09 NOTE — BH INPATIENT PSYCHIATRY PROGRESS NOTE - NSBHMETABOLIC_PSY_ALL_CORE_FT
BMI: BMI (kg/m2): 22.1 (10-08-22 @ 16:48)  HbA1c: A1C with Estimated Average Glucose Result: 5.8 % (07-26-22 @ 09:35)    Glucose: POCT Blood Glucose.: 102 mg/dL (09-16-22 @ 08:20)    BP: --  Lipid Panel: Date/Time: 08-16-22 @ 09:10  Cholesterol, Serum: 142  Direct LDL: --  HDL Cholesterol, Serum: 57  Total Cholesterol/HDL Ration Measurement: --  Triglycerides, Serum: 88

## 2022-10-09 NOTE — BH INPATIENT PSYCHIATRY PROGRESS NOTE - NSBHFUPINTERVALHXFT_PSY_A_CORE
Patient seen for depression, chart reviewed and discussed with nursing staff. Pt is compliant with meds, no acute events overnight, no PRN needed. On exam, Pt reports, 'I am ok'. Reports sleeping and eating alright. Feels his depression is better. Denies any pk, paranoia, SI/HI, hallucination. Pt is visible, ambulating stable.

## 2022-10-09 NOTE — BH INPATIENT PSYCHIATRY PROGRESS NOTE - CURRENT MEDICATION
MEDICATIONS  (STANDING):  amLODIPine   Tablet 5 milliGRAM(s) Oral daily  metFORMIN 500 milliGRAM(s) Oral two times a day  mirtazapine 45 milliGRAM(s) Oral at bedtime  sodium chloride 1 Gram(s) Oral two times a day  venlafaxine  milliGRAM(s) Oral daily    MEDICATIONS  (PRN):  ALBUTerol    90 MICROgram(s) HFA Inhaler 2 Puff(s) Inhalation every 6 hours PRN Wheezing  OLANZapine 2.5 milliGRAM(s) Oral two times a day PRN agitation  OLANZapine Injectable 2.5 milliGRAM(s) IntraMuscular once PRN severe agitation  polyethylene glycol 3350 17 Gram(s) Oral at bedtime PRN Constipation  senna 2 Tablet(s) Oral at bedtime PRN Constipation  simethicone 80 milliGRAM(s) Chew three times a day PRN Upset Stomach

## 2022-10-09 NOTE — BH INPATIENT PSYCHIATRY PROGRESS NOTE - NSBHCHARTREVIEWVS_PSY_A_CORE FT
Vital Signs Last 24 Hrs  T(C): 36.3 (10-09-22 @ 15:32), Max: 36.4 (10-09-22 @ 08:17)  T(F): 97.4 (10-09-22 @ 15:32), Max: 97.5 (10-09-22 @ 08:17)  HR: --  BP: --  BP(mean): --  RR: 16 (10-09-22 @ 08:17) (16 - 16)  SpO2: 97% (10-09-22 @ 08:17) (97% - 97%)    Orthostatic VS  10-09-22 @ 08:17  Lying BP: 132/74 HR: 70  Sitting BP: 121/68 HR: 72  Standing BP: --/-- HR: --  Site: --  Mode: --  Orthostatic VS  10-08-22 @ 08:50  Lying BP: --/-- HR: --  Sitting BP: 115/72 HR: 78  Standing BP: 110/70 HR: 76  Site: upper left arm  Mode: auscultated w/ stethoscope  Orthostatic VS  10-08-22 @ 08:25  Lying BP: --/-- HR: --  Sitting BP: 123/71 HR: 75  Standing BP: 95/60 HR: 78  Site: --  Mode: --

## 2022-10-10 LAB
ANION GAP SERPL CALC-SCNC: 14 MMOL/L — SIGNIFICANT CHANGE UP (ref 7–14)
BUN SERPL-MCNC: 9 MG/DL — SIGNIFICANT CHANGE UP (ref 7–23)
CALCIUM SERPL-MCNC: 9.3 MG/DL — SIGNIFICANT CHANGE UP (ref 8.4–10.5)
CHLORIDE SERPL-SCNC: 93 MMOL/L — LOW (ref 98–107)
CO2 SERPL-SCNC: 24 MMOL/L — SIGNIFICANT CHANGE UP (ref 22–31)
CREAT SERPL-MCNC: 0.8 MG/DL — SIGNIFICANT CHANGE UP (ref 0.5–1.3)
EGFR: 89 ML/MIN/1.73M2 — SIGNIFICANT CHANGE UP
GLUCOSE SERPL-MCNC: 158 MG/DL — HIGH (ref 70–99)
POTASSIUM SERPL-MCNC: 4.2 MMOL/L — SIGNIFICANT CHANGE UP (ref 3.5–5.3)
POTASSIUM SERPL-SCNC: 4.2 MMOL/L — SIGNIFICANT CHANGE UP (ref 3.5–5.3)
SARS-COV-2 RNA SPEC QL NAA+PROBE: SIGNIFICANT CHANGE UP
SODIUM SERPL-SCNC: 131 MMOL/L — LOW (ref 135–145)

## 2022-10-10 PROCEDURE — 99232 SBSQ HOSP IP/OBS MODERATE 35: CPT

## 2022-10-10 RX ADMIN — Medication 75 MILLIGRAM(S): at 10:42

## 2022-10-10 RX ADMIN — METFORMIN HYDROCHLORIDE 500 MILLIGRAM(S): 850 TABLET ORAL at 10:42

## 2022-10-10 RX ADMIN — MIRTAZAPINE 45 MILLIGRAM(S): 45 TABLET, ORALLY DISINTEGRATING ORAL at 20:27

## 2022-10-10 RX ADMIN — AMLODIPINE BESYLATE 5 MILLIGRAM(S): 2.5 TABLET ORAL at 10:42

## 2022-10-10 RX ADMIN — SODIUM CHLORIDE 1 GRAM(S): 9 INJECTION INTRAMUSCULAR; INTRAVENOUS; SUBCUTANEOUS at 20:27

## 2022-10-10 RX ADMIN — SODIUM CHLORIDE 1 GRAM(S): 9 INJECTION INTRAMUSCULAR; INTRAVENOUS; SUBCUTANEOUS at 10:42

## 2022-10-10 RX ADMIN — METFORMIN HYDROCHLORIDE 500 MILLIGRAM(S): 850 TABLET ORAL at 20:27

## 2022-10-10 NOTE — BH INPATIENT PSYCHIATRY PROGRESS NOTE - NSBHCHARTREVIEWVS_PSY_A_CORE FT
Vital Signs Last 24 Hrs  T(C): 35.6 (10-10-22 @ 15:36), Max: 36.8 (10-10-22 @ 08:06)  T(F): 96 (10-10-22 @ 15:36), Max: 98.3 (10-10-22 @ 08:06)  HR: --  BP: --  BP(mean): --  RR: --  SpO2: --    Orthostatic VS  10-10-22 @ 09:20  Lying BP: --/-- HR: --  Sitting BP: --/-- HR: --  Standing BP: 120/61 HR: 78  Site: --  Mode: --  Orthostatic VS  10-10-22 @ 08:06  Lying BP: --/-- HR: --  Sitting BP: 107/71 HR: 73  Standing BP: 99/66 HR: 75  Site: --  Mode: --  Orthostatic VS  10-09-22 @ 08:17  Lying BP: 132/74 HR: 70  Sitting BP: 121/68 HR: 72  Standing BP: --/-- HR: --  Site: --  Mode: --   Vital Signs Last 24 Hrs  T(C): 35.6 (10-10-22 @ 15:36), Max: 36.8 (10-10-22 @ 08:06)  T(F): 96 (10-10-22 @ 15:36), Max: 98.3 (10-10-22 @ 08:06)    10-10-22 @ 09:20  Standing BP: 120/61 HR: 78

## 2022-10-10 NOTE — BH INPATIENT PSYCHIATRY PROGRESS NOTE - MSE UNSTRUCTURED FT
Pt appears stated age, alert, awake, oriented x 3, in no acute physical distress. Cooperative, makes eye contact. No agitation/retardation/involuntary movements noted. Mood is ok, better. Affect is congruent. Speech is relevant, normal in rate, volume, productivity. Thoughts are linear, no gross delusions, denies any suicidal/homicidal ideas/plan. No hallucination expressed. Insight and judgment are fair. Memory and cognition are intact. Impulse control is intact.  Patient appears stated age, alert, awake, oriented x 3, in no acute physical distress. Cooperative, makes eye contact. No agitation/retardation/involuntary movements noted. Reported mood is "okay" with constricted but congruent affect. Speech is relevant, normal in rate, volume, productivity. Thought process is linear, no gross delusions, denies any suicidal/homicidal ideas/plan. No hallucination expressed. Insight and judgment are fair. Memory and cognition are intact. Impulse control intact at the time of exam.

## 2022-10-10 NOTE — BH INPATIENT PSYCHIATRY PROGRESS NOTE - NSBHMETABOLIC_PSY_ALL_CORE_FT
BMI: BMI (kg/m2): 22.1 (10-08-22 @ 16:48)  HbA1c: A1C with Estimated Average Glucose Result: 5.8 % (07-26-22 @ 09:35)    Glucose: POCT Blood Glucose.: 102 mg/dL (09-16-22 @ 08:20)    BP: --  Lipid Panel: Date/Time: 08-16-22 @ 09:10  Cholesterol, Serum: 142  Direct LDL: --  HDL Cholesterol, Serum: 57  Total Cholesterol/HDL Ration Measurement: --  Triglycerides, Serum: 88   BMI: BMI (kg/m2): 22.1 (10-08-22 @ 16:48)  HbA1c: A1C with Estimated Average Glucose Result: 5.8 % (07-26-22 @ 09:35)  Glucose: POCT Blood Glucose.: 102 mg/dL (09-16-22 @ 08:20)  Lipid Panel: Date/Time: 08-16-22 @ 09:10  Cholesterol, Serum: 142  HDL Cholesterol, Serum: 57  Triglycerides, Serum: 88

## 2022-10-10 NOTE — BH INPATIENT PSYCHIATRY PROGRESS NOTE - CURRENT MEDICATION
MEDICATIONS  (STANDING):  amLODIPine   Tablet 5 milliGRAM(s) Oral daily  metFORMIN 500 milliGRAM(s) Oral two times a day  mirtazapine 45 milliGRAM(s) Oral at bedtime  sodium chloride 1 Gram(s) Oral two times a day  venlafaxine XR 75 milliGRAM(s) Oral daily    MEDICATIONS  (PRN):  ALBUTerol    90 MICROgram(s) HFA Inhaler 2 Puff(s) Inhalation every 6 hours PRN Wheezing  OLANZapine 2.5 milliGRAM(s) Oral two times a day PRN agitation  OLANZapine Injectable 2.5 milliGRAM(s) IntraMuscular once PRN severe agitation  polyethylene glycol 3350 17 Gram(s) Oral at bedtime PRN Constipation  senna 2 Tablet(s) Oral at bedtime PRN Constipation  simethicone 80 milliGRAM(s) Chew three times a day PRN Upset Stomach
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
## 2022-10-10 NOTE — BH INPATIENT PSYCHIATRY PROGRESS NOTE - NSCGISEVERILLNESS_PSY_ALL_CORE
6 = Severely ill - disruptive pathology, behavior and function are frequently influenced by symptoms, may require assistance from others 4 = Moderately ill – overt symptoms causing noticeable, but modest, functional impairment or distress; symptom level may warrant medication

## 2022-10-10 NOTE — BH INPATIENT PSYCHIATRY PROGRESS NOTE - NSBHFUPINTERVALHXFT_PSY_A_CORE
Patient seen for depression, chart reviewed and discussed with nursing staff. Pt is compliant with meds, no acute events overnight, no PRN needed. On exam, Pt reports, 'I am ok'. Reports sleeping and eating alright. Feels his depression is better. Denies any pk, paranoia, SI/HI, hallucination. Pt is visible, ambulating stable.      Patient seen for follow-up depression and suicidal ideation. Chart reviewed and discussed with interdisciplinary team. No significant event reported overnight. On exam, patient reports his mood as "okay". His only concern is that he had blood drawn thrice in the last 4 days, and does not want any further blood draw; writer explains that his Na is running low and the blood draw is to keep track of his Na. Patient agrees to further labs if needed. Otherwise, patient denies any thoughts of self-harm and is return to his nursing home soon. His appetite and sleep are adequate. His vitals are stable.

## 2022-10-11 ENCOUNTER — INPATIENT (INPATIENT)
Facility: HOSPITAL | Age: 82
LOS: 5 days | Discharge: NURSING, REHAB OR LONG-TERM CARE | End: 2022-10-17
Attending: INTERNAL MEDICINE | Admitting: INTERNAL MEDICINE

## 2022-10-11 VITALS — TEMPERATURE: 98 F

## 2022-10-11 VITALS
HEART RATE: 84 BPM | OXYGEN SATURATION: 98 % | RESPIRATION RATE: 12 BRPM | DIASTOLIC BLOOD PRESSURE: 70 MMHG | HEIGHT: 64 IN | WEIGHT: 149.03 LBS | SYSTOLIC BLOOD PRESSURE: 126 MMHG | TEMPERATURE: 98 F

## 2022-10-11 DIAGNOSIS — F32.9 MAJOR DEPRESSIVE DISORDER, SINGLE EPISODE, UNSPECIFIED: ICD-10-CM

## 2022-10-11 DIAGNOSIS — E87.1 HYPO-OSMOLALITY AND HYPONATREMIA: ICD-10-CM

## 2022-10-11 LAB — SARS-COV-2 RNA SPEC QL NAA+PROBE: DETECTED

## 2022-10-11 PROCEDURE — 99223 1ST HOSP IP/OBS HIGH 75: CPT

## 2022-10-11 PROCEDURE — 90792 PSYCH DIAG EVAL W/MED SRVCS: CPT

## 2022-10-11 PROCEDURE — 99283 EMERGENCY DEPT VISIT LOW MDM: CPT | Mod: CS

## 2022-10-11 PROCEDURE — 99238 HOSP IP/OBS DSCHRG MGMT 30/<: CPT

## 2022-10-11 RX ORDER — MIRTAZAPINE 45 MG/1
45 TABLET, ORALLY DISINTEGRATING ORAL AT BEDTIME
Refills: 0 | Status: DISCONTINUED | OUTPATIENT
Start: 2022-10-11 | End: 2022-10-17

## 2022-10-11 RX ORDER — VENLAFAXINE HCL 75 MG
75 CAPSULE, EXT RELEASE 24 HR ORAL DAILY
Refills: 0 | Status: DISCONTINUED | OUTPATIENT
Start: 2022-10-11 | End: 2022-10-11

## 2022-10-11 RX ORDER — METFORMIN HYDROCHLORIDE 850 MG/1
500 TABLET ORAL
Refills: 0 | Status: DISCONTINUED | OUTPATIENT
Start: 2022-10-11 | End: 2022-10-11

## 2022-10-11 RX ORDER — SENNA PLUS 8.6 MG/1
2 TABLET ORAL
Qty: 0 | Refills: 0 | DISCHARGE
Start: 2022-10-11

## 2022-10-11 RX ORDER — SODIUM CHLORIDE 9 MG/ML
1 INJECTION INTRAMUSCULAR; INTRAVENOUS; SUBCUTANEOUS
Qty: 0 | Refills: 0 | DISCHARGE
Start: 2022-10-11

## 2022-10-11 RX ORDER — POLYETHYLENE GLYCOL 3350 17 G/17G
17 POWDER, FOR SOLUTION ORAL DAILY
Refills: 0 | Status: DISCONTINUED | OUTPATIENT
Start: 2022-10-11 | End: 2022-10-17

## 2022-10-11 RX ORDER — POLYETHYLENE GLYCOL 3350 17 G/17G
17 POWDER, FOR SOLUTION ORAL
Qty: 0 | Refills: 0 | DISCHARGE
Start: 2022-10-11

## 2022-10-11 RX ORDER — VENLAFAXINE HCL 75 MG
1 CAPSULE, EXT RELEASE 24 HR ORAL
Qty: 0 | Refills: 0 | DISCHARGE
Start: 2022-10-11

## 2022-10-11 RX ORDER — SODIUM CHLORIDE 9 MG/ML
1 INJECTION INTRAMUSCULAR; INTRAVENOUS; SUBCUTANEOUS
Refills: 0 | Status: DISCONTINUED | OUTPATIENT
Start: 2022-10-11 | End: 2022-10-17

## 2022-10-11 RX ORDER — AMLODIPINE BESYLATE 2.5 MG/1
5 TABLET ORAL DAILY
Refills: 0 | Status: DISCONTINUED | OUTPATIENT
Start: 2022-10-11 | End: 2022-10-17

## 2022-10-11 RX ORDER — AMLODIPINE BESYLATE 2.5 MG/1
1 TABLET ORAL
Qty: 0 | Refills: 0 | DISCHARGE
Start: 2022-10-11

## 2022-10-11 RX ORDER — METFORMIN HYDROCHLORIDE 850 MG/1
1 TABLET ORAL
Qty: 0 | Refills: 0 | DISCHARGE
Start: 2022-10-11

## 2022-10-11 RX ORDER — OLANZAPINE 15 MG/1
2.5 TABLET, FILM COATED ORAL
Refills: 0 | Status: DISCONTINUED | OUTPATIENT
Start: 2022-10-11 | End: 2022-10-17

## 2022-10-11 RX ORDER — METFORMIN HYDROCHLORIDE 850 MG/1
500 TABLET ORAL
Refills: 0 | Status: DISCONTINUED | OUTPATIENT
Start: 2022-10-11 | End: 2022-10-15

## 2022-10-11 RX ORDER — MIRTAZAPINE 45 MG/1
1 TABLET, ORALLY DISINTEGRATING ORAL
Qty: 0 | Refills: 0 | DISCHARGE
Start: 2022-10-11

## 2022-10-11 RX ORDER — ALBUTEROL 90 UG/1
2 AEROSOL, METERED ORAL EVERY 6 HOURS
Refills: 0 | Status: DISCONTINUED | OUTPATIENT
Start: 2022-10-11 | End: 2022-10-17

## 2022-10-11 RX ORDER — VENLAFAXINE HCL 75 MG
75 CAPSULE, EXT RELEASE 24 HR ORAL DAILY
Refills: 0 | Status: DISCONTINUED | OUTPATIENT
Start: 2022-10-11 | End: 2022-10-17

## 2022-10-11 RX ADMIN — Medication 100 MILLIGRAM(S): at 22:34

## 2022-10-11 RX ADMIN — METFORMIN HYDROCHLORIDE 500 MILLIGRAM(S): 850 TABLET ORAL at 08:12

## 2022-10-11 RX ADMIN — SODIUM CHLORIDE 1 GRAM(S): 9 INJECTION INTRAMUSCULAR; INTRAVENOUS; SUBCUTANEOUS at 22:33

## 2022-10-11 RX ADMIN — MIRTAZAPINE 45 MILLIGRAM(S): 45 TABLET, ORALLY DISINTEGRATING ORAL at 22:33

## 2022-10-11 RX ADMIN — Medication 75 MILLIGRAM(S): at 22:34

## 2022-10-11 RX ADMIN — Medication 75 MILLIGRAM(S): at 10:53

## 2022-10-11 RX ADMIN — SODIUM CHLORIDE 1 GRAM(S): 9 INJECTION INTRAMUSCULAR; INTRAVENOUS; SUBCUTANEOUS at 08:12

## 2022-10-11 NOTE — BH INPATIENT PSYCHIATRY PROGRESS NOTE - NSTXHYPERGOAL_PSY_ALL_CORE
Will identify 1 modifiable risk factor and a strategy to improve this

## 2022-10-11 NOTE — BH INPATIENT PSYCHIATRY PROGRESS NOTE - NSBHCHARTREVIEWVS_PSY_A_CORE FT
Vital Signs Last 24 Hrs  T(C): 36.7 (10-11-22 @ 08:53), Max: 37.4 (10-11-22 @ 08:24)  T(F): 98 (10-11-22 @ 08:53), Max: 99.3 (10-11-22 @ 08:24)  RR: 16 (10-11-22 @ 08:24) (16 - 16)  SpO2: 98% (10-11-22 @ 08:24) (98% - 98%)    Orthostatic VS  10-11-22 @ 08:24  Sitting BP: 116/68 HR: 89  Standing BP: 100/68 HR: 91  Site: upper left arm  Mode: electronic

## 2022-10-11 NOTE — BH INPATIENT PSYCHIATRY PROGRESS NOTE - NSBHASSESSSUMMFT_PSY_ALL_CORE
Patient is an 82-year-old male currently residing at [facility]. PPH of depression and psychosis. + history of inpatient admissions last 7/22-8/19 at [facility] post admission to [facility] for dehydration/SI/FTT 7/6-7/22I.   Patient presented to the ED in the context of suicidal ideation. Patient endorsed continued thought to die and stated he was going to stop eating and drinking at NH because he wants to be dead. Patient unable to contract for safety. As patient presented imminent risk of self-harm, he was admitted to inpatient psych for safety and stabilization. Patient had long standing Hx of Alcohol Abuse, but stopped drinking years ago.     Course of illness:   Patient was admitted with a diagnosis of schizophrenia, which most likely secondary to his disorganized behavior under the influence of alcohol. As reported by his 2 sisters, patient was admitted at a psych facility for almost a year a long time ago after he threatened to kill his sister's BF.   On the unit, initially patient expressed extreme sense of guilt, and passive SIs. He was exhibiting depressed pessimistic mood. Patient received one ECT on 09/19, but has been refusing ECT since then. Venlafaxine was added to augment treatment for depression. With continuing treatment, patient's report of guilt is more interrupted than initially reported. Venlafaxine was up-titrated but patient's Na started to trend downwards and venlafaxine was tapered down to 75mg. Patient's sleep is reportedly improved during this hospital admission. Patient had a CT brain and it indicated resolution of the previous SDH. Patient was seen afterwards by the neurosurgery team and no further changes or interventions recommended by them at this time. MMSE: 29 (during this hospital admission)    10/11 Clinical Update  Patient continues to report improved mood and is no longer expressing thoughts of self-harm. Patient's venlafaxine was reduced due to low Na and his serum Na has since improved 10/; 10/; 10/. Next set of labs due on 10/12/22. Patient tested positive for COVID-19 via PCR this morning and will be transferred to a medical unit for quarantine and monitoring.     Plan:  - Patient currently awaiting transfer to [facility]  - Safety: routine observation; PRNs: Zyprexa 2.5mg PO/IM for agitation   - Legal Status: 9:27 (2 PC)  - Psychiatric medications: Continue with Remeron 45 mg qhs, and venlafaxine XR 75mg daily  - Medical comorbidities:   -Hyponatremia:   1. Fluid restriction   2. Continue NaCl tab 1gm BID  3. Repeats BMP on 10/12/22  -Medical Comorbidities:  Continue Norvasc 5mg daily for HTN, metformin 500mg BID for DM, senna 2 tabs PRN qhs and Miralax 17g PRN qhs for constipation

## 2022-10-11 NOTE — BH INPATIENT PSYCHIATRY PROGRESS NOTE - NSTXDCOTHRGOAL_PSY_ALL_CORE
return to SNF.
pt will comply with treatment and improve mental status in order to effectively engage with dc planning.
return to SNF.
pt will comply with treatment and improve mental status in order to effectively engage with dc planning.

## 2022-10-11 NOTE — BH DISCHARGE NOTE NURSING/SOCIAL WORK/PSYCH REHAB - NSDCPRRECOMMEND_PSY_ALL_CORE
Psychiatric Rehabilitation staff recommends that patient continues his treatment at [facility] for ongoing medication management, support, and psychotherapy. In addition, psych rehab recommends patient continue to explore and utilize coping skills for improved symptom management and sustained recovery.

## 2022-10-11 NOTE — BH CONSULTATION LIAISON ASSESSMENT NOTE - NSBHCHARTREVIEWVS_PSY_A_CORE FT
Vital Signs Last 24 Hrs  T(C): 36.8 (11 Oct 2022 16:12), Max: 37.4 (11 Oct 2022 08:24)  T(F): 98.2 (11 Oct 2022 16:12), Max: 99.3 (11 Oct 2022 08:24)  HR: 84 (11 Oct 2022 16:12) (84 - 84)  BP: 126/70 (11 Oct 2022 16:12) (126/70 - 126/70)  BP(mean): --  RR: 12 (11 Oct 2022 16:12) (12 - 16)  SpO2: 98% (11 Oct 2022 16:12) (98% - 98%)    Parameters below as of 11 Oct 2022 16:12  Patient On (Oxygen Delivery Method): room air

## 2022-10-11 NOTE — BH INPATIENT PSYCHIATRY PROGRESS NOTE - NSTXDCFAMGOAL_PSY_ALL_CORE
Will agree to involve supports/family in treatment and discharge planning

## 2022-10-11 NOTE — BH INPATIENT PSYCHIATRY PROGRESS NOTE - NSBHMETABOLIC_PSY_ALL_CORE_FT
BMI: BMI (kg/m2): 22.1 (10-08-22 @ 16:48)  HbA1c: A1C with Estimated Average Glucose Result: 5.8 % (07-26-22 @ 09:35)  Glucose: POCT Blood Glucose.: 102 mg/dL (09-16-22 @ 08:20)  Lipid Panel: Date/Time: 08-16-22 @ 09:10  Cholesterol, Serum: 142  HDL Cholesterol, Serum: 57  Triglycerides, Serum: 88

## 2022-10-11 NOTE — BH DISCHARGE NOTE NURSING/SOCIAL WORK/PSYCH REHAB - DISCHARGE INSTRUCTIONS AFTERCARE APPOINTMENTS
In order to check the location, date, or time of your aftercare appointment, please refer to your Discharge Instructions Document given to you upon leaving the hospital.  If you have lost the instructions please call [phone]

## 2022-10-11 NOTE — BH INPATIENT PSYCHIATRY PROGRESS NOTE - NSTXSUICIDDATETRGT_PSY_ALL_CORE
07-Oct-2022
20-Sep-2022
28-Sep-2022
05-Oct-2022
28-Sep-2022
20-Sep-2022
28-Sep-2022
07-Oct-2022
28-Sep-2022
07-Oct-2022
28-Sep-2022
07-Oct-2022
07-Oct-2022
20-Sep-2022
07-Oct-2022
20-Sep-2022

## 2022-10-11 NOTE — BH INPATIENT PSYCHIATRY PROGRESS NOTE - NSTXSUICIDINTERMD_PSY_ALL_CORE
Appropriate management of depression with medications and psychotherapy. 

## 2022-10-11 NOTE — ED ADULT TRIAGE NOTE - CHIEF COMPLAINT QUOTE
BIBA from [facility] for covid positive today, fever yesterday, denies fever today. patient denies any pain, denies sob, denies n/v/dizziness, denies si/hi ideation at this time. as per emt, 1:1 from [facility] was not endorsed, did not notice patient was on a 1:1 at [facility], advised to be on EO

## 2022-10-11 NOTE — BH CONSULTATION LIAISON ASSESSMENT NOTE - MSE UNSTRUCTURED FT
10/11/22 MSE from [facility] at [facility]: "Patient now reports improved mood and is no longer expressing thoughts of self-harm."

## 2022-10-11 NOTE — BH INPATIENT PSYCHIATRY PROGRESS NOTE - NSTXDCFAMDATETRGT_PSY_ALL_CORE
10-Oct-2022
20-Sep-2022
20-Sep-2022
27-Sep-2022
03-Oct-2022
20-Sep-2022
03-Oct-2022
03-Oct-2022
27-Sep-2022
03-Oct-2022
20-Sep-2022
10-Oct-2022
20-Sep-2022
27-Sep-2022
03-Oct-2022
03-Oct-2022
20-Sep-2022
10-Oct-2022
10-Oct-2022
17-Oct-2022
10-Oct-2022
20-Sep-2022
17-Oct-2022

## 2022-10-11 NOTE — BH INPATIENT PSYCHIATRY PROGRESS NOTE - NSTXHYPERINTERMD_PSY_ALL_CORE
Patient will be kept on anti-HTN medications. Will titrate as needed
Patient will be kept on anti-HTN medications. Will be titrated 
Patient will be kept on anti-HTN medications. Will titrate as needed

## 2022-10-11 NOTE — BH INPATIENT PSYCHIATRY DISCHARGE NOTE - OTHER PAST PSYCHIATRIC HISTORY (INCLUDE DETAILS REGARDING ONSET, COURSE OF ILLNESS, INPATIENT/OUTPATIENT TREATMENT)
As per the medical record and interview with the patient on the psychiatric [place] today, patient is an 82M domiciled in [facility]. PPH Schizophrenia & Dementia, h/o inpatient admissions last 7/22-8/19 at [facility] post admission to [facility] for dehydration/SI/FTT 7/6-7/22I - admitted at [facility] and seen by CL. Prior to last admission stopped eating/drinking in attempt to kill self. Unknown violence/aggression or substance use.  no legal issues BIBA referred by nursing home for suicidal ideation 9/6/22.  Patient presented to the ED in the context of suicidal ideation -- patient endorsed continued thought to die and stated he is going to stop eating and drinking because he want to be dead. He was admitted to [facility] while awaiting inpt psychiatric bed and followed by CL service again - since admission he continues to voice suicidal ideation, on interview today he was lying in bed, disengaged from unit activities, in a darkened room. He perseveratively repeated the phrase, "I wanna die!" and had no other speech. Per today's  physician PN, "When he was asked about mood, he stated he is feeling ok. He stated he feels suicidal sometimes. The patient reported adequate sleep and appetite. MMSE was conducted and it was 29 (He could not repeat the phrase "No ifs, ands or Buts"). Patient could write short sentence containing a subject and verb. He did substraction using 5 instead of 7. He managed to do 100-7= 93, but could not go further. However, he could recall the letters for WORLD backwards correctly"  Pt's baseline: he was a ."

## 2022-10-11 NOTE — BH INPATIENT PSYCHIATRY PROGRESS NOTE - NSBHATTESTBILLINGAW_PSY_A_CORE
[id number]-Mpmwbvcmde Inpatient care - low complexity - 15 minutes
63130-Hsengaitnd Inpatient care - moderate complexity - 25 minutes
04072-Ucvglqyyve Inpatient care - moderate complexity - 25 minutes
06606-Enfcbocpko Inpatient care - low complexity - 15 minutes
20292-Uhuyzjalif Inpatient care - low complexity - 15 minutes
69795-Enhjedszsn Inpatient care - low complexity - 15 minutes
50628-Khrxhwkjpk Inpatient care - low complexity - 15 minutes
28102-Xdkojbqsqp Inpatient care - moderate complexity - 25 minutes
06353-Encoddhqkd Inpatient care - moderate complexity - 25 minutes
30979-Lrrztuzkfn Inpatient care - moderate complexity - 25 minutes
31868-Bgrfftemxb Inpatient care - moderate complexity - 25 minutes
33613-Vhyprvouua Inpatient care - moderate complexity - 25 minutes
51706-Kklscadvrw Inpatient care - moderate complexity - 25 minutes
54487-Yoyrhqzwah Inpatient care - low complexity - 15 minutes
76690-Nycylctexw Inpatient care - moderate complexity - 25 minutes
94984-Cmnkjjdgcf Inpatient care - moderate complexity - 25 minutes
96764-Dnrvwrrqxk Inpatient care - low complexity - 15 minutes
90141-Hasbfpnnkc Inpatient care - moderate complexity - 25 minutes
10086-Inoewoyhtu Inpatient care - moderate complexity - 25 minutes
11478-Ctothmozgm Inpatient care - moderate complexity - 25 minutes
98205-Ezjrtmvzft Inpatient care - low complexity - 15 minutes
82543-Pddraduqyc Inpatient care - moderate complexity - 25 minutes
39817-Culfzmouui Inpatient care - moderate complexity - 25 minutes

## 2022-10-11 NOTE — ED ADULT NURSE NOTE - OBJECTIVE STATEMENT
Patient brought to [facility] facility for fever and testing Covid. Patient alert and oriented x1, in no acute respiratory distress at this time.

## 2022-10-11 NOTE — BH DISCHARGE NOTE NURSING/SOCIAL WORK/PSYCH REHAB - NSDCADDINFO1FT_PSY_ALL_CORE
Pt was discharged from [facility] and admitted directly to [facility] for continued care due to covid. Pt was discharged from [facility] 5 and admitted directly to [facility]  for continued care due to covid. Pt was discharged from [facility] 5 and admitted directly to [facility]  for continued care due to covid. C and L will follow up with psychiatric treatment.

## 2022-10-11 NOTE — BH CONSULTATION LIAISON ASSESSMENT NOTE - NSBHCONSULTRECOMMENDOTHER_PSY_A_CORE FT
#DMII  -FS wnl in hospital; last A1C 5.8 in july  -okay to continue home metformin 500mg PO bid     #COPD  -respiratory status stable; c/w home inhaler albuterol PRN     #HTN; continue amlodipine 5mg PO qd     #hx of acute subdural hematoma; seen on [facility] in 8/2022 and it resolved on repeat CT scan on 9/22/22  -f/u with neuro sx post discharge    Recurrent Major Depression: continue Effexor XR 75mg PO qd, Remeron 45mg PO qhs     Hyponatremia:  - continue sodium chloride 1gm PO bid  - Effexor was reduced in dose to limit dose-dependent effects on sodium levels of antidepressants  - BMP ordered for 10/12/22 in AM

## 2022-10-11 NOTE — BH INPATIENT PSYCHIATRY PROGRESS NOTE - NSTXDCOTHRINTERMD_PSY_ALL_CORE
Will discharge back to NH when stable

## 2022-10-11 NOTE — BH CONSULTATION LIAISON ASSESSMENT NOTE - NSBHREFERDETAILS_PSY_A_CORE_FT
Administrative transfer from [facility] 5 to [facility] Unit 2E for boarding due to COVID-19 outbreak at [facility]

## 2022-10-11 NOTE — H&P ADULT - NSICDXPASTMEDICALHX_GEN_ALL_CORE_FT
PAST MEDICAL HISTORY:  CHF (congestive heart failure)     Chronic subdural hematoma     Chronic subdural hematoma     COPD, severity to be determined     HTN (hypertension)     Hyponatremia

## 2022-10-11 NOTE — BH INPATIENT PSYCHIATRY PROGRESS NOTE - NSTXPROBDCFAM_PSY_ALL_CORE
DISCHARGE ISSUE - POOR ENGAGEMENT WITH SUPPORTS/FAMILY

## 2022-10-11 NOTE — BH INPATIENT PSYCHIATRY PROGRESS NOTE - NSBHCHARTREVIEWLAB_PSY_A_CORE FT
10-09    125<L>  |  88<L>  |  11  ----------------------------<  135<H>  3.7   |  26  |  0.86    Ca    9.4      09 Oct 2022 09:35  Phos  3.3     10-09  Mg     1.90     10-09    10-07    127<L>  |  90<L>  |  13  ----------------------------<  100<H>  4.3   |  25  |  0.83    Ca    9.5      07 Oct 2022 08:40    TPro  6.7  /  Alb  4.2  /  TBili  0.3  /  DBili  x   /  AST  16  /  ALT  13  /  AlkPhos  38<L>  10-07  
10-07    127<L>  |  90<L>  |  13  ----------------------------<  100<H>  4.3   |  25  |  0.83    Ca    9.5      07 Oct 2022 08:40    TPro  6.7  /  Alb  4.2  /  TBili  0.3  /  DBili  x   /  AST  16  /  ALT  13  /  AlkPhos  38<L>  10-07

## 2022-10-11 NOTE — BH CONSULTATION LIAISON ASSESSMENT NOTE - CURRENT MEDICATION
MEDICATIONS  (STANDING):  amLODIPine   Tablet 5 milliGRAM(s) Oral daily  metFORMIN 500 milliGRAM(s) Oral two times a day  mirtazapine 45 milliGRAM(s) Oral at bedtime  sodium chloride 1 Gram(s) Oral two times a day  venlafaxine XR. 75 milliGRAM(s) Oral daily    MEDICATIONS  (PRN):  OLANZapine 2.5 milliGRAM(s) Oral two times a day PRN agitation  polyethylene glycol 3350 17 Gram(s) Oral daily PRN Constipation

## 2022-10-11 NOTE — BH INPATIENT PSYCHIATRY PROGRESS NOTE - MSE OPTIONS
Structured MSE
Unstructured MSE
Structured MSE
Unstructured MSE

## 2022-10-11 NOTE — BH INPATIENT PSYCHIATRY PROGRESS NOTE - NSBHCONSDANGERSELF_PSY_A_CORE
suicidal ideation with plan and means
suicidal behavior
suicidal behavior/unable to care for self
suicidal behavior
suicidal behavior
suicidal ideation with plan and means
suicidal behavior

## 2022-10-11 NOTE — BH CONSULTATION LIAISON ASSESSMENT NOTE - NSICDXPASTMEDICALHX_GEN_ALL_CORE_FT
PAST MEDICAL HISTORY:  CHF (congestive heart failure)     Chronic subdural hematoma     COPD, severity to be determined     HTN (hypertension)     Hyponatremia

## 2022-10-11 NOTE — BH INPATIENT PSYCHIATRY DISCHARGE NOTE - HOSPITAL COURSE
On initial evaluation on the inpatient unit, patient appeared depressed, spoke in monotonous pattern, with stuttering. He was noted to have lip smacking tics, no perioral movements were noted during the interview. Patient was able to tell the year and the exact date and knew what time of the day was when the interview was conducted. Patient stated he has been living at the NH for 3 and half years now. Patient endorsed suicidal ideations for more than 2 years; reported being depressed and stated that he has been struggling with depression for long time. He added that he had intentions to throw himself off the bridge in 1970s, but he could not. Patient denied any auditory/visual hallucinations. He denied any homicidal ideations. Patient did not express any manic symptoms or paranoid ideations. Home medication mirtazapine was continued but titrated up to 45mg daily. Home Abilify was held given lack of response. Patient’s appetite and sleep improved, however he was noted to be isolative, and he expressed extreme sense of guilt, and passive SIs. He evaluated and approved for ECT and received his 1st ECT on 09/19 but has been refusing ECT since then. Venlafaxine was added to augment treatment for depression. With continuing treatment, patient's report of guilt is more interrupted than initially reported. Venlafaxine was titrated but patient's Na started to trend downwards, and venlafaxine was tapered down to 75mg daily. Patient had a CT head on 09/22 and it indicated resolution of the previous SDH. Patient was seen afterwards by the neurosurgery team and no further changes or interventions recommended by them at this time. MMSE: 29 on 09/14/22.  Patient now reports improved mood and is no longer expressing thoughts of self-harm. Patient's venlafaxine was reduced due to low Na and his serum Na has since improved 10/; 10/; 10/. Next set of labs due on 10/12/22. Patient tested positive for COVID-19 via PCR this morning and will be transferred to a medical unit at [facility] for quarantine and monitoring.  Psychotropic medications: mirtazapine 45 mg qhs, and venlafaxine XR 75mg daily

## 2022-10-11 NOTE — BH CONSULTATION LIAISON ASSESSMENT NOTE - NSSUICPROTFACT_PSY_ALL_CORE
Identifies reasons for living/Supportive social network of family or friends/Cultural, spiritual and/or moral attitudes against suicide/Latter-day beliefs

## 2022-10-11 NOTE — H&P ADULT - NSHPLABSRESULTS_GEN_ALL_CORE
vLabs, Radiology, Cardiology, and Other Results: BMI: BMI (kg/m2): 22.1 (10-08-22 @ 16:48)  HbA1c: A1C with Estimated Average Glucose Result: 5.8 % (07-26-22 @ 09:35)  Glucose: POCT Blood Glucose.: 102 mg/dL (09-16-22 @ 08:20)  Lipid Panel: Date/Time: 08-16-22 @ 09:10  Cholesterol, Serum: 142  HDL Cholesterol, Serum: 57  Triglycerides, Serum: 88    Vital Signs Last 24 Hrs  T(C): 35.6 (10-10-22 @ 15:36), Max: 36.8 (10-10-22 @ 08:06)  T(F): 96 (10-10-22 @ 15:36), Max: 98.3 (10-10-22 @ 08:06)    10-10-22 @ 09:20  Standing BP: 120/61 HR: 78    10-09    125<L>  |  88<L>  |  11  ----------------------------<  135<H>  3.7   |  26  |  0.86    Ca    9.4      09 Oct 2022 09:35  Phos  3.3     10-09  Mg     1.90     10-09    10-07    127<L>  |  90<L>  |  13  ----------------------------<  100<H>  4.3   |  25  |  0.83    Ca    9.5      07 Oct 2022 08:40    TPro  6.7  /  Alb  4.2  /  TBili  0.3  /  DBili  x   /  AST  16  /  ALT  13  /  AlkPhos  38<L>  10-07

## 2022-10-11 NOTE — H&P ADULT - REASON FOR ADMISSION
Administrative transfer from [facility] 5 to [facility] Unit 2E for boarding due to COVID-19 outbreak at [facility]

## 2022-10-11 NOTE — ED PROVIDER NOTE - OBJECTIVE STATEMENT
82M sent to ED from [facility] as direct admit to 2E accepted by Dr Pate d/t (+) COVID test. Pt w/o complaints, afebrile.      PMH / PSH / meds / allergies as per EMR. Prior MOLST.

## 2022-10-11 NOTE — BH INPATIENT PSYCHIATRY PROGRESS NOTE - NSBHATTESTTYPEVISIT_PSY_A_CORE
Attending Only
Attending with Resident/Fellow/Student
Attending Only
Attending with Resident/Fellow/Student
Attending Only
Attending with Resident/Fellow/Student
Attending with Resident/Fellow/Student
Attending Only
Attending with Resident/Fellow/Student

## 2022-10-11 NOTE — BH INPATIENT PSYCHIATRY PROGRESS NOTE - NSTXHYPERDATETRGT_PSY_ALL_CORE
05-Oct-2022

## 2022-10-11 NOTE — BH INPATIENT PSYCHIATRY PROGRESS NOTE - NSTXDEPRESGOAL_PSY_ALL_CORE
Exhibit improvements in self-grooming, hygiene, sleep and appetite
Will identify 2 coping skills that assist in improving mood
Exhibit improvements in self-grooming, hygiene, sleep and appetite
Will identify 2 coping skills that assist in improving mood
Exhibit improvements in self-grooming, hygiene, sleep and appetite
Will identify 2 coping skills that assist in improving mood
Will identify 2 coping skills that assist in improving mood
Exhibit improvements in self-grooming, hygiene, sleep and appetite
Will identify 2 coping skills that assist in improving mood
Exhibit improvements in self-grooming, hygiene, sleep and appetite
Will identify 2 coping skills that assist in improving mood
Will identify 2 coping skills that assist in improving mood

## 2022-10-11 NOTE — BH CONSULTATION LIAISON ASSESSMENT NOTE - SUMMARY
82-year-old male currently residing at [facility] x 3.5 years. PPH of recurrent major depression, last MMSE 29/30 at [facility],  multiple prior inpatient psychiatric admissions, remote hx of suicidal ideation with general plan but no attempts (intentions to throw him self off the bridge in 1970s), with no hx of aggression or violence, no hx of substance abuse, no legal  issues, who was admitted to [place] [address] [facility] on 9/12/22 as a transfer from [facility] medical floors where he was admitted for dehydration and followed by  Psychiatry.  Patient endorsed suicidal ideation with continued thought of dying with plan (ie. he is going to stop eating and drinking at NH because he want to be dead). PMH- HTN, CHF, COPD & DM, hyponatremia.  At [facility], started bifrontal ECT (only one session on 9/19/22; Pt refused further treatment). COURSE OF TREATMENT AT [facility]: Pt was evaluated and approved for ECT and received his 1st ECT on 09/19 but has been refusing ECT since then. Venlafaxine was added to augment treatment for depression. With continuing treatment, patient's report of guilt is more interrupted than initially reported. Venlafaxine was titrated but patient's Na started to trend downwards, and venlafaxine was tapered down to 75mg daily. Patient had a CT head on 09/22 and it indicated resolution of the previous SDH. Patient was seen afterwards by the neurosurgery team and no further changes or interventions recommended by them at this time. MMSE: 29 on 09/14/22. Patient has since reported improved mood and is no longer expressing thoughts of self-harm. Patient's venlafaxine was reduced due to low Na and his serum Na has since improved 10/; 10/; 10/. Next set of labs due on 10/12/22. Patient tested positive for COVID-19 via PCR on 10/11/22 and set to be be transferred to a medical unit at [facility] for quarantine and monitoring. Psychotropic medications: mirtazapine 45 mg qhs, and venlafaxine XR 75mg daily    HAND OFF with [facility]  psychiatry and [facility] psychiatrist Dr Damian Frost done; Patient actually was awaiting acceptance back to nursing home and was deemed to be close to discharge from inpatient psychiatry.

## 2022-10-11 NOTE — BH CONSULTATION LIAISON ASSESSMENT NOTE - RISK ASSESSMENT
Chronic risk factors: single, male gender, advanced age, limited social supports, recurrent depression, hx of suicidal ideation, physical frailty/medical issues. Protective factors: medication and treatment compliant; no hx of actual attempts; no known hx of self-injurious behavior; no hx of aggression/violence; no legal issues; no substance use hx; motivated for help; access to health services.

## 2022-10-11 NOTE — BH INPATIENT PSYCHIATRY PROGRESS NOTE - NSDCCRITERIA_PSY_ALL_CORE
Patient will be discharged once he does not express any suicidal ideations, improvement of mood and affect. Improvement of Guilt feeling
Patient will be discharged once he does not express any suicidal ideations, improvement of mood and affect. Improvement of Guilt feeling
Patient will be discharged once he does not express any suicidal ideations, improvement of mood and affect. 
50% or > improvement of presenting symptoms and no further reported SI
Patient will be discharged once he does not express any suicidal ideations, improvement of mood and affect. Improvement of Guilt feeling
Patient will be discharged once he does not express any suicidal ideations, improvement of mood and affect. 
Patient will be discharged once he does not express any suicidal ideations, improvement of mood and affect. Improvement of Guilt feeling
Patient will be discharged once he does not express any suicidal ideations, improvement of mood and affect. Improvement of Guilt feeling
Patient will be discharged once he does not express any suicidal ideations, improvement of mood and affect. 
Patient will be discharged once he does not express any suicidal ideations, improvement of mood and affect. 
Patient will be discharged once he does not express any suicidal ideations, improvement of mood and affect. Improvement of Guilt feeling
Patient will be discharged once he does not express any suicidal ideations, improvement of mood and affect. 
Patient will be discharged once he does not express any suicidal ideations, improvement of mood and affect. Improvement of Guilt feeling
Patient will be discharged once he does not express any suicidal ideations, improvement of mood and affect. Improvement of Guilt feeling
Patient will be discharged once he does not express any suicidal ideations, improvement of mood and affect. 
Patient will be discharged once he does not express any suicidal ideations, improvement of mood and affect. Improvement of Guilt feeling

## 2022-10-11 NOTE — BH INPATIENT PSYCHIATRY PROGRESS NOTE - NSTXDCFAMINTERMD_PSY_ALL_CORE
Will coordinate discharge with family back to SNF

## 2022-10-11 NOTE — BH INPATIENT PSYCHIATRY PROGRESS NOTE - NSBHCONSULTIPREASON_PSY_A_CORE
danger to self; mental illness expected to respond to inpatient care

## 2022-10-11 NOTE — BH SAFETY PLAN - WARNING SIGN 1
Due to recent COVID diagnosis, writer was unable to engage in safety planning with patient.  Patient declined to complete safety plan with writer. Patient will be provided with crisis contacts in transitions of care document.

## 2022-10-11 NOTE — BH CONSULTATION LIAISON ASSESSMENT NOTE - DETAILS
admitted to [place] [address] [facility] on 9/12/22 as a transfer from [facility] medical floors where he was admitted for dehydration and followed by  Psychiatry.  see above  see HPI

## 2022-10-11 NOTE — BH INPATIENT PSYCHIATRY PROGRESS NOTE - MSE UNSTRUCTURED FT
Patient appears stated age, alert, awake, oriented x 3, in no acute physical distress. Cooperative with interview with fair eye contact. No agitation/retardation/involuntary movements noted. Reported mood is "I feel okay" with constricted but congruent affect. Speech is relevant, normal in rate, volume, productivity. Thought process is linear, no gross delusions, denies any suicidal/homicidal ideas/plan. No hallucination expressed. Insight and judgment are fair. Memory and cognition are intact. Impulse control intact at the time of exam.

## 2022-10-11 NOTE — H&P ADULT - ASSESSMENT
Labs, Radiology, Cardiology, and Other Results: BMI: BMI (kg/m2): 22.1 (10-08-22 @ 16:48)  HbA1c: A1C with Estimated Average Glucose Result: 5.8 % (07-26-22 @ 09:35)  Glucose: POCT Blood Glucose.: 102 mg/dL (09-16-22 @ 08:20)  Lipid Panel: Date/Time: 08-16-22 @ 09:10  Cholesterol, Serum: 142  HDL Cholesterol, Serum: 57  Triglycerides, Serum: 88    Vital Signs Last 24 Hrs  T(C): 35.6 (10-10-22 @ 15:36), Max: 36.8 (10-10-22 @ 08:06)  T(F): 96 (10-10-22 @ 15:36), Max: 98.3 (10-10-22 @ 08:06)    10-10-22 @ 09:20  Standing BP: 120/61 HR: 78    10-09    125<L>  |  88<L>  |  11  ----------------------------<  135<H>  3.7   |  26  |  0.86    Ca    9.4      09 Oct 2022 09:35  Phos  3.3     10-09  Mg     1.90     10-09    10-07    127<L>  |  90<L>  |  13  ----------------------------<  100<H>  4.3   |  25  |  0.83    Ca    9.5      07 Oct 2022 08:40    TPro  6.7  /  Alb  4.2  /  TBili  0.3  /  DBili  x   /  AST  16  /  ALT  13  /  AlkPhos  38<L>  10-07    Assessment and Plan:   · VTE Risk Assessment	VTE Assessment already completed for this visit    nt:  · Assessment	  Medical Problems and Treatment	  #DMII  -FS wnl in hospital  -last A1C 5.8 in july  -okay to continue home metformin 500mg PO bid     #COPD  -respiratory status stable  -c/w home inhaler albuterol PRN     #HTN  -continue amlodipine 5mg PO qd     #hx of acute subdural hematoma  -seen on [facility] in 8/2022  -f/u with neuro sx post discharge    Recurrent Major Depression:  - continue Effexor XR 75mg PO qd, Remeron 45mg PO qhs     Hyponatremia:  - continue sodium chloride 1gm PO bid  - Effexor was reduced in dose to limit dose-dependent effects on sodium levels of antidepressants  - BMP ordered for 10/12/22 in AM

## 2022-10-11 NOTE — ED PROVIDER NOTE - CLINICAL SUMMARY MEDICAL DECISION MAKING FREE TEXT BOX
82M sent to ED from [facility] as direct admit to 2E accepted by Dr Pate d/t (+) MARIA DEL CARMENID test. Pt w/o complaints, afebrile. Hospital team made aware of pt arrival in ED.

## 2022-10-11 NOTE — BH INPATIENT PSYCHIATRY PROGRESS NOTE - NSTXSUICIDGOAL_PSY_ALL_CORE
Be able to state 3 reasons for living

## 2022-10-11 NOTE — BH CONSULTATION LIAISON ASSESSMENT NOTE - HPI (INCLUDE ILLNESS QUALITY, SEVERITY, DURATION, TIMING, CONTEXT, MODIFYING FACTORS, ASSOCIATED SIGNS AND SYMPTOMS)
Patient is an 82-year-old male currently residing at [facility] x 3.5 years. PPH of recurrent major depression, last MMSE 29/30 at [facility],  multiple prior inpatient psychiatric admissions, remote hx of suicidal ideation with general plan but no attempts (intentions to throw him self off the bridge in 1970s), with no hx of aggression or violence, no hx of substance abuse, no legal  issues, who was admitted to [place] [address] [facility] on 9/12/22 as a transfer from [facility] medical floors where he was admitted for dehydration and followed by  Psychiatry.  Patient endorsed suicidal ideation with continued thought of dying with plan (ie. he is going to stop eating and drinking at NH because he want to be dead). PMH- HTN, CHF, COPD & DM, hyponatremia.  At [facility], started bifrontal ECT (only one session on 9/19/22; Pt refused further treatment). COURSE OF TREATMENT AT [facility]: Pt was evaluated and approved for ECT and received his 1st ECT on 09/19 but has been refusing ECT since then. Venlafaxine was added to augment treatment for depression. With continuing treatment, patient's report of guilt is more interrupted than initially reported. Venlafaxine was titrated but patient's Na started to trend downwards, and venlafaxine was tapered down to 75mg daily. Patient had a CT head on 09/22 and it indicated resolution of the previous SDH. Patient was seen afterwards by the neurosurgery team and no further changes or interventions recommended by them at this time. MMSE: 29 on 09/14/22. Patient has since reported improved mood and is no longer expressing thoughts of self-harm. Patient's venlafaxine was reduced due to low Na and his serum Na has since improved 10/; 10/; 10/. Next set of labs due on 10/12/22. Patient tested positive for COVID-19 via PCR on 10/11/22 and set to be be transferred to a medical unit at [facility] for quarantine and monitoring. Psychotropic medications: mirtazapine 45 mg qhs, and venlafaxine XR 75mg daily    HAND OFF with [facility]  psychiatry and [facility] psychiatrist Dr Damian Frost done; Patient actually was awaiting acceptance back to nursing home and was deemed to be close to discharge from inpatient psychiatry.

## 2022-10-11 NOTE — BH INPATIENT PSYCHIATRY DISCHARGE NOTE - NSBHDCMEDICALFT_PSY_A_CORE
#DM  -FS wnl in hospital  -last A1C 5.8 in july  -okay to continue home metformin     #COPD  -respiratory status stable  -c/w home inhaler    #HTN  -per chart review, patient was on amlodipine in the hospital in July, will continue amlodipine    #hx of acute subdural hematoma  -seen on CTH in 8/2022  -f/u with neuro sx post discharge

## 2022-10-11 NOTE — BH INPATIENT PSYCHIATRY PROGRESS NOTE - NSBHFUPINTERVALHXFT_PSY_A_CORE
Patient seen for follow-up for depression and suicidal ideation. Chart reviewed and discussed with interdisciplinary team. No significant event reported overnight. On exam, patient reports that he feels okay and denies any thoughts of self-harm. Patient inquires if we will be arranging for transportation to his nursing home at the time of discharge, and writer assures him that we will be doing so. Patient reminded to limit fluid intake due to his low Na levels. Patient once again agrees to further labs if needed. Patient's appetite and sleep remain adequate. Patient found to have elevated temp and subsequently tested positive for COVID-19 this morning. Patient informed of results, and that he will be transferred to [facility]. Patient's sister Elyse informed about patient's status (contact # [phone])

## 2022-10-11 NOTE — ED ADULT NURSE NOTE - NSIMPLEMENTINTERV_GEN_ALL_ED
Implemented All Fall with Harm Risk Interventions:  [place] to call system. Call bell, personal items and telephone within reach. Instruct patient to call for assistance. Room bathroom lighting operational. Non-slip footwear when patient is off stretcher. Physically safe environment: no spills, clutter or unnecessary equipment. Stretcher in lowest position, wheels locked, appropriate side rails in place. Provide visual cue, wrist band, yellow gown, etc. Monitor gait and stability. Monitor for mental status changes and reorient to person, place, and time. Review medications for side effects contributing to fall risk. Reinforce activity limits and safety measures with patient and family. Provide visual clues: red socks. Implemented All Fall Risk Interventions:  [place] to call system. Call bell, personal items and telephone within reach. Instruct patient to call for assistance. Room bathroom lighting operational. Non-slip footwear when patient is off stretcher. Physically safe environment: no spills, clutter or unnecessary equipment. Stretcher in lowest position, wheels locked, appropriate side rails in place. Provide visual cue, wrist band, yellow gown, etc. Monitor gait and stability. Monitor for mental status changes and reorient to person, place, and time. Review medications for side effects contributing to fall risk. Reinforce activity limits and safety measures with patient and family.

## 2022-10-11 NOTE — BH INPATIENT PSYCHIATRY PROGRESS NOTE - NSTXPROBSUICID_PSY_ALL_CORE
SUICIDE/SELF-INJURIOUS BEHAVIOR

## 2022-10-11 NOTE — BH INPATIENT PSYCHIATRY DISCHARGE NOTE - NSDCMRMEDTOKEN_GEN_ALL_CORE_FT
amLODIPine 5 mg oral tablet: 1 tab(s) orally once a day  budesonide-formoterol 80 mcg-4.5 mcg/inh inhalation aerosol: 2 puff(s) inhaled once a day  metFORMIN 500 mg oral tablet: 1 tab(s) orally 2 times a day  mirtazapine 45 mg oral tablet: 1 tab(s) orally once a day (at bedtime)  polyethylene glycol 3350 oral powder for reconstitution: 17 gram(s) orally once a day (at bedtime), As needed, Constipation  senna leaf extract oral tablet: 2 tab(s) orally once a day (at bedtime), As needed, Constipation  simethicone 80 mg oral tablet, chewable: 1 tab(s) orally 3 times a day, As needed, Upset Stomach  sodium chloride 1 g oral tablet: 1 tab(s) orally 2 times a day  venlafaxine 75 mg oral capsule, extended release: 1 cap(s) orally once a day

## 2022-10-11 NOTE — BH INPATIENT PSYCHIATRY PROGRESS NOTE - NSTXDEPRESINTERMD_PSY_ALL_CORE
Med titration with antidepressants; recently started on venlafaxine and is tolerating it well. Patient had one ECT and is refusing further treatment. 
Med titration with antidepressants; recently started on venlafaxine. Patient had one ECT and is refusing further treatment. 
Med titration with antidepressants
Med titration with antidepressants
Med titration with antidepressants; recently started on venlafaxine. Patient had one ECT and is refusing further treatment. 
Med titration with antidepressants
Med titration with antidepressants; recently started on venlafaxine. Patient had one ECT and is refusing further treatment. 
Med titration with antidepressants; recently started on venlafaxine. Patient had one ECT and is refusing further treatment. 
Med titration with antidepressants
Med titration with antidepressants; recently started on venlafaxine and is tolerating it well. Patient had one ECT and is refusing further treatment. 
Med titration with antidepressants; recently started on venlafaxine and is tolerating it well. Patient had one ECT and is refusing further treatment. 
Med titration with antidepressants
Med titration with antidepressants; recently started on venlafaxine and is tolerating it well. Patient had one ECT and is refusing further treatment. 
Med titration with antidepressants
Med titration with antidepressants
Med titration with antidepressants; recently started on venlafaxine. Patient had one ECT and is refusing further treatment. 
Med titration with antidepressants
Med titration with antidepressants; recently started on venlafaxine and is tolerating it well. Patient had one ECT and is refusing further treatment. 
Med titration with antidepressants

## 2022-10-11 NOTE — BH INPATIENT PSYCHIATRY DISCHARGE NOTE - HPI (INCLUDE ILLNESS QUALITY, SEVERITY, DURATION, TIMING, CONTEXT, MODIFYING FACTORS, ASSOCIATED SIGNS AND SYMPTOMS)
Patient is an 82-year-old male currently residing at [facility]. PPH of schizophrenia and dementia. + history of inpatient admissions last 7/22-8/19 at [facility] post admission to [facility] for dehydration/SI/FTT 7/6-7/22I. Admitted at [facility] and seen by CL. Prior to last admission stopped eating/drinking in attempt to kill self. Unknown violence/aggression or substance use. no legal issues PMH- HTN, CHF, COPD & DM. BIBA referred by NH for suicidal ideation.   Patient presented to the ED in the context of suicidal ideation. Patient endorsed continued thought to die and stated he was going to stop eating and drinking at NH because he want to be dead. Patient unable to contract for safety. As patient presented imminent risk of self-harm, he was admitted to inpatient psych for safety and stabilization.     On Initial Evaluation at [facility]:  Patient was seen and evaluated by the resident and the attending physician. Patient looks depressed, speaks in monotonous pattern, with stuttering. Patient had lip smacking tics, no perioral movements were noted during the interview. Patient is AAXO3 stating he was admitted initially at [facility] then just came yesterday to [facility]. Patient knows the year and the exact date, and knows what time of the day was when writer conducted the interview. Patient stated he has been living in the NH for 3 and half years now. Patient endorsed suicidal ideations for more than 2 years, being depressed, and had struggles with depression for long time. He added that he had intentions to throw him self off the bridge in 1970s, but he could not. Patient denied any Auditory/visual hallucinations. Denied any homicidal ideations. Pt does not express any manic symptoms or paranoid ideations at the mean time.     Mental Status Examination:  81 YO  Male stated to his age, average built, normal gait, in no acute distress. Patient is calm, behaviorally controlled and superficially cooperative. Patient has motor tics "Lips smacking". No manifestations of perioral TD. Patient expressed depressed mood with congruent affect, and poverty of speech, both quantitative and qualitative. Patient endorsed passive suicidal ideations, by willing to be de dead. He does not have serious intention, any active plans. Patient denied any VH/AH, Homicidal ideations/ paranoid ideations. Patient has poor insight and judgement, with fair impulse control during Interview.

## 2022-10-11 NOTE — H&P ADULT - HISTORY OF PRESENT ILLNESS
Patient is an 82-year-old male currently residing at [facility] x 3.5 years. PPH of recurrent major depression, last MMSE 29/30 at [facility],  multiple prior inpatient psychiatric admissions, remote hx of suicidal ideation with general plan but no attempts (intentions to throw him self off the bridge in 1970s), with no hx of aggression or violence, no hx of substance abuse, no legal  issues, who was admitted to [place] [address] [facility] on 9/12/22 as a transfer from [facility] medical floors where he was admitted for dehydration and followed by  Psychiatry.  Patient endorsed suicidal ideation with continued thought of dying with plan (ie. he is going to stop eating and drinking at NH because he want to be dead). PMH- HTN, CHF, COPD & DM, hyponatremia.  At [facility], started bifrontal ECT (only one session on 9/19/22; Pt refused further treatment). COURSE OF TREATMENT AT [facility]: Pt was evaluated and approved for ECT and received his 1st ECT on 09/19 but has been refusing ECT since then. Venlafaxine was added to augment treatment for depression. With continuing treatment, patient's report of guilt is more interrupted than initially reported. Venlafaxine was titrated but patient's Na started to trend downwards, and venlafaxine was tapered down to 75mg daily. Patient had a CT head on 09/22 and it indicated resolution of the previous SDH. Patient was seen afterwards by the neurosurgery team and no further changes or interventions recommended by them at this time. MMSE: 29 on 09/14/22. Patient has since reported improved mood and is no longer expressing thoughts of self-harm. Patient's venlafaxine was reduced due to low Na and his serum Na has since improved 10/; 10/; 10/. Next set of labs due on 10/12/22. Patient tested positive for COVID-19 via PCR on 10/11/22 and set to be  transferred to a medical unit at .

## 2022-10-11 NOTE — BH INPATIENT PSYCHIATRY PROGRESS NOTE - NSTXSUICIDDATEEST_PSY_ALL_CORE
13-Sep-2022

## 2022-10-11 NOTE — BH INPATIENT PSYCHIATRY PROGRESS NOTE - NSTXDEPRESDATETRGT_PSY_ALL_CORE
10-Oct-2022
17-Oct-2022
20-Sep-2022
28-Sep-2022
17-Oct-2022
07-Oct-2022
28-Sep-2022
28-Sep-2022
20-Sep-2022
28-Sep-2022
20-Sep-2022
20-Sep-2022
07-Oct-2022
07-Oct-2022
20-Sep-2022
20-Sep-2022
05-Oct-2022
04-Oct-2022
10-Oct-2022
20-Sep-2022

## 2022-10-11 NOTE — BH INPATIENT PSYCHIATRY PROGRESS NOTE - NSTXPROBHYPER_PSY_ALL_CORE
HYPERTENSION

## 2022-10-11 NOTE — ED ADULT NURSE NOTE - CHIEF COMPLAINT QUOTE
BIBA from [facility] for covid positive today, fever yesterday, denies fever today. patient denies any pain, denies sob, denies n/v/dizziness, denies si/hi ideation at this time. as per emt, 1:1 from [facility] was not endorsed, did not notice patient was on a 1:1 at [facility], advised to be on EO

## 2022-10-11 NOTE — BH INPATIENT PSYCHIATRY PROGRESS NOTE - NSTXDCFAMPROGRES_PSY_ALL_CORE
Improving
No Change
Improving
No Change
No Change
Improving
No Change
No Change
Improving
No Change
Improving
Improving
No Change

## 2022-10-11 NOTE — ED ADULT NURSE REASSESSMENT NOTE - NS ED NURSE REASSESS COMMENT FT1
Per Lisa from the nursing office, patient is a direct admit to send directly to [address]. Transport requested, report given to oncoming nurse.

## 2022-10-11 NOTE — BH INPATIENT PSYCHIATRY PROGRESS NOTE - NSTXPROBDEPRES_PSY_ALL_CORE
DEPRESSIVE SYMPTOMS

## 2022-10-11 NOTE — BH INPATIENT PSYCHIATRY DISCHARGE NOTE - NSBHDCHANDOFFFT_PSY_ALL_CORE
Writer left a message for Dr. Apodaca at [phone] requesting call back at [phone] to discuss patient's hospital course and management.

## 2022-10-11 NOTE — SOCIAL WORK POST DISCHARGE FOLLOW UP NOTE - NSBHSWFOLLOWUP_PSY_ALL_CORE_FT
as administration was deciding if this is transfer or discharge, navid prepared T of care form.   As per Dr Waterman, pt was TRANSFERRED to [facility] due to covid. Prior to transfer, Navid gave verbal handoff to social work department at [facility].  Prior to transfer Sw advised family of pt's  new Unit's contact info at [facility] .    Psy follow up - Patient will be followed by Consultation and Liaison at [facility] .

## 2022-10-11 NOTE — BH INPATIENT PSYCHIATRY PROGRESS NOTE - NSICDXBHPRIMARYDX_PSY_ALL_CORE
Major depressive episode   F32.9  

## 2022-10-11 NOTE — BH DISCHARGE NOTE NURSING/SOCIAL WORK/PSYCH REHAB - NSBHADVANCE_PSY_ALL_CORE
MOLST    copy of most received from [facility] (not the original) it has been signed by ANDREA ENCINAS and sent with patient to [facility] with notation that original not received by ANDREA./Yes

## 2022-10-11 NOTE — BH INPATIENT PSYCHIATRY DISCHARGE NOTE - NSDCCPCAREPLAN_GEN_ALL_CORE_FT
PRINCIPAL DISCHARGE DIAGNOSIS  Diagnosis: MDD (major depressive disorder), recurrent episode  Assessment and Plan of Treatment: Please continue current mangement and monitor elctrolytes closely      SECONDARY DISCHARGE DIAGNOSES  Diagnosis: DM (diabetes mellitus)  Assessment and Plan of Treatment: Please continue current management

## 2022-10-11 NOTE — BH CONSULTATION LIAISON ASSESSMENT NOTE - NSBHCHARTREVIEWLAB_PSY_A_CORE FT
Labs, Radiology, Cardiology, and Other Results: BMI: BMI (kg/m2): 22.1 (10-08-22 @ 16:48)  HbA1c: A1C with Estimated Average Glucose Result: 5.8 % (07-26-22 @ 09:35)  Glucose: POCT Blood Glucose.: 102 mg/dL (09-16-22 @ 08:20)  Lipid Panel: Date/Time: 08-16-22 @ 09:10  Cholesterol, Serum: 142  HDL Cholesterol, Serum: 57  Triglycerides, Serum: 88    Vital Signs Last 24 Hrs  T(C): 35.6 (10-10-22 @ 15:36), Max: 36.8 (10-10-22 @ 08:06)  T(F): 96 (10-10-22 @ 15:36), Max: 98.3 (10-10-22 @ 08:06)    10-10-22 @ 09:20  Standing BP: 120/61 HR: 78    10-09    125<L>  |  88<L>  |  11  ----------------------------<  135<H>  3.7   |  26  |  0.86    Ca    9.4      09 Oct 2022 09:35  Phos  3.3     10-09  Mg     1.90     10-09    10-07    127<L>  |  90<L>  |  13  ----------------------------<  100<H>  4.3   |  25  |  0.83    Ca    9.5      07 Oct 2022 08:40    TPro  6.7  /  Alb  4.2  /  TBili  0.3  /  DBili  x   /  AST  16  /  ALT  13  /  AlkPhos  38<L>  10-07

## 2022-10-11 NOTE — BH DISCHARGE NOTE NURSING/SOCIAL WORK/PSYCH REHAB - PATIENT PORTAL LINK FT
You can access the FollowMyHealth Patient Portal offered by [facility] by registering at the following website: [email or web address]. By joining [facility]’s FollowMyHealth portal, you will also be able to view your health information using other applications (apps) compatible with our system.

## 2022-10-11 NOTE — BH INPATIENT PSYCHIATRY PROGRESS NOTE - NSCGIIMPROVESX_PSY_ALL_CORE
3 = Minimally improved - slightly better with little or no clinically meaningful reduction of symptoms.  Represents very little change in basic clinical status, level of care, or functional capacity.
2 = Much improved - notably better with signficant reduction of symptoms; increase in the level of functioning but some symptoms remain
4 = No change - symptoms remain essentially unchanged
3 = Minimally improved - slightly better with little or no clinically meaningful reduction of symptoms.  Represents very little change in basic clinical status, level of care, or functional capacity.
2 = Much improved - notably better with signficant reduction of symptoms; increase in the level of functioning but some symptoms remain
3 = Minimally improved - slightly better with little or no clinically meaningful reduction of symptoms.  Represents very little change in basic clinical status, level of care, or functional capacity.
2 = Much improved - notably better with signficant reduction of symptoms; increase in the level of functioning but some symptoms remain
3 = Minimally improved - slightly better with little or no clinically meaningful reduction of symptoms.  Represents very little change in basic clinical status, level of care, or functional capacity.
2 = Much improved - notably better with signficant reduction of symptoms; increase in the level of functioning but some symptoms remain
2 = Much improved - notably better with signficant reduction of symptoms; increase in the level of functioning but some symptoms remain
3 = Minimally improved - slightly better with little or no clinically meaningful reduction of symptoms.  Represents very little change in basic clinical status, level of care, or functional capacity.
4 = No change - symptoms remain essentially unchanged

## 2022-10-12 PROBLEM — E87.1 HYPO-OSMOLALITY AND HYPONATREMIA: Chronic | Status: ACTIVE | Noted: 2022-10-11

## 2022-10-12 PROBLEM — I62.03 NONTRAUMATIC CHRONIC SUBDURAL HEMORRHAGE: Chronic | Status: ACTIVE | Noted: 2022-10-11

## 2022-10-12 LAB
ANION GAP SERPL CALC-SCNC: 10 MMOL/L — SIGNIFICANT CHANGE UP (ref 5–17)
BUN SERPL-MCNC: 10 MG/DL — SIGNIFICANT CHANGE UP (ref 7–23)
CALCIUM SERPL-MCNC: 9 MG/DL — SIGNIFICANT CHANGE UP (ref 8.5–10.1)
CHLORIDE SERPL-SCNC: 94 MMOL/L — LOW (ref 96–108)
CO2 SERPL-SCNC: 24 MMOL/L — SIGNIFICANT CHANGE UP (ref 22–31)
CREAT SERPL-MCNC: 0.68 MG/DL — SIGNIFICANT CHANGE UP (ref 0.5–1.3)
EGFR: 93 ML/MIN/1.73M2 — SIGNIFICANT CHANGE UP
GLUCOSE SERPL-MCNC: 112 MG/DL — HIGH (ref 70–99)
POTASSIUM SERPL-MCNC: 3.5 MMOL/L — SIGNIFICANT CHANGE UP (ref 3.5–5.3)
POTASSIUM SERPL-SCNC: 3.5 MMOL/L — SIGNIFICANT CHANGE UP (ref 3.5–5.3)
SODIUM SERPL-SCNC: 128 MMOL/L — LOW (ref 135–145)

## 2022-10-12 PROCEDURE — 99231 SBSQ HOSP IP/OBS SF/LOW 25: CPT

## 2022-10-12 PROCEDURE — 99232 SBSQ HOSP IP/OBS MODERATE 35: CPT

## 2022-10-12 RX ORDER — HYDRALAZINE HCL 50 MG
10 TABLET ORAL ONCE
Refills: 0 | Status: COMPLETED | OUTPATIENT
Start: 2022-10-12 | End: 2022-10-12

## 2022-10-12 RX ADMIN — Medication 75 MILLIGRAM(S): at 12:10

## 2022-10-12 RX ADMIN — METFORMIN HYDROCHLORIDE 500 MILLIGRAM(S): 850 TABLET ORAL at 18:37

## 2022-10-12 RX ADMIN — Medication 10 MILLIGRAM(S): at 00:38

## 2022-10-12 RX ADMIN — SODIUM CHLORIDE 1 GRAM(S): 9 INJECTION INTRAMUSCULAR; INTRAVENOUS; SUBCUTANEOUS at 18:38

## 2022-10-12 RX ADMIN — AMLODIPINE BESYLATE 5 MILLIGRAM(S): 2.5 TABLET ORAL at 06:44

## 2022-10-12 RX ADMIN — METFORMIN HYDROCHLORIDE 500 MILLIGRAM(S): 850 TABLET ORAL at 06:43

## 2022-10-12 RX ADMIN — OLANZAPINE 2.5 MILLIGRAM(S): 15 TABLET, FILM COATED ORAL at 00:39

## 2022-10-12 RX ADMIN — SODIUM CHLORIDE 1 GRAM(S): 9 INJECTION INTRAMUSCULAR; INTRAVENOUS; SUBCUTANEOUS at 06:43

## 2022-10-12 NOTE — SOCIAL WORK POST DISCHARGE FOLLOW UP NOTE - NSBHSWFOLLOWUP_PSY_ALL_CORE_FT
call from and spoke to [facility] navid. assigned to pt.  Jenise mcelroy  office     she called to advise pt is adjusting.  she has all the info sw provided yesterday. She has left message for family. she called [facility] and aware he can return. writer advised pt is psy stable and only awaiting Level 2 disposition - expected today or tomorrow.  .  Pt will continue with psy treatment via [facility].   writer will call her office when level 2 arrives at [facility] for instructions on where/how to send .

## 2022-10-12 NOTE — PATIENT PROFILE ADULT - FALL HARM RISK - HARM RISK INTERVENTIONS
Assistance with ambulation/Assistance OOB with selected safe patient handling equipment/Communicate Risk of Fall with Harm to all staff/Discuss with provider need for PT consult/Monitor gait and stability/Reinforce activity limits and safety measures with patient and family/Tailored Fall Risk Interventions/Visual Cue: Yellow wristband and red socks/Bed in lowest position, wheels locked, appropriate side rails in place/Call bell, personal items and telephone in reach/Instruct patient to call for assistance before getting out of bed or chair/Non-slip footwear when patient is out of bed/[place] to call system/Physically safe environment - no spills, clutter or unnecessary equipment/Purposeful Proactive Rounding/Room/bathroom lighting operational, light cord in reach

## 2022-10-12 NOTE — CHART NOTE - NSCHARTNOTEFT_GEN_A_CORE
COPY OF SW ASSESSMENT FROM [facility]:    Social Work Initial Psychosocial Evaluation [Charted Location: [facility]5 16] [Authored: 13-Sep-2022 14:23]- for Visit: [id number], Complete, Entered, Signed in Full, Available to Patient    Behavioral Health:    Initial Info:  Was Patient Profile Reviewed: Yes  Source of Information: Other(s)  Relationship: EMR and SNF  Past Psychiatric History: Yes  Past Psychiatric History: Reported past psychiatric admissions and outpatient treatment for diagnosis of schizophrenia.  Pt and sister are unable to provide exact details.  Pt has lived in [facility] since 2019, some type of group home setting prior to that as per sister.  Pt currently followed by Dr. Moeller onsite at [place] [phone]  History of High Risk Behavior: Yes  High Risk Behavior: Current admission: S/I with plan to jump from window  Prior admission :pt currently with SI, with plan to stop po intake.  As per sister, pt has a history of throwing objects when angry.  Family History (Psychiatric illness/suicidality/medical illness/substance use): None known  Caregiver:: jaclyn  Support Person:: jaclyn  Patient Representative:: jaclyn  Current Mental Health Resources: None  Current CAMERA Application: No     Financial/Legal:  Prescription Benefit Coverage: Yes  Prescription Co-Pay: Affordable  Is the patient on disability?: No  Is the patient employed?: No  Pt worked as a tax   Do you need help finding a job?: no  Other Legal Issues: Pt was  in 1980 with one son     Strengths/Barriers:  Patient Stated Goal: pt unable to participate in goal setting at this time  Patient Assets/Strengths: Supportive family   History: ...  Dates of Service: unknown dates of service, [facility] [place]     Social/Environmental:  Current Living Arrangement / Primary Residence: Nursing Facility/Hospital  Name of Facility/Residence: [facility]  Name/Phone Number of  at Facility/Residence: [phone]  Can client return to current housing?: Yes  Pt was first admitted to [place] April 2019  Current SPA (Single Point of Access) application?: No  Do you worry that in the next two months you/your family may not have a safe place to live?: no  Do you worry that the place you are living is making you sick?: no  Do you worry that you will run out of food before you get money to buy more?: no  Do you/family member need any of the following: clothing, diapers, car seats, school supplies, other?: no  Do you need help getting public benefits such as food stamps, WIC, welfare, disability income, other?: no  Do you have trouble paying your bills?: no  Do problems with transportation stop you from getting to doctor visits or getting your medication?: no  Do you ever skip medications that you need, or going to the doctor, to save money?: no  Do you need help getting dental or health insurance?: no  Do you ever need help understanding what your doctor tells you, or help reading health information?: no  Do you need help from a  with the following: housing, immigration, custody, child support, other?: no  Self-Reported Literacy Level: Able to read and write English  Patient is Responsible for Any Child Under 18: No

## 2022-10-13 ENCOUNTER — TRANSCRIPTION ENCOUNTER (OUTPATIENT)
Age: 82
End: 2022-10-13

## 2022-10-13 PROCEDURE — 71045 X-RAY EXAM CHEST 1 VIEW: CPT | Mod: 26

## 2022-10-13 PROCEDURE — 99232 SBSQ HOSP IP/OBS MODERATE 35: CPT

## 2022-10-13 PROCEDURE — 99231 SBSQ HOSP IP/OBS SF/LOW 25: CPT

## 2022-10-13 RX ORDER — AMLODIPINE BESYLATE 2.5 MG/1
1 TABLET ORAL
Qty: 0 | Refills: 0 | DISCHARGE
Start: 2022-10-13

## 2022-10-13 RX ORDER — ALBUTEROL 90 UG/1
2 AEROSOL, METERED ORAL
Qty: 0 | Refills: 0 | DISCHARGE
Start: 2022-10-13

## 2022-10-13 RX ORDER — SODIUM CHLORIDE 9 MG/ML
1 INJECTION INTRAMUSCULAR; INTRAVENOUS; SUBCUTANEOUS
Qty: 0 | Refills: 0 | DISCHARGE
Start: 2022-10-13

## 2022-10-13 RX ORDER — METFORMIN HYDROCHLORIDE 850 MG/1
1 TABLET ORAL
Qty: 0 | Refills: 0 | DISCHARGE
Start: 2022-10-13

## 2022-10-13 RX ORDER — POLYETHYLENE GLYCOL 3350 17 G/17G
17 POWDER, FOR SOLUTION ORAL
Qty: 0 | Refills: 0 | DISCHARGE
Start: 2022-10-13

## 2022-10-13 RX ORDER — MIRTAZAPINE 45 MG/1
1 TABLET, ORALLY DISINTEGRATING ORAL
Qty: 0 | Refills: 0 | DISCHARGE
Start: 2022-10-13

## 2022-10-13 RX ADMIN — ALBUTEROL 2 PUFF(S): 90 AEROSOL, METERED ORAL at 05:20

## 2022-10-13 RX ADMIN — METFORMIN HYDROCHLORIDE 500 MILLIGRAM(S): 850 TABLET ORAL at 18:17

## 2022-10-13 RX ADMIN — Medication 75 MILLIGRAM(S): at 11:37

## 2022-10-13 RX ADMIN — Medication 100 MILLIGRAM(S): at 18:17

## 2022-10-13 RX ADMIN — MIRTAZAPINE 45 MILLIGRAM(S): 45 TABLET, ORALLY DISINTEGRATING ORAL at 21:52

## 2022-10-13 RX ADMIN — AMLODIPINE BESYLATE 5 MILLIGRAM(S): 2.5 TABLET ORAL at 05:19

## 2022-10-13 RX ADMIN — SODIUM CHLORIDE 1 GRAM(S): 9 INJECTION INTRAMUSCULAR; INTRAVENOUS; SUBCUTANEOUS at 18:17

## 2022-10-13 RX ADMIN — SODIUM CHLORIDE 1 GRAM(S): 9 INJECTION INTRAMUSCULAR; INTRAVENOUS; SUBCUTANEOUS at 05:19

## 2022-10-13 RX ADMIN — METFORMIN HYDROCHLORIDE 500 MILLIGRAM(S): 850 TABLET ORAL at 05:19

## 2022-10-13 NOTE — DISCHARGE NOTE PROVIDER - PROVIDER TOKENS
PROVIDER:[TOKEN:[[id number]],ESTABLISHEDPATIENT:[T]] PROVIDER:[TOKEN:[[id number]],ESTABLISHEDPATIENT:[T]],PROVIDER:[TOKEN:[[id number]]]

## 2022-10-13 NOTE — DISCHARGE NOTE PROVIDER - NSDCFUADDINST_GEN_ALL_CORE_FT
It is important to see your primary physician as well as any specialty physicians within the next week to perform a comprehensive medical review.  Call their offices for an appointment as soon as you leave the hospital.  You will also need to see them for renewal of your medications.  If have any difficulty following with a physician, contact the [facility] [phone] or via [email or web address].   To obtain your results, you can access the [facility] Patient Portal at [email or web address].  Your medical issues appear to be stable at this time, but if your symptoms recur or worsen, contact your physicians and/or return to the hospital if necessary.  If you encounter any issues or questions with your medication, call your physicians before stopping the medication.  Do not drive.  Limit your diet to 2 grams of sodium daily.

## 2022-10-13 NOTE — DISCHARGE NOTE PROVIDER - REASON FOR ADMISSION
Administrative transfer from [facility] to [facility] for boarding due to COVID-19 outbreak at [facility]

## 2022-10-13 NOTE — DISCHARGE NOTE PROVIDER - HOSPITAL COURSE
Patient is an 82-year-old male currently residing at [facility] x 3.5 years. PPH of recurrent major depression, last MMSE 29/30 at [facility],  multiple prior inpatient psychiatric admissions, remote hx of suicidal ideation with general plan but no attempts (intentions to throw him self off the bridge in 1970s), with no hx of aggression or violence, no hx of substance abuse, no legal  issues, who was admitted to [place] [address] [facility] on 9/12/22 as a transfer from [facility] medical floors where he was admitted for dehydration and followed by  Psychiatry.  Patient endorsed suicidal ideation with continued thought of dying with plan (ie. he is going to stop eating and drinking at NH because he want to be dead). PMH- HTN, CHF, COPD & DM, hyponatremia.  At [facility], started bifrontal ECT (only one session on 9/19/22; Pt refused further treatment). COURSE OF TREATMENT AT [facility]: Pt was evaluated and approved for ECT and received his 1st ECT on 09/19 but has been refusing ECT since then. Venlafaxine was added to augment treatment for depression. With continuing treatment, patient's report of guilt is more interrupted than initially reported. Venlafaxine was titrated but patient's Na started to trend downwards, and venlafaxine was tapered down to 75mg daily. Patient had a CT head on 09/22 and it indicated resolution of the previous SDH. Patient was seen afterwards by the neurosurgery team and no further changes or interventions recommended by them at this time. MMSE: 29 on 09/14/22. Patient has since reported improved mood and is no longer expressing thoughts of self-harm. Patient's venlafaxine was reduced due to low Na and his serum Na has since improved 10/; 10/; 10/. Next set of labs due on 10/12/22. Patient tested positive for COVID-19 via PCR on 10/11/22 and set to be be transferred to a medical unit at [facility] for quarantine and monitoring. Psychotropic medications: mirtazapine 45 mg qhs, and venlafaxine XR 75mg daily. HAND OFF with [facility] psychiatry and [facility] psychiatrist Dr Damian Frost done; Patient actually was awaiting acceptance back to nursing home and was deemed to be close to discharge from inpatient psychiatry.   Patient is an 82-year-old male currently residing at [facility] x 3.5 years. PPH of recurrent major depression, last MMSE 29/30 at [facility],  multiple prior inpatient psychiatric admissions, remote hx of suicidal ideation with general plan but no attempts (intentions to throw him self off the bridge in 1970s), with no hx of aggression or violence, no hx of substance abuse, no legal  issues, who was admitted to [place] [address] [facility] on 9/12/22 as a transfer from [facility] medical floors where he was admitted for dehydration and followed by  Psychiatry.  Patient endorsed suicidal ideation with continued thought of dying with plan (ie. he is going to stop eating and drinking at NH because he want to be dead). PMH- HTN, CHF, COPD & DM, hyponatremia.  At [facility], started bifrontal ECT (only one session on 9/19/22; Pt refused further treatment). COURSE OF TREATMENT AT [facility]: Pt was evaluated and approved for ECT and received his 1st ECT on 09/19 but has been refusing ECT since then. Venlafaxine was added to augment treatment for depression. With continuing treatment, patient's report of guilt is more interrupted than initially reported. Venlafaxine was titrated but patient's Na started to trend downwards, and venlafaxine was tapered down to 75mg daily. Patient had a CT head on 09/22 and it indicated resolution of the previous SDH. Patient was seen afterwards by the neurosurgery team and no further changes or interventions recommended by them at this time. MMSE: 29 on 09/14/22. Patient has since reported improved mood and is no longer expressing thoughts of self-harm. Patient's venlafaxine was reduced due to low Na and his serum Na has since improved 10/; 10/; 10/. Next set of labs due on 10/12/22. Patient tested positive for COVID-19 via PCR on 10/11/22 and set to be be transferred to a medical unit at [facility] for quarantine and monitoring. Psychotropic medications: mirtazapine 45 mg qhs, and venlafaxine XR 75mg daily.     Now cleared by psychiatry.  CT chest was performed showing < from: CT Chest No Cont (10.14.22 @ 19:40) >  IMPRESSION:  No pneumothorax. Spiculated right apical mass may be postinflammatory or neoplastic.  Recommend comparison with prior studies or PET/CT.  Intrahepatic pneumobilia versus portal venous gas. Portal venous gas could reflect bowel ischemia. Limited evaluation without oral and IV contrast and due to motion artifact and streak artifact. Recommend GI or  surgical evaluation and contrast-enhanced CT abdomen and pelvis.  Findings were discussed with Nurse Violeta Escoto, who will relay the  message to the covering PA, 10/14/2022 9:12 PM by Dr. Raisa Davalos with  read back confirmation.  < end of copied text >    Seen by Pulmonary, recommended outpatient CT surgery evaluation for spiculated mass.      < from: CT Abdomen and Pelvis w/ Oral Cont and w/ IV Cont (10.15.22 @ 23:50) > Resolution previously suspected pneumobilia or portal venous gas.  Tubular defects within the small bowel. Suggest correlation for parasitic  infection Mild cardiomegaly  < end of copied text >    Can be evaluated as outpatient for further evaluation of tubular defects, discussed with ID.    Disposition: Stable for discharge.  Outpatient followup discussed.  Total time spent on discharge is  50 minutes.

## 2022-10-13 NOTE — DISCHARGE NOTE PROVIDER - NSDCCPCAREPLAN_GEN_ALL_CORE_FT
PRINCIPAL DISCHARGE DIAGNOSIS  Diagnosis: COVID  Assessment and Plan of Treatment:       SECONDARY DISCHARGE DIAGNOSES  Diagnosis: Recurrent major depression  Assessment and Plan of Treatment:      PRINCIPAL DISCHARGE DIAGNOSIS  Diagnosis: COVID  Assessment and Plan of Treatment:       SECONDARY DISCHARGE DIAGNOSES  Diagnosis: Recurrent major depression  Assessment and Plan of Treatment:     Diagnosis: Suicidal ideation  Assessment and Plan of Treatment:     Diagnosis: Mass of right lung  Assessment and Plan of Treatment: See pulmonary for further evaluation of possible lung cancer.    Diagnosis: Abnormal CT scan  Assessment and Plan of Treatment: Resolution previously suspected pneumobilia or portal venous gas.  Tubular defects within the small bowel. Suggest correlation for parasitic  infection Mild cardiomegaly .  Check stool studies to rool out parasitic infection.

## 2022-10-13 NOTE — DISCHARGE NOTE PROVIDER - CARE PROVIDER_API CALL
Bala Livingston)  Neurosurgery  [address], [address]  [place] [address]  Phone: [phone]  Fax: [phone]  Established Patient  Follow Up Time:    Bala Livingston)  Neurosurgery  [address], [address]  [place] [address]  Phone: [phone]  Fax: [phone]  Established Patient  Follow Up Time:     Walter Linton)  Critical Care Medicine; Internal Medicine; Pulmonary Disease  [address] [place] [address]  Phone: [phone]  Fax: [phone]  Follow Up Time:

## 2022-10-13 NOTE — DISCHARGE NOTE PROVIDER - NSDCMRMEDTOKEN_GEN_ALL_CORE_FT
albuterol 90 mcg/inh inhalation aerosol: 2 puff(s) inhaled every 6 hours, As needed, Shortness of Breath and/or Wheezing  amLODIPine 5 mg oral tablet: 1 tab(s) orally once a day  budesonide-formoterol 80 mcg-4.5 mcg/inh inhalation aerosol: 2 puff(s) inhaled once a day  metFORMIN 500 mg oral tablet: 1 tab(s) orally 2 times a day  mirtazapine 45 mg oral tablet: 1 tab(s) orally once a day (at bedtime)  polyethylene glycol 3350 oral powder for reconstitution: 17 gram(s) orally once a day, As needed, Constipation  sodium chloride 1 g oral tablet: 1 tab(s) orally 2 times a day  venlafaxine 75 mg oral capsule, extended release: 1 cap(s) orally once a day   albuterol 90 mcg/inh inhalation aerosol: 2 puff(s) inhaled every 6 hours, As needed, Shortness of Breath and/or Wheezing  amLODIPine 5 mg oral tablet: 1 tab(s) orally once a day  aspirin 81 mg oral delayed release capsule: 1 cap(s) orally once a day for 30 days.   budesonide-formoterol 80 mcg-4.5 mcg/inh inhalation aerosol: 2 puff(s) inhaled once a day  metFORMIN 500 mg oral tablet: 1 tab(s) orally 2 times a day  mirtazapine 45 mg oral tablet: 1 tab(s) orally once a day (at bedtime)  polyethylene glycol 3350 oral powder for reconstitution: 17 gram(s) orally once a day, As needed, Constipation  sodium chloride 1 g oral tablet: 1 tab(s) orally 2 times a day  venlafaxine 75 mg oral capsule, extended release: 1 cap(s) orally once a day

## 2022-10-13 NOTE — DISCHARGE NOTE PROVIDER - NSDCFUADDAPPT_GEN_ALL_CORE_FT
sees consulting Internist Dr Barrett at [facility]  sees consulting Internist Dr Barrett at [facility]     CT chest was performed showing < from: CT Chest No Cont (10.14.22 @ 19:40) >  IMPRESSION:  No pneumothorax. Spiculated right apical mass may be postinflammatory or neoplastic.  Recommend comparison with prior studies or PET/CT.  Intrahepatic pneumobilia versus portal venous gas. Portal venous gas could reflect bowel ischemia. Limited evaluation without oral and IV contrast and due to motion artifact and streak artifact. Recommend GI or  surgical evaluation and contrast-enhanced CT abdomen and pelvis.  Findings were discussed with Nurse Violeta Escoto, who will relay the  message to the covering PA, 10/14/2022 9:12 PM by Dr. Raisa Davalos with  read back confirmation.  < end of copied text >    Seen by Pulmonary, recommended outpatient CT surgery evaluation for spiculated mass.      < from: CT Abdomen and Pelvis w/ Oral Cont and w/ IV Cont (10.15.22 @ 23:50) > Resolution previously suspected pneumobilia or portal venous gas.  Tubular defects within the small bowel. Suggest correlation for parasitic  infection Mild cardiomegaly  < end of copied text >    Can be evaluated as outpatient for further evaluation of tubular defects, discussed with ID.

## 2022-10-13 NOTE — DISCHARGE NOTE PROVIDER - CARE PROVIDERS DIRECT ADDRESSES
,[email or web address]

## 2022-10-13 NOTE — BH CONSULTATION LIAISON PROGRESS NOTE - OTHER
MMSE to be done later "ok'  + paucity  adequate  low end of fair  see above  deferred at this time  = significant perioral TD? mouth agape with difficulty talking/displaying affect which is highly likely chronic

## 2022-10-14 LAB
ANION GAP SERPL CALC-SCNC: 8 MMOL/L — SIGNIFICANT CHANGE UP (ref 5–17)
BUN SERPL-MCNC: 15 MG/DL — SIGNIFICANT CHANGE UP (ref 7–23)
CALCIUM SERPL-MCNC: 8.7 MG/DL — SIGNIFICANT CHANGE UP (ref 8.5–10.1)
CHLORIDE SERPL-SCNC: 97 MMOL/L — SIGNIFICANT CHANGE UP (ref 96–108)
CO2 SERPL-SCNC: 27 MMOL/L — SIGNIFICANT CHANGE UP (ref 22–31)
CREAT SERPL-MCNC: 0.87 MG/DL — SIGNIFICANT CHANGE UP (ref 0.5–1.3)
EGFR: 87 ML/MIN/1.73M2 — SIGNIFICANT CHANGE UP
GLUCOSE SERPL-MCNC: 96 MG/DL — SIGNIFICANT CHANGE UP (ref 70–99)
POTASSIUM SERPL-MCNC: 3.8 MMOL/L — SIGNIFICANT CHANGE UP (ref 3.5–5.3)
POTASSIUM SERPL-SCNC: 3.8 MMOL/L — SIGNIFICANT CHANGE UP (ref 3.5–5.3)
SODIUM SERPL-SCNC: 132 MMOL/L — LOW (ref 135–145)

## 2022-10-14 PROCEDURE — 71045 X-RAY EXAM CHEST 1 VIEW: CPT | Mod: 26

## 2022-10-14 PROCEDURE — 71250 CT THORAX DX C-: CPT | Mod: 26

## 2022-10-14 PROCEDURE — 99231 SBSQ HOSP IP/OBS SF/LOW 25: CPT

## 2022-10-14 PROCEDURE — 99233 SBSQ HOSP IP/OBS HIGH 50: CPT

## 2022-10-14 RX ADMIN — SODIUM CHLORIDE 1 GRAM(S): 9 INJECTION INTRAMUSCULAR; INTRAVENOUS; SUBCUTANEOUS at 05:02

## 2022-10-14 RX ADMIN — AMLODIPINE BESYLATE 5 MILLIGRAM(S): 2.5 TABLET ORAL at 05:02

## 2022-10-14 RX ADMIN — SODIUM CHLORIDE 1 GRAM(S): 9 INJECTION INTRAMUSCULAR; INTRAVENOUS; SUBCUTANEOUS at 17:15

## 2022-10-14 RX ADMIN — METFORMIN HYDROCHLORIDE 500 MILLIGRAM(S): 850 TABLET ORAL at 17:15

## 2022-10-14 RX ADMIN — MIRTAZAPINE 45 MILLIGRAM(S): 45 TABLET, ORALLY DISINTEGRATING ORAL at 22:28

## 2022-10-14 RX ADMIN — METFORMIN HYDROCHLORIDE 500 MILLIGRAM(S): 850 TABLET ORAL at 05:02

## 2022-10-14 RX ADMIN — Medication 75 MILLIGRAM(S): at 13:41

## 2022-10-14 NOTE — BH CONSULTATION LIAISON PROGRESS NOTE - OTHER
see above  = significant perioral TD? mouth agape with difficulty talking/displaying affect which is highly likely chronic  MMSE to be done later deferred at this time  + paucity  adequate  "ok'  low end of fair

## 2022-10-15 LAB
ALBUMIN SERPL ELPH-MCNC: 3.5 G/DL — SIGNIFICANT CHANGE UP (ref 3.3–5)
ALP SERPL-CCNC: 42 U/L — SIGNIFICANT CHANGE UP (ref 40–120)
ALT FLD-CCNC: 16 U/L — SIGNIFICANT CHANGE UP (ref 12–78)
ANION GAP SERPL CALC-SCNC: 6 MMOL/L — SIGNIFICANT CHANGE UP (ref 5–17)
AST SERPL-CCNC: 13 U/L — LOW (ref 15–37)
BILIRUB SERPL-MCNC: 0.3 MG/DL — SIGNIFICANT CHANGE UP (ref 0.2–1.2)
BUN SERPL-MCNC: 15 MG/DL — SIGNIFICANT CHANGE UP (ref 7–23)
CALCIUM SERPL-MCNC: 9.2 MG/DL — SIGNIFICANT CHANGE UP (ref 8.5–10.1)
CHLORIDE SERPL-SCNC: 98 MMOL/L — SIGNIFICANT CHANGE UP (ref 96–108)
CO2 SERPL-SCNC: 29 MMOL/L — SIGNIFICANT CHANGE UP (ref 22–31)
CREAT SERPL-MCNC: 0.81 MG/DL — SIGNIFICANT CHANGE UP (ref 0.5–1.3)
CRP SERPL-MCNC: 20 MG/L — HIGH
D DIMER BLD IA.RAPID-MCNC: 187 NG/ML DDU — SIGNIFICANT CHANGE UP
EGFR: 89 ML/MIN/1.73M2 — SIGNIFICANT CHANGE UP
FERRITIN SERPL-MCNC: 141 NG/ML — SIGNIFICANT CHANGE UP (ref 30–400)
GLUCOSE SERPL-MCNC: 97 MG/DL — SIGNIFICANT CHANGE UP (ref 70–99)
HCT VFR BLD CALC: 39.9 % — SIGNIFICANT CHANGE UP (ref 39–50)
HGB BLD-MCNC: 13.6 G/DL — SIGNIFICANT CHANGE UP (ref 13–17)
LACTATE SERPL-SCNC: 1.9 MMOL/L — SIGNIFICANT CHANGE UP (ref 0.7–2)
MCHC RBC-ENTMCNC: 32.4 PG — SIGNIFICANT CHANGE UP (ref 27–34)
MCHC RBC-ENTMCNC: 34.1 G/DL — SIGNIFICANT CHANGE UP (ref 32–36)
MCV RBC AUTO: 95 FL — SIGNIFICANT CHANGE UP (ref 80–100)
NRBC # BLD: 0 /100 WBCS — SIGNIFICANT CHANGE UP (ref 0–0)
PLATELET # BLD AUTO: 263 K/UL — SIGNIFICANT CHANGE UP (ref 150–400)
POTASSIUM SERPL-MCNC: 4 MMOL/L — SIGNIFICANT CHANGE UP (ref 3.5–5.3)
POTASSIUM SERPL-SCNC: 4 MMOL/L — SIGNIFICANT CHANGE UP (ref 3.5–5.3)
PROT SERPL-MCNC: 7.3 GM/DL — SIGNIFICANT CHANGE UP (ref 6–8.3)
RBC # BLD: 4.2 M/UL — SIGNIFICANT CHANGE UP (ref 4.2–5.8)
RBC # FLD: 13.2 % — SIGNIFICANT CHANGE UP (ref 10.3–14.5)
SODIUM SERPL-SCNC: 133 MMOL/L — LOW (ref 135–145)
WBC # BLD: 4.58 K/UL — SIGNIFICANT CHANGE UP (ref 3.8–10.5)
WBC # FLD AUTO: 4.58 K/UL — SIGNIFICANT CHANGE UP (ref 3.8–10.5)

## 2022-10-15 PROCEDURE — 74177 CT ABD & PELVIS W/CONTRAST: CPT | Mod: 26

## 2022-10-15 PROCEDURE — 93970 EXTREMITY STUDY: CPT | Mod: 26

## 2022-10-15 PROCEDURE — 99222 1ST HOSP IP/OBS MODERATE 55: CPT

## 2022-10-15 PROCEDURE — 99233 SBSQ HOSP IP/OBS HIGH 50: CPT

## 2022-10-15 RX ORDER — IOHEXOL 300 MG/ML
30 INJECTION, SOLUTION INTRAVENOUS ONCE
Refills: 0 | Status: DISCONTINUED | OUTPATIENT
Start: 2022-10-15 | End: 2022-10-15

## 2022-10-15 RX ORDER — ENOXAPARIN SODIUM 100 MG/ML
40 INJECTION SUBCUTANEOUS EVERY 24 HOURS
Refills: 0 | Status: DISCONTINUED | OUTPATIENT
Start: 2022-10-15 | End: 2022-10-17

## 2022-10-15 RX ORDER — RADIOPAQUE PVC MARKERS/BARIUM 24MARKERS
450 CAPSULE ORAL ONCE
Refills: 0 | Status: COMPLETED | OUTPATIENT
Start: 2022-10-15 | End: 2022-10-15

## 2022-10-15 RX ADMIN — METFORMIN HYDROCHLORIDE 500 MILLIGRAM(S): 850 TABLET ORAL at 06:07

## 2022-10-15 RX ADMIN — SODIUM CHLORIDE 1 GRAM(S): 9 INJECTION INTRAMUSCULAR; INTRAVENOUS; SUBCUTANEOUS at 17:35

## 2022-10-15 RX ADMIN — AMLODIPINE BESYLATE 5 MILLIGRAM(S): 2.5 TABLET ORAL at 06:07

## 2022-10-15 RX ADMIN — POLYETHYLENE GLYCOL 3350 17 GRAM(S): 17 POWDER, FOR SOLUTION ORAL at 17:36

## 2022-10-15 RX ADMIN — Medication 450 MILLILITER(S): at 19:27

## 2022-10-15 RX ADMIN — Medication 75 MILLIGRAM(S): at 12:06

## 2022-10-15 RX ADMIN — ENOXAPARIN SODIUM 40 MILLIGRAM(S): 100 INJECTION SUBCUTANEOUS at 17:35

## 2022-10-15 RX ADMIN — SODIUM CHLORIDE 1 GRAM(S): 9 INJECTION INTRAMUSCULAR; INTRAVENOUS; SUBCUTANEOUS at 06:08

## 2022-10-15 NOTE — CONSULT NOTE ADULT - ASSESSMENT
Initial evaluation/Pulmonary Critical Care consultation requested on 10/15/2022  by Dr Olivia   from Dr Linton   Patient examined chart reviewed    HOSPITAL ADMISSION   PATIENT CAME  FROM (if information available)      REVIEW OF SYMPTOMS      Able to give (reliable) ROS  NO     PHYSICAL EXAM    HEENT Unremarkable  atraumatic   RESP Fair air entry EXP prolonged    Harsh breath sound Resp distres mild   CARDIAC S1 S2 No S3     NO JVD    ABDOMEN SOFT BS PRESENT NOT DISTENDED No hepatosplenomegaly   PEDAL EDEMA present No calf tenderness  NO rash       PATIENT PRESENTATION.  82-year-old male currently residing at [facility] x 3.5 years. PPH of recurrent major depression, last MMSE 29/30 at [facility],  multiple prior inpatient psychiatric admissions, remote hx of suicidal ideation with general plan but no attempts (intentions to throw him self off the bridge in 1970s), with no hx of aggression or violence, no hx of substance abuse, no legal  issues, who was admitted to [place] [address] [facility] on 9/12/22 as a transfer from [facility] medical floors where he was admitted for dehydration and followed by  Psychiatry.    Pulm consulted 10/15/2022 for lung nodule                                           AGE/SEX.   82 m   DOA.  10/11/2022  CC .  10/11/2022 depression      PMH .  pmh recurrent majpor depression   pmh multiple inpatient psych admissions       GENERAL DATA .   GOC.      10/15/2022 full code   ALLGY.       nka                     WT. 10/15/2022 67  BMI.        10/15/2022 25                      ICU STAY. none  COVID.      BEST PRACTICE ISSUES.    HOB ELEVATN. Yes  DVT PPLX.      DORADO PPLX.      INFN PPLX.    SP SW GUERDA.        DIET.   10/11 soft bite size     VS/ PO/IO/ VENT/ DRIPS.   10/15/2022 afeb 69 120/70         ASSESSMENT/RECOMMENDATIONS .   RESP.  Lung mass.  Former 3 ppd smoker quit 1 m per pt  ct ch 10/14/2022 1.8 cm spiculated r apical mass coarse calcificatns postinflamm or neoplastic   Intrahepatic pneumobilia  10/15/2022 Patient has incidental lung nodule   Lung nodules size 8 mm to 3 cm are managed with CT at 3 months, PET-CT, or tissue smpl  In this patient given ho heavy smoking advanced age location upper lobe spiculated margins the chances of this being lung cancer are very high   Pl have pt see me in office asap  and i will arrange cts eval or get CTS eval right away if pt not likely to followup He will need pfts pet scan and resection of nodule   Pneumothorax.  10/13/2022 cxr 30% r apical pntx   10/14 ct ch no pntx  pntx excluded   COPD.   10/11 albuterol hfa p   cobt  COUGH.  10/11 guaifenesin   cobt   INFECTION.  W 10/15/2022 w 4.5   CARDIAC.  no active issues   check echo as part of preop [place]   GI.  INTRAHEPATIC PNEUMOBILIA   ct ch 10/14 portal venous gas which can be seen with bowel ischemia   suggest gi or surgery eval   HEMAT.  D-dimr 10/15/2022 d-dimr 187   Hb 10/15/2022 Hb 13.6   10/15/2022 check venous duplx   RENAL.  Na 10/15/2022 Na 133   Cr 10/15/2022 Cr .8   monitor     TIME SPENT   Over 55 minutes aggregate care time spent on encounter; activities included   direct patient care, counseling and/or coordinating care reviewing notes, lab data/ imaging , discussion with multidisciplinary team/ patient  /family and explaining in detail risks, benefits, alternatives  of the recommendations     SOHAIL YANEZ 10/11/2022 1941 YANCY GUERRIER   
#COVID-19  - Pt is COVID positive but asymptomatic at present  - Monitor for any worsening in disease process    #DMII  -FS wnl in hospital  -last A1C 5.8 in july  -okay to continue home metformin 500mg PO bid     #COPD  -respiratory status stable  -c/w home inhaler albuterol PRN     #HTN  -continue amlodipine 5mg PO qd     #hx of acute subdural hematoma  -seen on CTH in 8/2022  -f/u with neuro sx post discharge    Recurrent Major Depression:  - continue Effexor XR 75mg PO qd, Remeron 45mg PO qhs     Hyponatremia:  - continue sodium chloride 1gm PO bid  - Effexor was reduced in dose to limit dose-dependent effects on sodium levels of antidepressants  - BMP ordered for 10/12/22 in AM, follow

## 2022-10-16 LAB
BASE EXCESS BLDA CALC-SCNC: 2.8 MMOL/L — SIGNIFICANT CHANGE UP (ref -2–3)
BLOOD GAS COMMENTS ARTERIAL: SIGNIFICANT CHANGE UP
CO2 BLDA-SCNC: 28 MMOL/L — HIGH (ref 19–24)
GAS PNL BLDA: SIGNIFICANT CHANGE UP
HCO3 BLDA-SCNC: 27 MMOL/L — SIGNIFICANT CHANGE UP (ref 21–28)
HOROWITZ INDEX BLDA+IHG-RTO: 21 — SIGNIFICANT CHANGE UP
PCO2 BLDA: 40 MMHG — SIGNIFICANT CHANGE UP (ref 32–46)
PH BLDA: 7.44 — SIGNIFICANT CHANGE UP (ref 7.35–7.45)
PO2 BLDA: 70 MMHG — LOW (ref 83–108)
SAO2 % BLDA: 96 % — SIGNIFICANT CHANGE UP (ref 94–98)

## 2022-10-16 PROCEDURE — 99232 SBSQ HOSP IP/OBS MODERATE 35: CPT

## 2022-10-16 PROCEDURE — 99233 SBSQ HOSP IP/OBS HIGH 50: CPT

## 2022-10-16 RX ADMIN — Medication 75 MILLIGRAM(S): at 12:49

## 2022-10-16 RX ADMIN — MIRTAZAPINE 45 MILLIGRAM(S): 45 TABLET, ORALLY DISINTEGRATING ORAL at 21:36

## 2022-10-16 RX ADMIN — AMLODIPINE BESYLATE 5 MILLIGRAM(S): 2.5 TABLET ORAL at 05:46

## 2022-10-16 RX ADMIN — SODIUM CHLORIDE 1 GRAM(S): 9 INJECTION INTRAMUSCULAR; INTRAVENOUS; SUBCUTANEOUS at 05:45

## 2022-10-16 RX ADMIN — SODIUM CHLORIDE 1 GRAM(S): 9 INJECTION INTRAMUSCULAR; INTRAVENOUS; SUBCUTANEOUS at 18:53

## 2022-10-16 RX ADMIN — POLYETHYLENE GLYCOL 3350 17 GRAM(S): 17 POWDER, FOR SOLUTION ORAL at 10:14

## 2022-10-16 RX ADMIN — ENOXAPARIN SODIUM 40 MILLIGRAM(S): 100 INJECTION SUBCUTANEOUS at 18:52

## 2022-10-17 ENCOUNTER — TRANSCRIPTION ENCOUNTER (OUTPATIENT)
Age: 82
End: 2022-10-17

## 2022-10-17 VITALS
RESPIRATION RATE: 18 BRPM | TEMPERATURE: 98 F | DIASTOLIC BLOOD PRESSURE: 76 MMHG | OXYGEN SATURATION: 96 % | SYSTOLIC BLOOD PRESSURE: 144 MMHG | HEART RATE: 61 BPM

## 2022-10-17 PROCEDURE — 99232 SBSQ HOSP IP/OBS MODERATE 35: CPT

## 2022-10-17 PROCEDURE — 99239 HOSP IP/OBS DSCHRG MGMT >30: CPT

## 2022-10-17 PROCEDURE — 99231 SBSQ HOSP IP/OBS SF/LOW 25: CPT

## 2022-10-17 PROCEDURE — 99222 1ST HOSP IP/OBS MODERATE 55: CPT | Mod: FS

## 2022-10-17 RX ORDER — ASPIRIN/CALCIUM CARB/MAGNESIUM 324 MG
1 TABLET ORAL
Qty: 0 | Refills: 0 | DISCHARGE

## 2022-10-17 RX ADMIN — AMLODIPINE BESYLATE 5 MILLIGRAM(S): 2.5 TABLET ORAL at 06:19

## 2022-10-17 RX ADMIN — SODIUM CHLORIDE 1 GRAM(S): 9 INJECTION INTRAMUSCULAR; INTRAVENOUS; SUBCUTANEOUS at 17:59

## 2022-10-17 RX ADMIN — Medication 75 MILLIGRAM(S): at 12:37

## 2022-10-17 RX ADMIN — SODIUM CHLORIDE 1 GRAM(S): 9 INJECTION INTRAMUSCULAR; INTRAVENOUS; SUBCUTANEOUS at 06:20

## 2022-10-17 RX ADMIN — ENOXAPARIN SODIUM 40 MILLIGRAM(S): 100 INJECTION SUBCUTANEOUS at 17:58

## 2022-10-17 NOTE — PROGRESS NOTE ADULT - ASSESSMENT
82-year-old male PMH SDH, CHF, HTN, COPD, DM, Hyponatremia, admitted as border from [facility] due to COVID-19 positive.  He has been asymptomatic.  CXR performed on 10/13 showed ? RUL pneumothorax.  CT Chest was performed showing < from: CT Chest No Cont (10.14.22 @ 19:40) >  No pneumothorax.  Spiculated right apical mass may be postinflammatory or neoplastic.   Recommend comparison with prior studies or PET/CT.  Intrahepatic pneumobilia versus portal venous gas. Portal venous gas could reflect bowel ischemia. Limited evaluation without oral and IV  contrast and due to motion artifact and streak artifact. Recommend GI or  surgical evaluation and contrast-enhanced CT abdomen and pelvis.  < end of copied text >  < from: CT Abdomen and Pelvis w/ Oral Cont and w/ IV Cont (10.15.22 @ 23:50) > Tubular defects within the small bowel. Suggest correlation for parasitic  infection  < end of copied text >      # ? RUL Pneumothorax - excluded on CT   # Abnormal CTs: R spiculated apical mass - ? postinflammatory vs neoplastic.  No previous CTs for comparison.  I left a message for pt's sister Maureen at [phone] to clarify goals of care.  Pulmonary input d/w Dr. Linton - outpatient f/u discussed.  # Abnormal CT Abd: Tubular defects in small bowel, ? parasitic infection.  Stool O&P, PCR ordered.  Discussed with ID, stable for outpatient workup.    # COVID-19 - Pt is COVID positive but asymptomatic at present - Monitor for any worsening in disease process  # DMII -FS wnl in hospital -last A1C 5.8 in july -okay to continue home metformin 500mg PO bid   # COPD -respiratory status stable -c/w home inhaler albuterol PRN    #HTN -continue amlodipine 5mg PO qd    #hx of acute subdural hematoma -f/u with neuro sx post discharge  # Recurrent Major Depression: - continue Effexor XR 75mg PO qd, Remeron 45mg PO qhs   # Hyponatremia: - continue NaCl tabs.  Monitor BMP.  # Inpatient DVT Prophylaxis - Lower extremity intermittent compression devices. Not placed on pharmacologic prophylaxis due to recent SDH.     I again left a message for pt's sister Maureen at [phone] on 10/16.   Stable for d/c. 
82-year-old male PMH SDH, CHF, HTN, COPD, DM, Hyponatremia, admitted as border from [facility] due to COVID-19 positive.  He has been asymptomatic.  CXR performed on 10/13 showed ? RUL pneumothorax.  CT Chest was performed showing < from: CT Chest No Cont (10.14.22 @ 19:40) >  No pneumothorax.  Spiculated right apical mass may be postinflammatory or neoplastic.   Recommend comparison with prior studies or PET/CT.  Intrahepatic pneumobilia versus portal venous gas. Portal venous gas could reflect bowel ischemia. Limited evaluation without oral and IV  contrast and due to motion artifact and streak artifact. Recommend GI or  surgical evaluation and contrast-enhanced CT abdomen and pelvis.  < end of copied text >  < from: CT Abdomen and Pelvis w/ Oral Cont and w/ IV Cont (10.15.22 @ 23:50) > Tubular defects within the small bowel. Suggest correlation for parasitic  infection  < end of copied text >      # ? RUL Pneumothorax - excluded on CT   # Abnormal CTs: R spiculated apical mass - ? postinflammatory vs neoplastic.  No previous CTs for comparison.  I left a message for pt's sister Maureen at [phone] to clarify goals of care.  Pulmonary input d/w Dr. Linton - outpatient f/u discussed.  # Abnormal CT Abd: Tubular defects in small bowel, ? parasitic infection.  Stool O&P, PCR ordered.    # COVID-19 - Pt is COVID positive but asymptomatic at present - Monitor for any worsening in disease process  # DMII -FS wnl in hospital -last A1C 5.8 in july -okay to continue home metformin 500mg PO bid   # COPD -respiratory status stable -c/w home inhaler albuterol PRN    #HTN -continue amlodipine 5mg PO qd    #hx of acute subdural hematoma -f/u with neuro sx post discharge  # Recurrent Major Depression: - continue Effexor XR 75mg PO qd, Remeron 45mg PO qhs   # Hyponatremia: - continue NaCl tabs.  Monitor BMP.  # Inpatient DVT Prophylaxis - Lower extremity intermittent compression devices. Not placed on pharmacologic prophylaxis due to recent SDH.     I again left a message for pt's sister Maureen at [phone] 
#COVID-19  - Pt is COVID positive but asymptomatic at present  - Monitor for any worsening in disease process    #DMII  -FS wnl in hospital  -last A1C 5.8 in july  -okay to continue home metformin 500mg PO bid     #COPD  -respiratory status stable  -c/w home inhaler albuterol PRN     #HTN  -continue amlodipine 5mg PO qd     #hx of acute subdural hematoma  -per my colleague's documentation seen on CT in 8/2022  -f/u with neuro sx post discharge    Recurrent Major Depression:  - continue Effexor XR 75mg PO qd, Remeron 45mg PO qhs     Hyponatremia:  - was started on  sodium chloride 1gm PO bid by my colleague   will obtain  labs for the morning   -per my colleague's documentation  Effexor was reduced in dose to limit dose-dependent effects on sodium levels of antidepressants  - BMP ordered for 10/14/22 in AM, follow up  
82-year-old male PMH SDH, CHF, HTN, COPD, DM, Hyponatremia, admitted as border from [facility] due to COVID-19 positive.  He has been asymptomatic.  CXR performed on 10/13 showed ? RUL pneumothorax.  CT Chest was performed showing < from: CT Chest No Cont (10.14.22 @ 19:40) >  No pneumothorax.  Spiculated right apical mass may be postinflammatory or neoplastic.   Recommend comparison with prior studies or PET/CT.  Intrahepatic pneumobilia versus portal venous gas. Portal venous gas could reflect bowel ischemia. Limited evaluation without oral and IV  contrast and due to motion artifact and streak artifact. Recommend GI or  surgical evaluation and contrast-enhanced CT abdomen and pelvis.  < end of copied text >    # Abnormal CT - Intrahepatic pneumobilia vs portal venous gas.  ? Bowel ischemia.  Lactate not significantly elevated.  Pt asymptomatic.  Continue po intake.  Dedicated CT abdomen pelvis ordered.   # RUL Pneumothorax - excluded on CT   # R apical mass - ? postinflammatory vs neoplastic.  No previous CTs for comparison.  I left a message for pt's sister Maureen at [phone] to clarify goals of care.  Pulmonary input d/w Dr. Linton - outpatient f/u discussed.  # COVID-19 - Pt is COVID positive but asymptomatic at present - Monitor for any worsening in disease process  # DMII -FS wnl in hospital -last A1C 5.8 in july -okay to continue home metformin 500mg PO bid   # COPD -respiratory status stable -c/w home inhaler albuterol PRN    #HTN -continue amlodipine 5mg PO qd    #hx of acute subdural hematoma -f/u with neuro sx post discharge  # Recurrent Major Depression: - continue Effexor XR 75mg PO qd, Remeron 45mg PO qhs   # Hyponatremia: - continue NaCl tabs.  Monitor BMP.  # Inpatient DVT Prophylaxis - Lower extremity intermittent compression devices. Not placed on pharmacologic prophylaxis due to recent SDH.     I left a message for pt's sister Maureen at [phone]   Patient without active psychiatric issues - discussed with psychiatry Dr. Apodaca.  Transfer to medicine.  
82-year-old male PMH SDH, CHF, HTN, COPD, DM, Hyponatremia, admitted as border from [facility] due to COVID-19 positive.  He has been asymptomatic.  CXR performed on 10/13 showed ? RUL pneumothorax.      # ? RUL Pneumothorax - CXR reviewed with radiology from 10/13 - ? artifact.  I repeated CXR, RUL findings appear much less prominent.  CT Chest ordered to further evaluation.  Pt asymptomatic, SaO2 stable.   # COVID-19 - Pt is COVID positive but asymptomatic at present - Monitor for any worsening in disease process  # DMII -FS wnl in hospital -last A1C 5.8 in july -okay to continue home metformin 500mg PO bid   # COPD -respiratory status stable -c/w home inhaler albuterol PRN    #HTN -continue amlodipine 5mg PO qd    #hx of acute subdural hematoma -f/u with neuro sx post discharge  # Recurrent Major Depression: - continue Effexor XR 75mg PO qd, Remeron 45mg PO qhs   # Hyponatremia: - continue NaCl tabs.  Monitor BMP.  # Inpatient DVT Prophylaxis - Lower extremity intermittent compression devices. Not placed on pharmacologic prophylaxis due to recent SDH. 
  REVIEW OF SYMPTOMS      Able to give (reliable) ROS  NO     PHYSICAL EXAM    HEENT Unremarkable  atraumatic   RESP Fair air entry EXP prolonged    Harsh breath sound Resp distres mild   CARDIAC S1 S2 No S3     NO JVD    ABDOMEN SOFT BS PRESENT NOT DISTENDED No hepatosplenomegaly   PEDAL EDEMA present No calf tenderness  NO rash       AGE/SEX.   82 m   DOA.  10/11/2022  CC .  10/11/2022 depression  PRESENTING PROBLEMS .   Suspcious rul 1.8 cm spiculated nodule       PMH .  pmh recurrent majpor depression   pmh multiple inpatient psych admissions       ASSESSMENT/RECOMMENDATIONS .   RESP.  -- Gas exchange.  10/16/2022 ra 744/40/70   gas exchange is acceptable   -- RO VTE   D-dimr 10/15/2022 d-dimr 187    V duplx 10/16/2022 (-)  doubt vte   -- Lung mass.  Former 3 ppd smoker quit 1 m per pt  ct ch 10/14/2022 1.8 cm spiculated r apical mass coarse calcificatns postinflamm or neoplastic   Intrahepatic pneumobilia  10/15/2022 Patient has incidental lung nodule   Lung nodules size 8 mm to 3 cm are managed with CT at 3 months, PET-CT, or tissue smpl  In this patient given ho heavy smoking advanced age location upper lobe spiculated margins the chances of this being lung cancer are very high   Pl have pt see me in office asap  and i will arrange cts eval or get CTS eval right away if pt not likely to followup He will need pfts pet scan and resection of nodule   -- Pneumothorax.  10/13/2022 cxr 30% r apical pntx   10/14 ct ch no pntx  pntx excluded   -- COPD.   10/11 albuterol hfa p   cobt  -- COUGH.  10/11 guaifenesin   cobt   INFECTION.  W 10/15/2022 w 4.5   CARDIAC.  no active issues   check echo as part of preop [place]   GI.  -- INTRAHEPATIC PNEUMOBILIA   ct ch 10/14 portal venous gas which can be seen with bowel ischemia   ctap oc ic 10/16/2022 resolution prev suspectd pneumobilia   suggest gi or surgery eval   HEMAT.  Hb 10/15/2022 Hb 13.6   monitor   RENAL.  Na 10/15/2022 Na 133   Cr 10/15/2022 Cr .8   monitor   monitor   IV fl.  ENDOCRINE.   NEURO.  -- Psych problems  Follow with Psych    TIME SPENT   Over 25 minutes aggregate care time spent on encounter; activities included   direct patient care, counseling and/or coordinating care reviewing notes, lab data/ imaging , discussion with multidisciplinary team/ patient  /family and explaining in detail risks, benefits, alternatives  of the recommendations     SOHAIL YANEZ 10/11/2022 1941 [address]   
REVIEW OF SYMPTOMS      Able to give (reliable) ROS  NO     PHYSICAL EXAM    HEENT Unremarkable  atraumatic   RESP Fair air entry EXP prolonged    Harsh breath sound Resp distres mild   CARDIAC S1 S2 No S3     NO JVD    ABDOMEN SOFT BS PRESENT NOT DISTENDED No hepatosplenomegaly   PEDAL EDEMA present No calf tenderness  NO rash       AGE/SEX.   82 m   DOA.  10/11/2022  CC .  10/11/2022 depression  PRESENTING PROBLEMS .   Suspcious rul 1.8 cm spiculated nodule   COURSE.         PMH .  pmh recurrent majpor depression   pmh multiple inpatient psych admissions     pmh  pmh   pmh       GENERAL DATA .   GOC.      10/15/2022 full code   ALLGY.       nka                     WT. 10/15/2022 67  BMI.        10/15/2022 25                      ICU STAY. none  COVID.      BEST PRACTICE ISSUES.    HOB ELEVATN. Yes  INFN PPLX.    SP SW GUERDA.        DIET.   10/11 soft bite size              ASSESSMENT/RECOMMENDATIONS .   RESP.  -- Gas exchange.  10/16/2022 ra 744/40/70   gas exchange is acceptable   -- RO VTE   D-dimr 10/15/2022 d-dimr 187    V duplx 10/16/2022 (-)  doubt vte   -- Lung mass.  Former 3 ppd smoker quit 1 m per pt  ct ch 10/14/2022 1.8 cm spiculated r apical mass coarse calcificatns postinflamm or neoplastic   Intrahepatic pneumobilia  10/15/2022 Patient has incidental lung nodule   Lung nodules size 8 mm to 3 cm are managed with CT at 3 months, PET-CT, or tissue smpl  In this patient given ho heavy smoking advanced age location upper lobe spiculated margins the chances of this being lung cancer are very high   Pl have pt see me in office asap  and i will arrange cts eval or get CTS eval right away if pt not likely to followup He will need pfts pet scan and resection of nodule   -- Pneumothorax.  10/13/2022 cxr 30% r apical pntx   10/14 ct ch no pntx  pntx excluded   -- COPD.   10/11 albuterol hfa p   cobt  -- COUGH.  10/11 guaifenesin   cobt   INFECTION.  W 10/15/2022 w 4.5   CARDIAC.  no active issues   check echo as part of preop [place]   GI.  -- INTRAHEPATIC PNEUMOBILIA   ct ch 10/14 portal venous gas which can be seen with bowel ischemia   ctap oc ic 10/16/2022 resolution prev suspectd pneumobilia   suggest gi or surgery eval   HEMAT.  Hb 10/15/2022 Hb 13.6   monitor   RENAL.  Na 10/15/2022 Na 133   Cr 10/15/2022 Cr .8   monitor   monitor   IV fl.  ENDOCRINE.   NEURO.  -- Psych problems  Follow with Psych    TIME SPENT   Over 25 minutes aggregate care time spent on encounter; activities included   direct patient care, counseling and/or coordinating care reviewing notes, lab data/ imaging , discussion with multidisciplinary team/ patient  /family and explaining in detail risks, benefits, alternatives  of the recommendations     SOHAIL YANEZ 10/11/2022 1941 [address]ER

## 2022-10-17 NOTE — BH CONSULTATION LIAISON PROGRESS NOTE - OTHER
adequate  low end of fair  MMSE to be done later see above  deferred at this time  = significant perioral TD? mouth agape with difficulty talking/displaying affect which is highly likely chronic  "ok'  poverty of content  + paucity  of thought

## 2022-10-17 NOTE — BH CONSULTATION LIAISON PROGRESS NOTE - NSBHCONSULTFOLLOW_PSY_ALL_CORE
Yes...
No, psychiatric follow up needed - call with questions...
No, psychiatric follow up needed - call with questions...
Yes...

## 2022-10-17 NOTE — BH CONSULTATION LIAISON PROGRESS NOTE - NSICDXBHPRIMARYDX_PSY_ALL_CORE
Major depression, recurrent, chronic   F33.9  

## 2022-10-17 NOTE — BH CONSULTATION LIAISON PROGRESS NOTE - CURRENT MEDICATION
MEDICATIONS  (STANDING):  amLODIPine   Tablet 5 milliGRAM(s) Oral daily  metFORMIN 500 milliGRAM(s) Oral two times a day  mirtazapine 45 milliGRAM(s) Oral at bedtime  sodium chloride 1 Gram(s) Oral two times a day  venlafaxine XR. 75 milliGRAM(s) Oral daily    MEDICATIONS  (PRN):  ALBUTerol    90 MICROgram(s) HFA Inhaler 2 Puff(s) Inhalation every 6 hours PRN Shortness of Breath and/or Wheezing  guaiFENesin Oral Liquid (Sugar-Free) 100 milliGRAM(s) Oral every 6 hours PRN Cough  OLANZapine 2.5 milliGRAM(s) Oral two times a day PRN agitation  polyethylene glycol 3350 17 Gram(s) Oral daily PRN Constipation  
MEDICATIONS  (STANDING):  amLODIPine   Tablet 5 milliGRAM(s) Oral daily  metFORMIN 500 milliGRAM(s) Oral two times a day  mirtazapine 45 milliGRAM(s) Oral at bedtime  sodium chloride 1 Gram(s) Oral two times a day  venlafaxine XR. 75 milliGRAM(s) Oral daily    MEDICATIONS  (PRN):  ALBUTerol    90 MICROgram(s) HFA Inhaler 2 Puff(s) Inhalation every 6 hours PRN Shortness of Breath and/or Wheezing  guaiFENesin Oral Liquid (Sugar-Free) 100 milliGRAM(s) Oral every 6 hours PRN Cough  OLANZapine 2.5 milliGRAM(s) Oral two times a day PRN agitation  polyethylene glycol 3350 17 Gram(s) Oral daily PRN Constipation  
MEDICATIONS  (STANDING):  amLODIPine   Tablet 5 milliGRAM(s) Oral daily  enoxaparin Injectable 40 milliGRAM(s) SubCutaneous every 24 hours  mirtazapine 45 milliGRAM(s) Oral at bedtime  sodium chloride 1 Gram(s) Oral two times a day  venlafaxine XR. 75 milliGRAM(s) Oral daily    MEDICATIONS  (PRN):  ALBUTerol    90 MICROgram(s) HFA Inhaler 2 Puff(s) Inhalation every 6 hours PRN Shortness of Breath and/or Wheezing  guaiFENesin Oral Liquid (Sugar-Free) 100 milliGRAM(s) Oral every 6 hours PRN Cough  OLANZapine 2.5 milliGRAM(s) Oral two times a day PRN agitation  polyethylene glycol 3350 17 Gram(s) Oral daily PRN Constipation  
MEDICATIONS  (STANDING):  amLODIPine   Tablet 5 milliGRAM(s) Oral daily  metFORMIN 500 milliGRAM(s) Oral two times a day  mirtazapine 45 milliGRAM(s) Oral at bedtime  sodium chloride 1 Gram(s) Oral two times a day  venlafaxine XR. 75 milliGRAM(s) Oral daily    MEDICATIONS  (PRN):  ALBUTerol    90 MICROgram(s) HFA Inhaler 2 Puff(s) Inhalation every 6 hours PRN Shortness of Breath and/or Wheezing  guaiFENesin Oral Liquid (Sugar-Free) 100 milliGRAM(s) Oral every 6 hours PRN Cough  OLANZapine 2.5 milliGRAM(s) Oral two times a day PRN agitation  polyethylene glycol 3350 17 Gram(s) Oral daily PRN Constipation

## 2022-10-17 NOTE — PROGRESS NOTE ADULT - SUBJECTIVE AND OBJECTIVE BOX
Patient: RENATA SAUCEDA [id number] 82y Male                            Hospitalist Attending Note    Denies complaints.  Denies SOB / chest pain.  No Abdominal pain, nausea, vomiting, diarrhea, nor constipation.  Tolerating po intake.   SaO2 95% on RA    ____________________PHYSICAL EXAM:  GENERAL:  NAD, alert, interactive.   HEENT: NCAT  CARDIOVASCULAR:  S1, S2  LUNGS: CTAB, equal breath sounds.   ABDOMEN:  soft, (-) tenderness, (-) distension, (+) bowel sounds, (-) guarding, (-) rebound (-) rigidity  EXTREMITIES:  no cyanosis / clubbing / edema.   ____________________    VITALS:  Vital Signs Last 24 Hrs  T(C): 37.1 (16 Oct 2022 16:33), Max: 37.1 (16 Oct 2022 16:33)  T(F): 98.7 (16 Oct 2022 16:33), Max: 98.7 (16 Oct 2022 16:33)  HR: 61 (16 Oct 2022 16:33) (61 - 72)  BP: 144/75 (16 Oct 2022 16:33) (127/79 - 145/74)  BP(mean): --  RR: 18 (16 Oct 2022 16:33) (17 - 18)  SpO2: 95% (16 Oct 2022 16:33) (95% - 96%)    Parameters below as of 16 Oct 2022 05:00  Patient On (Oxygen Delivery Method): room air     Daily     Daily   CAPILLARY BLOOD GLUCOSE        I&O's Summary      LABS:                        13.6   4.58  )-----------( 263      ( 15 Oct 2022 12:00 )             39.9     10-15    133<L>  |  98  |  15  ----------------------------<  97  4.0   |  29  |  0.81    Ca    9.2      15 Oct 2022 12:00    TPro  7.3  /  Alb  3.5  /  TBili  0.3  /  DBili  x   /  AST  13<L>  /  ALT  16  /  AlkPhos  42  10-15      LIVER FUNCTIONS - ( 15 Oct 2022 12:00 )  Alb: 3.5 g/dL / Pro: 7.3 gm/dL / ALK PHOS: 42 U/L / ALT: 16 U/L / AST: 13 U/L / GGT: x                     MEDICATIONS:  ALBUTerol    90 MICROgram(s) HFA Inhaler 2 Puff(s) Inhalation every 6 hours PRN  amLODIPine   Tablet 5 milliGRAM(s) Oral daily  enoxaparin Injectable 40 milliGRAM(s) SubCutaneous every 24 hours  guaiFENesin Oral Liquid (Sugar-Free) 100 milliGRAM(s) Oral every 6 hours PRN  mirtazapine 45 milliGRAM(s) Oral at bedtime  OLANZapine 2.5 milliGRAM(s) Oral two times a day PRN  polyethylene glycol 3350 17 Gram(s) Oral daily PRN  sodium chloride 1 Gram(s) Oral two times a day  venlafaxine XR. 75 milliGRAM(s) Oral daily    
                          Patient: RENATA SAUCEDA [id number] 82y Male                            Hospitalist Attending Note    Denies complaints.  Denies SOB / chest pain.  SaO2 95% on RA    ____________________PHYSICAL EXAM:  GENERAL:  NAD, alert, interactive.   HEENT: NCAT  CARDIOVASCULAR:  S1, S2  LUNGS: CTAB, equal breath sounds.   ABDOMEN:  soft, (-) tenderness, (-) distension, (+) bowel sounds, (-) guarding, (-) rebound (-) rigidity  EXTREMITIES:  no cyanosis / clubbing / edema.   ____________________     VITALS:  Vital Signs Last 24 Hrs  T(C): 36.5 (14 Oct 2022 17:07), Max: 36.7 (13 Oct 2022 23:41)  T(F): 97.7 (14 Oct 2022 17:07), Max: 98.1 (13 Oct 2022 23:41)  HR: 63 (14 Oct 2022 17:07) (63 - 72)  BP: 153/87 (14 Oct 2022 17:07) (127/72 - 164/79)  BP(mean): --  RR: 17 (14 Oct 2022 17:07) (17 - 18)  SpO2: 95% (14 Oct 2022 17:07) (94% - 95%)     Daily     Daily   CAPILLARY BLOOD GLUCOSE        I&O's Summary      HISTORY:  PAST MEDICAL & SURGICAL HISTORY:  HTN (hypertension)      COPD, severity to be determined      CHF (congestive heart failure)      Hyponatremia      Chronic subdural hematoma      Chronic subdural hematoma      No significant past surgical history      Allergies    No Known Allergies    Intolerances       LABS:    10-14    132<L>  |  97  |  15  ----------------------------<  96  3.8   |  27  |  0.87    Ca    8.7      14 Oct 2022 07:55                    MEDICATIONS:  MEDICATIONS  (STANDING):  amLODIPine   Tablet 5 milliGRAM(s) Oral daily  metFORMIN 500 milliGRAM(s) Oral two times a day  mirtazapine 45 milliGRAM(s) Oral at bedtime  sodium chloride 1 Gram(s) Oral two times a day  venlafaxine XR. 75 milliGRAM(s) Oral daily    MEDICATIONS  (PRN):  ALBUTerol    90 MICROgram(s) HFA Inhaler 2 Puff(s) Inhalation every 6 hours PRN Shortness of Breath and/or Wheezing  guaiFENesin Oral Liquid (Sugar-Free) 100 milliGRAM(s) Oral every 6 hours PRN Cough  OLANZapine 2.5 milliGRAM(s) Oral two times a day PRN agitation  polyethylene glycol 3350 17 Gram(s) Oral daily PRN Constipation  
    RENATA SAUCEDA    LVS 2E 291 D    Allergies    No Known Allergies    Intolerances        PAST MEDICAL & SURGICAL HISTORY:  HTN (hypertension)      COPD, severity to be determined      CHF (congestive heart failure)      Hyponatremia      Chronic subdural hematoma      Chronic subdural hematoma      No significant past surgical history          FAMILY HISTORY:  No pertinent family history in first degree relatives        Home Medications:  albuterol 90 mcg/inh inhalation aerosol: 2 puff(s) inhaled every 6 hours, As needed, Shortness of Breath and/or Wheezing (13 Oct 2022 13:49)  amLODIPine 5 mg oral tablet: 1 tab(s) orally once a day (13 Oct 2022 13:49)  budesonide-formoterol 80 mcg-4.5 mcg/inh inhalation aerosol: 2 puff(s) inhaled once a day (12 Sep 2022 14:39)  metFORMIN 500 mg oral tablet: 1 tab(s) orally 2 times a day (13 Oct 2022 13:49)  mirtazapine 45 mg oral tablet: 1 tab(s) orally once a day (at bedtime) (13 Oct 2022 13:49)  polyethylene glycol 3350 oral powder for reconstitution: 17 gram(s) orally once a day, As needed, Constipation (13 Oct 2022 13:49)  sodium chloride 1 g oral tablet: 1 tab(s) orally 2 times a day (13 Oct 2022 13:49)  venlafaxine 75 mg oral capsule, extended release: 1 cap(s) orally once a day (11 Oct 2022 13:23)      MEDICATIONS  (STANDING):  amLODIPine   Tablet 5 milliGRAM(s) Oral daily  enoxaparin Injectable 40 milliGRAM(s) SubCutaneous every 24 hours  mirtazapine 45 milliGRAM(s) Oral at bedtime  sodium chloride 1 Gram(s) Oral two times a day  venlafaxine XR. 75 milliGRAM(s) Oral daily    MEDICATIONS  (PRN):  ALBUTerol    90 MICROgram(s) HFA Inhaler 2 Puff(s) Inhalation every 6 hours PRN Shortness of Breath and/or Wheezing  guaiFENesin Oral Liquid (Sugar-Free) 100 milliGRAM(s) Oral every 6 hours PRN Cough  OLANZapine 2.5 milliGRAM(s) Oral two times a day PRN agitation  polyethylene glycol 3350 17 Gram(s) Oral daily PRN Constipation              Vital Signs Last 24 Hrs  T(C): 36.6 (17 Oct 2022 05:30), Max: 37.1 (16 Oct 2022 16:33)  T(F): 97.8 (17 Oct 2022 05:30), Max: 98.7 (16 Oct 2022 16:33)  HR: 66 (17 Oct 2022 05:30) (61 - 71)  BP: 160/73 (17 Oct 2022 05:30) (137/79 - 160/73)  BP(mean): --  RR: 18 (17 Oct 2022 05:30) (18 - 18)  SpO2: 96% (17 Oct 2022 05:30) (95% - 96%)          10-16-22 @ 07:01  -  10-17-22 @ 07:00  --------------------------------------------------------  IN: 240 mL / OUT: 0 mL / NET: 240 mL              LABS:                        13.6   4.58  )-----------( 263      ( 15 Oct 2022 12:00 )             39.9     10-15    133<L>  |  98  |  15  ----------------------------<  97  4.0   |  29  |  0.81    Ca    9.2      15 Oct 2022 12:00    TPro  7.3  /  Alb  3.5  /  TBili  0.3  /  DBili  x   /  AST  13<L>  /  ALT  16  /  AlkPhos  42  10-15          ABG - ( 16 Oct 2022 06:26 )  pH, Arterial: 7.44  pH, Blood: x     /  pCO2: 40    /  pO2: 70    / HCO3: 27    / Base Excess: 2.8   /  SaO2: 96.0                WBC:  WBC Count: 4.58 K/uL (10-15 @ 12:00)      MICROBIOLOGY:  RECENT CULTURES:                  Sodium:  Sodium, Serum: 133 mmol/L (10-15 @ 12:00)  Sodium, Serum: 132 mmol/L (10-14 @ 07:55)      0.81 mg/dL 10-15 @ 12:00  0.87 mg/dL 10-14 @ 07:55      Hemoglobin:  Hemoglobin: 13.6 g/dL (10-15 @ 12:00)      Platelets: Platelet Count - Automated: 263 K/uL (10-15 @ 12:00)      LIVER FUNCTIONS - ( 15 Oct 2022 12:00 )  Alb: 3.5 g/dL / Pro: 7.3 gm/dL / ALK PHOS: 42 U/L / ALT: 16 U/L / AST: 13 U/L / GGT: x                 RADIOLOGY & ADDITIONAL STUDIES:      MICROBIOLOGY:  RECENT CULTURES:          
                          Patient: RENATA SAUCEDA [id number] 82y Male                            Hospitalist Attending Note    Denies complaints.  Denies SOB / chest pain.  No Abdominal pain, nausea, vomiting, diarrhea, nor constipation.  Tolerating po intake.   SaO2 95% on RA    ____________________PHYSICAL EXAM:  GENERAL:  NAD, alert, interactive.   HEENT: NCAT  CARDIOVASCULAR:  S1, S2  LUNGS: CTAB, equal breath sounds.   ABDOMEN:  soft, (-) tenderness, (-) distension, (+) bowel sounds, (-) guarding, (-) rebound (-) rigidity  EXTREMITIES:  no cyanosis / clubbing / edema.   ____________________    VITALS:  Vital Signs Last 24 Hrs  T(C): 36.4 (17 Oct 2022 11:40), Max: 37.1 (16 Oct 2022 16:33)  T(F): 97.5 (17 Oct 2022 11:40), Max: 98.7 (16 Oct 2022 16:33)  HR: 65 (17 Oct 2022 11:40) (61 - 67)  BP: 133/82 (17 Oct 2022 11:40) (133/82 - 160/73)  BP(mean): --  RR: 18 (17 Oct 2022 11:40) (18 - 18)  SpO2: 98% (17 Oct 2022 11:40) (95% - 98%)     Daily     Daily   CAPILLARY BLOOD GLUCOSE        I&O's Summary    16 Oct 2022 07:01  -  17 Oct 2022 07:00  --------------------------------------------------------  IN: 240 mL / OUT: 0 mL / NET: 240 mL        LABS:                        MEDICATIONS:  ALBUTerol    90 MICROgram(s) HFA Inhaler 2 Puff(s) Inhalation every 6 hours PRN  amLODIPine   Tablet 5 milliGRAM(s) Oral daily  enoxaparin Injectable 40 milliGRAM(s) SubCutaneous every 24 hours  guaiFENesin Oral Liquid (Sugar-Free) 100 milliGRAM(s) Oral every 6 hours PRN  mirtazapine 45 milliGRAM(s) Oral at bedtime  OLANZapine 2.5 milliGRAM(s) Oral two times a day PRN  polyethylene glycol 3350 17 Gram(s) Oral daily PRN  sodium chloride 1 Gram(s) Oral two times a day  venlafaxine XR. 75 milliGRAM(s) Oral daily    
    RENATA SAUCEDA    LVS 2E 291 D    Allergies    No Known Allergies    Intolerances        PAST MEDICAL & SURGICAL HISTORY:  HTN (hypertension)      COPD, severity to be determined      CHF (congestive heart failure)      Hyponatremia      Chronic subdural hematoma      Chronic subdural hematoma      No significant past surgical history          FAMILY HISTORY:  No pertinent family history in first degree relatives        Home Medications:  albuterol 90 mcg/inh inhalation aerosol: 2 puff(s) inhaled every 6 hours, As needed, Shortness of Breath and/or Wheezing (13 Oct 2022 13:49)  amLODIPine 5 mg oral tablet: 1 tab(s) orally once a day (13 Oct 2022 13:49)  budesonide-formoterol 80 mcg-4.5 mcg/inh inhalation aerosol: 2 puff(s) inhaled once a day (12 Sep 2022 14:39)  metFORMIN 500 mg oral tablet: 1 tab(s) orally 2 times a day (13 Oct 2022 13:49)  mirtazapine 45 mg oral tablet: 1 tab(s) orally once a day (at bedtime) (13 Oct 2022 13:49)  polyethylene glycol 3350 oral powder for reconstitution: 17 gram(s) orally once a day, As needed, Constipation (13 Oct 2022 13:49)  sodium chloride 1 g oral tablet: 1 tab(s) orally 2 times a day (13 Oct 2022 13:49)  venlafaxine 75 mg oral capsule, extended release: 1 cap(s) orally once a day (11 Oct 2022 13:23)      MEDICATIONS  (STANDING):  amLODIPine   Tablet 5 milliGRAM(s) Oral daily  enoxaparin Injectable 40 milliGRAM(s) SubCutaneous every 24 hours  mirtazapine 45 milliGRAM(s) Oral at bedtime  sodium chloride 1 Gram(s) Oral two times a day  venlafaxine XR. 75 milliGRAM(s) Oral daily    MEDICATIONS  (PRN):  ALBUTerol    90 MICROgram(s) HFA Inhaler 2 Puff(s) Inhalation every 6 hours PRN Shortness of Breath and/or Wheezing  guaiFENesin Oral Liquid (Sugar-Free) 100 milliGRAM(s) Oral every 6 hours PRN Cough  OLANZapine 2.5 milliGRAM(s) Oral two times a day PRN agitation  polyethylene glycol 3350 17 Gram(s) Oral daily PRN Constipation              Vital Signs Last 24 Hrs  T(C): 36.6 (16 Oct 2022 05:00), Max: 36.6 (16 Oct 2022 05:00)  T(F): 97.8 (16 Oct 2022 05:00), Max: 97.8 (16 Oct 2022 05:00)  HR: 63 (16 Oct 2022 05:00) (63 - 72)  BP: 127/79 (16 Oct 2022 05:00) (127/79 - 145/74)  BP(mean): --  RR: 17 (16 Oct 2022 05:00) (17 - 17)  SpO2: 95% (16 Oct 2022 05:00) (95% - 96%)    Parameters below as of 16 Oct 2022 05:00  Patient On (Oxygen Delivery Method): room air                  LABS:                        13.6   4.58  )-----------( 263      ( 15 Oct 2022 12:00 )             39.9     10-15    133<L>  |  98  |  15  ----------------------------<  97  4.0   |  29  |  0.81    Ca    9.2      15 Oct 2022 12:00    TPro  7.3  /  Alb  3.5  /  TBili  0.3  /  DBili  x   /  AST  13<L>  /  ALT  16  /  AlkPhos  42  10-15          ABG - ( 16 Oct 2022 06:26 )  pH, Arterial: 7.44  pH, Blood: x     /  pCO2: 40    /  pO2: 70    / HCO3: 27    / Base Excess: 2.8   /  SaO2: 96.0                WBC:  WBC Count: 4.58 K/uL (10-15 @ 12:00)      MICROBIOLOGY:  RECENT CULTURES:                  Sodium:  Sodium, Serum: 133 mmol/L (10-15 @ 12:00)  Sodium, Serum: 132 mmol/L (10-14 @ 07:55)      0.81 mg/dL 10-15 @ 12:00  0.87 mg/dL 10-14 @ 07:55      Hemoglobin:  Hemoglobin: 13.6 g/dL (10-15 @ 12:00)      Platelets: Platelet Count - Automated: 263 K/uL (10-15 @ 12:00)      LIVER FUNCTIONS - ( 15 Oct 2022 12:00 )  Alb: 3.5 g/dL / Pro: 7.3 gm/dL / ALK PHOS: 42 U/L / ALT: 16 U/L / AST: 13 U/L / GGT: x                 RADIOLOGY & ADDITIONAL STUDIES:      MICROBIOLOGY:  RECENT CULTURES:          
                          Patient: RENATA SAUCEDA [id number] 82y Male                            Hospitalist Attending Note    Denies complaints.  Denies SOB / chest pain.  No Abdominal pain, nausea, vomiting, diarrhea, nor constipation.  Tolerating po intake.   SaO2 95% on RA    ____________________PHYSICAL EXAM:  GENERAL:  NAD, alert, interactive.   HEENT: NCAT  CARDIOVASCULAR:  S1, S2  LUNGS: CTAB, equal breath sounds.   ABDOMEN:  soft, (-) tenderness, (-) distension, (+) bowel sounds, (-) guarding, (-) rebound (-) rigidity  EXTREMITIES:  no cyanosis / clubbing / edema.   ____________________      VITALS:  Vital Signs Last 24 Hrs  T(C): 36.4 (15 Oct 2022 10:57), Max: 37 (15 Oct 2022 05:21)  T(F): 97.5 (15 Oct 2022 10:57), Max: 98.6 (15 Oct 2022 05:21)  HR: 78 (15 Oct 2022 10:57) (62 - 78)  BP: 157/82 (15 Oct 2022 10:57) (129/75 - 157/82)  BP(mean): --  RR: 16 (15 Oct 2022 10:57) (16 - 17)  SpO2: 96% (15 Oct 2022 10:57) (91% - 96%)     Daily     Daily   CAPILLARY BLOOD GLUCOSE        I&O's Summary      LABS:                        13.6   4.58  )-----------( 263      ( 15 Oct 2022 12:00 )             39.9     10-15    133<L>  |  98  |  15  ----------------------------<  97  4.0   |  29  |  0.81    Ca    9.2      15 Oct 2022 12:00    TPro  7.3  /  Alb  3.5  /  TBili  0.3  /  DBili  x   /  AST  13<L>  /  ALT  16  /  AlkPhos  42  10-15      LIVER FUNCTIONS - ( 15 Oct 2022 12:00 )  Alb: 3.5 g/dL / Pro: 7.3 gm/dL / ALK PHOS: 42 U/L / ALT: 16 U/L / AST: 13 U/L / GGT: x                     MEDICATIONS:  ALBUTerol    90 MICROgram(s) HFA Inhaler 2 Puff(s) Inhalation every 6 hours PRN  amLODIPine   Tablet 5 milliGRAM(s) Oral daily  guaiFENesin Oral Liquid (Sugar-Free) 100 milliGRAM(s) Oral every 6 hours PRN  metFORMIN 500 milliGRAM(s) Oral two times a day  mirtazapine 45 milliGRAM(s) Oral at bedtime  OLANZapine 2.5 milliGRAM(s) Oral two times a day PRN  polyethylene glycol 3350 17 Gram(s) Oral daily PRN  sodium chloride 1 Gram(s) Oral two times a day  venlafaxine XR. 75 milliGRAM(s) Oral daily    
82-year-old male currently residing at [facility] x 3.5 years. PPH of recurrent major depression, last MMSE 29/30 at [facility],  multiple prior inpatient psychiatric admissions, remote hx of suicidal ideation with general plan but no attempts (intentions to throw him self off the bridge in 1970s), with no hx of aggression or violence, no hx of substance abuse, no legal  issues, who was admitted to [place] [address] [facility] on 9/12/22 as a transfer from [facility] medical floors where he was admitted for dehydration and followed by  Psychiatry.      Patient has no acute complaints and is comfortably laying in bed and cooperative. Reports no suicidal thoughts. Denies SOB, CP, fever, palpitations, constipation, diarrhea, nausea, headache.    Patient is a 82y old  Male who presents with a chief complaint of Administrative transfer from [place] [address] to [address] for boarding due to COVID-19 outbreak at [facility] (12 Oct 2022 05:00)      OVERNIGHT EVENTS:    none       MEDICATIONS  (STANDING):  amLODIPine   Tablet 5 milliGRAM(s) Oral daily  metFORMIN 500 milliGRAM(s) Oral two times a day  mirtazapine 45 milliGRAM(s) Oral at bedtime  sodium chloride 1 Gram(s) Oral two times a day  venlafaxine XR. 75 milliGRAM(s) Oral daily    MEDICATIONS  (PRN):  ALBUTerol    90 MICROgram(s) HFA Inhaler 2 Puff(s) Inhalation every 6 hours PRN Shortness of Breath and/or Wheezing  guaiFENesin Oral Liquid (Sugar-Free) 100 milliGRAM(s) Oral every 6 hours PRN Cough  OLANZapine 2.5 milliGRAM(s) Oral two times a day PRN agitation  polyethylene glycol 3350 17 Gram(s) Oral daily PRN Constipation      Allergies    No Known Allergies    Intolerances        SUBJECTIVE: in bed in NAD, no acute events overnight     T(F): 97.4 (10-13-22 @ 10:58), Max: 98.7 (10-12-22 @ 17:20)  HR: 70 (10-13-22 @ 10:58) (70 - 75)  BP: 101/64 (10-13-22 @ 10:58) (101/64 - 148/84)  RR: 17 (10-13-22 @ 10:58) (17 - 18)  SpO2: 93% (10-13-22 @ 10:58) (90% - 97%)  Wt(kg): --    PHYSICAL EXAM:  GENERAL: NAD, well-groomed, well-developed  HEAD:  Atraumatic, Normocephalic  EYES: EOMI, PERRLA, conjunctiva and sclera clear  ENMT: No tonsillar erythema, exudates, or enlargement; Moist mucous membranes, Good dentition, No lesions  NECK: Supple,   CHEST/LUNG: Clear to  auscultation bilaterally; No rales, rhonchi, wheezing, or rubs  bilaterally  HEART: Regular rate and rhythm; No murmurs, rubs, or gallops  ABDOMEN: Soft, Nontender, Nondistended; Bowel sounds present  EXTREMITIES:  2+ Peripheral Pulses, No clubbing, cyanosis, or edema BL LE  SKIN: No rashes or lesions  NERVOUS SYSTEM:  Alert & Oriented X3, Good concentration; Motor Strength 5/5 B/L upper and lower extremities;   DTRs 2+ intact and symmetric, sensation intact BL    LABS:    10-12    128<L>  |  94<L>  |  10  ----------------------------<  112<H>  3.5   |  24  |  0.68    Ca    9.0      12 Oct 2022 07:50          Cultures;   CAPILLARY BLOOD GLUCOSE        Lipid panel:           RADIOLOGY & ADDITIONAL TESTS:      Imaging Personally Reviewed:  [ x] YES      Consultant(s) Notes Reviewed:  [x ] YES     Care Discussed with [x ] Consultants [X ] Patient [x ] Family  [x ]    [x ]  Other; RN

## 2022-10-17 NOTE — DISCHARGE NOTE NURSING/CASE MANAGEMENT/SOCIAL WORK - NSDCPEFALRISK_GEN_ALL_CORE
For information on Fall & Injury Prevention, visit: [email or web address] OR  [email or web address] OR  https://www.cdc.gov/steadi/patient.html

## 2022-10-17 NOTE — PROGRESS NOTE ADULT - REASON FOR ADMISSION
Administrative transfer from [facility] Unit [facility] to [facility] Unit 2E for boarding due to COVID-19 outbreak at [facility]
Administrative transfer from [facility] Unit [facility] 5 to [facility] Unit 2E for boarding due to COVID-19 outbreak at [facility]
Administrative transfer from [facility] Unit [facility] 5 to [facility] Unit 2E for boarding due to COVID-19 outbreak at [facility]
Administrative transfer from [facility] Unit Low 5 to [facility] Unit 2E for boarding due to COVID-19 outbreak at [facility]
Administrative transfer from [facility] Unit [facility] 5 to [facility] Unit 2E for boarding due to COVID-19 outbreak at [facility]
Administrative transfer from [facility] Unit Low 5 to [facility] Unit 2E for boarding due to COVID-19 outbreak at [facility]
Administrative transfer from [facility] Unit [facility] 5 to [facility] Unit 2E for boarding due to COVID-19 outbreak at [facility]

## 2022-10-17 NOTE — BH CONSULTATION LIAISON PROGRESS NOTE - NSBHCONSULTRECOMMENDOTHER_PSY_A_CORE FT
#DMII  -FS wnl in hospital; last A1C 5.8 in july  -okay to continue home metformin 500mg PO bid     #COPD  -respiratory status stable; c/w home inhaler albuterol PRN     #HTN; continue amlodipine 5mg PO qd     #hx of acute subdural hematoma; seen on [facility] in 8/2022 and it resolved on repeat CT scan on 9/22/22  -f/u with neuro sx post discharge    Recurrent Major Depression: continue Effexor XR 75mg PO qd, Remeron 45mg PO qhs     Hyponatremia:  - continue sodium chloride 1gm PO bid; sodium 128 on 10/12/22 at baseline range   - Effexor was reduced in dose to limit dose-dependent effects on sodium levels of antidepressants    
#DMII  -FS wnl in hospital; last A1C 5.8 in july  - metformin 500mg PO bid     #COPD  -respiratory status stable; c/w home inhaler albuterol PRN     #HTN; continue amlodipine 5mg PO qd     #hx of acute subdural hematoma; seen on [facility] in 8/2022 and it resolved on repeat CT scan on 9/22/22; has neurosurgical follow up established     Recurrent Major Depression: continue Effexor XR 75mg PO qd, Remeron 45mg PO qhs     Hyponatremia: BMP 10/16/22  - continue sodium chloride 1gm PO bid; sodium 128 on 10/12/22 at baseline range   - Effexor was reduced in dose to limit dose-dependent effects on sodium levels of antidepressants    
#DMII  -FS wnl in hospital; last A1C 5.8 in july  -okay to continue home metformin 500mg PO bid     #COPD  -respiratory status stable; c/w home inhaler albuterol PRN     #HTN; continue amlodipine 5mg PO qd     #hx of acute subdural hematoma; seen on [facility] in 8/2022 and it resolved on repeat CT scan on 9/22/22  -f/u with neuro sx post discharge    Recurrent Major Depression: continue Effexor XR 75mg PO qd, Remeron 45mg PO qhs     Hyponatremia:  - continue sodium chloride 1gm PO bid; sodium 128 on 10/12/22 at baseline range   - Effexor was reduced in dose to limit dose-dependent effects on sodium levels of antidepressants    
#DMII  -FS wnl in hospital; last A1C 5.8 in july  - metformin 500mg PO bid     #COPD  -respiratory status stable; c/w home inhaler albuterol PRN     #HTN; continue amlodipine 5mg PO qd     #hx of acute subdural hematoma; seen on [facility] in 8/2022 and it resolved on repeat CT scan on 9/22/22; has neurosurgical follow up established     Recurrent Major Depression: continue Effexor XR 75mg PO qd, Remeron 45mg PO qhs     Hyponatremia: BMP 10/16/22  - continue sodium chloride 1gm PO bid; sodium 133 on 10/15/22 at high end of baseline range   - Effexor was reduced in dose to limit dose-dependent effects on sodium levels of antidepressants

## 2022-10-17 NOTE — CONSULT NOTE ADULT - NS ATTEND AMEND GEN_ALL_CORE FT
I have seen and examined the patient and have reviewed the CT scan. 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 Distal pulses are adequate and no complications.  plan-- no need for vascular W/U or treatment.

## 2022-10-17 NOTE — BH CONSULTATION LIAISON PROGRESS NOTE - NSBHCHARTREVIEWLAB_PSY_A_CORE FT
no new labs today
reviewed 
 10-12    128<L>  |  94<L>  |  10  ----------------------------<  112<H>  3.5   |  24  |  0.68    Ca    9.0      12 Oct 2022 07:50    
10-14    132<L>  |  97  |  15  ----------------------------<  96  3.8   |  27  |  0.87    Ca    8.7      14 Oct 2022 07:55

## 2022-10-17 NOTE — BH CONSULTATION LIAISON PROGRESS NOTE - NSBHCHARTREVIEWINVESTIGATE_PSY_A_CORE FT
9/22/22 CT head - resolution of previous subdural hematoma
. CT Abdomen and Pelvis w/ Oral Cont and w/ IV Cont (10.15.22 @ 23:50) Resolution previously suspected pneumobilia or portal venous gas. Tubular defects within the small bowel. Suggest correlation for parasitic infection

## 2022-10-17 NOTE — BH CONSULTATION LIAISON PROGRESS NOTE - NSBHMSEGAIT_PSY_A_CORE
Patient a/oX 3, anxious, c/o of SOB and weakness, lightheadedness, no chest pain.  Vital signs stable, NSR on cardiac monitor.  Right AC PIV #20 in place, all labs sent, no complications.  Xray done.  NSS 1 L ongoing 125ml/hr. POC glucose 81.  Results and disposition pending.
Other

## 2022-10-17 NOTE — CONSULT NOTE ADULT - SUBJECTIVE AND OBJECTIVE BOX
Vascular Attending:      HPI: Vascular surgery called for evaluation of CT finding of infrarenal aneurysm.   Patient is an 82-year-old male currently residing at [facility] x 3.5 years. PPH of recurrent major depression, last MMSE 29/30 at [facility],  multiple prior inpatient psychiatric admissions, remote hx of suicidal ideation with general plan but no attempts (intentions to throw him self off the bridge in 1970s), with no hx of aggression or violence, no hx of substance abuse, no legal  issues, who was admitted to [place] [id number] [facility] on 9/12/22 as a transfer from [facility] medical floors where he was admitted for dehydration and followed by  Psychiatry.  Patient endorsed suicidal ideation with continued thought of dying with plan (ie. he is going to stop eating and drinking at NH because he want to be dead). PMH- HTN, CHF, COPD & DM, hyponatremia.  At [facility], started bifrontal ECT (only one session on 9/19/22; Pt refused further treatment). COURSE OF TREATMENT AT [facility]: Pt was evaluated and approved for ECT and received his 1st ECT on 09/19 but has been refusing ECT since then. Venlafaxine was added to augment treatment for depression. With continuing treatment, patient's report of guilt is more interrupted than initially reported. Venlafaxine was titrated but patient's Na started to trend downwards, and venlafaxine was tapered down to 75mg daily. Patient had a CT head on 09/22 and it indicated resolution of the previous SDH. Patient was seen afterwards by the neurosurgery team and no further changes or interventions recommended by them at this time. MMSE: 29 on 09/14/22. Patient has since reported improved mood and is no longer expressing thoughts of self-harm. Patient's venlafaxine was reduced due to low Na and his serum Na has since improved 10/; 10/; 10/. Next set of labs due on 10/12/22. Patient tested positive for COVID-19 via PCR on 10/11/22 and set to be  transferred to a medical unit at .  (11 Oct 2022 18:48)      PAST MEDICAL & SURGICAL HISTORY:  HTN (hypertension)    COPD, severity to be determined    CHF (congestive heart failure)    Hyponatremia    Chronic subdural hematoma        Allergies    No Known Allergies      SOCIAL HISTORY: lives in NH, significant depression with hx of suicidal ideation, no etoh or drug use reported     Vital Signs Last 24 Hrs  T(C): 36.6 (17 Oct 2022 05:30), Max: 37.1 (16 Oct 2022 16:33)  T(F): 97.8 (17 Oct 2022 05:30), Max: 98.7 (16 Oct 2022 16:33)  HR: 66 (17 Oct 2022 05:30) (61 - 71)  BP: 160/73 (17 Oct 2022 05:30) (137/79 - 160/73)  BP(mean): --  RR: 18 (17 Oct 2022 05:30) (18 - 18)  SpO2: 96% (17 Oct 2022 05:30) (95% - 96%)        Gen: awake alert  HEENT: ncat  CV: s1 s2 rrr  Lung: cta b/l  Abd: no palpable masses.   Extremities: no cce  Pulses: Right PT palpable pulse, Left DT palpable pulse Femoral pulses 2+ no sign of end organ damage                                                                           LABS:                        13.6   4.58  )-----------( 263      ( 15 Oct 2022 12:00 )             39.9     10-15    133<L>  |  98  |  15  ----------------------------<  97  4.0   |  29  |  0.81    Ca    9.2      15 Oct 2022 12:00    TPro  7.3  /  Alb  3.5  /  TBili  0.3  /  DBili  x   /  AST  13<L>  /  ALT  16  /  AlkPhos  42  10-15          RADIOLOGY & ADDITIONAL STUDIES   < from: CT Abdomen and Pelvis w/ Oral Cont and w/ IV Cont (10.15.22 @ 23:50) >  VESSELS: Atherosclerotic disease. Focal dissection infrarenal abdominal   aorta.  RETROPERITONEUM/LYMPH NODES: No lymphadenopathy.  ABDOMINAL WALL: Within normal limits.  BONES: Degenerative change.    IMPRESSION:  Resolution previously suspected pneumobilia or portal venous gas.  Tubular defects within the small bowel. Suggest correlation for parasitic   infection  Mild cardiomegaly    < end of copied text >      Impression and Plan: 82 year old male with hx of depression, admitted with COVID incidentally found to have focal dissection of infrarenal abdominal aorta.     imaging and labs reviewed with Dr. Allred  patient with incidental finding of intrarenal abdominal aorta focal dissection  no sign of end organ damage  no surgical intervention at this time.   seen by Dr. Allred 
    RENATA SAUCEDA    LVS 2E [address] D    Allergies    No Known Allergies    Intolerances        PAST MEDICAL & SURGICAL HISTORY:  HTN (hypertension)      COPD, severity to be determined      CHF (congestive heart failure)      Hyponatremia      Chronic subdural hematoma      Chronic subdural hematoma      No significant past surgical history          FAMILY HISTORY:  No pertinent family history in first degree relatives        Home Medications:  albuterol 90 mcg/inh inhalation aerosol: 2 puff(s) inhaled every 6 hours, As needed, Shortness of Breath and/or Wheezing (13 Oct 2022 13:49)  amLODIPine 5 mg oral tablet: 1 tab(s) orally once a day (13 Oct 2022 13:49)  budesonide-formoterol 80 mcg-4.5 mcg/inh inhalation aerosol: 2 puff(s) inhaled once a day (12 Sep 2022 14:39)  metFORMIN 500 mg oral tablet: 1 tab(s) orally 2 times a day (13 Oct 2022 13:49)  mirtazapine 45 mg oral tablet: 1 tab(s) orally once a day (at bedtime) (13 Oct 2022 13:49)  polyethylene glycol 3350 oral powder for reconstitution: 17 gram(s) orally once a day, As needed, Constipation (13 Oct 2022 13:49)  sodium chloride 1 g oral tablet: 1 tab(s) orally 2 times a day (13 Oct 2022 13:49)  venlafaxine 75 mg oral capsule, extended release: 1 cap(s) orally once a day (11 Oct 2022 13:23)      MEDICATIONS  (STANDING):  amLODIPine   Tablet 5 milliGRAM(s) Oral daily  enoxaparin Injectable 40 milliGRAM(s) SubCutaneous every 24 hours  mirtazapine 45 milliGRAM(s) Oral at bedtime  sodium chloride 1 Gram(s) Oral two times a day  venlafaxine XR. 75 milliGRAM(s) Oral daily    MEDICATIONS  (PRN):  ALBUTerol    90 MICROgram(s) HFA Inhaler 2 Puff(s) Inhalation every 6 hours PRN Shortness of Breath and/or Wheezing  guaiFENesin Oral Liquid (Sugar-Free) 100 milliGRAM(s) Oral every 6 hours PRN Cough  OLANZapine 2.5 milliGRAM(s) Oral two times a day PRN agitation  polyethylene glycol 3350 17 Gram(s) Oral daily PRN Constipation              Vital Signs Last 24 Hrs  T(C): 36.4 (15 Oct 2022 10:57), Max: 37 (15 Oct 2022 05:21)  T(F): 97.5 (15 Oct 2022 10:57), Max: 98.6 (15 Oct 2022 05:21)  HR: 78 (15 Oct 2022 10:57) (62 - 78)  BP: 157/82 (15 Oct 2022 10:57) (129/75 - 157/82)  BP(mean): --  RR: 16 (15 Oct 2022 10:57) (16 - 17)  SpO2: 96% (15 Oct 2022 10:57) (91% - 96%)                  LABS:                        13.6   4.58  )-----------( 263      ( 15 Oct 2022 12:00 )             39.9     10-15    133<L>  |  98  |  15  ----------------------------<  97  4.0   |  29  |  0.81    Ca    9.2      15 Oct 2022 12:00    TPro  7.3  /  Alb  3.5  /  TBili  0.3  /  DBili  x   /  AST  13<L>  /  ALT  16  /  AlkPhos  42  10-15              WBC:  WBC Count: 4.58 K/uL (10-15 @ 12:00)      MICROBIOLOGY:  RECENT CULTURES:                  Sodium:  Sodium, Serum: 133 mmol/L (10-15 @ 12:00)  Sodium, Serum: 132 mmol/L (10-14 @ 07:55)  Sodium, Serum: 128 mmol/L (10-12 @ 07:50)      0.81 mg/dL 10-15 @ 12:00  0.87 mg/dL 10-14 @ 07:55  0.68 mg/dL 10-12 @ 07:50      Hemoglobin:  Hemoglobin: 13.6 g/dL (10-15 @ 12:00)      Platelets: Platelet Count - Automated: 263 K/uL (10-15 @ 12:00)      LIVER FUNCTIONS - ( 15 Oct 2022 12:00 )  Alb: 3.5 g/dL / Pro: 7.3 gm/dL / ALK PHOS: 42 U/L / ALT: 16 U/L / AST: 13 U/L / GGT: x                 RADIOLOGY & ADDITIONAL STUDIES:      MICROBIOLOGY:  RECENT CULTURES:          
82-year-old male currently residing at [facility] x 3.5 years. PPH of recurrent major depression, last MMSE 29/30 at [facility],  multiple prior inpatient psychiatric admissions, remote hx of suicidal ideation with general plan but no attempts (intentions to throw him self off the bridge in 1970s), with no hx of aggression or violence, no hx of substance abuse, no legal  issues, who was admitted to [place] [address] [facility] on 9/12/22 as a transfer from [facility] medical floors where he was admitted for dehydration and followed by  Psychiatry.      Patient has no acute complaints and is comfortably laying in bed and cooperative. Reports no suicidal thoughts. Denies SOB, CP, fever, palpitations, constipation, diarrhea, nausea, headache.    T(C): 36.9 (10-12-22 @ 02:10), Max: 37.4 (10-11-22 @ 08:24)  HR: 80 (10-12-22 @ 02:10) (69 - 84)  BP: 159/73 (10-12-22 @ 02:10) (126/70 - 200/85)  RR: 20 (10-12-22 @ 02:10) (12 - 20)  SpO2: 95% (10-12-22 @ 02:10) (95% - 98%)    CONSTITUTIONAL: Well groomed, no apparent distress  EYES: PERRLA and symmetric, EOMI, No conjunctival or scleral injection, non-icteric  ENMT: Oral mucosa with moist membranes. Normal dentition; no pharyngeal injection or exudates  RESP: No respiratory distress, no use of accessory muscles; CTA b/l, no WRR  CV: RRR, +S1S2, no MRG; no JVD; no peripheral edema  GI: Soft, NT, ND, no rebound, no guarding; no palpable masses; no hepatosplenomegaly; no hernia palpated  LYMPH: No cervical LAD or tenderness; no axillary LAD or tenderness; no inguinal LAD or tenderness  MSK: Normal ROM without pain, no spinal tenderness, normal muscle strength/tone  SKIN: No rashes or ulcers noted; no subcutaneous nodules or induration palpable  NEURO: CN II-XII intact; normal reflexes in upper and lower extremities, sensation intact in upper and lower extremities b/l to light touch   PSYCH: Appropriate insight/judgment; A+O x 3, flat affect

## 2022-10-17 NOTE — CONSULT NOTE ADULT - REASON FOR ADMISSION
Administrative transfer from [facility] Unit Low [facility] to [facility] Unit 2E for boarding due to COVID-19 outbreak at [facility]
Administrative transfer from [facility] Unit Low 5 to [facility] Unit 2E for boarding due to COVID-19 outbreak at [facility]
Administrative transfer from [facility] Unit Low 5 to [facility] Unit 2E for boarding due to COVID-19 outbreak at [facility]

## 2022-10-17 NOTE — BH CONSULTATION LIAISON PROGRESS NOTE - NSBHCHARTREVIEWVS_PSY_A_CORE FT
Vital Signs Last 24 Hrs  T(C): 36.4 (17 Oct 2022 11:40), Max: 37.1 (16 Oct 2022 16:33)  T(F): 97.5 (17 Oct 2022 11:40), Max: 98.7 (16 Oct 2022 16:33)  HR: 65 (17 Oct 2022 11:40) (61 - 71)  BP: 133/82 (17 Oct 2022 11:40) (133/82 - 160/73)  BP(mean): --  RR: 18 (17 Oct 2022 11:40) (18 - 18)  SpO2: 98% (17 Oct 2022 11:40) (95% - 98%)    
Vital Signs Last 24 Hrs  T(C): 36.3 (13 Oct 2022 10:58), Max: 37.1 (12 Oct 2022 17:20)  T(F): 97.4 (13 Oct 2022 10:58), Max: 98.7 (12 Oct 2022 17:20)  HR: 70 (13 Oct 2022 10:58) (70 - 75)  BP: 101/64 (13 Oct 2022 10:58) (101/64 - 148/84)  BP(mean): --  RR: 17 (13 Oct 2022 10:58) (17 - 18)  SpO2: 93% (13 Oct 2022 10:58) (90% - 97%)    Parameters below as of 13 Oct 2022 10:58  Patient On (Oxygen Delivery Method): room air    
Vital Signs Last 24 Hrs  T(C): 36.6 (14 Oct 2022 11:36), Max: 37.1 (13 Oct 2022 17:27)  T(F): 97.8 (14 Oct 2022 11:36), Max: 98.8 (13 Oct 2022 17:27)  HR: 67 (14 Oct 2022 11:36) (64 - 72)  BP: 132/74 (14 Oct 2022 11:36) (110/68 - 164/79)  BP(mean): --  RR: 17 (14 Oct 2022 11:36) (17 - 18)  SpO2: 94% (14 Oct 2022 11:36) (94% - 94%)    
Vital Signs Last 24 Hrs  T(C): 36.4 (12 Oct 2022 11:19), Max: 37.1 (12 Oct 2022 00:03)  T(F): 97.6 (12 Oct 2022 11:19), Max: 98.7 (12 Oct 2022 00:03)  HR: 72 (12 Oct 2022 11:19) (63 - 84)  BP: 144/75 (12 Oct 2022 11:19) (126/70 - 200/85)  BP(mean): --  RR: 19 (12 Oct 2022 11:19) (12 - 20)  SpO2: 97% (12 Oct 2022 11:19) (95% - 98%)    Parameters below as of 12 Oct 2022 11:19  Patient On (Oxygen Delivery Method): room air

## 2022-10-17 NOTE — DISCHARGE NOTE NURSING/CASE MANAGEMENT/SOCIAL WORK - PATIENT PORTAL LINK FT
You can access the FollowMyHealth Patient Portal offered by [facility] by registering at the following website: [email or web address]. By joining [employer]’s FollowMyHealth portal, you will also be able to view your health information using other applications (apps) compatible with our system.

## 2022-10-17 NOTE — BH CONSULTATION LIAISON PROGRESS NOTE - GENERAL APPEARANCE
No deformities present/Other

## 2022-10-17 NOTE — BH CONSULTATION LIAISON PROGRESS NOTE - NSBHFUPINTERVALHXFT_PSY_A_CORE
Interval events: CT chest was done showing Spiculated right apical mass may be postinflammatory or neoplastic; intrahepatic pneumobilia versus portal venous gas. Portal venous gas could reflect bowel ischemia. CT of abdomen done Resolution previously suspected pneumobilia or portal venous gas. Tubular defects within the small bowel. Suggest correlation for parasitic infection (no clinical indication) and incidentally found to have focal dissection of infrarenal abdominal aorta. Seen by Vascular Surgery - no surgical intervention indicated. Psychiatrically speaking, Patient is maintaining the same clinical presentation - calm, cooperative, medication and treatment compliant. Pt reports improved mood and denies any suicidal ideation, intent or plan. He feels ready for discharge back to the nursing home.          
Sodium level 132 today; best thus far. BMP for Sunday ordered if still here. No significant interval events; awaiting Level 2 disposition to go through so Patient may return to his nursing home. Repeat serum sodium ordered for AM. Patient remains calm, cooperative, medication and treatment compliant. Pt reports improved mood and denies any suicidal ideation, intent or plan. He feels ready for discharge back to the nursing home. 
No significant interval events; awaiting Level 2 disposition to go through so Patient may return to his nursing home. Repeat serum sodium ordered for AM. Patient remains calm, cooperative, medication and treatment compliant. Pt reports improved mood and denies any suicidal ideation, intent or plan. He feels ready for discharge back to the nursing home. 
No significant interval events. Repeat serum sodium 128 on 10/12/22 at baseline range Patient remains calm, cooperative, medication and treatment compliant. reports improved mood and denies any suicidal ideation, intent or plan. He feels ready for discharge back to the nursing home.

## 2022-10-17 NOTE — BH CONSULTATION LIAISON PROGRESS NOTE - NSBHMSESPABN_PSY_A_CORE
Soft volume/Slowed rate/Decreased productivity

## 2022-10-17 NOTE — BH CONSULTATION LIAISON PROGRESS NOTE - NSBHATTESTBILLINGAW_PSY_A_CORE
[id number] Inpatient care - low complexity - 15 minutes
[id number] Inpatient care - low complexity - 15 minutes
[id number] Inpatient care - low complexity - 15 minutes
[id number]-[id number] Inpatient care - low complexity - 15 minutes

## 2022-10-20 NOTE — BH CONSULTATION LIAISON PROGRESS NOTE - NSBHASSESSMENTFT_PSY_ALL_CORE
Pt is calling stating that there is blood in urine. Pt mention that period was 2 weeks ago, but the blood on urine is on and off and there is no pain.        Please call and advise
Administrative transfer from [facility] Unit Low [facility] to [facility] Unit 2E for boarding due to COVID-19 outbreak at [facility] has been psychiatrically cleared for discharge on Low5 already prior to transfer and was awaiting documentation for return back to nursing home. Plan of care confirmed with [facility] Medical Director Dr Waterman on 10/14/22. 
Administrative transfer from [facility] Unit Low 5 to [facility] Unit 2E for boarding due to COVID-19 outbreak at [facility]  has been psychiatrically cleared for discharge on Low5 already prior to transfer and was awaiting documentation for return back to nursing home. 
Administrative transfer from [facility] Unit Low 5 to [facility] Unit 2E for boarding due to COVID-19 outbreak at [facility]  has been psychiatrically cleared for discharge on Low5 already prior to transfer and was awaiting documentation for return back to nursing home. 
Administrative transfer from [facility] Unit Low 5 to [facility] Unit 2E for boarding due to COVID-19 outbreak at [facility] has been psychiatrically cleared for discharge on Low5 already prior to transfer and was awaiting documentation for return back to nursing home. Plan of care confirmed with [facility] Medical Director Dr Waterman on 10/14/22.

## 2022-10-25 DIAGNOSIS — R91.8 OTHER NONSPECIFIC ABNORMAL FINDING OF LUNG FIELD: ICD-10-CM

## 2022-10-25 DIAGNOSIS — U07.1 COVID-19: ICD-10-CM

## 2022-10-25 DIAGNOSIS — Z79.84 LONG TERM (CURRENT) USE OF ORAL HYPOGLYCEMIC DRUGS: ICD-10-CM

## 2022-10-25 DIAGNOSIS — E11.9 TYPE 2 DIABETES MELLITUS WITHOUT COMPLICATIONS: ICD-10-CM

## 2022-10-25 DIAGNOSIS — I71.43 INFRARENAL ABDOMINAL AORTIC ANEURYSM, WITHOUT RUPTURE: ICD-10-CM

## 2022-10-25 DIAGNOSIS — F33.9 MAJOR DEPRESSIVE DISORDER, RECURRENT, UNSPECIFIED: ICD-10-CM

## 2022-10-25 DIAGNOSIS — E87.1 HYPO-OSMOLALITY AND HYPONATREMIA: ICD-10-CM

## 2022-10-25 DIAGNOSIS — R45.851 SUICIDAL IDEATIONS: ICD-10-CM

## 2022-10-25 DIAGNOSIS — J44.9 CHRONIC OBSTRUCTIVE PULMONARY DISEASE, UNSPECIFIED: ICD-10-CM

## 2022-10-25 DIAGNOSIS — I11.0 HYPERTENSIVE HEART DISEASE WITH HEART FAILURE: ICD-10-CM

## 2022-10-25 DIAGNOSIS — I50.9 HEART FAILURE, UNSPECIFIED: ICD-10-CM

## 2022-11-10 NOTE — BH CONSULTATION LIAISON PROGRESS NOTE - OTHER
see above  adequate  "ok'  MMSE to be done later = significant perioral TD? mouth agape with difficulty talking/displaying affect which is highly likely chronic  + paucity  low end of fair  deferred at this time  Minoxidil Counseling: Minoxidil is a topical medication which can increase blood flow where it is applied. It is uncertain how this medication increases hair growth. Side effects are uncommon and include stinging and allergic reactions.

## 2022-12-20 NOTE — BH CONSULTATION LIAISON PROGRESS NOTE - NSBHMSEGROOM_PSY_A_CORE
Physical Therapy Visit    Visit Type: Daily Treatment Note  Visit: 6  Referring Provider: Shila Clemente DPM  Medical Diagnosis (from order): Diagnosis Information    Diagnosis  728.71 (ICD-9-CM) - M72.2 (ICD-10-CM) - Plantar fasciitis of right foot         SUBJECTIVE                                                                                                               The patient reports she noticed an increase in pain since last visit with not having tape put on.  Just seemed her whole foot and heel were more painful and achy.  Considering trial of acupuncture.  She has been good about stretching several times a day, but admits consistency with self care of massage and heat/ice have not been as frequent.   Functional Change: Noted above      OBJECTIVE                                                                                                                                       Treatment    Ultrasound (23798)  - Location: left plantarfascia/heel  - Position: prone  - Duty Cycle: 100%  - Frequency: 1 Mhz  - Intensity (w/cm2): 1.0  - Duration: 8 minutes    2 minutes added to total treatment time for clinical reasoning, assessment of the tissue and determining if the treatment is appropriate to perform)    Results: tissue softening  Reaction: no adverse reaction to treatment      Therapeutic Exercise  - Patient provided with reinforcement of regimen of heat soak, self massage and stretches.  Also suggested patient initiate Voltaren and night splint if possible.  - Patient given suggestions for night splint, compression ankle sock and orthotic plantar fascia cushion.     Manual Therapy   -Soft Tissue Mobilization in prone to right plantar fascia, achilles and calf muscles to decrease pain, improve blood flow and increase myofascial mobility.   -Navicular sling to the patient’s foot with Leukotape to correct foot position.  The patient was educated on the length of time to wear the tape, to take the tape 
off immediately if itching or burning occur, and to notify the therapist if there was a reaction to the tape.    Skilled input: verbal instruction/cues, as detailed above and demonstration    Writer verbally educated and received verbal consent for hand placement, positioning of patient, and techniques to be performed today from patient for hand placement and palpation for techniques, therapist position for techniques and clothing adjustments for techniques as described above and how they are pertinent to the patient's plan of care.    Home Exercise Program  Access Code: [email or web address]  URL: [email or web address]  Date: 12/13/2022  Prepared by: Adrienne Dugan-Torri    Exercises  · Towel Scrunches - 1-3 x daily - 7 x weekly - 2-3 sets - 10 reps  · Seated Gastroc Stretch with Strap - 1-3 x daily - 7 x weekly - 3 sets - 30 s hold  · Toe Spreading - 2 x daily - 2 sets - 10 reps - 5 hold  · Seated Arch Lifts - 2 x daily - 2 sets - 10 reps - 5 hold  · Heel Raises with Counter Support - 1 x daily - 7 x weekly - 1-2 sets - 15 reps  · Standing Hip Abduction - 1 x daily - 7 x weekly - 2 sets - 15 reps  · Single Leg Stance - 1 x daily - 7 x weekly - 2-3 sets - 30 hold  · Single Leg Balance with Trunk Rotation - 1 x daily - 7 x weekly - 1-2 sets - 10 reps               ASSESSMENT                                                                                                            Patient given several recommendations today to potentially help improve progress being made.  Reminded patient of suggestion of night splint and Voltaren that was made by Dr. Clemente, in addition to reinforcing the heat soak and massage daily to see if this helps improve progress.    Trial with US today.   Pain after session: Not formally rated.  Education:   - Results of above outlined education: Verbalizes understanding and Demonstrates understanding       Therapy procedure time and total treatment time can be found documented 
on the Time Entry flowsheet    
Fair

## 2023-02-06 NOTE — PSYCHIATRIC REHAB INITIAL EVALUATION - NSBHEDULITERACY_PSY_ALL_CORE
----- Message from Fredis Garcia PA-C sent at 2/6/2023  3:43 PM CST -----  Please notify patient he is negative for COVID, flu and RSV.    Able to read and write English
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
## 2023-02-22 NOTE — DISCHARGE NOTE NURSING/CASE MANAGEMENT/SOCIAL WORK - NSDCPEFALRISK_GEN_ALL_CORE
[de-identified] : Soft, nondistended, nontender.  Left groin incision clean and healing very well with prominent ridge.  Previously noted local edema significantly decreased from last exam.  Repair fully intact.  Mild left testicular posterior tenderness but no induration.
For information on Fall & Injury Prevention, visit: [email or web address] OR  [email or web address] OR  https://www.cdc.gov/steadi/patient.html

## 2023-09-06 NOTE — BH INPATIENT PSYCHIATRY ASSESSMENT NOTE - OTHER
impulsive hx finger moderate TD of jaw, tongue, legs bilaterally; no catatonia signs noted near flat stutter  laconic

## 2024-01-01 NOTE — BH DISCHARGE NOTE NURSING/SOCIAL WORK/PSYCH REHAB - NSDCINSTRUCTNUMBER_PSY_ALL_CORE
DISCHARGE SUMMARY    Delivery Information:       Gestational Age: 37w5d on 2024 8:53 PM       Birth Measurements:        Weight: 7 lb 12.2 oz (3520 g)        Length: 21\"        Head circumference: 33 cm        Discharge Weight: 3355 g (7 lb 6.3 oz) (24 0220)        Weight change since birth:-5%          Screenings/ Immunizations:    - [place]  Screen: done, results pending  - Hearing Screen: Pass R, Pass L;Final result (24 1200)  - CHD Screening   Screening complete: Done (24)  Right hand reading %: 99 %  Foot reading %: 98 %  CHD: Normal  - Immunizations:   Most Recent Immunizations   Administered Date(s) Administered    Hep B, adolescent or pediatric 2024         Infant Blood Type: O Rh Positive  Bilirubin:  TCB 9.4 at 33 hrs                Helpful reminders to keep you safe when you return home    Call 911 if your child:  Stops breathing  Turns blue  Is choking  Has a seizure            APPOINTMENTS:  You must call your health care provider to make or change follow-up appointments. It is very important to see your provider for follow-up care.  Please call as soon as possible to make your appointment.    BABY CARE    Signs of Sickness            Call your baby's doctor if he/she has:    Baby has signs of worsening jaundice (yellow skin or eyes).  Temperature over 100.4 F degrees taken under the arm  Forceful throwing up  Poor feeding or doesn't eat for 2 feedings in a row  More sleepy than usual or crying a lot.  Look at your resource page on using the 5 S's.  Never shake your baby.  Shaking can cause brain injury, blindness, or death.   Any baby injury - fall from bed or table, dropped or shaken.  Watery, bloody or mucous stools, or worse foul smelling.  White or cream colored stools  Hard stools or no poop for 48 hours.   No wet diapers for 8 hours, no tears when crying, sunken eyes, dry/lips  mouth, or dark yellow pee (baby should have 1 wet diaper on day one of life, 2 wets on day two, 3 wets on day three, 4 wets on day four, 5 wets on day five and going forward)  Rash (new skin redness) other than  rash  Hard time breathing  Very drowsy or slowed body movements   White patches in mouth that can't be wiped away  Smelly pee    Safe Sleep and Risk of Sudden Infant Death Syndrome (SIDS) and Sudden Unexpected Infant Death (SUID)    Back to sleep for every sleep. To decrease the risk of sleep-related death, baby should be placed on their backs by every caregiver until child reaches one year of age.   Use a firm mattress that fits snug in the crib frame, and a tightly fitted sheet to cover the mattress with no other bedding, soft toys, positioning devices or soft objects.   Infants should sleep in the parents' room, close to parents' bed, but on a separate surface designed for infants, ideally for at least the first 6 months.  Correctly sized sleep sacks are best, if not available, a lightweight blanket or swaddle blanket is good. When swaddling, use a single blanket wrapped below shoulder level.  Do not over-heat and over-layer.   No hats or head coverings are needed.  Breastfeeding is known to reduce the risk of SIDS. Giving a pacifier at nap time and bedtime is recommended after breastfeeding is going well and your baby is gaining weight.   Avoid smoke exposure, alcohol, illicit drugs, and marijuana during pregnancy and after birth.   Mattress should be flat.  Devices or monitors do not replace safe sleep recommendations.  Awake and supervised tummy time is important for their growth and development.     Cord Care        Clean around the cord with a damp clean cloth and pat dry.  May clean with mild soap as needed.  Call provider for continuous bleeding from umbilical cord. A drop or two of blood may happen when the umbilical cord falls off in 10-14 days.  Call baby's health care provider if the  cord smells bad, appears red and swollen or has yellow or green drainage or has not fallen off by 3 weeks old.    Normal in the Diaper:    It is normal for females to have a white, cloudy, discharge from the vagina around 1 week old.    Wipe front to back to decrease risk of infection.   It is normal for  genitals to appear swollen.     Circumcision Care     It is normal for penis to have swelling and redness. A thin yellow/white film will form over healing penis in 24-48 hours (do not remove).   Call the baby's health care provider if baby does not have a wet diaper 8 hours after circumcision or there is a foul odor, active bleeding at the site or fever.   Do not use baby wipes on tip of penis.  Penis can be cleansed with warm water if stool gets on tip of the penis. Apply gauze and Vaseline to penis for 4-7 days.    If Plastibel Ring used, no petroleum or gauze is needed. Ring will usually fall off in 5-8 days.  Call the health care provider with unusual swelling, if the ring has not fallen off in 8 days or if ring has slipped onto the shaft of the penis.     Car Seat    A car seat is the best way to keep your infant safe. They should stay in a rear-facing car seat until he or she reaches the top height or weight limit allowed by your car seat's .   Never leave your baby alone in a car.  The inside of a car can reach unsafe temperatures quickly and cause heatstroke.   If you want to make an appointment to have your car seat checked by a certified car seat technician or are looking for more information on the right type of seat for your child, please call our injury prevention team at [phone] (appointments are needed).  We offer appointments virtually or in person.  [email or web address]/equipment/car-seats-and-booster-seats#installation-help-instructionsadLoma Linda Veterans [facility] Center.com/care-and-treatment/injury-prevention/    Resources    Please see the community resources flyer in your discharge  [facility] packet.      Mother Baby Resource Video    (Scan the QR Code.  Password: [email or web address])            Your Baby's Hearing Development Checklist    BIRTH TO 3 YEARS OF AGE        BIRTH TO 3 MONTHS    Reacts to loud sounds.  Is soothed by your voice.  Turns head to you when you speak.  Is awakened by loud voices and sounds.  Seems to know your voice and quiets down if crying.    3 TO 6 MONTHS    Looks upward or turns toward a new sound.  Responds to “no” and changes in tone of voice.  Imitates his/her own voice.  Enjoys rattles and other toys that make sounds.  Begins to repeat sounds (ie: “ooh, aah, ba-ba”).  Becomes scared by a loud voice.    6 TO 10 MONTHS    Responds to his/her own name, someone's voice, and a ringing telephone, even when not loud.  Knows words for common things (cup, shoe) and sayings (“bye-bye”).  Makes babbling sounds, even when alone.  Starts to respond to requests such as “come here”.  Looks at things when someone talks about them.    10 TO 15 MONTHS    Plays with own voice, enjoying the sound and feel of it.  Points to or looks at familiar objects or people when asked to do so.  Imitates simple words and sounds; may use a few single words meaningfully.  Enjoys games like [employer] and Medikid[employer]-cake.    15 TO 18 MONTHS    Follows simple directions, such as “give me the ball”.  Uses words he/she has learned often.  Uses 2-3 word sentences to talk about/ask for things.  Knows 10 - 20 words.    18 TO 24 MONTHS    Understands simple “yes-no” questions, (ie: “Are you hungry”).  Understands simple phrases (ie: “in the cup”, “sit down”).  Enjoys being read to.  Points to pictures when asked.    24 TO 36 MONTHS    Understands “not now” and “no more”.  Chooses things by size (big/little).  Follows simple directions such as “Get your shoes”.  Understands many action words (jump, dance, run).

## 2024-01-03 NOTE — ECT CONSULT NOTE - NSICDXBHPRIMARYDX_PSY_ALL_CORE
Recevied call from patient at pharmacy, patient totally out of medication. D/w SULEIMAN Fall to refill for 1 year supply.    Melquiades Forman, PharmD, BCPS  1/3/2024  15:05 EST     Major depressive episode   F32.9

## 2024-02-13 NOTE — ED BEHAVIORAL HEALTH ASSESSMENT NOTE - NSBHPSYCHOLCOGORIENT_PSY_A_CORE
Patient completed RT on 1/29/24. Patient has urology follow up post RT on 4/30/24. He will be getting PSA PTV. This is appropriate.  
Oriented to time, place, person, situation

## 2024-03-13 NOTE — BH INPATIENT PSYCHIATRY PROGRESS NOTE - NSBHMETABOLIC_PSY_ALL_CORE_FT
No BMI: BMI (kg/m2): 19.7 (09-12-22 @ 23:38)  HbA1c: A1C with Estimated Average Glucose Result: 5.8 % (07-26-22 @ 09:35)    Glucose: POCT Blood Glucose.: 102 mg/dL (09-16-22 @ 08:20)    BP: --  Lipid Panel: Date/Time: 08-16-22 @ 09:10  Cholesterol, Serum: 142  Direct LDL: --  HDL Cholesterol, Serum: 57  Total Cholesterol/HDL Ration Measurement: --  Triglycerides, Serum: 88

## 2024-03-20 NOTE — BH DISCHARGE NOTE NURSING/SOCIAL WORK/PSYCH REHAB - NSDCPRGOAL_PSY_ALL_CORE
Oriented to time, place, person, situation Oriented to time, place, person, situation Writer met with patient to discuss patient’s discharge and progress towards rehabilitation goals. Patient was initially receptive to meeting with writer but then declined to engage in further conversation surrounding progress towards treatment goals. Patient declined to complete safety plan prior to discharge and will be provided with emergency contacts in his transitions of care. Patient is being transferred to [facility] after testing positive for COVID. Patient was admitted to [facility] due to suicidal ideation. During current hospitalization, patient demonstrated fair improvements in symptoms. Patient reported some improvements in depressive symptoms. Patient presented with increasing appetite and fair sleep. Patient was adherent to medications and reported no side effects. Patient denied suicidal/homicidal ideations. Patient denied auditory/visual hallucinations. Patient has met his psychiatric rehabilitation goal of identifying coping skills that assist in improving mood. Due to the COVID-19 pandemic, unit structure and activities are re-evaluated on a consistent basis in effort to maintain the safety of patients and staff. Patient attended most psychiatric rehabilitation groups and participated appropriately during sessions. Patient was visible on the unit and maintained good behavioral control. Patient was isolative with peers and able to make needs known to staff. Patient presented with fair ADLs.

## 2024-10-07 NOTE — BH CONSULTATION LIAISON PROGRESS NOTE - MSE OPTIONS
Pt unable to attend appt due to needing ankle surgery   
Structured MSE

## 2024-11-09 NOTE — ED ADULT NURSE REASSESSMENT NOTE - NS ED NURSE REASSESS COMMENT FT1
Pt awaiting EMS , staff member remains at bedside. Pt offered food but he refused.
Spoke to attending who alerted [facility] provider Juan Jose that pt was coming back to [facility]. I spoke with RN Mari on [facility] as well to notify that pt was stable and would be returning to [facility] pending ems transport. [facility] EMS transport arranged at this time. Primary RN Jeimy manuel.
Speaking Coherently

## 2024-12-04 NOTE — ED BEHAVIORAL HEALTH ASSESSMENT NOTE - AXIS III
Attempted to call patient multiple times regarding prostate MRI results, eventually left voicemail with results    PI-RADS 5 lesion in the left posterior lateral mid gland also with PI-RADS 4 lesion in the left anterior mid gland both lesions are greater than 1 cm.     Gland 29 g in size large TUR defect consistent with known history of holmium laser nucleation of prostate    Overall would recommend repeat prostate biopsy with focus on PI-RADS lesions on left side this can be performed in the office if patient willing to consider local procedure - -alternatively could consider external beam radiation or radical prostatectomy without repeat biopsy given patient's known pathology today.       Copy to RN to schedule virtual visit or in person visit at patient preference to review options given patient's known history of Robb grade group 2 disease on final pathology 2 concerning lesions on MRI concern for disease progression over time HTN, CHF, COPD & DM
